# Patient Record
Sex: MALE | Race: WHITE | NOT HISPANIC OR LATINO | Employment: OTHER | URBAN - METROPOLITAN AREA
[De-identification: names, ages, dates, MRNs, and addresses within clinical notes are randomized per-mention and may not be internally consistent; named-entity substitution may affect disease eponyms.]

---

## 2017-05-15 ENCOUNTER — COMMUNICATION - HEALTHEAST (OUTPATIENT)
Dept: INTERNAL MEDICINE | Facility: CLINIC | Age: 62
End: 2017-05-15

## 2017-05-15 ENCOUNTER — OFFICE VISIT - HEALTHEAST (OUTPATIENT)
Dept: INTERNAL MEDICINE | Facility: CLINIC | Age: 62
End: 2017-05-15

## 2017-05-15 DIAGNOSIS — I10 BENIGN ESSENTIAL HTN: ICD-10-CM

## 2017-05-15 DIAGNOSIS — M10.9 GOUTY ARTHROPATHY: ICD-10-CM

## 2017-05-15 DIAGNOSIS — E11.9 TYPE 2 DIABETES MELLITUS (H): ICD-10-CM

## 2017-05-15 DIAGNOSIS — Z91.199 COMPLIANCE POOR: ICD-10-CM

## 2017-05-15 LAB — HBA1C MFR BLD: 10.7 % (ref 3.5–6)

## 2017-05-15 ASSESSMENT — MIFFLIN-ST. JEOR: SCORE: 1743.34

## 2017-05-17 ENCOUNTER — COMMUNICATION - HEALTHEAST (OUTPATIENT)
Dept: INTERNAL MEDICINE | Facility: CLINIC | Age: 62
End: 2017-05-17

## 2017-05-20 ENCOUNTER — COMMUNICATION - HEALTHEAST (OUTPATIENT)
Dept: INTERNAL MEDICINE | Facility: CLINIC | Age: 62
End: 2017-05-20

## 2017-05-20 DIAGNOSIS — M10.9 GOUT: ICD-10-CM

## 2017-05-31 ENCOUNTER — OFFICE VISIT - HEALTHEAST (OUTPATIENT)
Dept: INTERNAL MEDICINE | Facility: CLINIC | Age: 62
End: 2017-05-31

## 2017-05-31 DIAGNOSIS — E11.9 TYPE 2 DIABETES MELLITUS WITHOUT COMPLICATION, WITHOUT LONG-TERM CURRENT USE OF INSULIN (H): ICD-10-CM

## 2017-05-31 DIAGNOSIS — Z91.199 COMPLIANCE POOR: ICD-10-CM

## 2017-05-31 DIAGNOSIS — M10.9 GOUTY ARTHROPATHY: ICD-10-CM

## 2017-05-31 DIAGNOSIS — I10 BENIGN ESSENTIAL HTN: ICD-10-CM

## 2017-06-15 ENCOUNTER — COMMUNICATION - HEALTHEAST (OUTPATIENT)
Dept: INTERNAL MEDICINE | Facility: CLINIC | Age: 62
End: 2017-06-15

## 2017-07-24 ENCOUNTER — OFFICE VISIT - HEALTHEAST (OUTPATIENT)
Dept: INTERNAL MEDICINE | Facility: CLINIC | Age: 62
End: 2017-07-24

## 2017-07-24 ENCOUNTER — COMMUNICATION - HEALTHEAST (OUTPATIENT)
Dept: INTERNAL MEDICINE | Facility: CLINIC | Age: 62
End: 2017-07-24

## 2017-07-24 DIAGNOSIS — E11.9 TYPE 2 DIABETES MELLITUS WITHOUT COMPLICATION, WITHOUT LONG-TERM CURRENT USE OF INSULIN (H): ICD-10-CM

## 2017-07-24 DIAGNOSIS — M10.9 GOUTY ARTHROPATHY: ICD-10-CM

## 2017-07-24 DIAGNOSIS — Z91.199 COMPLIANCE POOR: ICD-10-CM

## 2017-07-24 DIAGNOSIS — I10 BENIGN ESSENTIAL HTN: ICD-10-CM

## 2017-07-24 DIAGNOSIS — K63.5 COLON POLYPS: ICD-10-CM

## 2017-07-24 LAB
HBA1C MFR BLD: 7.5 % (ref 3.5–6)
LDLC SERPL CALC-MCNC: 84 MG/DL

## 2017-09-04 ENCOUNTER — COMMUNICATION - HEALTHEAST (OUTPATIENT)
Dept: INTERNAL MEDICINE | Facility: CLINIC | Age: 62
End: 2017-09-04

## 2017-09-04 DIAGNOSIS — I10 BENIGN ESSENTIAL HTN: ICD-10-CM

## 2017-09-18 ENCOUNTER — COMMUNICATION - HEALTHEAST (OUTPATIENT)
Dept: INTERNAL MEDICINE | Facility: CLINIC | Age: 62
End: 2017-09-18

## 2017-09-18 DIAGNOSIS — E11.9 TYPE 2 DIABETES MELLITUS WITHOUT COMPLICATION, WITHOUT LONG-TERM CURRENT USE OF INSULIN (H): ICD-10-CM

## 2017-09-19 RX ORDER — ATORVASTATIN CALCIUM 10 MG/1
TABLET, FILM COATED ORAL
Qty: 90 TABLET | Refills: 0 | Status: ON HOLD | OUTPATIENT
Start: 2017-09-19 | End: 2021-01-01

## 2017-10-10 ENCOUNTER — OFFICE VISIT - HEALTHEAST (OUTPATIENT)
Dept: INTERNAL MEDICINE | Facility: CLINIC | Age: 62
End: 2017-10-10

## 2017-10-10 DIAGNOSIS — I10 BENIGN ESSENTIAL HTN: ICD-10-CM

## 2017-10-10 DIAGNOSIS — L72.9 SKIN CYST: ICD-10-CM

## 2017-10-10 DIAGNOSIS — E78.5 HYPERLIPEMIA: ICD-10-CM

## 2017-10-10 DIAGNOSIS — Z23 NEED FOR INFLUENZA VACCINATION: ICD-10-CM

## 2017-10-10 DIAGNOSIS — E11.9 TYPE 2 DIABETES MELLITUS WITHOUT COMPLICATION, WITHOUT LONG-TERM CURRENT USE OF INSULIN (H): ICD-10-CM

## 2017-10-10 RX ORDER — CLONIDINE HYDROCHLORIDE 0.2 MG/1
TABLET ORAL
Qty: 90 TABLET | Refills: 3 | Status: SHIPPED
Start: 2017-10-10

## 2017-10-10 RX ORDER — SIMVASTATIN 20 MG
20 TABLET ORAL EVERY EVENING
Qty: 90 TABLET | Refills: 3 | Status: ON HOLD | OUTPATIENT
Start: 2017-10-10 | End: 2021-01-01

## 2017-10-10 ASSESSMENT — MIFFLIN-ST. JEOR: SCORE: 1772.37

## 2017-11-08 ENCOUNTER — COMMUNICATION - HEALTHEAST (OUTPATIENT)
Dept: INTERNAL MEDICINE | Facility: CLINIC | Age: 62
End: 2017-11-08

## 2017-11-08 DIAGNOSIS — M10.9 GOUT: ICD-10-CM

## 2018-05-04 ENCOUNTER — COMMUNICATION - HEALTHEAST (OUTPATIENT)
Dept: INTERNAL MEDICINE | Facility: CLINIC | Age: 63
End: 2018-05-04

## 2018-05-04 RX ORDER — INDOMETHACIN 50 MG/1
50 CAPSULE ORAL 2 TIMES DAILY PRN
Qty: 60 CAPSULE | Refills: 6 | Status: ON HOLD | OUTPATIENT
Start: 2018-05-04 | End: 2021-01-01

## 2018-06-14 ENCOUNTER — COMMUNICATION - HEALTHEAST (OUTPATIENT)
Dept: INTERNAL MEDICINE | Facility: CLINIC | Age: 63
End: 2018-06-14

## 2018-06-14 DIAGNOSIS — M10.9 GOUT: ICD-10-CM

## 2018-06-14 DIAGNOSIS — E11.9 TYPE 2 DIABETES MELLITUS (H): ICD-10-CM

## 2018-06-17 RX ORDER — GLIPIZIDE 10 MG/1
TABLET ORAL
Qty: 90 TABLET | Refills: 0 | Status: ON HOLD | OUTPATIENT
Start: 2018-06-17 | End: 2021-01-01

## 2018-07-25 ENCOUNTER — COMMUNICATION - HEALTHEAST (OUTPATIENT)
Dept: INTERNAL MEDICINE | Facility: CLINIC | Age: 63
End: 2018-07-25

## 2018-07-25 DIAGNOSIS — I10 BENIGN ESSENTIAL HTN: ICD-10-CM

## 2018-07-25 RX ORDER — AMLODIPINE BESYLATE 10 MG/1
TABLET ORAL
Qty: 90 TABLET | Refills: 0 | Status: SHIPPED | OUTPATIENT
Start: 2018-07-25

## 2018-07-27 ENCOUNTER — COMMUNICATION - HEALTHEAST (OUTPATIENT)
Dept: INTERNAL MEDICINE | Facility: CLINIC | Age: 63
End: 2018-07-27

## 2018-07-27 DIAGNOSIS — E11.9 TYPE 2 DIABETES MELLITUS (H): ICD-10-CM

## 2018-08-06 ENCOUNTER — COMMUNICATION - HEALTHEAST (OUTPATIENT)
Dept: INTERNAL MEDICINE | Facility: CLINIC | Age: 63
End: 2018-08-06

## 2018-08-06 DIAGNOSIS — E11.9 TYPE 2 DIABETES MELLITUS (H): ICD-10-CM

## 2018-09-07 ENCOUNTER — COMMUNICATION - HEALTHEAST (OUTPATIENT)
Dept: INTERNAL MEDICINE | Facility: CLINIC | Age: 63
End: 2018-09-07

## 2018-09-10 RX ORDER — LISINOPRIL AND HYDROCHLOROTHIAZIDE 20; 25 MG/1; MG/1
TABLET ORAL
Qty: 90 TABLET | Refills: 3 | Status: ON HOLD | OUTPATIENT
Start: 2018-09-10 | End: 2021-01-01

## 2018-09-13 ENCOUNTER — COMMUNICATION - HEALTHEAST (OUTPATIENT)
Dept: INTERNAL MEDICINE | Facility: CLINIC | Age: 63
End: 2018-09-13

## 2018-10-01 ENCOUNTER — COMMUNICATION - HEALTHEAST (OUTPATIENT)
Dept: INTERNAL MEDICINE | Facility: CLINIC | Age: 63
End: 2018-10-01

## 2018-10-01 DIAGNOSIS — I10 BENIGN ESSENTIAL HTN: ICD-10-CM

## 2018-10-02 RX ORDER — CLONIDINE HYDROCHLORIDE 0.2 MG/1
0.2 TABLET ORAL DAILY
Qty: 90 TABLET | Refills: 0 | Status: ON HOLD | OUTPATIENT
Start: 2018-10-02 | End: 2021-01-01

## 2019-01-24 ENCOUNTER — OFFICE VISIT - HEALTHEAST (OUTPATIENT)
Dept: INTERNAL MEDICINE | Facility: CLINIC | Age: 64
End: 2019-01-24

## 2019-01-24 DIAGNOSIS — E11.9 TYPE 2 DIABETES MELLITUS WITHOUT COMPLICATION, WITHOUT LONG-TERM CURRENT USE OF INSULIN (H): ICD-10-CM

## 2019-01-24 DIAGNOSIS — Z98.890 H/O CORONARY ANGIOGRAM: ICD-10-CM

## 2019-01-24 DIAGNOSIS — R94.31 NONSPECIFIC ABNORMAL ELECTROCARDIOGRAM (ECG) (EKG): ICD-10-CM

## 2019-01-24 DIAGNOSIS — G62.9 NEUROPATHY: ICD-10-CM

## 2019-01-24 DIAGNOSIS — M10.9 GOUTY ARTHROPATHY: ICD-10-CM

## 2019-01-24 DIAGNOSIS — I10 BENIGN ESSENTIAL HTN: ICD-10-CM

## 2019-01-24 LAB
ALBUMIN SERPL-MCNC: 3.5 G/DL (ref 3.5–5)
ALP SERPL-CCNC: 90 U/L (ref 45–120)
ALT SERPL W P-5'-P-CCNC: 15 U/L (ref 0–45)
ANION GAP SERPL CALCULATED.3IONS-SCNC: 12 MMOL/L (ref 5–18)
AST SERPL W P-5'-P-CCNC: 11 U/L (ref 0–40)
BILIRUB SERPL-MCNC: 0.5 MG/DL (ref 0–1)
BUN SERPL-MCNC: 11 MG/DL (ref 8–22)
CALCIUM SERPL-MCNC: 9.1 MG/DL (ref 8.5–10.5)
CHLORIDE BLD-SCNC: 100 MMOL/L (ref 98–107)
CO2 SERPL-SCNC: 26 MMOL/L (ref 22–31)
CREAT SERPL-MCNC: 1.01 MG/DL (ref 0.7–1.3)
CREAT UR-MCNC: 91.8 MG/DL
ERYTHROCYTE [DISTWIDTH] IN BLOOD BY AUTOMATED COUNT: 11.3 % (ref 11–14.5)
GFR SERPL CREATININE-BSD FRML MDRD: >60 ML/MIN/1.73M2
GLUCOSE BLD-MCNC: 381 MG/DL (ref 70–125)
HBA1C MFR BLD: 9.1 % (ref 3.5–6)
HCT VFR BLD AUTO: 40.8 % (ref 40–54)
HGB BLD-MCNC: 14 G/DL (ref 14–18)
LDLC SERPL CALC-MCNC: 88 MG/DL
MCH RBC QN AUTO: 29.2 PG (ref 27–34)
MCHC RBC AUTO-ENTMCNC: 34.3 G/DL (ref 32–36)
MCV RBC AUTO: 85 FL (ref 80–100)
MICROALBUMIN UR-MCNC: 13.12 MG/DL (ref 0–1.99)
MICROALBUMIN/CREAT UR: 142.9 MG/G
PLATELET # BLD AUTO: 159 THOU/UL (ref 140–440)
PMV BLD AUTO: 8.7 FL (ref 7–10)
POTASSIUM BLD-SCNC: 4.1 MMOL/L (ref 3.5–5)
PROT SERPL-MCNC: 6.2 G/DL (ref 6–8)
RBC # BLD AUTO: 4.8 MILL/UL (ref 4.4–6.2)
SODIUM SERPL-SCNC: 138 MMOL/L (ref 136–145)
WBC: 6.5 THOU/UL (ref 4–11)

## 2019-01-24 RX ORDER — GABAPENTIN 300 MG/1
300 CAPSULE ORAL
Qty: 30 CAPSULE | Refills: 0 | Status: ON HOLD | OUTPATIENT
Start: 2019-01-24 | End: 2021-01-01

## 2019-01-25 ENCOUNTER — COMMUNICATION - HEALTHEAST (OUTPATIENT)
Dept: INTERNAL MEDICINE | Facility: CLINIC | Age: 64
End: 2019-01-25

## 2019-01-29 ENCOUNTER — COMMUNICATION - HEALTHEAST (OUTPATIENT)
Dept: PHARMACY | Facility: CLINIC | Age: 64
End: 2019-01-29

## 2019-01-31 ENCOUNTER — COMMUNICATION - HEALTHEAST (OUTPATIENT)
Dept: PHARMACY | Facility: CLINIC | Age: 64
End: 2019-01-31

## 2019-05-03 ENCOUNTER — COMMUNICATION - HEALTHEAST (OUTPATIENT)
Dept: INTERNAL MEDICINE | Facility: CLINIC | Age: 64
End: 2019-05-03

## 2019-06-09 ENCOUNTER — COMMUNICATION - HEALTHEAST (OUTPATIENT)
Dept: INTERNAL MEDICINE | Facility: CLINIC | Age: 64
End: 2019-06-09

## 2019-06-09 DIAGNOSIS — M10.9 GOUT: ICD-10-CM

## 2019-06-09 DIAGNOSIS — I10 BENIGN ESSENTIAL HTN: ICD-10-CM

## 2019-06-09 DIAGNOSIS — E11.9 TYPE 2 DIABETES MELLITUS (H): ICD-10-CM

## 2019-06-10 RX ORDER — METOPROLOL SUCCINATE 100 MG/1
TABLET, EXTENDED RELEASE ORAL
Qty: 90 TABLET | Refills: 3 | Status: SHIPPED | OUTPATIENT
Start: 2019-06-10

## 2019-06-10 RX ORDER — GLIPIZIDE 5 MG/1
TABLET ORAL
Qty: 90 TABLET | Refills: 3 | Status: ON HOLD | OUTPATIENT
Start: 2019-06-10 | End: 2021-01-01

## 2019-06-10 RX ORDER — ALLOPURINOL 300 MG/1
TABLET ORAL
Qty: 100 TABLET | Refills: 3 | Status: SHIPPED | OUTPATIENT
Start: 2019-06-10

## 2019-06-28 ENCOUNTER — COMMUNICATION - HEALTHEAST (OUTPATIENT)
Dept: INTERNAL MEDICINE | Facility: CLINIC | Age: 64
End: 2019-06-28

## 2020-01-02 ENCOUNTER — COMMUNICATION - HEALTHEAST (OUTPATIENT)
Dept: INTERNAL MEDICINE | Facility: CLINIC | Age: 65
End: 2020-01-02

## 2020-01-02 DIAGNOSIS — I10 BENIGN ESSENTIAL HTN: ICD-10-CM

## 2020-01-06 RX ORDER — CLONIDINE HYDROCHLORIDE 0.2 MG/1
TABLET ORAL
Qty: 30 TABLET | Refills: 0 | Status: ON HOLD | OUTPATIENT
Start: 2020-01-06 | End: 2021-01-01

## 2020-09-28 ENCOUNTER — COMMUNICATION - HEALTHEAST (OUTPATIENT)
Dept: INTERNAL MEDICINE | Facility: CLINIC | Age: 65
End: 2020-09-28

## 2020-09-28 DIAGNOSIS — I10 BENIGN ESSENTIAL HTN: ICD-10-CM

## 2021-01-01 ENCOUNTER — APPOINTMENT (OUTPATIENT)
Dept: GENERAL RADIOLOGY | Facility: CLINIC | Age: 66
DRG: 207 | End: 2021-01-01
Attending: INTERNAL MEDICINE
Payer: COMMERCIAL

## 2021-01-01 ENCOUNTER — RECORDS - HEALTHEAST (OUTPATIENT)
Dept: ADMINISTRATIVE | Facility: CLINIC | Age: 66
End: 2021-01-01

## 2021-01-01 ENCOUNTER — APPOINTMENT (OUTPATIENT)
Dept: CARDIOLOGY | Facility: CLINIC | Age: 66
DRG: 207 | End: 2021-01-01
Attending: PHYSICIAN ASSISTANT
Payer: COMMERCIAL

## 2021-01-01 ENCOUNTER — APPOINTMENT (OUTPATIENT)
Dept: CT IMAGING | Facility: CLINIC | Age: 66
DRG: 207 | End: 2021-01-01
Attending: EMERGENCY MEDICINE
Payer: COMMERCIAL

## 2021-01-01 ENCOUNTER — APPOINTMENT (OUTPATIENT)
Dept: CT IMAGING | Facility: CLINIC | Age: 66
DRG: 207 | End: 2021-01-01
Attending: ANESTHESIOLOGY
Payer: COMMERCIAL

## 2021-01-01 ENCOUNTER — HOSPITAL ENCOUNTER (INPATIENT)
Facility: CLINIC | Age: 66
LOS: 19 days | DRG: 207 | End: 2021-12-04
Attending: EMERGENCY MEDICINE | Admitting: HOSPITALIST
Payer: COMMERCIAL

## 2021-01-01 ENCOUNTER — ANESTHESIA EVENT (OUTPATIENT)
Dept: INTENSIVE CARE | Facility: CLINIC | Age: 66
DRG: 207 | End: 2021-01-01
Payer: COMMERCIAL

## 2021-01-01 ENCOUNTER — APPOINTMENT (OUTPATIENT)
Dept: CT IMAGING | Facility: CLINIC | Age: 66
DRG: 871 | End: 2021-01-01
Attending: PHYSICIAN ASSISTANT
Payer: COMMERCIAL

## 2021-01-01 ENCOUNTER — RESULTS ONLY (OUTPATIENT)
Facility: CLINIC | Age: 66
End: 2021-01-01
Payer: COMMERCIAL

## 2021-01-01 ENCOUNTER — ANESTHESIA (OUTPATIENT)
Dept: INTENSIVE CARE | Facility: CLINIC | Age: 66
DRG: 207 | End: 2021-01-01
Payer: COMMERCIAL

## 2021-01-01 ENCOUNTER — APPOINTMENT (OUTPATIENT)
Dept: GENERAL RADIOLOGY | Facility: CLINIC | Age: 66
DRG: 207 | End: 2021-01-01
Attending: EMERGENCY MEDICINE
Payer: COMMERCIAL

## 2021-01-01 ENCOUNTER — APPOINTMENT (OUTPATIENT)
Dept: CT IMAGING | Facility: CLINIC | Age: 66
DRG: 207 | End: 2021-01-01
Attending: HOSPITALIST
Payer: COMMERCIAL

## 2021-01-01 ENCOUNTER — APPOINTMENT (OUTPATIENT)
Dept: ULTRASOUND IMAGING | Facility: CLINIC | Age: 66
DRG: 207 | End: 2021-01-01
Attending: INTERNAL MEDICINE
Payer: COMMERCIAL

## 2021-01-01 ENCOUNTER — APPOINTMENT (OUTPATIENT)
Dept: PHYSICAL THERAPY | Facility: CLINIC | Age: 66
DRG: 207 | End: 2021-01-01
Attending: SURGERY
Payer: COMMERCIAL

## 2021-01-01 ENCOUNTER — HOSPITAL ENCOUNTER (INPATIENT)
Facility: CLINIC | Age: 66
LOS: 2 days | Discharge: HOME | DRG: 871 | End: 2021-07-02
Attending: PHYSICIAN ASSISTANT | Admitting: INTERNAL MEDICINE
Payer: COMMERCIAL

## 2021-01-01 ENCOUNTER — APPOINTMENT (OUTPATIENT)
Dept: GENERAL RADIOLOGY | Facility: CLINIC | Age: 66
DRG: 871 | End: 2021-01-01
Attending: PHYSICIAN ASSISTANT
Payer: COMMERCIAL

## 2021-01-01 ENCOUNTER — APPOINTMENT (OUTPATIENT)
Dept: SPEECH THERAPY | Facility: CLINIC | Age: 66
DRG: 207 | End: 2021-01-01
Attending: INTERNAL MEDICINE
Payer: COMMERCIAL

## 2021-01-01 VITALS
OXYGEN SATURATION: 98 % | DIASTOLIC BLOOD PRESSURE: 70 MMHG | HEART RATE: 65 BPM | RESPIRATION RATE: 16 BRPM | HEIGHT: 72 IN | TEMPERATURE: 98.3 F | WEIGHT: 186.3 LBS | SYSTOLIC BLOOD PRESSURE: 138 MMHG | BODY MASS INDEX: 25.23 KG/M2

## 2021-01-01 VITALS — BODY MASS INDEX: 27.8 KG/M2 | WEIGHT: 205 LBS

## 2021-01-01 VITALS — WEIGHT: 204 LBS | HEIGHT: 72 IN | BODY MASS INDEX: 27.63 KG/M2

## 2021-01-01 VITALS
DIASTOLIC BLOOD PRESSURE: 54 MMHG | SYSTOLIC BLOOD PRESSURE: 151 MMHG | TEMPERATURE: 98.3 F | RESPIRATION RATE: 25 BRPM | WEIGHT: 199.3 LBS | BODY MASS INDEX: 27.03 KG/M2

## 2021-01-01 VITALS — BODY MASS INDEX: 27.94 KG/M2 | WEIGHT: 206 LBS

## 2021-01-01 VITALS — BODY MASS INDEX: 28.5 KG/M2 | WEIGHT: 210.4 LBS | HEIGHT: 72 IN

## 2021-01-01 VITALS — BODY MASS INDEX: 27.11 KG/M2 | WEIGHT: 199.9 LBS

## 2021-01-01 DIAGNOSIS — R11.10 VOMITING AND DIARRHEA: ICD-10-CM

## 2021-01-01 DIAGNOSIS — Z00.6 RESEARCH STUDY PATIENT: Primary | ICD-10-CM

## 2021-01-01 DIAGNOSIS — R53.81 PHYSICAL DECONDITIONING: ICD-10-CM

## 2021-01-01 DIAGNOSIS — E87.20 LACTIC ACIDOSIS: ICD-10-CM

## 2021-01-01 DIAGNOSIS — R19.7 VOMITING AND DIARRHEA: ICD-10-CM

## 2021-01-01 DIAGNOSIS — J18.9 COMMUNITY ACQUIRED BILATERAL LOWER LOBE PNEUMONIA: ICD-10-CM

## 2021-01-01 DIAGNOSIS — E78.5 HYPERLIPIDEMIA LDL GOAL <100: Primary | ICD-10-CM

## 2021-01-01 DIAGNOSIS — U07.1 INFECTION DUE TO 2019 NOVEL CORONAVIRUS: ICD-10-CM

## 2021-01-01 DIAGNOSIS — M62.81 GENERALIZED MUSCLE WEAKNESS: ICD-10-CM

## 2021-01-01 DIAGNOSIS — N17.9 AKI (ACUTE KIDNEY INJURY) (H): ICD-10-CM

## 2021-01-01 LAB
ABO/RH(D): NORMAL
ABO/RH(D): NORMAL
ALBUMIN SERPL-MCNC: 1.1 G/DL (ref 3.4–5)
ALBUMIN SERPL-MCNC: 1.2 G/DL (ref 3.4–5)
ALBUMIN SERPL-MCNC: 1.2 G/DL (ref 3.4–5)
ALBUMIN SERPL-MCNC: 1.3 G/DL (ref 3.4–5)
ALBUMIN SERPL-MCNC: 1.4 G/DL (ref 3.4–5)
ALBUMIN SERPL-MCNC: 1.9 G/DL (ref 3.4–5)
ALBUMIN SERPL-MCNC: 2.7 G/DL (ref 3.4–5)
ALBUMIN SERPL-MCNC: 2.7 G/DL (ref 3.4–5)
ALBUMIN SERPL-MCNC: 2.8 G/DL (ref 3.4–5)
ALBUMIN SERPL-MCNC: 3.5 G/DL (ref 3.4–5)
ALBUMIN UR-MCNC: 100 MG/DL
ALBUMIN UR-MCNC: 50 MG/DL
ALBUMIN UR-MCNC: NEGATIVE MG/DL
ALP SERPL-CCNC: 102 U/L (ref 40–150)
ALP SERPL-CCNC: 73 U/L (ref 40–150)
ALP SERPL-CCNC: 75 U/L (ref 40–150)
ALP SERPL-CCNC: 78 U/L (ref 40–150)
ALP SERPL-CCNC: 80 U/L (ref 40–150)
ALT SERPL W P-5'-P-CCNC: 13 U/L (ref 0–70)
ALT SERPL W P-5'-P-CCNC: 14 U/L (ref 0–70)
ALT SERPL W P-5'-P-CCNC: 15 U/L (ref 0–70)
ALT SERPL W P-5'-P-CCNC: 16 U/L (ref 0–70)
ALT SERPL W P-5'-P-CCNC: 16 U/L (ref 0–70)
AMORPH CRY #/AREA URNS HPF: ABNORMAL /HPF
ANION GAP SERPL CALCULATED.3IONS-SCNC: 10 MMOL/L (ref 3–14)
ANION GAP SERPL CALCULATED.3IONS-SCNC: 11 MMOL/L (ref 3–14)
ANION GAP SERPL CALCULATED.3IONS-SCNC: 12 MMOL/L (ref 3–14)
ANION GAP SERPL CALCULATED.3IONS-SCNC: 13 MMOL/L (ref 3–14)
ANION GAP SERPL CALCULATED.3IONS-SCNC: 13 MMOL/L (ref 3–14)
ANION GAP SERPL CALCULATED.3IONS-SCNC: 14 MMOL/L (ref 3–14)
ANION GAP SERPL CALCULATED.3IONS-SCNC: 15 MMOL/L (ref 3–14)
ANION GAP SERPL CALCULATED.3IONS-SCNC: 15 MMOL/L (ref 3–14)
ANION GAP SERPL CALCULATED.3IONS-SCNC: 16 MMOL/L (ref 3–14)
ANION GAP SERPL CALCULATED.3IONS-SCNC: 16 MMOL/L (ref 3–14)
ANION GAP SERPL CALCULATED.3IONS-SCNC: 17 MMOL/L (ref 3–14)
ANION GAP SERPL CALCULATED.3IONS-SCNC: 19 MMOL/L (ref 3–14)
ANION GAP SERPL CALCULATED.3IONS-SCNC: 19 MMOL/L (ref 3–14)
ANION GAP SERPL CALCULATED.3IONS-SCNC: 20 MMOL/L (ref 3–14)
ANION GAP SERPL CALCULATED.3IONS-SCNC: 23 MMOL/L (ref 3–14)
ANION GAP SERPL CALCULATED.3IONS-SCNC: 23 MMOL/L (ref 3–14)
ANION GAP SERPL CALCULATED.3IONS-SCNC: 24 MMOL/L (ref 3–14)
ANION GAP SERPL CALCULATED.3IONS-SCNC: 25 MMOL/L (ref 3–14)
ANION GAP SERPL CALCULATED.3IONS-SCNC: 25 MMOL/L (ref 3–14)
ANION GAP SERPL CALCULATED.3IONS-SCNC: 26 MMOL/L (ref 3–14)
ANION GAP SERPL CALCULATED.3IONS-SCNC: 4 MMOL/L (ref 3–14)
ANION GAP SERPL CALCULATED.3IONS-SCNC: 6 MMOL/L (ref 3–14)
ANION GAP SERPL CALCULATED.3IONS-SCNC: 7 MMOL/L (ref 3–14)
ANTIBODY SCREEN: NEGATIVE
APPEARANCE UR: ABNORMAL
APPEARANCE UR: CLEAR
APPEARANCE UR: CLEAR
AST SERPL W P-5'-P-CCNC: 11 U/L (ref 0–45)
AST SERPL W P-5'-P-CCNC: 21 U/L (ref 0–45)
AST SERPL W P-5'-P-CCNC: 34 U/L (ref 0–45)
AST SERPL W P-5'-P-CCNC: 40 U/L (ref 0–45)
AST SERPL W P-5'-P-CCNC: 45 U/L (ref 0–45)
ATRIAL RATE - MUSE: 104 BPM
ATRIAL RATE - MUSE: 115 BPM
ATRIAL RATE - MUSE: 159 BPM
ATRIAL RATE - MUSE: 81 BPM
ATRIAL RATE - MUSE: 84 BPM
ATRIAL RATE - MUSE: 97 BPM
BACTERIA BLD CULT: NO GROWTH
BACTERIA SPEC CULT: NO GROWTH
BACTERIA SPEC CULT: NO GROWTH
BACTERIA SPT CULT: ABNORMAL
BACTERIA SPT CULT: ABNORMAL
BACTERIA UR CULT: NO GROWTH
BASE EXCESS BLDA CALC-SCNC: -0.2 MMOL/L (ref -9–1.8)
BASE EXCESS BLDA CALC-SCNC: -0.8 MMOL/L (ref -9–1.8)
BASE EXCESS BLDA CALC-SCNC: -0.8 MMOL/L (ref -9–1.8)
BASE EXCESS BLDA CALC-SCNC: -1.3 MMOL/L (ref -9–1.8)
BASE EXCESS BLDA CALC-SCNC: -1.7 MMOL/L (ref -9–1.8)
BASE EXCESS BLDA CALC-SCNC: -11.7 MMOL/L (ref -9–1.8)
BASE EXCESS BLDA CALC-SCNC: -16.2 MMOL/L (ref -9–1.8)
BASE EXCESS BLDA CALC-SCNC: -17.1 MMOL/L (ref -9–1.8)
BASE EXCESS BLDA CALC-SCNC: -2.2 MMOL/L (ref -9–1.8)
BASE EXCESS BLDA CALC-SCNC: -2.6 MMOL/L (ref -9–1.8)
BASE EXCESS BLDA CALC-SCNC: -2.9 MMOL/L (ref -9–1.8)
BASE EXCESS BLDA CALC-SCNC: -20 MMOL/L (ref -9–1.8)
BASE EXCESS BLDA CALC-SCNC: -21.4 MMOL/L (ref -9–1.8)
BASE EXCESS BLDA CALC-SCNC: -3.3 MMOL/L (ref -9–1.8)
BASE EXCESS BLDA CALC-SCNC: -3.9 MMOL/L (ref -9–1.8)
BASE EXCESS BLDA CALC-SCNC: -4.2 MMOL/L (ref -9–1.8)
BASE EXCESS BLDA CALC-SCNC: -4.3 MMOL/L (ref -9–1.8)
BASE EXCESS BLDA CALC-SCNC: -5.2 MMOL/L (ref -9–1.8)
BASE EXCESS BLDA CALC-SCNC: -5.2 MMOL/L (ref -9–1.8)
BASE EXCESS BLDA CALC-SCNC: -6.7 MMOL/L (ref -9–1.8)
BASE EXCESS BLDA CALC-SCNC: -6.9 MMOL/L (ref -9–1.8)
BASE EXCESS BLDA CALC-SCNC: -7.6 MMOL/L (ref -9–1.8)
BASE EXCESS BLDA CALC-SCNC: -8.1 MMOL/L (ref -9–1.8)
BASE EXCESS BLDA CALC-SCNC: -8.5 MMOL/L (ref -9–1.8)
BASE EXCESS BLDA CALC-SCNC: -9 MMOL/L (ref -9–1.8)
BASE EXCESS BLDA CALC-SCNC: -9.9 MMOL/L (ref -9–1.8)
BASE EXCESS BLDA CALC-SCNC: 1.7 MMOL/L (ref -9–1.8)
BASE EXCESS BLDA CALC-SCNC: 2.6 MMOL/L (ref -9–1.8)
BASE EXCESS BLDA CALC-SCNC: 5 MMOL/L (ref -9–1.8)
BASE EXCESS BLDA CALC-SCNC: 6.8 MMOL/L (ref -9–1.8)
BASOPHILS # BLD AUTO: 0 10E3/UL (ref 0–0.2)
BASOPHILS # BLD AUTO: 0.1 10E9/L (ref 0–0.2)
BASOPHILS # BLD MANUAL: 0 10E3/UL (ref 0–0.2)
BASOPHILS NFR BLD AUTO: 0 %
BASOPHILS NFR BLD AUTO: 0.4 %
BASOPHILS NFR BLD MANUAL: 0 %
BILIRUB DIRECT SERPL-MCNC: 0.2 MG/DL (ref 0–0.2)
BILIRUB SERPL-MCNC: 0.5 MG/DL (ref 0.2–1.3)
BILIRUB SERPL-MCNC: 0.5 MG/DL (ref 0.2–1.3)
BILIRUB SERPL-MCNC: 0.6 MG/DL (ref 0.2–1.3)
BILIRUB SERPL-MCNC: 0.6 MG/DL (ref 0.2–1.3)
BILIRUB SERPL-MCNC: 0.7 MG/DL (ref 0.2–1.3)
BILIRUB UR QL STRIP: NEGATIVE
BLD PROD TYP BPU: NORMAL
BLOOD COMPONENT TYPE: NORMAL
BUN SERPL-MCNC: 10 MG/DL (ref 7–30)
BUN SERPL-MCNC: 115 MG/DL (ref 7–30)
BUN SERPL-MCNC: 125 MG/DL (ref 7–30)
BUN SERPL-MCNC: 136 MG/DL (ref 7–30)
BUN SERPL-MCNC: 18 MG/DL (ref 7–30)
BUN SERPL-MCNC: 22 MG/DL (ref 7–30)
BUN SERPL-MCNC: 27 MG/DL (ref 7–30)
BUN SERPL-MCNC: 36 MG/DL (ref 7–30)
BUN SERPL-MCNC: 39 MG/DL (ref 7–30)
BUN SERPL-MCNC: 42 MG/DL (ref 7–30)
BUN SERPL-MCNC: 51 MG/DL (ref 7–30)
BUN SERPL-MCNC: 54 MG/DL (ref 7–30)
BUN SERPL-MCNC: 57 MG/DL (ref 7–30)
BUN SERPL-MCNC: 64 MG/DL (ref 7–30)
BUN SERPL-MCNC: 69 MG/DL (ref 7–30)
BUN SERPL-MCNC: 70 MG/DL (ref 7–30)
BUN SERPL-MCNC: 73 MG/DL (ref 7–30)
BUN SERPL-MCNC: 74 MG/DL (ref 7–30)
BUN SERPL-MCNC: 75 MG/DL (ref 7–30)
BUN SERPL-MCNC: 79 MG/DL (ref 7–30)
BUN SERPL-MCNC: 82 MG/DL (ref 7–30)
BUN SERPL-MCNC: 82 MG/DL (ref 7–30)
BUN SERPL-MCNC: 84 MG/DL (ref 7–30)
BUN SERPL-MCNC: 85 MG/DL (ref 7–30)
BUN SERPL-MCNC: 85 MG/DL (ref 7–30)
BUN SERPL-MCNC: 86 MG/DL (ref 7–30)
BUN SERPL-MCNC: 91 MG/DL (ref 7–30)
BUN SERPL-MCNC: 99 MG/DL (ref 7–30)
BURR CELLS BLD QL SMEAR: SLIGHT
C COLI+JEJUNI+LARI FUSA STL QL NAA+PROBE: NOT DETECTED
CALCIUM SERPL-MCNC: 6.3 MG/DL (ref 8.5–10.1)
CALCIUM SERPL-MCNC: 6.5 MG/DL (ref 8.5–10.1)
CALCIUM SERPL-MCNC: 6.6 MG/DL (ref 8.5–10.1)
CALCIUM SERPL-MCNC: 6.6 MG/DL (ref 8.5–10.1)
CALCIUM SERPL-MCNC: 6.7 MG/DL (ref 8.5–10.1)
CALCIUM SERPL-MCNC: 6.9 MG/DL (ref 8.5–10.1)
CALCIUM SERPL-MCNC: 7 MG/DL (ref 8.5–10.1)
CALCIUM SERPL-MCNC: 7 MG/DL (ref 8.5–10.1)
CALCIUM SERPL-MCNC: 7.1 MG/DL (ref 8.5–10.1)
CALCIUM SERPL-MCNC: 7.2 MG/DL (ref 8.5–10.1)
CALCIUM SERPL-MCNC: 7.2 MG/DL (ref 8.5–10.1)
CALCIUM SERPL-MCNC: 7.5 MG/DL (ref 8.5–10.1)
CALCIUM SERPL-MCNC: 7.5 MG/DL (ref 8.5–10.1)
CALCIUM SERPL-MCNC: 7.6 MG/DL (ref 8.5–10.1)
CALCIUM SERPL-MCNC: 7.8 MG/DL (ref 8.5–10.1)
CALCIUM SERPL-MCNC: 7.9 MG/DL (ref 8.5–10.1)
CALCIUM SERPL-MCNC: 7.9 MG/DL (ref 8.5–10.1)
CALCIUM SERPL-MCNC: 8 MG/DL (ref 8.5–10.1)
CALCIUM SERPL-MCNC: 8 MG/DL (ref 8.5–10.1)
CALCIUM SERPL-MCNC: 8.1 MG/DL (ref 8.5–10.1)
CALCIUM SERPL-MCNC: 8.1 MG/DL (ref 8.5–10.1)
CALCIUM SERPL-MCNC: 8.3 MG/DL (ref 8.5–10.1)
CALCIUM SERPL-MCNC: 8.8 MG/DL (ref 8.5–10.1)
CHLORIDE BLD-SCNC: 100 MMOL/L (ref 94–109)
CHLORIDE BLD-SCNC: 102 MMOL/L (ref 94–109)
CHLORIDE BLD-SCNC: 103 MMOL/L (ref 94–109)
CHLORIDE BLD-SCNC: 104 MMOL/L (ref 94–109)
CHLORIDE BLD-SCNC: 105 MMOL/L (ref 94–109)
CHLORIDE BLD-SCNC: 106 MMOL/L (ref 94–109)
CHLORIDE BLD-SCNC: 108 MMOL/L (ref 94–109)
CHLORIDE BLD-SCNC: 108 MMOL/L (ref 94–109)
CHLORIDE BLD-SCNC: 109 MMOL/L (ref 94–109)
CHLORIDE BLD-SCNC: 99 MMOL/L (ref 94–109)
CHLORIDE SERPL-SCNC: 103 MMOL/L (ref 94–109)
CHLORIDE SERPL-SCNC: 109 MMOL/L (ref 94–109)
CHLORIDE SERPL-SCNC: 111 MMOL/L (ref 94–109)
CK SERPL-CCNC: 105 U/L (ref 30–300)
CK SERPL-CCNC: 185 U/L (ref 30–300)
CK SERPL-CCNC: 218 U/L (ref 30–300)
CO2 SERPL-SCNC: 10 MMOL/L (ref 20–32)
CO2 SERPL-SCNC: 10 MMOL/L (ref 20–32)
CO2 SERPL-SCNC: 11 MMOL/L (ref 20–32)
CO2 SERPL-SCNC: 14 MMOL/L (ref 20–32)
CO2 SERPL-SCNC: 14 MMOL/L (ref 20–32)
CO2 SERPL-SCNC: 16 MMOL/L (ref 20–32)
CO2 SERPL-SCNC: 17 MMOL/L (ref 20–32)
CO2 SERPL-SCNC: 18 MMOL/L (ref 20–32)
CO2 SERPL-SCNC: 19 MMOL/L (ref 20–32)
CO2 SERPL-SCNC: 19 MMOL/L (ref 20–32)
CO2 SERPL-SCNC: 20 MMOL/L (ref 20–32)
CO2 SERPL-SCNC: 21 MMOL/L (ref 20–32)
CO2 SERPL-SCNC: 22 MMOL/L (ref 20–32)
CO2 SERPL-SCNC: 23 MMOL/L (ref 20–32)
CO2 SERPL-SCNC: 24 MMOL/L (ref 20–32)
CO2 SERPL-SCNC: 25 MMOL/L (ref 20–32)
CO2 SERPL-SCNC: 25 MMOL/L (ref 20–32)
CO2 SERPL-SCNC: 26 MMOL/L (ref 20–32)
CO2 SERPL-SCNC: 26 MMOL/L (ref 20–32)
CO2 SERPL-SCNC: 27 MMOL/L (ref 20–32)
CODING SYSTEM: NORMAL
COLOR UR AUTO: ABNORMAL
COLOR UR AUTO: YELLOW
COLOR UR AUTO: YELLOW
CREAT SERPL-MCNC: 0.82 MG/DL (ref 0.66–1.25)
CREAT SERPL-MCNC: 1.08 MG/DL (ref 0.66–1.25)
CREAT SERPL-MCNC: 1.22 MG/DL (ref 0.66–1.25)
CREAT SERPL-MCNC: 1.27 MG/DL (ref 0.66–1.25)
CREAT SERPL-MCNC: 1.29 MG/DL (ref 0.66–1.25)
CREAT SERPL-MCNC: 1.34 MG/DL (ref 0.66–1.25)
CREAT SERPL-MCNC: 1.37 MG/DL (ref 0.66–1.25)
CREAT SERPL-MCNC: 1.57 MG/DL (ref 0.66–1.25)
CREAT SERPL-MCNC: 1.83 MG/DL (ref 0.66–1.25)
CREAT SERPL-MCNC: 1.9 MG/DL (ref 0.66–1.25)
CREAT SERPL-MCNC: 1.96 MG/DL (ref 0.66–1.25)
CREAT SERPL-MCNC: 2.31 MG/DL (ref 0.66–1.25)
CREAT SERPL-MCNC: 2.41 MG/DL (ref 0.66–1.25)
CREAT SERPL-MCNC: 2.42 MG/DL (ref 0.66–1.25)
CREAT SERPL-MCNC: 2.44 MG/DL (ref 0.66–1.25)
CREAT SERPL-MCNC: 2.45 MG/DL (ref 0.66–1.25)
CREAT SERPL-MCNC: 2.56 MG/DL (ref 0.66–1.25)
CREAT SERPL-MCNC: 2.61 MG/DL (ref 0.66–1.25)
CREAT SERPL-MCNC: 2.65 MG/DL (ref 0.66–1.25)
CREAT SERPL-MCNC: 2.84 MG/DL (ref 0.66–1.25)
CREAT SERPL-MCNC: 2.89 MG/DL (ref 0.66–1.25)
CREAT SERPL-MCNC: 2.89 MG/DL (ref 0.66–1.25)
CREAT SERPL-MCNC: 2.96 MG/DL (ref 0.66–1.25)
CREAT SERPL-MCNC: 3.02 MG/DL (ref 0.66–1.25)
CREAT SERPL-MCNC: 3.05 MG/DL (ref 0.66–1.25)
CREAT SERPL-MCNC: 3.13 MG/DL (ref 0.66–1.25)
CREAT SERPL-MCNC: 3.8 MG/DL (ref 0.66–1.25)
CREAT SERPL-MCNC: 3.83 MG/DL (ref 0.66–1.25)
CREAT SERPL-MCNC: 4.71 MG/DL (ref 0.66–1.25)
CREAT SERPL-MCNC: 4.8 MG/DL (ref 0.66–1.25)
CREAT SERPL-MCNC: 4.89 MG/DL (ref 0.66–1.25)
CROSSMATCH: NORMAL
CRP SERPL-MCNC: 110 MG/L (ref 0–8)
CRP SERPL-MCNC: 111 MG/L (ref 0–8)
CRP SERPL-MCNC: 127 MG/L (ref 0–8)
CRP SERPL-MCNC: 156 MG/L (ref 0–8)
CRP SERPL-MCNC: 208 MG/L (ref 0–8)
CRP SERPL-MCNC: 22.9 MG/L (ref 0–8)
CRP SERPL-MCNC: 256 MG/L (ref 0–8)
CRP SERPL-MCNC: 34.4 MG/L (ref 0–8)
CRP SERPL-MCNC: 56.5 MG/L (ref 0–8)
CRP SERPL-MCNC: 76.3 MG/L (ref 0–8)
D DIMER PPP FEU-MCNC: 0.83 UG/ML FEU (ref 0–0.5)
D DIMER PPP FEU-MCNC: 0.98 UG/ML FEU (ref 0–0.5)
D DIMER PPP FEU-MCNC: 1.42 UG/ML FEU (ref 0–0.5)
D DIMER PPP FEU-MCNC: 10.48 UG/ML FEU (ref 0–0.5)
D DIMER PPP FEU-MCNC: 12.1 UG/ML FEU (ref 0–0.5)
D DIMER PPP FEU-MCNC: 2.19 UG/ML FEU (ref 0–0.5)
D DIMER PPP FEU-MCNC: 2.36 UG/ML FEU (ref 0–0.5)
D DIMER PPP FEU-MCNC: 3.67 UG/ML FEU (ref 0–0.5)
D DIMER PPP FEU-MCNC: 4.63 UG/ML FEU (ref 0–0.5)
D DIMER PPP FEU-MCNC: 9.6 UG/ML FEU (ref 0–0.5)
DACRYOCYTES BLD QL SMEAR: SLIGHT
DIASTOLIC BLOOD PRESSURE - MUSE: NORMAL MMHG
DIFFERENTIAL METHOD BLD: ABNORMAL
EC STX1 GENE STL QL NAA+PROBE: NOT DETECTED
EC STX2 GENE STL QL NAA+PROBE: NOT DETECTED
ENTERIC PATHOGEN COMMENT: NORMAL
EOSINOPHIL # BLD AUTO: 0 10E3/UL (ref 0–0.7)
EOSINOPHIL # BLD AUTO: 0.1 10E9/L (ref 0–0.7)
EOSINOPHIL # BLD MANUAL: 0 10E3/UL (ref 0–0.7)
EOSINOPHIL NFR BLD AUTO: 0 %
EOSINOPHIL NFR BLD AUTO: 0.2 %
EOSINOPHIL NFR BLD MANUAL: 0 %
ERYTHROCYTE [DISTWIDTH] IN BLOOD BY AUTOMATED COUNT: 12.8 % (ref 10–15)
ERYTHROCYTE [DISTWIDTH] IN BLOOD BY AUTOMATED COUNT: 12.9 % (ref 10–15)
ERYTHROCYTE [DISTWIDTH] IN BLOOD BY AUTOMATED COUNT: 13.1 % (ref 10–15)
ERYTHROCYTE [DISTWIDTH] IN BLOOD BY AUTOMATED COUNT: 13.2 % (ref 10–15)
ERYTHROCYTE [DISTWIDTH] IN BLOOD BY AUTOMATED COUNT: 13.5 % (ref 10–15)
ERYTHROCYTE [DISTWIDTH] IN BLOOD BY AUTOMATED COUNT: 13.6 % (ref 10–15)
ERYTHROCYTE [DISTWIDTH] IN BLOOD BY AUTOMATED COUNT: 13.8 % (ref 10–15)
ERYTHROCYTE [DISTWIDTH] IN BLOOD BY AUTOMATED COUNT: 14.1 % (ref 10–15)
ERYTHROCYTE [DISTWIDTH] IN BLOOD BY AUTOMATED COUNT: 14.2 % (ref 10–15)
ERYTHROCYTE [DISTWIDTH] IN BLOOD BY AUTOMATED COUNT: 14.4 % (ref 10–15)
ERYTHROCYTE [DISTWIDTH] IN BLOOD BY AUTOMATED COUNT: 14.5 % (ref 10–15)
ERYTHROCYTE [DISTWIDTH] IN BLOOD BY AUTOMATED COUNT: 14.5 % (ref 10–15)
ERYTHROCYTE [DISTWIDTH] IN BLOOD BY AUTOMATED COUNT: 14.6 % (ref 10–15)
ERYTHROCYTE [DISTWIDTH] IN BLOOD BY AUTOMATED COUNT: 14.9 % (ref 10–15)
ERYTHROCYTE [DISTWIDTH] IN BLOOD BY AUTOMATED COUNT: 14.9 % (ref 10–15)
ERYTHROCYTE [DISTWIDTH] IN BLOOD BY AUTOMATED COUNT: 15 % (ref 10–15)
ERYTHROCYTE [DISTWIDTH] IN BLOOD BY AUTOMATED COUNT: 15.2 % (ref 10–15)
ERYTHROCYTE [DISTWIDTH] IN BLOOD BY AUTOMATED COUNT: 15.2 % (ref 10–15)
ERYTHROCYTE [DISTWIDTH] IN BLOOD BY AUTOMATED COUNT: 15.4 % (ref 10–15)
ERYTHROCYTE [SEDIMENTATION RATE] IN BLOOD BY WESTERGREN METHOD: 34 MM/HR (ref 0–20)
FERRITIN SERPL-MCNC: 791 NG/ML (ref 26–388)
FIBRINOGEN PPP-MCNC: 466 MG/DL (ref 170–490)
FIBRINOGEN PPP-MCNC: 471 MG/DL (ref 170–490)
FIBRINOGEN PPP-MCNC: 572 MG/DL (ref 170–490)
FIBRINOGEN PPP-MCNC: 624 MG/DL (ref 170–490)
FIBRINOGEN PPP-MCNC: 641 MG/DL (ref 170–490)
FIBRINOGEN PPP-MCNC: 668 MG/DL (ref 170–490)
FIBRINOGEN PPP-MCNC: 718 MG/DL (ref 170–490)
FIBRINOGEN PPP-MCNC: 723 MG/DL (ref 170–490)
FOLATE SERPL-MCNC: 20.3 NG/ML
GFR SERPL CREATININE-BSD FRML MDRD: 11 ML/MIN/1.73M2
GFR SERPL CREATININE-BSD FRML MDRD: 12 ML/MIN/1.73M2
GFR SERPL CREATININE-BSD FRML MDRD: 12 ML/MIN/1.73M2
GFR SERPL CREATININE-BSD FRML MDRD: 15 ML/MIN/1.73M2
GFR SERPL CREATININE-BSD FRML MDRD: 16 ML/MIN/1.73M2
GFR SERPL CREATININE-BSD FRML MDRD: 20 ML/MIN/1.73M2
GFR SERPL CREATININE-BSD FRML MDRD: 20 ML/MIN/1.73M2
GFR SERPL CREATININE-BSD FRML MDRD: 21 ML/MIN/1.73M2
GFR SERPL CREATININE-BSD FRML MDRD: 21 ML/MIN/1.73M2
GFR SERPL CREATININE-BSD FRML MDRD: 22 ML/MIN/1.73M2
GFR SERPL CREATININE-BSD FRML MDRD: 24 ML/MIN/1.73M2
GFR SERPL CREATININE-BSD FRML MDRD: 24 ML/MIN/1.73M2
GFR SERPL CREATININE-BSD FRML MDRD: 25 ML/MIN/1.73M2
GFR SERPL CREATININE-BSD FRML MDRD: 26 ML/MIN/1.73M2
GFR SERPL CREATININE-BSD FRML MDRD: 27 ML/MIN/1.73M2
GFR SERPL CREATININE-BSD FRML MDRD: 28 ML/MIN/1.73M2
GFR SERPL CREATININE-BSD FRML MDRD: 35 ML/MIN/1.73M2
GFR SERPL CREATININE-BSD FRML MDRD: 36 ML/MIN/1.73M2
GFR SERPL CREATININE-BSD FRML MDRD: 38 ML/MIN/1.73M2
GFR SERPL CREATININE-BSD FRML MDRD: 45 ML/MIN/1.73M2
GFR SERPL CREATININE-BSD FRML MDRD: 53 ML/MIN/1.73M2
GFR SERPL CREATININE-BSD FRML MDRD: 55 ML/MIN/{1.73_M2}
GFR SERPL CREATININE-BSD FRML MDRD: 57 ML/MIN/{1.73_M2}
GFR SERPL CREATININE-BSD FRML MDRD: 58 ML/MIN/1.73M2
GFR SERPL CREATININE-BSD FRML MDRD: 61 ML/MIN/1.73M2
GFR SERPL CREATININE-BSD FRML MDRD: 71 ML/MIN/{1.73_M2}
GFR SERPL CREATININE-BSD FRML MDRD: >90 ML/MIN/{1.73_M2}
GLUCOSE BLD-MCNC: 105 MG/DL (ref 70–99)
GLUCOSE BLD-MCNC: 113 MG/DL (ref 70–99)
GLUCOSE BLD-MCNC: 132 MG/DL (ref 70–99)
GLUCOSE BLD-MCNC: 139 MG/DL (ref 70–99)
GLUCOSE BLD-MCNC: 141 MG/DL (ref 70–99)
GLUCOSE BLD-MCNC: 143 MG/DL (ref 70–99)
GLUCOSE BLD-MCNC: 144 MG/DL (ref 70–99)
GLUCOSE BLD-MCNC: 149 MG/DL (ref 70–99)
GLUCOSE BLD-MCNC: 160 MG/DL (ref 70–99)
GLUCOSE BLD-MCNC: 164 MG/DL (ref 70–99)
GLUCOSE BLD-MCNC: 168 MG/DL (ref 70–99)
GLUCOSE BLD-MCNC: 182 MG/DL (ref 70–99)
GLUCOSE BLD-MCNC: 186 MG/DL (ref 70–99)
GLUCOSE BLD-MCNC: 187 MG/DL (ref 70–99)
GLUCOSE BLD-MCNC: 191 MG/DL (ref 70–99)
GLUCOSE BLD-MCNC: 198 MG/DL (ref 70–99)
GLUCOSE BLD-MCNC: 206 MG/DL (ref 70–99)
GLUCOSE BLD-MCNC: 231 MG/DL (ref 70–99)
GLUCOSE BLD-MCNC: 237 MG/DL (ref 70–99)
GLUCOSE BLD-MCNC: 244 MG/DL (ref 70–99)
GLUCOSE BLD-MCNC: 289 MG/DL (ref 70–99)
GLUCOSE BLD-MCNC: 303 MG/DL (ref 70–99)
GLUCOSE BLD-MCNC: 324 MG/DL (ref 70–99)
GLUCOSE BLD-MCNC: 379 MG/DL (ref 70–99)
GLUCOSE BLD-MCNC: 386 MG/DL (ref 70–99)
GLUCOSE BLD-MCNC: 66 MG/DL (ref 70–99)
GLUCOSE BLD-MCNC: 98 MG/DL (ref 70–99)
GLUCOSE BLDC GLUCOMTR-MCNC: 100 MG/DL (ref 70–99)
GLUCOSE BLDC GLUCOMTR-MCNC: 101 MG/DL (ref 70–99)
GLUCOSE BLDC GLUCOMTR-MCNC: 102 MG/DL (ref 70–99)
GLUCOSE BLDC GLUCOMTR-MCNC: 105 MG/DL (ref 70–99)
GLUCOSE BLDC GLUCOMTR-MCNC: 105 MG/DL (ref 70–99)
GLUCOSE BLDC GLUCOMTR-MCNC: 106 MG/DL (ref 70–99)
GLUCOSE BLDC GLUCOMTR-MCNC: 106 MG/DL (ref 70–99)
GLUCOSE BLDC GLUCOMTR-MCNC: 107 MG/DL (ref 70–99)
GLUCOSE BLDC GLUCOMTR-MCNC: 107 MG/DL (ref 70–99)
GLUCOSE BLDC GLUCOMTR-MCNC: 110 MG/DL (ref 70–99)
GLUCOSE BLDC GLUCOMTR-MCNC: 111 MG/DL (ref 70–99)
GLUCOSE BLDC GLUCOMTR-MCNC: 111 MG/DL (ref 70–99)
GLUCOSE BLDC GLUCOMTR-MCNC: 112 MG/DL (ref 70–99)
GLUCOSE BLDC GLUCOMTR-MCNC: 113 MG/DL (ref 70–99)
GLUCOSE BLDC GLUCOMTR-MCNC: 113 MG/DL (ref 70–99)
GLUCOSE BLDC GLUCOMTR-MCNC: 114 MG/DL (ref 70–99)
GLUCOSE BLDC GLUCOMTR-MCNC: 114 MG/DL (ref 70–99)
GLUCOSE BLDC GLUCOMTR-MCNC: 115 MG/DL (ref 70–99)
GLUCOSE BLDC GLUCOMTR-MCNC: 118 MG/DL (ref 70–99)
GLUCOSE BLDC GLUCOMTR-MCNC: 118 MG/DL (ref 70–99)
GLUCOSE BLDC GLUCOMTR-MCNC: 119 MG/DL (ref 70–99)
GLUCOSE BLDC GLUCOMTR-MCNC: 119 MG/DL (ref 70–99)
GLUCOSE BLDC GLUCOMTR-MCNC: 120 MG/DL (ref 70–99)
GLUCOSE BLDC GLUCOMTR-MCNC: 121 MG/DL (ref 70–99)
GLUCOSE BLDC GLUCOMTR-MCNC: 121 MG/DL (ref 70–99)
GLUCOSE BLDC GLUCOMTR-MCNC: 122 MG/DL (ref 70–99)
GLUCOSE BLDC GLUCOMTR-MCNC: 122 MG/DL (ref 70–99)
GLUCOSE BLDC GLUCOMTR-MCNC: 123 MG/DL (ref 70–99)
GLUCOSE BLDC GLUCOMTR-MCNC: 124 MG/DL (ref 70–99)
GLUCOSE BLDC GLUCOMTR-MCNC: 125 MG/DL (ref 70–99)
GLUCOSE BLDC GLUCOMTR-MCNC: 126 MG/DL (ref 70–99)
GLUCOSE BLDC GLUCOMTR-MCNC: 126 MG/DL (ref 70–99)
GLUCOSE BLDC GLUCOMTR-MCNC: 127 MG/DL (ref 70–99)
GLUCOSE BLDC GLUCOMTR-MCNC: 128 MG/DL (ref 70–99)
GLUCOSE BLDC GLUCOMTR-MCNC: 128 MG/DL (ref 70–99)
GLUCOSE BLDC GLUCOMTR-MCNC: 129 MG/DL (ref 70–99)
GLUCOSE BLDC GLUCOMTR-MCNC: 130 MG/DL (ref 70–99)
GLUCOSE BLDC GLUCOMTR-MCNC: 132 MG/DL (ref 70–99)
GLUCOSE BLDC GLUCOMTR-MCNC: 132 MG/DL (ref 70–99)
GLUCOSE BLDC GLUCOMTR-MCNC: 133 MG/DL (ref 70–99)
GLUCOSE BLDC GLUCOMTR-MCNC: 134 MG/DL (ref 70–99)
GLUCOSE BLDC GLUCOMTR-MCNC: 136 MG/DL (ref 70–99)
GLUCOSE BLDC GLUCOMTR-MCNC: 137 MG/DL (ref 70–99)
GLUCOSE BLDC GLUCOMTR-MCNC: 138 MG/DL (ref 70–99)
GLUCOSE BLDC GLUCOMTR-MCNC: 139 MG/DL (ref 70–99)
GLUCOSE BLDC GLUCOMTR-MCNC: 139 MG/DL (ref 70–99)
GLUCOSE BLDC GLUCOMTR-MCNC: 140 MG/DL (ref 70–99)
GLUCOSE BLDC GLUCOMTR-MCNC: 141 MG/DL (ref 70–99)
GLUCOSE BLDC GLUCOMTR-MCNC: 142 MG/DL (ref 70–99)
GLUCOSE BLDC GLUCOMTR-MCNC: 143 MG/DL (ref 70–99)
GLUCOSE BLDC GLUCOMTR-MCNC: 144 MG/DL (ref 70–99)
GLUCOSE BLDC GLUCOMTR-MCNC: 146 MG/DL (ref 70–99)
GLUCOSE BLDC GLUCOMTR-MCNC: 147 MG/DL (ref 70–99)
GLUCOSE BLDC GLUCOMTR-MCNC: 148 MG/DL (ref 70–99)
GLUCOSE BLDC GLUCOMTR-MCNC: 150 MG/DL (ref 70–99)
GLUCOSE BLDC GLUCOMTR-MCNC: 151 MG/DL (ref 70–99)
GLUCOSE BLDC GLUCOMTR-MCNC: 152 MG/DL (ref 70–99)
GLUCOSE BLDC GLUCOMTR-MCNC: 152 MG/DL (ref 70–99)
GLUCOSE BLDC GLUCOMTR-MCNC: 153 MG/DL (ref 70–99)
GLUCOSE BLDC GLUCOMTR-MCNC: 154 MG/DL (ref 70–99)
GLUCOSE BLDC GLUCOMTR-MCNC: 155 MG/DL (ref 70–99)
GLUCOSE BLDC GLUCOMTR-MCNC: 156 MG/DL (ref 70–99)
GLUCOSE BLDC GLUCOMTR-MCNC: 159 MG/DL (ref 70–99)
GLUCOSE BLDC GLUCOMTR-MCNC: 160 MG/DL (ref 70–99)
GLUCOSE BLDC GLUCOMTR-MCNC: 160 MG/DL (ref 70–99)
GLUCOSE BLDC GLUCOMTR-MCNC: 162 MG/DL (ref 70–99)
GLUCOSE BLDC GLUCOMTR-MCNC: 163 MG/DL (ref 70–99)
GLUCOSE BLDC GLUCOMTR-MCNC: 166 MG/DL (ref 70–99)
GLUCOSE BLDC GLUCOMTR-MCNC: 169 MG/DL (ref 70–99)
GLUCOSE BLDC GLUCOMTR-MCNC: 169 MG/DL (ref 70–99)
GLUCOSE BLDC GLUCOMTR-MCNC: 170 MG/DL (ref 70–99)
GLUCOSE BLDC GLUCOMTR-MCNC: 170 MG/DL (ref 70–99)
GLUCOSE BLDC GLUCOMTR-MCNC: 172 MG/DL (ref 70–99)
GLUCOSE BLDC GLUCOMTR-MCNC: 172 MG/DL (ref 70–99)
GLUCOSE BLDC GLUCOMTR-MCNC: 173 MG/DL (ref 70–99)
GLUCOSE BLDC GLUCOMTR-MCNC: 173 MG/DL (ref 70–99)
GLUCOSE BLDC GLUCOMTR-MCNC: 174 MG/DL (ref 70–99)
GLUCOSE BLDC GLUCOMTR-MCNC: 174 MG/DL (ref 70–99)
GLUCOSE BLDC GLUCOMTR-MCNC: 175 MG/DL (ref 70–99)
GLUCOSE BLDC GLUCOMTR-MCNC: 177 MG/DL (ref 70–99)
GLUCOSE BLDC GLUCOMTR-MCNC: 178 MG/DL (ref 70–99)
GLUCOSE BLDC GLUCOMTR-MCNC: 179 MG/DL (ref 70–99)
GLUCOSE BLDC GLUCOMTR-MCNC: 179 MG/DL (ref 70–99)
GLUCOSE BLDC GLUCOMTR-MCNC: 180 MG/DL (ref 70–99)
GLUCOSE BLDC GLUCOMTR-MCNC: 182 MG/DL (ref 70–99)
GLUCOSE BLDC GLUCOMTR-MCNC: 184 MG/DL (ref 70–99)
GLUCOSE BLDC GLUCOMTR-MCNC: 184 MG/DL (ref 70–99)
GLUCOSE BLDC GLUCOMTR-MCNC: 186 MG/DL (ref 70–99)
GLUCOSE BLDC GLUCOMTR-MCNC: 187 MG/DL (ref 70–99)
GLUCOSE BLDC GLUCOMTR-MCNC: 190 MG/DL (ref 70–99)
GLUCOSE BLDC GLUCOMTR-MCNC: 191 MG/DL (ref 70–99)
GLUCOSE BLDC GLUCOMTR-MCNC: 192 MG/DL (ref 70–99)
GLUCOSE BLDC GLUCOMTR-MCNC: 195 MG/DL (ref 70–99)
GLUCOSE BLDC GLUCOMTR-MCNC: 195 MG/DL (ref 70–99)
GLUCOSE BLDC GLUCOMTR-MCNC: 196 MG/DL (ref 70–99)
GLUCOSE BLDC GLUCOMTR-MCNC: 198 MG/DL (ref 70–99)
GLUCOSE BLDC GLUCOMTR-MCNC: 203 MG/DL (ref 70–99)
GLUCOSE BLDC GLUCOMTR-MCNC: 206 MG/DL (ref 70–99)
GLUCOSE BLDC GLUCOMTR-MCNC: 212 MG/DL (ref 70–99)
GLUCOSE BLDC GLUCOMTR-MCNC: 212 MG/DL (ref 70–99)
GLUCOSE BLDC GLUCOMTR-MCNC: 214 MG/DL (ref 70–99)
GLUCOSE BLDC GLUCOMTR-MCNC: 216 MG/DL (ref 70–99)
GLUCOSE BLDC GLUCOMTR-MCNC: 223 MG/DL (ref 70–99)
GLUCOSE BLDC GLUCOMTR-MCNC: 226 MG/DL (ref 70–99)
GLUCOSE BLDC GLUCOMTR-MCNC: 227 MG/DL (ref 70–99)
GLUCOSE BLDC GLUCOMTR-MCNC: 233 MG/DL (ref 70–99)
GLUCOSE BLDC GLUCOMTR-MCNC: 235 MG/DL (ref 70–99)
GLUCOSE BLDC GLUCOMTR-MCNC: 239 MG/DL (ref 70–99)
GLUCOSE BLDC GLUCOMTR-MCNC: 240 MG/DL (ref 70–99)
GLUCOSE BLDC GLUCOMTR-MCNC: 246 MG/DL (ref 70–99)
GLUCOSE BLDC GLUCOMTR-MCNC: 257 MG/DL (ref 70–99)
GLUCOSE BLDC GLUCOMTR-MCNC: 258 MG/DL (ref 70–99)
GLUCOSE BLDC GLUCOMTR-MCNC: 275 MG/DL (ref 70–99)
GLUCOSE BLDC GLUCOMTR-MCNC: 284 MG/DL (ref 70–99)
GLUCOSE BLDC GLUCOMTR-MCNC: 286 MG/DL (ref 70–99)
GLUCOSE BLDC GLUCOMTR-MCNC: 287 MG/DL (ref 70–99)
GLUCOSE BLDC GLUCOMTR-MCNC: 291 MG/DL (ref 70–99)
GLUCOSE BLDC GLUCOMTR-MCNC: 296 MG/DL (ref 70–99)
GLUCOSE BLDC GLUCOMTR-MCNC: 319 MG/DL (ref 70–99)
GLUCOSE BLDC GLUCOMTR-MCNC: 336 MG/DL (ref 70–99)
GLUCOSE BLDC GLUCOMTR-MCNC: 348 MG/DL (ref 70–99)
GLUCOSE BLDC GLUCOMTR-MCNC: 349 MG/DL (ref 70–99)
GLUCOSE BLDC GLUCOMTR-MCNC: 365 MG/DL (ref 70–99)
GLUCOSE BLDC GLUCOMTR-MCNC: 57 MG/DL (ref 70–99)
GLUCOSE BLDC GLUCOMTR-MCNC: 58 MG/DL (ref 70–99)
GLUCOSE BLDC GLUCOMTR-MCNC: 64 MG/DL (ref 70–99)
GLUCOSE BLDC GLUCOMTR-MCNC: 78 MG/DL (ref 70–99)
GLUCOSE BLDC GLUCOMTR-MCNC: 81 MG/DL (ref 70–99)
GLUCOSE BLDC GLUCOMTR-MCNC: 83 MG/DL (ref 70–99)
GLUCOSE BLDC GLUCOMTR-MCNC: 85 MG/DL (ref 70–99)
GLUCOSE BLDC GLUCOMTR-MCNC: 85 MG/DL (ref 70–99)
GLUCOSE BLDC GLUCOMTR-MCNC: 89 MG/DL (ref 70–99)
GLUCOSE BLDC GLUCOMTR-MCNC: 90 MG/DL (ref 70–99)
GLUCOSE BLDC GLUCOMTR-MCNC: 92 MG/DL (ref 70–99)
GLUCOSE BLDC GLUCOMTR-MCNC: 95 MG/DL (ref 70–99)
GLUCOSE BLDC GLUCOMTR-MCNC: 96 MG/DL (ref 70–99)
GLUCOSE BLDC GLUCOMTR-MCNC: 98 MG/DL (ref 70–99)
GLUCOSE SERPL-MCNC: 128 MG/DL (ref 70–99)
GLUCOSE SERPL-MCNC: 173 MG/DL (ref 70–99)
GLUCOSE SERPL-MCNC: 277 MG/DL (ref 70–99)
GLUCOSE UR STRIP-MCNC: 100 MG/DL
GLUCOSE UR STRIP-MCNC: 30 MG/DL
GLUCOSE UR STRIP-MCNC: 70 MG/DL
GRAM STAIN RESULT: ABNORMAL
GRAM STAIN RESULT: ABNORMAL
GRANULAR CAST: 7 /LPF
HBA1C MFR BLD: 8.1 % (ref 0–5.6)
HBA1C MFR BLD: 8.4 % (ref 0–5.6)
HBV SURFACE AB SERPL IA-ACNC: 0.01 M[IU]/ML
HBV SURFACE AG SERPL QL IA: NONREACTIVE
HCO3 BLD-SCNC: 11 MMOL/L (ref 21–28)
HCO3 BLD-SCNC: 14 MMOL/L (ref 21–28)
HCO3 BLD-SCNC: 14 MMOL/L (ref 21–28)
HCO3 BLD-SCNC: 16 MMOL/L (ref 21–28)
HCO3 BLD-SCNC: 16 MMOL/L (ref 21–28)
HCO3 BLD-SCNC: 18 MMOL/L (ref 21–28)
HCO3 BLD-SCNC: 18 MMOL/L (ref 21–28)
HCO3 BLD-SCNC: 19 MMOL/L (ref 21–28)
HCO3 BLD-SCNC: 20 MMOL/L (ref 21–28)
HCO3 BLD-SCNC: 21 MMOL/L (ref 21–28)
HCO3 BLD-SCNC: 22 MMOL/L (ref 21–28)
HCO3 BLD-SCNC: 22 MMOL/L (ref 21–28)
HCO3 BLD-SCNC: 23 MMOL/L (ref 21–28)
HCO3 BLD-SCNC: 24 MMOL/L (ref 21–28)
HCO3 BLD-SCNC: 24 MMOL/L (ref 21–28)
HCO3 BLD-SCNC: 25 MMOL/L (ref 21–28)
HCO3 BLD-SCNC: 25 MMOL/L (ref 21–28)
HCO3 BLD-SCNC: 26 MMOL/L (ref 21–28)
HCO3 BLD-SCNC: 28 MMOL/L (ref 21–28)
HCO3 BLD-SCNC: 31 MMOL/L (ref 21–28)
HCO3 BLD-SCNC: 8 MMOL/L (ref 21–28)
HCT VFR BLD AUTO: 20.8 % (ref 40–53)
HCT VFR BLD AUTO: 23.5 % (ref 40–53)
HCT VFR BLD AUTO: 24.1 % (ref 40–53)
HCT VFR BLD AUTO: 24.1 % (ref 40–53)
HCT VFR BLD AUTO: 24.6 % (ref 40–53)
HCT VFR BLD AUTO: 24.8 % (ref 40–53)
HCT VFR BLD AUTO: 25.8 % (ref 40–53)
HCT VFR BLD AUTO: 26.1 % (ref 40–53)
HCT VFR BLD AUTO: 28.6 % (ref 40–53)
HCT VFR BLD AUTO: 29 % (ref 40–53)
HCT VFR BLD AUTO: 29.6 % (ref 40–53)
HCT VFR BLD AUTO: 30.4 % (ref 40–53)
HCT VFR BLD AUTO: 31.3 % (ref 40–53)
HCT VFR BLD AUTO: 31.4 % (ref 40–53)
HCT VFR BLD AUTO: 31.6 % (ref 40–53)
HCT VFR BLD AUTO: 31.8 % (ref 40–53)
HCT VFR BLD AUTO: 33.1 % (ref 40–53)
HCT VFR BLD AUTO: 33.2 % (ref 40–53)
HCT VFR BLD AUTO: 34 % (ref 40–53)
HCT VFR BLD AUTO: 36.4 % (ref 40–53)
HCT VFR BLD AUTO: 36.6 % (ref 40–53)
HCT VFR BLD AUTO: 38.3 % (ref 40–53)
HCT VFR BLD AUTO: 38.3 % (ref 40–53)
HCT VFR BLD AUTO: 40.1 % (ref 40–53)
HCT VFR BLD AUTO: 41.4 % (ref 40–53)
HCT VFR BLD AUTO: 41.5 % (ref 40–53)
HGB BLD-MCNC: 10 G/DL (ref 13.3–17.7)
HGB BLD-MCNC: 10.1 G/DL (ref 13.3–17.7)
HGB BLD-MCNC: 10.4 G/DL (ref 13.3–17.7)
HGB BLD-MCNC: 10.6 G/DL (ref 13.3–17.7)
HGB BLD-MCNC: 10.8 G/DL (ref 13.3–17.7)
HGB BLD-MCNC: 10.8 G/DL (ref 13.3–17.7)
HGB BLD-MCNC: 11.2 G/DL (ref 13.3–17.7)
HGB BLD-MCNC: 11.5 G/DL (ref 13.3–17.7)
HGB BLD-MCNC: 11.6 G/DL (ref 13.3–17.7)
HGB BLD-MCNC: 11.8 G/DL (ref 13.3–17.7)
HGB BLD-MCNC: 11.9 G/DL (ref 13.3–17.7)
HGB BLD-MCNC: 12.3 G/DL (ref 13.3–17.7)
HGB BLD-MCNC: 12.6 G/DL (ref 13.3–17.7)
HGB BLD-MCNC: 12.6 G/DL (ref 13.3–17.7)
HGB BLD-MCNC: 12.8 G/DL (ref 13.3–17.7)
HGB BLD-MCNC: 13.6 G/DL (ref 13.3–17.7)
HGB BLD-MCNC: 14 G/DL (ref 13.3–17.7)
HGB BLD-MCNC: 6.6 G/DL (ref 13.3–17.7)
HGB BLD-MCNC: 7.3 G/DL (ref 13.3–17.7)
HGB BLD-MCNC: 7.5 G/DL (ref 13.3–17.7)
HGB BLD-MCNC: 7.5 G/DL (ref 13.3–17.7)
HGB BLD-MCNC: 7.7 G/DL (ref 13.3–17.7)
HGB BLD-MCNC: 7.9 G/DL (ref 13.3–17.7)
HGB BLD-MCNC: 8.1 G/DL (ref 13.3–17.7)
HGB BLD-MCNC: 9.4 G/DL (ref 13.3–17.7)
HGB BLD-MCNC: 9.9 G/DL (ref 13.3–17.7)
HGB UR QL STRIP: ABNORMAL
HOLD SPECIMEN: NORMAL
HOLD SPECIMEN: NORMAL
IMM GRANULOCYTES # BLD: 0 10E3/UL
IMM GRANULOCYTES # BLD: 0 10E3/UL
IMM GRANULOCYTES # BLD: 0.1 10E3/UL
IMM GRANULOCYTES # BLD: 0.2 10E3/UL
IMM GRANULOCYTES # BLD: 0.2 10E9/L (ref 0–0.4)
IMM GRANULOCYTES # BLD: 0.4 10E3/UL
IMM GRANULOCYTES NFR BLD: 0 %
IMM GRANULOCYTES NFR BLD: 0 %
IMM GRANULOCYTES NFR BLD: 0.6 %
IMM GRANULOCYTES NFR BLD: 1 %
IMM GRANULOCYTES NFR BLD: 2 %
IMM GRANULOCYTES NFR BLD: 3 %
INR PPP: 0.85 (ref 0.85–1.15)
INR PPP: 0.87 (ref 0.85–1.15)
INR PPP: 0.9 (ref 0.85–1.15)
INR PPP: 0.97 (ref 0.85–1.15)
INR PPP: 0.99 (ref 0.85–1.15)
INR PPP: 1.01 (ref 0.85–1.15)
INR PPP: 1.05 (ref 0.85–1.15)
INR PPP: 1.1 (ref 0.85–1.15)
INTERPRETATION ECG - MUSE: NORMAL
IRON SATN MFR SERPL: 10 % (ref 15–46)
IRON SERPL-MCNC: 19 UG/DL (ref 35–180)
ISSUE DATE AND TIME: NORMAL
KETONES BLD-SCNC: 0.2 MMOL/L (ref 0–0.6)
KETONES BLD-SCNC: 2.1 MMOL/L (ref 0–0.6)
KETONES BLD-SCNC: 4.3 MMOL/L (ref 0–0.6)
KETONES UR STRIP-MCNC: 10 MG/DL
KETONES UR STRIP-MCNC: NEGATIVE MG/DL
KETONES UR STRIP-MCNC: NEGATIVE MG/DL
L PNEUMO1 AG UR QL IA: NEGATIVE
LABORATORY COMMENT REPORT: NORMAL
LACTATE BLD-SCNC: 0.7 MMOL/L (ref 0.7–2)
LACTATE BLD-SCNC: 1.2 MMOL/L (ref 0.7–2)
LACTATE BLD-SCNC: 1.8 MMOL/L (ref 0.7–2)
LACTATE BLD-SCNC: 2.1 MMOL/L (ref 0.7–2)
LACTATE BLD-SCNC: 3.4 MMOL/L (ref 0.7–2)
LACTATE BLD-SCNC: 4.1 MMOL/L (ref 0.7–2)
LACTATE SERPL-SCNC: 1.4 MMOL/L (ref 0.7–2)
LACTATE SERPL-SCNC: 1.4 MMOL/L (ref 0.7–2)
LACTATE SERPL-SCNC: 1.5 MMOL/L (ref 0.7–2)
LACTATE SERPL-SCNC: 1.9 MMOL/L (ref 0.7–2)
LACTATE SERPL-SCNC: 2.2 MMOL/L (ref 0.7–2)
LACTATE SERPL-SCNC: 3 MMOL/L (ref 0.7–2)
LACTOFERRIN STL QL IA: POSITIVE
LDH SERPL L TO P-CCNC: 1056 U/L (ref 85–227)
LDH SERPL L TO P-CCNC: 1201 U/L (ref 85–227)
LDH SERPL L TO P-CCNC: 461 U/L (ref 85–227)
LDH SERPL L TO P-CCNC: 627 U/L (ref 85–227)
LDH SERPL L TO P-CCNC: 640 U/L (ref 85–227)
LDH SERPL L TO P-CCNC: 663 U/L (ref 85–227)
LDH SERPL L TO P-CCNC: 744 U/L (ref 85–227)
LDH SERPL L TO P-CCNC: 935 U/L (ref 85–227)
LEUKOCYTE ESTERASE UR QL STRIP: NEGATIVE
LIPASE SERPL-CCNC: 45 U/L (ref 73–393)
LVEF ECHO: NORMAL
LYMPHOCYTES # BLD AUTO: 0.4 10E3/UL (ref 0.8–5.3)
LYMPHOCYTES # BLD AUTO: 0.4 10E3/UL (ref 0.8–5.3)
LYMPHOCYTES # BLD AUTO: 0.5 10E3/UL (ref 0.8–5.3)
LYMPHOCYTES # BLD AUTO: 0.5 10E3/UL (ref 0.8–5.3)
LYMPHOCYTES # BLD AUTO: 0.6 10E3/UL (ref 0.8–5.3)
LYMPHOCYTES # BLD AUTO: 0.7 10E3/UL (ref 0.8–5.3)
LYMPHOCYTES # BLD AUTO: 0.8 10E3/UL (ref 0.8–5.3)
LYMPHOCYTES # BLD AUTO: 0.8 10E9/L (ref 0.8–5.3)
LYMPHOCYTES # BLD AUTO: 1.2 10E3/UL (ref 0.8–5.3)
LYMPHOCYTES # BLD MANUAL: 0.5 10E3/UL (ref 0.8–5.3)
LYMPHOCYTES # BLD MANUAL: 1 10E3/UL (ref 0.8–5.3)
LYMPHOCYTES # BLD MANUAL: 1.2 10E3/UL (ref 0.8–5.3)
LYMPHOCYTES # BLD MANUAL: 2.1 10E3/UL (ref 0.8–5.3)
LYMPHOCYTES NFR BLD AUTO: 11 %
LYMPHOCYTES NFR BLD AUTO: 3 %
LYMPHOCYTES NFR BLD AUTO: 3 %
LYMPHOCYTES NFR BLD AUTO: 3.2 %
LYMPHOCYTES NFR BLD AUTO: 4 %
LYMPHOCYTES NFR BLD AUTO: 5 %
LYMPHOCYTES NFR BLD AUTO: 7 %
LYMPHOCYTES NFR BLD MANUAL: 11 %
LYMPHOCYTES NFR BLD MANUAL: 3 %
LYMPHOCYTES NFR BLD MANUAL: 6 %
LYMPHOCYTES NFR BLD MANUAL: 8 %
Lab: NORMAL
Lab: NORMAL
MAGNESIUM SERPL-MCNC: 1.8 MG/DL (ref 1.6–2.3)
MAGNESIUM SERPL-MCNC: 1.9 MG/DL (ref 1.6–2.3)
MAGNESIUM SERPL-MCNC: 2 MG/DL (ref 1.6–2.3)
MAGNESIUM SERPL-MCNC: 2.1 MG/DL (ref 1.6–2.3)
MAGNESIUM SERPL-MCNC: 2.2 MG/DL (ref 1.6–2.3)
MAGNESIUM SERPL-MCNC: 2.3 MG/DL (ref 1.6–2.3)
MAGNESIUM SERPL-MCNC: 2.3 MG/DL (ref 1.6–2.3)
MAGNESIUM SERPL-MCNC: 2.7 MG/DL (ref 1.6–2.3)
MAGNESIUM SERPL-MCNC: 2.8 MG/DL (ref 1.6–2.3)
MCH RBC QN AUTO: 28.3 PG (ref 26.5–33)
MCH RBC QN AUTO: 28.5 PG (ref 26.5–33)
MCH RBC QN AUTO: 28.6 PG (ref 26.5–33)
MCH RBC QN AUTO: 28.7 PG (ref 26.5–33)
MCH RBC QN AUTO: 28.7 PG (ref 26.5–33)
MCH RBC QN AUTO: 28.8 PG (ref 26.5–33)
MCH RBC QN AUTO: 29 PG (ref 26.5–33)
MCH RBC QN AUTO: 29.1 PG (ref 26.5–33)
MCH RBC QN AUTO: 29.2 PG (ref 26.5–33)
MCH RBC QN AUTO: 29.3 PG (ref 26.5–33)
MCH RBC QN AUTO: 29.4 PG (ref 26.5–33)
MCH RBC QN AUTO: 29.4 PG (ref 26.5–33)
MCH RBC QN AUTO: 29.5 PG (ref 26.5–33)
MCH RBC QN AUTO: 29.7 PG (ref 26.5–33)
MCH RBC QN AUTO: 29.7 PG (ref 26.5–33)
MCH RBC QN AUTO: 29.9 PG (ref 26.5–33)
MCH RBC QN AUTO: 30.2 PG (ref 26.5–33)
MCHC RBC AUTO-ENTMCNC: 30.6 G/DL (ref 31.5–36.5)
MCHC RBC AUTO-ENTMCNC: 30.7 G/DL (ref 31.5–36.5)
MCHC RBC AUTO-ENTMCNC: 31 G/DL (ref 31.5–36.5)
MCHC RBC AUTO-ENTMCNC: 31 G/DL (ref 31.5–36.5)
MCHC RBC AUTO-ENTMCNC: 31.1 G/DL (ref 31.5–36.5)
MCHC RBC AUTO-ENTMCNC: 31.7 G/DL (ref 31.5–36.5)
MCHC RBC AUTO-ENTMCNC: 32.1 G/DL (ref 31.5–36.5)
MCHC RBC AUTO-ENTMCNC: 32.4 G/DL (ref 31.5–36.5)
MCHC RBC AUTO-ENTMCNC: 32.5 G/DL (ref 31.5–36.5)
MCHC RBC AUTO-ENTMCNC: 32.6 G/DL (ref 31.5–36.5)
MCHC RBC AUTO-ENTMCNC: 32.6 G/DL (ref 31.5–36.5)
MCHC RBC AUTO-ENTMCNC: 32.9 G/DL (ref 31.5–36.5)
MCHC RBC AUTO-ENTMCNC: 33.4 G/DL (ref 31.5–36.5)
MCHC RBC AUTO-ENTMCNC: 33.7 G/DL (ref 31.5–36.5)
MCHC RBC AUTO-ENTMCNC: 33.7 G/DL (ref 31.5–36.5)
MCHC RBC AUTO-ENTMCNC: 33.8 G/DL (ref 31.5–36.5)
MCHC RBC AUTO-ENTMCNC: 33.8 G/DL (ref 31.5–36.5)
MCHC RBC AUTO-ENTMCNC: 33.9 G/DL (ref 31.5–36.5)
MCHC RBC AUTO-ENTMCNC: 34 G/DL (ref 31.5–36.5)
MCHC RBC AUTO-ENTMCNC: 34.1 G/DL (ref 31.5–36.5)
MCHC RBC AUTO-ENTMCNC: 34.6 G/DL (ref 31.5–36.5)
MCHC RBC AUTO-ENTMCNC: 35.3 G/DL (ref 31.5–36.5)
MCV RBC AUTO: 85 FL (ref 78–100)
MCV RBC AUTO: 86 FL (ref 78–100)
MCV RBC AUTO: 86 FL (ref 78–100)
MCV RBC AUTO: 87 FL (ref 78–100)
MCV RBC AUTO: 88 FL (ref 78–100)
MCV RBC AUTO: 88 FL (ref 78–100)
MCV RBC AUTO: 89 FL (ref 78–100)
MCV RBC AUTO: 89 FL (ref 78–100)
MCV RBC AUTO: 90 FL (ref 78–100)
MCV RBC AUTO: 90 FL (ref 78–100)
MCV RBC AUTO: 91 FL (ref 78–100)
MCV RBC AUTO: 92 FL (ref 78–100)
MCV RBC AUTO: 93 FL (ref 78–100)
MCV RBC AUTO: 93 FL (ref 78–100)
MCV RBC AUTO: 94 FL (ref 78–100)
METAMYELOCYTES # BLD MANUAL: 0.2 10E3/UL
METAMYELOCYTES # BLD MANUAL: 0.5 10E3/UL
METAMYELOCYTES NFR BLD MANUAL: 1 %
METAMYELOCYTES NFR BLD MANUAL: 3 %
MONOCYTES # BLD AUTO: 0.3 10E3/UL (ref 0–1.3)
MONOCYTES # BLD AUTO: 0.3 10E3/UL (ref 0–1.3)
MONOCYTES # BLD AUTO: 0.4 10E3/UL (ref 0–1.3)
MONOCYTES # BLD AUTO: 0.4 10E3/UL (ref 0–1.3)
MONOCYTES # BLD AUTO: 0.5 10E3/UL (ref 0–1.3)
MONOCYTES # BLD AUTO: 0.6 10E3/UL (ref 0–1.3)
MONOCYTES # BLD AUTO: 0.6 10E3/UL (ref 0–1.3)
MONOCYTES # BLD AUTO: 0.7 10E3/UL (ref 0–1.3)
MONOCYTES # BLD AUTO: 1.4 10E9/L (ref 0–1.3)
MONOCYTES # BLD MANUAL: 0.2 10E3/UL (ref 0–1.3)
MONOCYTES # BLD MANUAL: 0.4 10E3/UL (ref 0–1.3)
MONOCYTES # BLD MANUAL: 0.4 10E3/UL (ref 0–1.3)
MONOCYTES # BLD MANUAL: 0.7 10E3/UL (ref 0–1.3)
MONOCYTES NFR BLD AUTO: 10 %
MONOCYTES NFR BLD AUTO: 2 %
MONOCYTES NFR BLD AUTO: 3 %
MONOCYTES NFR BLD AUTO: 4 %
MONOCYTES NFR BLD AUTO: 5.3 %
MONOCYTES NFR BLD MANUAL: 1 %
MONOCYTES NFR BLD MANUAL: 2 %
MONOCYTES NFR BLD MANUAL: 3 %
MONOCYTES NFR BLD MANUAL: 4 %
MRSA DNA SPEC QL NAA+PROBE: NEGATIVE
MUCOUS THREADS #/AREA URNS LPF: PRESENT /LPF
MUCOUS THREADS #/AREA URNS LPF: PRESENT /LPF
MYELOCYTES # BLD MANUAL: 0.1 10E3/UL
MYELOCYTES NFR BLD MANUAL: 1 %
NEUTROPHILS # BLD AUTO: 12.2 10E3/UL (ref 1.6–8.3)
NEUTROPHILS # BLD AUTO: 12.6 10E3/UL (ref 1.6–8.3)
NEUTROPHILS # BLD AUTO: 13.8 10E3/UL (ref 1.6–8.3)
NEUTROPHILS # BLD AUTO: 14.1 10E3/UL (ref 1.6–8.3)
NEUTROPHILS # BLD AUTO: 14.2 10E3/UL (ref 1.6–8.3)
NEUTROPHILS # BLD AUTO: 23.2 10E9/L (ref 1.6–8.3)
NEUTROPHILS # BLD AUTO: 5.8 10E3/UL (ref 1.6–8.3)
NEUTROPHILS # BLD AUTO: 7 10E3/UL (ref 1.6–8.3)
NEUTROPHILS # BLD AUTO: 8.6 10E3/UL (ref 1.6–8.3)
NEUTROPHILS # BLD MANUAL: 13 10E3/UL (ref 1.6–8.3)
NEUTROPHILS # BLD MANUAL: 14.7 10E3/UL (ref 1.6–8.3)
NEUTROPHILS # BLD MANUAL: 16.6 10E3/UL (ref 1.6–8.3)
NEUTROPHILS # BLD MANUAL: 16.7 10E3/UL (ref 1.6–8.3)
NEUTROPHILS NFR BLD AUTO: 79 %
NEUTROPHILS NFR BLD AUTO: 89 %
NEUTROPHILS NFR BLD AUTO: 90.3 %
NEUTROPHILS NFR BLD AUTO: 92 %
NEUTROPHILS NFR BLD MANUAL: 86 %
NEUTROPHILS NFR BLD MANUAL: 88 %
NEUTROPHILS NFR BLD MANUAL: 90 %
NEUTROPHILS NFR BLD MANUAL: 93 %
NITRATE UR QL: NEGATIVE
NOROV GI+II ORF1-ORF2 JNC STL QL NAA+PR: NOT DETECTED
NRBC # BLD AUTO: 0 10*3/UL
NRBC # BLD AUTO: 0 10E3/UL
NRBC BLD AUTO-RTO: 0 /100
NT-PROBNP SERPL-MCNC: 580 PG/ML (ref 0–900)
NT-PROBNP SERPL-MCNC: 999 PG/ML (ref 0–900)
O2/TOTAL GAS SETTING VFR VENT: 100 %
O2/TOTAL GAS SETTING VFR VENT: 35 %
O2/TOTAL GAS SETTING VFR VENT: 40 %
O2/TOTAL GAS SETTING VFR VENT: 45 %
O2/TOTAL GAS SETTING VFR VENT: 45 %
O2/TOTAL GAS SETTING VFR VENT: 50 %
O2/TOTAL GAS SETTING VFR VENT: 55 %
O2/TOTAL GAS SETTING VFR VENT: 60 %
O2/TOTAL GAS SETTING VFR VENT: 70 %
O2/TOTAL GAS SETTING VFR VENT: 75 %
O2/TOTAL GAS SETTING VFR VENT: 80 %
O2/TOTAL GAS SETTING VFR VENT: 85 %
OXYHGB MFR BLD: 93 % (ref 92–100)
OXYHGB MFR BLD: 94 % (ref 92–100)
P AXIS - MUSE: 49 DEGREES
P AXIS - MUSE: 54 DEGREES
P AXIS - MUSE: 57 DEGREES
P AXIS - MUSE: 60 DEGREES
P AXIS - MUSE: NORMAL DEGREES
P AXIS - MUSE: NORMAL DEGREES
PATH REPORT.COMMENTS IMP SPEC: NORMAL
PATH REPORT.COMMENTS IMP SPEC: NORMAL
PATH REPORT.FINAL DX SPEC: NORMAL
PATH REPORT.MICROSCOPIC SPEC OTHER STN: NORMAL
PATH REPORT.MICROSCOPIC SPEC OTHER STN: NORMAL
PATH REPORT.RELEVANT HX SPEC: NORMAL
PCO2 BLD: 26 MM HG (ref 35–45)
PCO2 BLD: 26 MM HG (ref 35–45)
PCO2 BLD: 29 MM HG (ref 35–45)
PCO2 BLD: 30 MM HG (ref 35–45)
PCO2 BLD: 31 MM HG (ref 35–45)
PCO2 BLD: 31 MM HG (ref 35–45)
PCO2 BLD: 32 MM HG (ref 35–45)
PCO2 BLD: 32 MM HG (ref 35–45)
PCO2 BLD: 33 MM HG (ref 35–45)
PCO2 BLD: 37 MM HG (ref 35–45)
PCO2 BLD: 38 MM HG (ref 35–45)
PCO2 BLD: 39 MM HG (ref 35–45)
PCO2 BLD: 40 MM HG (ref 35–45)
PCO2 BLD: 41 MM HG (ref 35–45)
PCO2 BLD: 41 MM HG (ref 35–45)
PCO2 BLD: 43 MM HG (ref 35–45)
PCO2 BLD: 44 MM HG (ref 35–45)
PCO2 BLD: 44 MM HG (ref 35–45)
PCO2 BLD: 53 MM HG (ref 35–45)
PH BLD: 6.93 [PH] (ref 7.35–7.45)
PH BLD: 7.11 [PH] (ref 7.35–7.45)
PH BLD: 7.14 [PH] (ref 7.35–7.45)
PH BLD: 7.17 [PH] (ref 7.35–7.45)
PH BLD: 7.25 [PH] (ref 7.35–7.45)
PH BLD: 7.28 [PH] (ref 7.35–7.45)
PH BLD: 7.31 [PH] (ref 7.35–7.45)
PH BLD: 7.34 [PH] (ref 7.35–7.45)
PH BLD: 7.35 [PH] (ref 7.35–7.45)
PH BLD: 7.36 [PH] (ref 7.35–7.45)
PH BLD: 7.37 [PH] (ref 7.35–7.45)
PH BLD: 7.38 [PH] (ref 7.35–7.45)
PH BLD: 7.39 [PH] (ref 7.35–7.45)
PH BLD: 7.4 [PH] (ref 7.35–7.45)
PH BLD: 7.4 [PH] (ref 7.35–7.45)
PH BLD: 7.41 [PH] (ref 7.35–7.45)
PH BLD: 7.43 [PH] (ref 7.35–7.45)
PH BLD: 7.46 [PH] (ref 7.35–7.45)
PH BLD: 7.47 [PH] (ref 7.35–7.45)
PH BLD: 7.53 [PH] (ref 7.35–7.45)
PH BLD: 7.54 [PH] (ref 7.35–7.45)
PH UR STRIP: 5 PH (ref 5–7)
PH UR STRIP: 5 [PH] (ref 5–7)
PH UR STRIP: 5.5 [PH] (ref 5–7)
PHOSPHATE SERPL-MCNC: 4.4 MG/DL (ref 2.5–4.5)
PHOSPHATE SERPL-MCNC: 4.4 MG/DL (ref 2.5–4.5)
PHOSPHATE SERPL-MCNC: 4.5 MG/DL (ref 2.5–4.5)
PHOSPHATE SERPL-MCNC: 4.6 MG/DL (ref 2.5–4.5)
PHOSPHATE SERPL-MCNC: 5 MG/DL (ref 2.5–4.5)
PHOSPHATE SERPL-MCNC: 5.3 MG/DL (ref 2.5–4.5)
PHOSPHATE SERPL-MCNC: 5.7 MG/DL (ref 2.5–4.5)
PHOSPHATE SERPL-MCNC: 5.8 MG/DL (ref 2.5–4.5)
PHOSPHATE SERPL-MCNC: 6 MG/DL (ref 2.5–4.5)
PHOSPHATE SERPL-MCNC: 6.5 MG/DL (ref 2.5–4.5)
PHOSPHATE SERPL-MCNC: 6.5 MG/DL (ref 2.5–4.5)
PHOSPHATE SERPL-MCNC: 6.6 MG/DL (ref 2.5–4.5)
PHOSPHATE SERPL-MCNC: 6.8 MG/DL (ref 2.5–4.5)
PHOSPHATE SERPL-MCNC: 7.1 MG/DL (ref 2.5–4.5)
PHOSPHATE SERPL-MCNC: 7.5 MG/DL (ref 2.5–4.5)
PHOSPHATE SERPL-MCNC: 7.7 MG/DL (ref 2.5–4.5)
PHOSPHATE SERPL-MCNC: 8.1 MG/DL (ref 2.5–4.5)
PHOSPHATE SERPL-MCNC: 8.8 MG/DL (ref 2.5–4.5)
PLAT MORPH BLD: ABNORMAL
PLAT MORPH BLD: NORMAL
PLATELET # BLD AUTO: 115 10E3/UL (ref 150–450)
PLATELET # BLD AUTO: 118 10E3/UL (ref 150–450)
PLATELET # BLD AUTO: 122 10E3/UL (ref 150–450)
PLATELET # BLD AUTO: 122 10E3/UL (ref 150–450)
PLATELET # BLD AUTO: 125 10E3/UL (ref 150–450)
PLATELET # BLD AUTO: 127 10E3/UL (ref 150–450)
PLATELET # BLD AUTO: 128 10E3/UL (ref 150–450)
PLATELET # BLD AUTO: 128 10E3/UL (ref 150–450)
PLATELET # BLD AUTO: 129 10E3/UL (ref 150–450)
PLATELET # BLD AUTO: 135 10E3/UL (ref 150–450)
PLATELET # BLD AUTO: 136 10E3/UL (ref 150–450)
PLATELET # BLD AUTO: 138 10E9/L (ref 150–450)
PLATELET # BLD AUTO: 141 10E3/UL (ref 150–450)
PLATELET # BLD AUTO: 144 10E3/UL (ref 150–450)
PLATELET # BLD AUTO: 144 10E9/L (ref 150–450)
PLATELET # BLD AUTO: 149 10E3/UL (ref 150–450)
PLATELET # BLD AUTO: 153 10E3/UL (ref 150–450)
PLATELET # BLD AUTO: 162 10E3/UL (ref 150–450)
PLATELET # BLD AUTO: 177 10E3/UL (ref 150–450)
PLATELET # BLD AUTO: 214 10E9/L (ref 150–450)
PLATELET # BLD AUTO: 266 10E3/UL (ref 150–450)
PLATELET # BLD AUTO: 79 10E3/UL (ref 150–450)
PLATELET # BLD AUTO: 87 10E3/UL (ref 150–450)
PO2 BLD: 103 MM HG (ref 80–105)
PO2 BLD: 104 MM HG (ref 80–105)
PO2 BLD: 107 MM HG (ref 80–105)
PO2 BLD: 116 MM HG (ref 80–105)
PO2 BLD: 127 MM HG (ref 80–105)
PO2 BLD: 218 MM HG (ref 80–105)
PO2 BLD: 346 MM HG (ref 80–105)
PO2 BLD: 56 MM HG (ref 80–105)
PO2 BLD: 62 MM HG (ref 80–105)
PO2 BLD: 65 MM HG (ref 80–105)
PO2 BLD: 68 MM HG (ref 80–105)
PO2 BLD: 69 MM HG (ref 80–105)
PO2 BLD: 70 MM HG (ref 80–105)
PO2 BLD: 70 MM HG (ref 80–105)
PO2 BLD: 71 MM HG (ref 80–105)
PO2 BLD: 72 MM HG (ref 80–105)
PO2 BLD: 73 MM HG (ref 80–105)
PO2 BLD: 74 MM HG (ref 80–105)
PO2 BLD: 75 MM HG (ref 80–105)
PO2 BLD: 75 MM HG (ref 80–105)
PO2 BLD: 76 MM HG (ref 80–105)
PO2 BLD: 82 MM HG (ref 80–105)
PO2 BLD: 88 MM HG (ref 80–105)
PO2 BLD: 89 MM HG (ref 80–105)
PO2 BLD: 90 MM HG (ref 80–105)
PO2 BLD: 91 MM HG (ref 80–105)
PO2 BLD: 99 MM HG (ref 80–105)
PO2 BLD: 99 MM HG (ref 80–105)
POTASSIUM BLD-SCNC: 2.9 MMOL/L (ref 3.4–5.3)
POTASSIUM BLD-SCNC: 3 MMOL/L (ref 3.4–5.3)
POTASSIUM BLD-SCNC: 3 MMOL/L (ref 3.4–5.3)
POTASSIUM BLD-SCNC: 3.1 MMOL/L (ref 3.4–5.3)
POTASSIUM BLD-SCNC: 3.2 MMOL/L (ref 3.4–5.3)
POTASSIUM BLD-SCNC: 3.3 MMOL/L (ref 3.4–5.3)
POTASSIUM BLD-SCNC: 3.3 MMOL/L (ref 3.4–5.3)
POTASSIUM BLD-SCNC: 3.4 MMOL/L (ref 3.4–5.3)
POTASSIUM BLD-SCNC: 3.4 MMOL/L (ref 3.4–5.3)
POTASSIUM BLD-SCNC: 3.5 MMOL/L (ref 3.4–5.3)
POTASSIUM BLD-SCNC: 3.6 MMOL/L (ref 3.4–5.3)
POTASSIUM BLD-SCNC: 3.6 MMOL/L (ref 3.4–5.3)
POTASSIUM BLD-SCNC: 3.7 MMOL/L (ref 3.4–5.3)
POTASSIUM BLD-SCNC: 3.8 MMOL/L (ref 3.4–5.3)
POTASSIUM BLD-SCNC: 3.9 MMOL/L (ref 3.4–5.3)
POTASSIUM BLD-SCNC: 4 MMOL/L (ref 3.4–5.3)
POTASSIUM BLD-SCNC: 4 MMOL/L (ref 3.4–5.3)
POTASSIUM BLD-SCNC: 4.2 MMOL/L (ref 3.4–5.3)
POTASSIUM BLD-SCNC: 4.5 MMOL/L (ref 3.4–5.3)
POTASSIUM BLD-SCNC: 4.7 MMOL/L (ref 3.4–5.3)
POTASSIUM SERPL-SCNC: 3.4 MMOL/L (ref 3.4–5.3)
POTASSIUM SERPL-SCNC: 3.8 MMOL/L (ref 3.4–5.3)
POTASSIUM SERPL-SCNC: 3.9 MMOL/L (ref 3.4–5.3)
PR INTERVAL - MUSE: 134 MS
PR INTERVAL - MUSE: 136 MS
PR INTERVAL - MUSE: 136 MS
PR INTERVAL - MUSE: 140 MS
PR INTERVAL - MUSE: NORMAL MS
PR INTERVAL - MUSE: NORMAL MS
PROCALCITONIN SERPL-MCNC: 0.28 NG/ML
PROCALCITONIN SERPL-MCNC: 12.44 NG/ML
PROCALCITONIN SERPL-MCNC: 2.66 NG/ML
PROCALCITONIN SERPL-MCNC: 47.73 NG/ML
PROCALCITONIN SERPL-MCNC: 8.56 NG/ML
PROT SERPL-MCNC: 5.8 G/DL (ref 6.8–8.8)
PROT SERPL-MCNC: 6.5 G/DL (ref 6.8–8.8)
PROT SERPL-MCNC: 6.5 G/DL (ref 6.8–8.8)
PROT SERPL-MCNC: 6.8 G/DL (ref 6.8–8.8)
PROT SERPL-MCNC: 6.9 G/DL (ref 6.8–8.8)
QRS DURATION - MUSE: 100 MS
QRS DURATION - MUSE: 90 MS
QRS DURATION - MUSE: 92 MS
QRS DURATION - MUSE: 96 MS
QRS DURATION - MUSE: 96 MS
QRS DURATION - MUSE: 98 MS
QT - MUSE: 358 MS
QT - MUSE: 364 MS
QT - MUSE: 364 MS
QT - MUSE: 388 MS
QT - MUSE: 440 MS
QT - MUSE: 446 MS
QTC - MUSE: 478 MS
QTC - MUSE: 492 MS
QTC - MUSE: 495 MS
QTC - MUSE: 519 MS
QTC - MUSE: 537 MS
QTC - MUSE: 551 MS
R AXIS - MUSE: 64 DEGREES
R AXIS - MUSE: 64 DEGREES
R AXIS - MUSE: 80 DEGREES
R AXIS - MUSE: 82 DEGREES
R AXIS - MUSE: 90 DEGREES
R AXIS - MUSE: 97 DEGREES
RBC # BLD AUTO: 2.29 10E6/UL (ref 4.4–5.9)
RBC # BLD AUTO: 2.52 10E6/UL (ref 4.4–5.9)
RBC # BLD AUTO: 2.61 10E6/UL (ref 4.4–5.9)
RBC # BLD AUTO: 2.61 10E6/UL (ref 4.4–5.9)
RBC # BLD AUTO: 2.67 10E6/UL (ref 4.4–5.9)
RBC # BLD AUTO: 2.71 10E6/UL (ref 4.4–5.9)
RBC # BLD AUTO: 2.76 10E6/UL (ref 4.4–5.9)
RBC # BLD AUTO: 2.83 10E6/UL (ref 4.4–5.9)
RBC # BLD AUTO: 3.21 10E6/UL (ref 4.4–5.9)
RBC # BLD AUTO: 3.34 10E6/UL (ref 4.4–5.9)
RBC # BLD AUTO: 3.36 10E6/UL (ref 4.4–5.9)
RBC # BLD AUTO: 3.39 10E6/UL (ref 4.4–5.9)
RBC # BLD AUTO: 3.54 10E6/UL (ref 4.4–5.9)
RBC # BLD AUTO: 3.6 10E6/UL (ref 4.4–5.9)
RBC # BLD AUTO: 3.64 10E12/L (ref 4.4–5.9)
RBC # BLD AUTO: 3.64 10E6/UL (ref 4.4–5.9)
RBC # BLD AUTO: 3.74 10E6/UL (ref 4.4–5.9)
RBC # BLD AUTO: 3.77 10E12/L (ref 4.4–5.9)
RBC # BLD AUTO: 3.91 10E6/UL (ref 4.4–5.9)
RBC # BLD AUTO: 4.08 10E6/UL (ref 4.4–5.9)
RBC # BLD AUTO: 4.27 10E6/UL (ref 4.4–5.9)
RBC # BLD AUTO: 4.35 10E6/UL (ref 4.4–5.9)
RBC # BLD AUTO: 4.37 10E6/UL (ref 4.4–5.9)
RBC # BLD AUTO: 4.39 10E6/UL (ref 4.4–5.9)
RBC # BLD AUTO: 4.77 10E6/UL (ref 4.4–5.9)
RBC # BLD AUTO: 4.79 10E12/L (ref 4.4–5.9)
RBC #/AREA URNS AUTO: 4 /HPF (ref 0–2)
RBC MORPH BLD: ABNORMAL
RBC MORPH BLD: NORMAL
RBC URINE: 69 /HPF
RBC URINE: 8 /HPF
RETICS # AUTO: 0.02 10E6/UL (ref 0.03–0.1)
RETICS # AUTO: 0.02 10E6/UL (ref 0.03–0.1)
RETICS # AUTO: 0.03 10E6/UL (ref 0.03–0.1)
RETICS # AUTO: 0.04 10E6/UL (ref 0.03–0.1)
RETICS # AUTO: 0.05 10E6/UL (ref 0.03–0.1)
RETICS # AUTO: 0.06 10E6/UL (ref 0.03–0.1)
RETICS # AUTO: 0.07 10E6/UL (ref 0.03–0.1)
RETICS # AUTO: 0.07 10E6/UL (ref 0.03–0.1)
RETICS/RBC NFR AUTO: 0.7 % (ref 0.5–2)
RETICS/RBC NFR AUTO: 0.7 % (ref 0.5–2)
RETICS/RBC NFR AUTO: 0.8 % (ref 0.5–2)
RETICS/RBC NFR AUTO: 0.8 % (ref 0.5–2)
RETICS/RBC NFR AUTO: 0.9 % (ref 0.5–2)
RETICS/RBC NFR AUTO: 1 % (ref 0.5–2)
RETICS/RBC NFR AUTO: 1.2 % (ref 0.5–2)
RETICS/RBC NFR AUTO: 1.4 % (ref 0.5–2)
RETICS/RBC NFR AUTO: 1.8 % (ref 0.5–2)
RETICS/RBC NFR AUTO: 1.8 % (ref 0.5–2)
RVA NSP5 STL QL NAA+PROBE: NOT DETECTED
S PNEUM AG SPEC QL: NEGATIVE
SA TARGET DNA: NEGATIVE
SALMONELLA SP RPOD STL QL NAA+PROBE: NOT DETECTED
SARS-COV-2 RNA RESP QL NAA+PROBE: NEGATIVE
SHIGELLA SP+EIEC IPAH STL QL NAA+PROBE: NOT DETECTED
SMUDGE CELLS BLD QL SMEAR: PRESENT
SODIUM SERPL-SCNC: 133 MMOL/L (ref 133–144)
SODIUM SERPL-SCNC: 134 MMOL/L (ref 133–144)
SODIUM SERPL-SCNC: 135 MMOL/L (ref 133–144)
SODIUM SERPL-SCNC: 135 MMOL/L (ref 133–144)
SODIUM SERPL-SCNC: 136 MMOL/L (ref 133–144)
SODIUM SERPL-SCNC: 137 MMOL/L (ref 133–144)
SODIUM SERPL-SCNC: 137 MMOL/L (ref 133–144)
SODIUM SERPL-SCNC: 138 MMOL/L (ref 133–144)
SODIUM SERPL-SCNC: 139 MMOL/L (ref 133–144)
SODIUM SERPL-SCNC: 139 MMOL/L (ref 133–144)
SODIUM SERPL-SCNC: 140 MMOL/L (ref 133–144)
SODIUM SERPL-SCNC: 141 MMOL/L (ref 133–144)
SODIUM SERPL-SCNC: 145 MMOL/L (ref 133–144)
SODIUM SERPL-SCNC: 145 MMOL/L (ref 133–144)
SODIUM SERPL-SCNC: 146 MMOL/L (ref 133–144)
SODIUM SERPL-SCNC: 146 MMOL/L (ref 133–144)
SODIUM SERPL-SCNC: 148 MMOL/L (ref 133–144)
SODIUM SERPL-SCNC: 149 MMOL/L (ref 133–144)
SODIUM SERPL-SCNC: 149 MMOL/L (ref 133–144)
SODIUM SERPL-SCNC: 150 MMOL/L (ref 133–144)
SOURCE: ABNORMAL
SP GR UR STRIP: 1.01 (ref 1–1.03)
SP GR UR STRIP: 1.02 (ref 1–1.03)
SP GR UR STRIP: 1.03 (ref 1–1.03)
SPECIMEN EXPIRATION DATE: NORMAL
SPECIMEN EXPIRATION DATE: NORMAL
SPECIMEN SOURCE: NORMAL
SQUAMOUS #/AREA URNS AUTO: <1 /HPF (ref 0–1)
SQUAMOUS EPITHELIAL: 1 /HPF
SQUAMOUS EPITHELIAL: <1 /HPF
SYSTOLIC BLOOD PRESSURE - MUSE: NORMAL MMHG
T AXIS - MUSE: -52 DEGREES
T AXIS - MUSE: -62 DEGREES
T AXIS - MUSE: -69 DEGREES
T AXIS - MUSE: -76 DEGREES
T AXIS - MUSE: 34 DEGREES
T AXIS - MUSE: 5 DEGREES
TIBC SERPL-MCNC: 190 UG/DL (ref 240–430)
TOXIC GRANULES BLD QL SMEAR: PRESENT
TRIGL SERPL-MCNC: 288 MG/DL
TRIGL SERPL-MCNC: 424 MG/DL
TRIGL SERPL-MCNC: 446 MG/DL
TRIGL SERPL-MCNC: 479 MG/DL
TROPONIN I SERPL HS-MCNC: 16 NG/L
TROPONIN I SERPL-MCNC: 0.02 UG/L (ref 0–0.04)
TROPONIN I SERPL-MCNC: 0.03 UG/L (ref 0–0.04)
TROPONIN I SERPL-MCNC: 0.03 UG/L (ref 0–0.04)
TROPONIN I SERPL-MCNC: 0.04 UG/L (ref 0–0.04)
TROPONIN I SERPL-MCNC: <0.015 UG/L (ref 0–0.04)
TSH SERPL DL<=0.005 MIU/L-ACNC: 0.46 MU/L (ref 0.4–4)
UNIT ABO/RH: NORMAL
UNIT NUMBER: NORMAL
UNIT STATUS: NORMAL
UNIT TYPE ISBT: 5100
UROBILINOGEN UR STRIP-MCNC: NORMAL MG/DL
UROBILINOGEN UR STRIP-MCNC: NORMAL MG/DL
UROBILINOGEN UR STRIP-MCNC: NORMAL MG/DL (ref 0–2)
V CHOL+PARA RFBL+TRKH+TNAA STL QL NAA+PR: NOT DETECTED
VANCOMYCIN SERPL-MCNC: 24.6 MG/L
VANCOMYCIN SERPL-MCNC: 26.8 MG/L
VENTRICULAR RATE- MUSE: 104 BPM
VENTRICULAR RATE- MUSE: 115 BPM
VENTRICULAR RATE- MUSE: 131 BPM
VENTRICULAR RATE- MUSE: 84 BPM
VENTRICULAR RATE- MUSE: 92 BPM
VENTRICULAR RATE- MUSE: 97 BPM
VIT B12 SERPL-MCNC: 935 PG/ML (ref 193–986)
WBC # BLD AUTO: 10.8 10E3/UL (ref 4–11)
WBC # BLD AUTO: 11.2 10E3/UL (ref 4–11)
WBC # BLD AUTO: 12.5 10E3/UL (ref 4–11)
WBC # BLD AUTO: 13.5 10E3/UL (ref 4–11)
WBC # BLD AUTO: 13.7 10E3/UL (ref 4–11)
WBC # BLD AUTO: 13.8 10E3/UL (ref 4–11)
WBC # BLD AUTO: 14.2 10E3/UL (ref 4–11)
WBC # BLD AUTO: 14.8 10E3/UL (ref 4–11)
WBC # BLD AUTO: 15 10E3/UL (ref 4–11)
WBC # BLD AUTO: 15.4 10E3/UL (ref 4–11)
WBC # BLD AUTO: 15.5 10E3/UL (ref 4–11)
WBC # BLD AUTO: 15.9 10E3/UL (ref 4–11)
WBC # BLD AUTO: 16.3 10E3/UL (ref 4–11)
WBC # BLD AUTO: 17.8 10E3/UL (ref 4–11)
WBC # BLD AUTO: 19.4 10E3/UL (ref 4–11)
WBC # BLD AUTO: 23.1 10E3/UL (ref 4–11)
WBC # BLD AUTO: 25.7 10E9/L (ref 4–11)
WBC # BLD AUTO: 5.1 10E3/UL (ref 4–11)
WBC # BLD AUTO: 5.7 10E9/L (ref 4–11)
WBC # BLD AUTO: 6.6 10E3/UL (ref 4–11)
WBC # BLD AUTO: 7.4 10E3/UL (ref 4–11)
WBC # BLD AUTO: 7.8 10E3/UL (ref 4–11)
WBC # BLD AUTO: 7.9 10E3/UL (ref 4–11)
WBC # BLD AUTO: 8.6 10E9/L (ref 4–11)
WBC # BLD AUTO: 9.6 10E3/UL (ref 4–11)
WBC # BLD AUTO: 9.6 10E3/UL (ref 4–11)
WBC #/AREA URNS AUTO: 2 /HPF (ref 0–5)
WBC URINE: 2 /HPF
WBC URINE: 4 /HPF
Y ENTERO RECN STL QL NAA+PROBE: NOT DETECTED

## 2021-01-01 PROCEDURE — 85025 COMPLETE CBC W/AUTO DIFF WBC: CPT | Performed by: EMERGENCY MEDICINE

## 2021-01-01 PROCEDURE — 250N000013 HC RX MED GY IP 250 OP 250 PS 637: Performed by: HOSPITALIST

## 2021-01-01 PROCEDURE — 258N000003 HC RX IP 258 OP 636: Performed by: NURSE PRACTITIONER

## 2021-01-01 PROCEDURE — 85045 AUTOMATED RETICULOCYTE COUNT: CPT | Performed by: INTERNAL MEDICINE

## 2021-01-01 PROCEDURE — 82803 BLOOD GASES ANY COMBINATION: CPT | Performed by: INTERNAL MEDICINE

## 2021-01-01 PROCEDURE — 250N000013 HC RX MED GY IP 250 OP 250 PS 637: Performed by: SURGERY

## 2021-01-01 PROCEDURE — 250N000011 HC RX IP 250 OP 636: Performed by: ANESTHESIOLOGY

## 2021-01-01 PROCEDURE — 250N000013 HC RX MED GY IP 250 OP 250 PS 637: Performed by: INTERNAL MEDICINE

## 2021-01-01 PROCEDURE — 80202 ASSAY OF VANCOMYCIN: CPT | Performed by: INTERNAL MEDICINE

## 2021-01-01 PROCEDURE — 82728 ASSAY OF FERRITIN: CPT | Performed by: HOSPITALIST

## 2021-01-01 PROCEDURE — 250N000013 HC RX MED GY IP 250 OP 250 PS 637: Performed by: PHYSICIAN ASSISTANT

## 2021-01-01 PROCEDURE — 99232 SBSQ HOSP IP/OBS MODERATE 35: CPT | Performed by: NURSE PRACTITIONER

## 2021-01-01 PROCEDURE — G0378 HOSPITAL OBSERVATION PER HR: HCPCS

## 2021-01-01 PROCEDURE — 82746 ASSAY OF FOLIC ACID SERUM: CPT | Performed by: HOSPITALIST

## 2021-01-01 PROCEDURE — 258N000003 HC RX IP 258 OP 636: Performed by: INTERNAL MEDICINE

## 2021-01-01 PROCEDURE — 634N000001 HC RX 634: Performed by: INTERNAL MEDICINE

## 2021-01-01 PROCEDURE — 82803 BLOOD GASES ANY COMBINATION: CPT | Performed by: SURGERY

## 2021-01-01 PROCEDURE — 99291 CRITICAL CARE FIRST HOUR: CPT | Performed by: SURGERY

## 2021-01-01 PROCEDURE — 70486 CT MAXILLOFACIAL W/O DYE: CPT

## 2021-01-01 PROCEDURE — 250N000011 HC RX IP 250 OP 636: Performed by: SURGERY

## 2021-01-01 PROCEDURE — 99223 1ST HOSP IP/OBS HIGH 75: CPT | Mod: AI | Performed by: HOSPITALIST

## 2021-01-01 PROCEDURE — 99223 1ST HOSP IP/OBS HIGH 75: CPT | Performed by: NURSE PRACTITIONER

## 2021-01-01 PROCEDURE — 99001 SPECIMEN HANDLING PT-LAB: CPT | Performed by: INTERNAL MEDICINE

## 2021-01-01 PROCEDURE — 85025 COMPLETE CBC W/AUTO DIFF WBC: CPT | Performed by: HOSPITALIST

## 2021-01-01 PROCEDURE — 96376 TX/PRO/DX INJ SAME DRUG ADON: CPT

## 2021-01-01 PROCEDURE — 84484 ASSAY OF TROPONIN QUANT: CPT | Performed by: INTERNAL MEDICINE

## 2021-01-01 PROCEDURE — 250N000012 HC RX MED GY IP 250 OP 636 PS 637: Performed by: INTERNAL MEDICINE

## 2021-01-01 PROCEDURE — 99233 SBSQ HOSP IP/OBS HIGH 50: CPT | Performed by: INTERNAL MEDICINE

## 2021-01-01 PROCEDURE — 83735 ASSAY OF MAGNESIUM: CPT | Performed by: ANESTHESIOLOGY

## 2021-01-01 PROCEDURE — 250N000009 HC RX 250: Performed by: ANESTHESIOLOGY

## 2021-01-01 PROCEDURE — 96372 THER/PROPH/DIAG INJ SC/IM: CPT

## 2021-01-01 PROCEDURE — 84145 PROCALCITONIN (PCT): CPT | Performed by: PHYSICIAN ASSISTANT

## 2021-01-01 PROCEDURE — 80048 BASIC METABOLIC PNL TOTAL CA: CPT | Performed by: INTERNAL MEDICINE

## 2021-01-01 PROCEDURE — 36415 COLL VENOUS BLD VENIPUNCTURE: CPT | Performed by: INTERNAL MEDICINE

## 2021-01-01 PROCEDURE — 70450 CT HEAD/BRAIN W/O DYE: CPT

## 2021-01-01 PROCEDURE — 87040 BLOOD CULTURE FOR BACTERIA: CPT | Performed by: ANESTHESIOLOGY

## 2021-01-01 PROCEDURE — 250N000013 HC RX MED GY IP 250 OP 250 PS 637: Performed by: ANESTHESIOLOGY

## 2021-01-01 PROCEDURE — C9113 INJ PANTOPRAZOLE SODIUM, VIA: HCPCS | Performed by: ANESTHESIOLOGY

## 2021-01-01 PROCEDURE — 250N000011 HC RX IP 250 OP 636: Performed by: INTERNAL MEDICINE

## 2021-01-01 PROCEDURE — 84155 ASSAY OF PROTEIN SERUM: CPT | Performed by: INTERNAL MEDICINE

## 2021-01-01 PROCEDURE — 250N000011 HC RX IP 250 OP 636: Performed by: HOSPITALIST

## 2021-01-01 PROCEDURE — 83605 ASSAY OF LACTIC ACID: CPT | Performed by: PHYSICIAN ASSISTANT

## 2021-01-01 PROCEDURE — 36592 COLLECT BLOOD FROM PICC: CPT | Performed by: INTERNAL MEDICINE

## 2021-01-01 PROCEDURE — 250N000011 HC RX IP 250 OP 636: Performed by: PHYSICIAN ASSISTANT

## 2021-01-01 PROCEDURE — 258N000003 HC RX IP 258 OP 636: Performed by: PHYSICIAN ASSISTANT

## 2021-01-01 PROCEDURE — 80202 ASSAY OF VANCOMYCIN: CPT | Performed by: HOSPITALIST

## 2021-01-01 PROCEDURE — C9113 INJ PANTOPRAZOLE SODIUM, VIA: HCPCS | Performed by: INTERNAL MEDICINE

## 2021-01-01 PROCEDURE — 81001 URINALYSIS AUTO W/SCOPE: CPT | Performed by: PHYSICIAN ASSISTANT

## 2021-01-01 PROCEDURE — 36415 COLL VENOUS BLD VENIPUNCTURE: CPT | Performed by: HOSPITALIST

## 2021-01-01 PROCEDURE — 82010 KETONE BODYS QUAN: CPT | Performed by: INTERNAL MEDICINE

## 2021-01-01 PROCEDURE — 86901 BLOOD TYPING SEROLOGIC RH(D): CPT | Performed by: INTERNAL MEDICINE

## 2021-01-01 PROCEDURE — 999N000157 HC STATISTIC RCP TIME EA 10 MIN

## 2021-01-01 PROCEDURE — 250N000009 HC RX 250: Performed by: PHYSICIAN ASSISTANT

## 2021-01-01 PROCEDURE — 3E043XZ INTRODUCTION OF VASOPRESSOR INTO CENTRAL VEIN, PERCUTANEOUS APPROACH: ICD-10-PCS | Performed by: ANESTHESIOLOGY

## 2021-01-01 PROCEDURE — 93005 ELECTROCARDIOGRAM TRACING: CPT

## 2021-01-01 PROCEDURE — 90937 HEMODIALYSIS REPEATED EVAL: CPT

## 2021-01-01 PROCEDURE — 99207 PR CDG-CODE CATEGORY CHANGED: CPT | Performed by: HOSPITALIST

## 2021-01-01 PROCEDURE — 258N000003 HC RX IP 258 OP 636: Performed by: ANESTHESIOLOGY

## 2021-01-01 PROCEDURE — 999N001017 HC STATISTIC GLUCOSE BY METER IP

## 2021-01-01 PROCEDURE — 93306 TTE W/DOPPLER COMPLETE: CPT | Mod: 26 | Performed by: INTERNAL MEDICINE

## 2021-01-01 PROCEDURE — 85610 PROTHROMBIN TIME: CPT | Performed by: STUDENT IN AN ORGANIZED HEALTH CARE EDUCATION/TRAINING PROGRAM

## 2021-01-01 PROCEDURE — 87635 SARS-COV-2 COVID-19 AMP PRB: CPT | Performed by: PHYSICIAN ASSISTANT

## 2021-01-01 PROCEDURE — 84145 PROCALCITONIN (PCT): CPT | Performed by: INTERNAL MEDICINE

## 2021-01-01 PROCEDURE — 71045 X-RAY EXAM CHEST 1 VIEW: CPT

## 2021-01-01 PROCEDURE — 999N000127 HC STATISTIC PERIPHERAL IV START W US GUIDANCE

## 2021-01-01 PROCEDURE — 85027 COMPLETE CBC AUTOMATED: CPT | Performed by: PHYSICIAN ASSISTANT

## 2021-01-01 PROCEDURE — 250N000009 HC RX 250: Performed by: NURSE PRACTITIONER

## 2021-01-01 PROCEDURE — 94003 VENT MGMT INPAT SUBQ DAY: CPT

## 2021-01-01 PROCEDURE — 370N000003 HC ANESTHESIA WARD SERVICE

## 2021-01-01 PROCEDURE — 36415 COLL VENOUS BLD VENIPUNCTURE: CPT | Performed by: PHYSICIAN ASSISTANT

## 2021-01-01 PROCEDURE — 83735 ASSAY OF MAGNESIUM: CPT | Performed by: HOSPITALIST

## 2021-01-01 PROCEDURE — 999N000105 HC STATISTIC NO DOCUMENTATION TO SUPPORT CHARGE

## 2021-01-01 PROCEDURE — 36592 COLLECT BLOOD FROM PICC: CPT | Performed by: HOSPITALIST

## 2021-01-01 PROCEDURE — 84478 ASSAY OF TRIGLYCERIDES: CPT

## 2021-01-01 PROCEDURE — 250N000009 HC RX 250: Performed by: INTERNAL MEDICINE

## 2021-01-01 PROCEDURE — 84132 ASSAY OF SERUM POTASSIUM: CPT | Performed by: PHYSICIAN ASSISTANT

## 2021-01-01 PROCEDURE — 87899 AGENT NOS ASSAY W/OPTIC: CPT | Performed by: INTERNAL MEDICINE

## 2021-01-01 PROCEDURE — 85027 COMPLETE CBC AUTOMATED: CPT | Performed by: SURGERY

## 2021-01-01 PROCEDURE — 83735 ASSAY OF MAGNESIUM: CPT | Performed by: SURGERY

## 2021-01-01 PROCEDURE — 87205 SMEAR GRAM STAIN: CPT | Performed by: INTERNAL MEDICINE

## 2021-01-01 PROCEDURE — 90935 HEMODIALYSIS ONE EVALUATION: CPT | Performed by: INTERNAL MEDICINE

## 2021-01-01 PROCEDURE — 84100 ASSAY OF PHOSPHORUS: CPT | Performed by: SURGERY

## 2021-01-01 PROCEDURE — 83735 ASSAY OF MAGNESIUM: CPT | Performed by: INTERNAL MEDICINE

## 2021-01-01 PROCEDURE — 82040 ASSAY OF SERUM ALBUMIN: CPT | Performed by: INTERNAL MEDICINE

## 2021-01-01 PROCEDURE — G0463 HOSPITAL OUTPT CLINIC VISIT: HCPCS

## 2021-01-01 PROCEDURE — 99223 1ST HOSP IP/OBS HIGH 75: CPT | Mod: AI | Performed by: INTERNAL MEDICINE

## 2021-01-01 PROCEDURE — 99239 HOSP IP/OBS DSCHRG MGMT >30: CPT | Performed by: INTERNAL MEDICINE

## 2021-01-01 PROCEDURE — 71250 CT THORAX DX C-: CPT

## 2021-01-01 PROCEDURE — 85379 FIBRIN DEGRADATION QUANT: CPT | Performed by: INTERNAL MEDICINE

## 2021-01-01 PROCEDURE — 85379 FIBRIN DEGRADATION QUANT: CPT | Performed by: STUDENT IN AN ORGANIZED HEALTH CARE EDUCATION/TRAINING PROGRAM

## 2021-01-01 PROCEDURE — 250N000011 HC RX IP 250 OP 636: Performed by: NURSE ANESTHETIST, CERTIFIED REGISTERED

## 2021-01-01 PROCEDURE — 99232 SBSQ HOSP IP/OBS MODERATE 35: CPT | Performed by: INTERNAL MEDICINE

## 2021-01-01 PROCEDURE — 5A09357 ASSISTANCE WITH RESPIRATORY VENTILATION, LESS THAN 24 CONSECUTIVE HOURS, CONTINUOUS POSITIVE AIRWAY PRESSURE: ICD-10-PCS | Performed by: INTERNAL MEDICINE

## 2021-01-01 PROCEDURE — 86140 C-REACTIVE PROTEIN: CPT | Performed by: STUDENT IN AN ORGANIZED HEALTH CARE EDUCATION/TRAINING PROGRAM

## 2021-01-01 PROCEDURE — 5A1955Z RESPIRATORY VENTILATION, GREATER THAN 96 CONSECUTIVE HOURS: ICD-10-PCS | Performed by: INTERNAL MEDICINE

## 2021-01-01 PROCEDURE — 85610 PROTHROMBIN TIME: CPT | Performed by: INTERNAL MEDICINE

## 2021-01-01 PROCEDURE — 36415 COLL VENOUS BLD VENIPUNCTURE: CPT | Performed by: EMERGENCY MEDICINE

## 2021-01-01 PROCEDURE — 80048 BASIC METABOLIC PNL TOTAL CA: CPT | Performed by: HOSPITALIST

## 2021-01-01 PROCEDURE — 85027 COMPLETE CBC AUTOMATED: CPT | Performed by: INTERNAL MEDICINE

## 2021-01-01 PROCEDURE — 83615 LACTATE (LD) (LDH) ENZYME: CPT | Performed by: INTERNAL MEDICINE

## 2021-01-01 PROCEDURE — 94002 VENT MGMT INPAT INIT DAY: CPT

## 2021-01-01 PROCEDURE — XW033E5 INTRODUCTION OF REMDESIVIR ANTI-INFECTIVE INTO PERIPHERAL VEIN, PERCUTANEOUS APPROACH, NEW TECHNOLOGY GROUP 5: ICD-10-PCS | Performed by: PHYSICIAN ASSISTANT

## 2021-01-01 PROCEDURE — 258N000003 HC RX IP 258 OP 636: Performed by: HOSPITALIST

## 2021-01-01 PROCEDURE — 86140 C-REACTIVE PROTEIN: CPT | Performed by: INTERNAL MEDICINE

## 2021-01-01 PROCEDURE — 82803 BLOOD GASES ANY COMBINATION: CPT | Performed by: ANESTHESIOLOGY

## 2021-01-01 PROCEDURE — 200N000001 HC R&B ICU

## 2021-01-01 PROCEDURE — 36592 COLLECT BLOOD FROM PICC: CPT | Performed by: SURGERY

## 2021-01-01 PROCEDURE — 84484 ASSAY OF TROPONIN QUANT: CPT | Performed by: HOSPITALIST

## 2021-01-01 PROCEDURE — 85384 FIBRINOGEN ACTIVITY: CPT | Performed by: STUDENT IN AN ORGANIZED HEALTH CARE EDUCATION/TRAINING PROGRAM

## 2021-01-01 PROCEDURE — 84484 ASSAY OF TROPONIN QUANT: CPT | Performed by: PHYSICIAN ASSISTANT

## 2021-01-01 PROCEDURE — 99233 SBSQ HOSP IP/OBS HIGH 50: CPT | Performed by: PHYSICIAN ASSISTANT

## 2021-01-01 PROCEDURE — 99291 CRITICAL CARE FIRST HOUR: CPT | Mod: 25 | Performed by: INTERNAL MEDICINE

## 2021-01-01 PROCEDURE — 87506 IADNA-DNA/RNA PROBE TQ 6-11: CPT | Performed by: INTERNAL MEDICINE

## 2021-01-01 PROCEDURE — 999N000065 XR ABDOMEN PORT 1 VIEWS

## 2021-01-01 PROCEDURE — 83605 ASSAY OF LACTIC ACID: CPT | Performed by: INTERNAL MEDICINE

## 2021-01-01 PROCEDURE — 86923 COMPATIBILITY TEST ELECTRIC: CPT | Performed by: SURGERY

## 2021-01-01 PROCEDURE — 86140 C-REACTIVE PROTEIN: CPT | Performed by: PHYSICIAN ASSISTANT

## 2021-01-01 PROCEDURE — 82374 ASSAY BLOOD CARBON DIOXIDE: CPT | Performed by: INTERNAL MEDICINE

## 2021-01-01 PROCEDURE — 96367 TX/PROPH/DG ADDL SEQ IV INF: CPT

## 2021-01-01 PROCEDURE — 94660 CPAP INITIATION&MGMT: CPT

## 2021-01-01 PROCEDURE — 258N000003 HC RX IP 258 OP 636: Performed by: EMERGENCY MEDICINE

## 2021-01-01 PROCEDURE — 82374 ASSAY BLOOD CARBON DIOXIDE: CPT | Performed by: PHYSICIAN ASSISTANT

## 2021-01-01 PROCEDURE — 96366 THER/PROPH/DIAG IV INF ADDON: CPT

## 2021-01-01 PROCEDURE — 85652 RBC SED RATE AUTOMATED: CPT | Performed by: PHYSICIAN ASSISTANT

## 2021-01-01 PROCEDURE — 83036 HEMOGLOBIN GLYCOSYLATED A1C: CPT | Performed by: INTERNAL MEDICINE

## 2021-01-01 PROCEDURE — 85384 FIBRINOGEN ACTIVITY: CPT | Performed by: INTERNAL MEDICINE

## 2021-01-01 PROCEDURE — 82310 ASSAY OF CALCIUM: CPT | Performed by: SURGERY

## 2021-01-01 PROCEDURE — 999N000208 ECHOCARDIOGRAM COMPLETE

## 2021-01-01 PROCEDURE — 80053 COMPREHEN METABOLIC PANEL: CPT | Performed by: PHYSICIAN ASSISTANT

## 2021-01-01 PROCEDURE — 96361 HYDRATE IV INFUSION ADD-ON: CPT

## 2021-01-01 PROCEDURE — 258N000001 HC RX 258: Performed by: HOSPITALIST

## 2021-01-01 PROCEDURE — 258N000003 HC RX IP 258 OP 636: Performed by: SURGERY

## 2021-01-01 PROCEDURE — C9803 HOPD COVID-19 SPEC COLLECT: HCPCS

## 2021-01-01 PROCEDURE — 36556 INSERT NON-TUNNEL CV CATH: CPT | Performed by: INTERNAL MEDICINE

## 2021-01-01 PROCEDURE — 86706 HEP B SURFACE ANTIBODY: CPT | Performed by: INTERNAL MEDICINE

## 2021-01-01 PROCEDURE — 80069 RENAL FUNCTION PANEL: CPT | Performed by: INTERNAL MEDICINE

## 2021-01-01 PROCEDURE — 83880 ASSAY OF NATRIURETIC PEPTIDE: CPT | Performed by: INTERNAL MEDICINE

## 2021-01-01 PROCEDURE — 999N000040 HC STATISTIC CONSULT NO CHARGE VASC ACCESS

## 2021-01-01 PROCEDURE — 84145 PROCALCITONIN (PCT): CPT | Performed by: HOSPITALIST

## 2021-01-01 PROCEDURE — 80048 BASIC METABOLIC PNL TOTAL CA: CPT | Performed by: SURGERY

## 2021-01-01 PROCEDURE — 250N000009 HC RX 250: Performed by: HOSPITALIST

## 2021-01-01 PROCEDURE — 85025 COMPLETE CBC W/AUTO DIFF WBC: CPT | Performed by: SURGERY

## 2021-01-01 PROCEDURE — 83615 LACTATE (LD) (LDH) ENZYME: CPT | Performed by: STUDENT IN AN ORGANIZED HEALTH CARE EDUCATION/TRAINING PROGRAM

## 2021-01-01 PROCEDURE — 999N000111 HC STATISTIC OT IP EVAL DEFER

## 2021-01-01 PROCEDURE — 81001 URINALYSIS AUTO W/SCOPE: CPT | Performed by: EMERGENCY MEDICINE

## 2021-01-01 PROCEDURE — 85379 FIBRIN DEGRADATION QUANT: CPT | Performed by: PHYSICIAN ASSISTANT

## 2021-01-01 PROCEDURE — 83550 IRON BINDING TEST: CPT | Performed by: HOSPITALIST

## 2021-01-01 PROCEDURE — 99233 SBSQ HOSP IP/OBS HIGH 50: CPT | Performed by: HOSPITALIST

## 2021-01-01 PROCEDURE — 82040 ASSAY OF SERUM ALBUMIN: CPT | Performed by: PHYSICIAN ASSISTANT

## 2021-01-01 PROCEDURE — 84145 PROCALCITONIN (PCT): CPT | Performed by: SURGERY

## 2021-01-01 PROCEDURE — 82565 ASSAY OF CREATININE: CPT | Performed by: INTERNAL MEDICINE

## 2021-01-01 PROCEDURE — 83690 ASSAY OF LIPASE: CPT | Performed by: PHYSICIAN ASSISTANT

## 2021-01-01 PROCEDURE — 85018 HEMOGLOBIN: CPT | Performed by: INTERNAL MEDICINE

## 2021-01-01 PROCEDURE — 83605 ASSAY OF LACTIC ACID: CPT | Performed by: HOSPITALIST

## 2021-01-01 PROCEDURE — 96372 THER/PROPH/DIAG INJ SC/IM: CPT | Performed by: HOSPITALIST

## 2021-01-01 PROCEDURE — 99207 PR NO BILLABLE SERVICE THIS VISIT: CPT | Performed by: INTERNAL MEDICINE

## 2021-01-01 PROCEDURE — P9016 RBC LEUKOCYTES REDUCED: HCPCS | Performed by: SURGERY

## 2021-01-01 PROCEDURE — 87040 BLOOD CULTURE FOR BACTERIA: CPT | Performed by: PHYSICIAN ASSISTANT

## 2021-01-01 PROCEDURE — 999N000147 HC STATISTIC PT IP EVAL DEFER: Performed by: PHYSICAL THERAPIST

## 2021-01-01 PROCEDURE — 87086 URINE CULTURE/COLONY COUNT: CPT | Performed by: PHYSICIAN ASSISTANT

## 2021-01-01 PROCEDURE — 84484 ASSAY OF TROPONIN QUANT: CPT | Performed by: STUDENT IN AN ORGANIZED HEALTH CARE EDUCATION/TRAINING PROGRAM

## 2021-01-01 PROCEDURE — 85060 BLOOD SMEAR INTERPRETATION: CPT | Performed by: PATHOLOGY

## 2021-01-01 PROCEDURE — 81001 URINALYSIS AUTO W/SCOPE: CPT | Performed by: INTERNAL MEDICINE

## 2021-01-01 PROCEDURE — 120N000001 HC R&B MED SURG/OB

## 2021-01-01 PROCEDURE — 85027 COMPLETE CBC AUTOMATED: CPT | Performed by: HOSPITALIST

## 2021-01-01 PROCEDURE — 99285 EMERGENCY DEPT VISIT HI MDM: CPT | Mod: 25

## 2021-01-01 PROCEDURE — 83630 LACTOFERRIN FECAL (QUAL): CPT | Performed by: INTERNAL MEDICINE

## 2021-01-01 PROCEDURE — 82805 BLOOD GASES W/O2 SATURATION: CPT | Performed by: INTERNAL MEDICINE

## 2021-01-01 PROCEDURE — 83735 ASSAY OF MAGNESIUM: CPT | Performed by: PHYSICIAN ASSISTANT

## 2021-01-01 PROCEDURE — 84478 ASSAY OF TRIGLYCERIDES: CPT | Performed by: HOSPITALIST

## 2021-01-01 PROCEDURE — 84450 TRANSFERASE (AST) (SGOT): CPT | Performed by: INTERNAL MEDICINE

## 2021-01-01 PROCEDURE — 80053 COMPREHEN METABOLIC PANEL: CPT | Performed by: EMERGENCY MEDICINE

## 2021-01-01 PROCEDURE — 96365 THER/PROPH/DIAG IV INF INIT: CPT | Mod: 59

## 2021-01-01 PROCEDURE — 02HV33Z INSERTION OF INFUSION DEVICE INTO SUPERIOR VENA CAVA, PERCUTANEOUS APPROACH: ICD-10-PCS | Performed by: INTERNAL MEDICINE

## 2021-01-01 PROCEDURE — 71275 CT ANGIOGRAPHY CHEST: CPT

## 2021-01-01 PROCEDURE — 83036 HEMOGLOBIN GLYCOSYLATED A1C: CPT | Performed by: HOSPITALIST

## 2021-01-01 PROCEDURE — 82550 ASSAY OF CK (CPK): CPT | Performed by: HOSPITALIST

## 2021-01-01 PROCEDURE — 87641 MR-STAPH DNA AMP PROBE: CPT | Performed by: SURGERY

## 2021-01-01 PROCEDURE — 83880 ASSAY OF NATRIURETIC PEPTIDE: CPT | Performed by: PHYSICIAN ASSISTANT

## 2021-01-01 PROCEDURE — 250N000009 HC RX 250: Performed by: SURGERY

## 2021-01-01 PROCEDURE — 97162 PT EVAL MOD COMPLEX 30 MIN: CPT | Mod: GP | Performed by: PHYSICAL THERAPIST

## 2021-01-01 PROCEDURE — 82962 GLUCOSE BLOOD TEST: CPT

## 2021-01-01 PROCEDURE — 76705 ECHO EXAM OF ABDOMEN: CPT | Mod: TC

## 2021-01-01 PROCEDURE — 99291 CRITICAL CARE FIRST HOUR: CPT | Performed by: INTERNAL MEDICINE

## 2021-01-01 PROCEDURE — 87340 HEPATITIS B SURFACE AG IA: CPT | Performed by: INTERNAL MEDICINE

## 2021-01-01 PROCEDURE — 272N000026 HC KIT POWER PICC TRIPLE LUMEN

## 2021-01-01 PROCEDURE — 96375 TX/PRO/DX INJ NEW DRUG ADDON: CPT

## 2021-01-01 PROCEDURE — 84100 ASSAY OF PHOSPHORUS: CPT | Performed by: ANESTHESIOLOGY

## 2021-01-01 PROCEDURE — 84443 ASSAY THYROID STIM HORMONE: CPT | Performed by: PHYSICIAN ASSISTANT

## 2021-01-01 PROCEDURE — 82607 VITAMIN B-12: CPT | Performed by: HOSPITALIST

## 2021-01-01 PROCEDURE — 92610 EVALUATE SWALLOWING FUNCTION: CPT | Mod: GN | Performed by: SPEECH-LANGUAGE PATHOLOGIST

## 2021-01-01 PROCEDURE — 120N000004 HC R&B MS OVERFLOW

## 2021-01-01 PROCEDURE — 96360 HYDRATION IV INFUSION INIT: CPT

## 2021-01-01 PROCEDURE — 97530 THERAPEUTIC ACTIVITIES: CPT | Mod: GP | Performed by: PHYSICAL THERAPIST

## 2021-01-01 PROCEDURE — 82040 ASSAY OF SERUM ALBUMIN: CPT | Performed by: ANESTHESIOLOGY

## 2021-01-01 PROCEDURE — 82550 ASSAY OF CK (CPK): CPT

## 2021-01-01 PROCEDURE — 85014 HEMATOCRIT: CPT | Performed by: STUDENT IN AN ORGANIZED HEALTH CARE EDUCATION/TRAINING PROGRAM

## 2021-01-01 PROCEDURE — 85025 COMPLETE CBC W/AUTO DIFF WBC: CPT | Performed by: INTERNAL MEDICINE

## 2021-01-01 PROCEDURE — 84132 ASSAY OF SERUM POTASSIUM: CPT | Performed by: INTERNAL MEDICINE

## 2021-01-01 PROCEDURE — 250N000011 HC RX IP 250 OP 636: Performed by: NURSE PRACTITIONER

## 2021-01-01 PROCEDURE — 85045 AUTOMATED RETICULOCYTE COUNT: CPT | Performed by: HOSPITALIST

## 2021-01-01 PROCEDURE — 85045 AUTOMATED RETICULOCYTE COUNT: CPT | Performed by: STUDENT IN AN ORGANIZED HEALTH CARE EDUCATION/TRAINING PROGRAM

## 2021-01-01 PROCEDURE — 85018 HEMOGLOBIN: CPT | Mod: 91 | Performed by: INTERNAL MEDICINE

## 2021-01-01 PROCEDURE — 272N000079 HC NUTRITION PRODUCT RENAL BASIC LITER

## 2021-01-01 PROCEDURE — 255N000002 HC RX 255 OP 636: Performed by: HOSPITALIST

## 2021-01-01 PROCEDURE — 85025 COMPLETE CBC W/AUTO DIFF WBC: CPT | Performed by: PHYSICIAN ASSISTANT

## 2021-01-01 PROCEDURE — 92526 ORAL FUNCTION THERAPY: CPT | Mod: GN | Performed by: SPEECH-LANGUAGE PATHOLOGIST

## 2021-01-01 PROCEDURE — 93970 EXTREMITY STUDY: CPT

## 2021-01-01 PROCEDURE — 36569 INSJ PICC 5 YR+ W/O IMAGING: CPT

## 2021-01-01 PROCEDURE — 71046 X-RAY EXAM CHEST 2 VIEWS: CPT

## 2021-01-01 PROCEDURE — 85018 HEMOGLOBIN: CPT | Performed by: OBSTETRICS & GYNECOLOGY

## 2021-01-01 PROCEDURE — 99223 1ST HOSP IP/OBS HIGH 75: CPT | Performed by: INTERNAL MEDICINE

## 2021-01-01 PROCEDURE — 74177 CT ABD & PELVIS W/CONTRAST: CPT

## 2021-01-01 PROCEDURE — 85041 AUTOMATED RBC COUNT: CPT | Performed by: INTERNAL MEDICINE

## 2021-01-01 PROCEDURE — 84478 ASSAY OF TRIGLYCERIDES: CPT | Performed by: SURGERY

## 2021-01-01 PROCEDURE — 999N000065 XR CHEST PORT 1 VIEW

## 2021-01-01 RX ORDER — NALOXONE HYDROCHLORIDE 0.4 MG/ML
0.2 INJECTION, SOLUTION INTRAMUSCULAR; INTRAVENOUS; SUBCUTANEOUS
Status: DISCONTINUED | OUTPATIENT
Start: 2021-01-01 | End: 2021-12-05 | Stop reason: HOSPADM

## 2021-01-01 RX ORDER — MIDAZOLAM HCL IN 0.9 % NACL/PF 1 MG/ML
1-8 PLASTIC BAG, INJECTION (ML) INTRAVENOUS CONTINUOUS
Status: DISCONTINUED | OUTPATIENT
Start: 2021-01-01 | End: 2021-01-01

## 2021-01-01 RX ORDER — PROPOFOL 10 MG/ML
10-75 INJECTION, EMULSION INTRAVENOUS CONTINUOUS
Status: DISCONTINUED | OUTPATIENT
Start: 2021-01-01 | End: 2021-01-01

## 2021-01-01 RX ORDER — IOPAMIDOL 755 MG/ML
500 INJECTION, SOLUTION INTRAVASCULAR ONCE
Status: COMPLETED | OUTPATIENT
Start: 2021-01-01 | End: 2021-01-01

## 2021-01-01 RX ORDER — DEXTROSE MONOHYDRATE 25 G/50ML
25-50 INJECTION, SOLUTION INTRAVENOUS
Status: DISCONTINUED | OUTPATIENT
Start: 2021-01-01 | End: 2021-01-01

## 2021-01-01 RX ORDER — ACETAMINOPHEN 325 MG/10.15ML
650 LIQUID ORAL EVERY 6 HOURS PRN
Status: DISCONTINUED | OUTPATIENT
Start: 2021-01-01 | End: 2021-12-05 | Stop reason: HOSPADM

## 2021-01-01 RX ORDER — POTASSIUM CHLORIDE 1.5 G/1.58G
40 POWDER, FOR SOLUTION ORAL ONCE
Status: COMPLETED | OUTPATIENT
Start: 2021-01-01 | End: 2021-01-01

## 2021-01-01 RX ORDER — AZITHROMYCIN 500 MG/5ML
500 INJECTION, POWDER, LYOPHILIZED, FOR SOLUTION INTRAVENOUS ONCE
Status: COMPLETED | OUTPATIENT
Start: 2021-01-01 | End: 2021-01-01

## 2021-01-01 RX ORDER — GABAPENTIN 100 MG/1
100-200 CAPSULE ORAL
COMMUNITY

## 2021-01-01 RX ORDER — NICOTINE POLACRILEX 4 MG
15-30 LOZENGE BUCCAL
Status: DISCONTINUED | OUTPATIENT
Start: 2021-01-01 | End: 2021-01-01

## 2021-01-01 RX ORDER — AMLODIPINE BESYLATE 10 MG/1
10 TABLET ORAL DAILY
Status: DISCONTINUED | OUTPATIENT
Start: 2021-01-01 | End: 2021-01-01 | Stop reason: HOSPADM

## 2021-01-01 RX ORDER — LISINOPRIL 40 MG/1
40 TABLET ORAL DAILY
COMMUNITY

## 2021-01-01 RX ORDER — VANCOMYCIN HYDROCHLORIDE 1 G/200ML
1000 INJECTION, SOLUTION INTRAVENOUS EVERY 24 HOURS
Status: DISCONTINUED | OUTPATIENT
Start: 2021-01-01 | End: 2021-01-01 | Stop reason: DRUGHIGH

## 2021-01-01 RX ORDER — CLONIDINE HYDROCHLORIDE 0.1 MG/1
0.2 TABLET ORAL DAILY
Status: DISCONTINUED | OUTPATIENT
Start: 2021-01-01 | End: 2021-01-01 | Stop reason: HOSPADM

## 2021-01-01 RX ORDER — PROPOFOL 10 MG/ML
INJECTION, EMULSION INTRAVENOUS PRN
Status: DISCONTINUED | OUTPATIENT
Start: 2021-01-01 | End: 2021-01-01

## 2021-01-01 RX ORDER — CLONIDINE HYDROCHLORIDE 0.1 MG/1
0.2 TABLET ORAL 2 TIMES DAILY
Status: DISCONTINUED | OUTPATIENT
Start: 2021-01-01 | End: 2021-01-01

## 2021-01-01 RX ORDER — POTASSIUM CHLORIDE 20MEQ/15ML
10 LIQUID (ML) ORAL ONCE
Status: COMPLETED | OUTPATIENT
Start: 2021-01-01 | End: 2021-01-01

## 2021-01-01 RX ORDER — PROPOFOL 10 MG/ML
5-20 INJECTION, EMULSION INTRAVENOUS CONTINUOUS
Status: DISCONTINUED | OUTPATIENT
Start: 2021-01-01 | End: 2021-01-01

## 2021-01-01 RX ORDER — NALOXONE HYDROCHLORIDE 0.4 MG/ML
0.4 INJECTION, SOLUTION INTRAMUSCULAR; INTRAVENOUS; SUBCUTANEOUS
Status: DISCONTINUED | OUTPATIENT
Start: 2021-01-01 | End: 2021-12-05 | Stop reason: HOSPADM

## 2021-01-01 RX ORDER — METOPROLOL TARTRATE 1 MG/ML
10 INJECTION, SOLUTION INTRAVENOUS EVERY 8 HOURS
Status: DISCONTINUED | OUTPATIENT
Start: 2021-01-01 | End: 2021-01-01

## 2021-01-01 RX ORDER — FUROSEMIDE 10 MG/ML
60 INJECTION INTRAMUSCULAR; INTRAVENOUS ONCE
Status: COMPLETED | OUTPATIENT
Start: 2021-01-01 | End: 2021-01-01

## 2021-01-01 RX ORDER — GUAIFENESIN 600 MG/1
15 TABLET, EXTENDED RELEASE ORAL DAILY
Status: DISCONTINUED | OUTPATIENT
Start: 2021-01-01 | End: 2021-01-01

## 2021-01-01 RX ORDER — AMINO AC/PROTEIN HYDR/WHEY PRO 10G-100/30
1 LIQUID (ML) ORAL 3 TIMES DAILY
Status: DISCONTINUED | OUTPATIENT
Start: 2021-01-01 | End: 2021-01-01

## 2021-01-01 RX ORDER — PANTOPRAZOLE SODIUM 40 MG/1
40 TABLET, DELAYED RELEASE ORAL
Status: DISCONTINUED | OUTPATIENT
Start: 2021-01-01 | End: 2021-01-01 | Stop reason: HOSPADM

## 2021-01-01 RX ORDER — CEFTRIAXONE 2 G/1
2 INJECTION, POWDER, FOR SOLUTION INTRAMUSCULAR; INTRAVENOUS EVERY 24 HOURS
Status: DISCONTINUED | OUTPATIENT
Start: 2021-01-01 | End: 2021-01-01 | Stop reason: HOSPADM

## 2021-01-01 RX ORDER — LIDOCAINE 40 MG/G
CREAM TOPICAL
Status: ACTIVE | OUTPATIENT
Start: 2021-01-01 | End: 2021-01-01

## 2021-01-01 RX ORDER — DEXTROSE MONOHYDRATE 100 MG/ML
INJECTION, SOLUTION INTRAVENOUS CONTINUOUS PRN
Status: DISCONTINUED | OUTPATIENT
Start: 2021-01-01 | End: 2021-01-01

## 2021-01-01 RX ORDER — DEXTROSE MONOHYDRATE, SODIUM CHLORIDE, SODIUM LACTATE, POTASSIUM CHLORIDE, CALCIUM CHLORIDE 5; 600; 310; 179; 20 G/100ML; MG/100ML; MG/100ML; MG/100ML; MG/100ML
INJECTION, SOLUTION INTRAVENOUS CONTINUOUS
Status: DISCONTINUED | OUTPATIENT
Start: 2021-01-01 | End: 2021-01-01

## 2021-01-01 RX ORDER — CLONIDINE HYDROCHLORIDE 0.1 MG/1
0.2 TABLET ORAL DAILY
Status: DISCONTINUED | OUTPATIENT
Start: 2021-01-01 | End: 2021-01-01

## 2021-01-01 RX ORDER — HYDROCHLOROTHIAZIDE 25 MG/1
25 TABLET ORAL DAILY
COMMUNITY

## 2021-01-01 RX ORDER — HEPARIN SODIUM 1000 [USP'U]/ML
500 INJECTION, SOLUTION INTRAVENOUS; SUBCUTANEOUS CONTINUOUS
Status: DISCONTINUED | OUTPATIENT
Start: 2021-01-01 | End: 2021-01-01

## 2021-01-01 RX ORDER — AMINO AC/PROTEIN HYDR/WHEY PRO 10G-100/30
2 LIQUID (ML) ORAL 3 TIMES DAILY
Status: DISCONTINUED | OUTPATIENT
Start: 2021-01-01 | End: 2021-01-01

## 2021-01-01 RX ORDER — ATORVASTATIN CALCIUM 10 MG/1
10 TABLET, FILM COATED ORAL EVERY EVENING
Qty: 30 TABLET | Refills: 0 | Status: SHIPPED | OUTPATIENT
Start: 2021-01-01

## 2021-01-01 RX ORDER — GABAPENTIN 100 MG/1
100-200 CAPSULE ORAL
Status: DISCONTINUED | OUTPATIENT
Start: 2021-01-01 | End: 2021-01-01 | Stop reason: CLARIF

## 2021-01-01 RX ORDER — GLIPIZIDE 10 MG/1
10 TABLET, FILM COATED, EXTENDED RELEASE ORAL
COMMUNITY

## 2021-01-01 RX ORDER — NICOTINE POLACRILEX 4 MG
15-30 LOZENGE BUCCAL
Status: DISCONTINUED | OUTPATIENT
Start: 2021-01-01 | End: 2021-12-05 | Stop reason: HOSPADM

## 2021-01-01 RX ORDER — ONDANSETRON 2 MG/ML
4 INJECTION INTRAMUSCULAR; INTRAVENOUS EVERY 6 HOURS PRN
Status: DISCONTINUED | OUTPATIENT
Start: 2021-01-01 | End: 2021-01-01

## 2021-01-01 RX ORDER — PROCHLORPERAZINE 25 MG
12.5 SUPPOSITORY, RECTAL RECTAL EVERY 12 HOURS PRN
Status: DISCONTINUED | OUTPATIENT
Start: 2021-01-01 | End: 2021-01-01

## 2021-01-01 RX ORDER — CEFTRIAXONE 2 G/1
2 INJECTION, POWDER, FOR SOLUTION INTRAMUSCULAR; INTRAVENOUS ONCE
Status: COMPLETED | OUTPATIENT
Start: 2021-01-01 | End: 2021-01-01

## 2021-01-01 RX ORDER — AMLODIPINE BESYLATE 5 MG/1
5 TABLET ORAL DAILY
Status: DISCONTINUED | OUTPATIENT
Start: 2021-01-01 | End: 2021-01-01

## 2021-01-01 RX ORDER — PROCHLORPERAZINE MALEATE 5 MG
5 TABLET ORAL EVERY 6 HOURS PRN
Status: DISCONTINUED | OUTPATIENT
Start: 2021-01-01 | End: 2021-01-01

## 2021-01-01 RX ORDER — MEROPENEM 500 MG/1
500 INJECTION, POWDER, FOR SOLUTION INTRAVENOUS EVERY 8 HOURS
Status: DISCONTINUED | OUTPATIENT
Start: 2021-01-01 | End: 2021-01-01

## 2021-01-01 RX ORDER — FENTANYL CITRATE 50 UG/ML
50 INJECTION, SOLUTION INTRAMUSCULAR; INTRAVENOUS ONCE
Status: DISCONTINUED | OUTPATIENT
Start: 2021-01-01 | End: 2021-12-05 | Stop reason: HOSPADM

## 2021-01-01 RX ORDER — KETOROLAC TROMETHAMINE 15 MG/ML
15 INJECTION, SOLUTION INTRAMUSCULAR; INTRAVENOUS EVERY 6 HOURS PRN
Status: DISCONTINUED | OUTPATIENT
Start: 2021-01-01 | End: 2021-01-01

## 2021-01-01 RX ORDER — BUMETANIDE 0.25 MG/ML
2 INJECTION INTRAMUSCULAR; INTRAVENOUS EVERY 12 HOURS
Status: DISCONTINUED | OUTPATIENT
Start: 2021-01-01 | End: 2021-01-01

## 2021-01-01 RX ORDER — CALCIUM GLUCONATE 20 MG/ML
2 INJECTION, SOLUTION INTRAVENOUS ONCE
Status: COMPLETED | OUTPATIENT
Start: 2021-01-01 | End: 2021-01-01

## 2021-01-01 RX ORDER — SODIUM CHLORIDE, SODIUM LACTATE, POTASSIUM CHLORIDE, CALCIUM CHLORIDE 600; 310; 30; 20 MG/100ML; MG/100ML; MG/100ML; MG/100ML
INJECTION, SOLUTION INTRAVENOUS CONTINUOUS
Status: DISCONTINUED | OUTPATIENT
Start: 2021-01-01 | End: 2021-01-01

## 2021-01-01 RX ORDER — SODIUM CHLORIDE 9 MG/ML
INJECTION, SOLUTION INTRAVENOUS CONTINUOUS
Status: DISCONTINUED | OUTPATIENT
Start: 2021-01-01 | End: 2021-01-01

## 2021-01-01 RX ORDER — LIDOCAINE 40 MG/G
CREAM TOPICAL
Status: DISCONTINUED | OUTPATIENT
Start: 2021-01-01 | End: 2021-01-01 | Stop reason: HOSPADM

## 2021-01-01 RX ORDER — FENTANYL CITRATE 50 UG/ML
50-100 INJECTION, SOLUTION INTRAMUSCULAR; INTRAVENOUS
Status: DISCONTINUED | OUTPATIENT
Start: 2021-01-01 | End: 2021-12-05 | Stop reason: HOSPADM

## 2021-01-01 RX ORDER — VANCOMYCIN HYDROCHLORIDE 500 MG/10ML
500 INJECTION, POWDER, LYOPHILIZED, FOR SOLUTION INTRAVENOUS EVERY 24 HOURS
Status: DISCONTINUED | OUTPATIENT
Start: 2021-01-01 | End: 2021-01-01

## 2021-01-01 RX ORDER — ONDANSETRON 2 MG/ML
4 INJECTION INTRAMUSCULAR; INTRAVENOUS ONCE
Status: COMPLETED | OUTPATIENT
Start: 2021-01-01 | End: 2021-01-01

## 2021-01-01 RX ORDER — MEROPENEM 500 MG/1
500 INJECTION, POWDER, FOR SOLUTION INTRAVENOUS EVERY 12 HOURS
Status: DISCONTINUED | OUTPATIENT
Start: 2021-01-01 | End: 2021-01-01

## 2021-01-01 RX ORDER — ALLOPURINOL 300 MG/1
300 TABLET ORAL DAILY
Status: DISCONTINUED | OUTPATIENT
Start: 2021-01-01 | End: 2021-01-01

## 2021-01-01 RX ORDER — GABAPENTIN 250 MG/5ML
100-200 SOLUTION ORAL
Status: DISCONTINUED | OUTPATIENT
Start: 2021-01-01 | End: 2021-01-01

## 2021-01-01 RX ORDER — BISACODYL 10 MG
10 SUPPOSITORY, RECTAL RECTAL DAILY PRN
Status: DISCONTINUED | OUTPATIENT
Start: 2021-01-01 | End: 2021-12-05 | Stop reason: HOSPADM

## 2021-01-01 RX ORDER — PROPOFOL 10 MG/ML
5-75 INJECTION, EMULSION INTRAVENOUS CONTINUOUS
Status: DISCONTINUED | OUTPATIENT
Start: 2021-01-01 | End: 2021-01-01

## 2021-01-01 RX ORDER — MAGNESIUM SULFATE HEPTAHYDRATE 40 MG/ML
2 INJECTION, SOLUTION INTRAVENOUS ONCE
Status: COMPLETED | OUTPATIENT
Start: 2021-01-01 | End: 2021-01-01

## 2021-01-01 RX ORDER — AMIODARONE HYDROCHLORIDE 200 MG/1
200 TABLET ORAL DAILY
Status: DISCONTINUED | OUTPATIENT
Start: 2021-01-01 | End: 2021-01-01

## 2021-01-01 RX ORDER — MIDAZOLAM HCL IN 0.9 % NACL/PF 1 MG/ML
2-10 PLASTIC BAG, INJECTION (ML) INTRAVENOUS CONTINUOUS
Status: DISCONTINUED | OUTPATIENT
Start: 2021-01-01 | End: 2021-12-05 | Stop reason: HOSPADM

## 2021-01-01 RX ORDER — ONDANSETRON 4 MG/1
4 TABLET, ORALLY DISINTEGRATING ORAL EVERY 6 HOURS PRN
Status: DISCONTINUED | OUTPATIENT
Start: 2021-01-01 | End: 2021-12-05 | Stop reason: HOSPADM

## 2021-01-01 RX ORDER — DEXTROSE MONOHYDRATE 25 G/50ML
25-50 INJECTION, SOLUTION INTRAVENOUS
Status: DISCONTINUED | OUTPATIENT
Start: 2021-01-01 | End: 2021-12-05 | Stop reason: HOSPADM

## 2021-01-01 RX ORDER — ACETAMINOPHEN 325 MG/10.15ML
975 LIQUID ORAL EVERY 8 HOURS
Status: DISCONTINUED | OUTPATIENT
Start: 2021-01-01 | End: 2021-01-01

## 2021-01-01 RX ORDER — METOPROLOL SUCCINATE 100 MG/1
100 TABLET, EXTENDED RELEASE ORAL DAILY
Status: DISCONTINUED | OUTPATIENT
Start: 2021-01-01 | End: 2021-01-01 | Stop reason: HOSPADM

## 2021-01-01 RX ORDER — LABETALOL HYDROCHLORIDE 5 MG/ML
20 INJECTION, SOLUTION INTRAVENOUS EVERY 4 HOURS PRN
Status: DISCONTINUED | OUTPATIENT
Start: 2021-01-01 | End: 2021-01-01

## 2021-01-01 RX ORDER — FUROSEMIDE 10 MG/ML
80 INJECTION INTRAMUSCULAR; INTRAVENOUS EVERY 12 HOURS
Status: DISCONTINUED | OUTPATIENT
Start: 2021-01-01 | End: 2021-01-01

## 2021-01-01 RX ORDER — AMIODARONE HYDROCHLORIDE 200 MG/1
400 TABLET ORAL 2 TIMES DAILY
Status: DISCONTINUED | OUTPATIENT
Start: 2021-01-01 | End: 2021-01-01

## 2021-01-01 RX ORDER — ACETAMINOPHEN 325 MG/1
650 TABLET ORAL EVERY 4 HOURS PRN
Status: DISCONTINUED | OUTPATIENT
Start: 2021-01-01 | End: 2021-01-01 | Stop reason: HOSPADM

## 2021-01-01 RX ORDER — AZITHROMYCIN 250 MG/1
250 TABLET, FILM COATED ORAL DAILY
Qty: 2 TABLET | Refills: 0 | Status: SHIPPED | OUTPATIENT
Start: 2021-01-01 | End: 2021-01-01

## 2021-01-01 RX ORDER — B COMPLEX C NO.10/FOLIC ACID 900MCG/5ML
5 LIQUID (ML) ORAL DAILY
Status: DISCONTINUED | OUTPATIENT
Start: 2021-01-01 | End: 2021-01-01

## 2021-01-01 RX ORDER — ACETAMINOPHEN 650 MG/1
650 SUPPOSITORY RECTAL EVERY 6 HOURS PRN
Status: DISCONTINUED | OUTPATIENT
Start: 2021-01-01 | End: 2021-01-01

## 2021-01-01 RX ORDER — GLYCOPYRROLATE 0.2 MG/ML
0.2 INJECTION, SOLUTION INTRAMUSCULAR; INTRAVENOUS ONCE
Status: COMPLETED | OUTPATIENT
Start: 2021-01-01 | End: 2021-01-01

## 2021-01-01 RX ORDER — DEXTROSE MONOHYDRATE 100 MG/ML
INJECTION, SOLUTION INTRAVENOUS CONTINUOUS PRN
Status: DISCONTINUED | OUTPATIENT
Start: 2021-01-01 | End: 2021-12-05 | Stop reason: HOSPADM

## 2021-01-01 RX ORDER — GLIPIZIDE 5 MG/1
10 TABLET, FILM COATED, EXTENDED RELEASE ORAL
Status: DISCONTINUED | OUTPATIENT
Start: 2021-01-01 | End: 2021-01-01

## 2021-01-01 RX ORDER — FUROSEMIDE 10 MG/ML
40 INJECTION INTRAMUSCULAR; INTRAVENOUS ONCE
Status: COMPLETED | OUTPATIENT
Start: 2021-01-01 | End: 2021-01-01

## 2021-01-01 RX ORDER — ACETAMINOPHEN 650 MG/1
650 SUPPOSITORY RECTAL EVERY 6 HOURS PRN
Status: DISCONTINUED | OUTPATIENT
Start: 2021-01-01 | End: 2021-12-05 | Stop reason: HOSPADM

## 2021-01-01 RX ORDER — ATORVASTATIN CALCIUM 10 MG/1
10 TABLET, FILM COATED ORAL EVERY EVENING
Status: DISCONTINUED | OUTPATIENT
Start: 2021-01-01 | End: 2021-01-01 | Stop reason: HOSPADM

## 2021-01-01 RX ORDER — POTASSIUM CHLORIDE 7.45 MG/ML
10 INJECTION INTRAVENOUS
Status: COMPLETED | OUTPATIENT
Start: 2021-01-01 | End: 2021-01-01

## 2021-01-01 RX ORDER — AZITHROMYCIN 250 MG/1
250 TABLET, FILM COATED ORAL DAILY
Status: DISCONTINUED | OUTPATIENT
Start: 2021-01-01 | End: 2021-01-01 | Stop reason: HOSPADM

## 2021-01-01 RX ORDER — POTASSIUM CHLORIDE 20MEQ/15ML
40 LIQUID (ML) ORAL ONCE
Status: COMPLETED | OUTPATIENT
Start: 2021-01-01 | End: 2021-01-01

## 2021-01-01 RX ORDER — DEXMEDETOMIDINE HYDROCHLORIDE 4 UG/ML
0.2-0.7 INJECTION, SOLUTION INTRAVENOUS CONTINUOUS
Status: DISCONTINUED | OUTPATIENT
Start: 2021-01-01 | End: 2021-01-01

## 2021-01-01 RX ORDER — ACETAMINOPHEN 325 MG/10.15ML
650 LIQUID ORAL EVERY 6 HOURS PRN
Status: DISCONTINUED | OUTPATIENT
Start: 2021-01-01 | End: 2021-01-01

## 2021-01-01 RX ORDER — NICOTINE POLACRILEX 4 MG
15-30 LOZENGE BUCCAL
Status: DISCONTINUED | OUTPATIENT
Start: 2021-01-01 | End: 2021-01-01 | Stop reason: HOSPADM

## 2021-01-01 RX ORDER — ACETAMINOPHEN 325 MG/1
650 TABLET ORAL EVERY 6 HOURS PRN
Status: DISCONTINUED | OUTPATIENT
Start: 2021-01-01 | End: 2021-01-01

## 2021-01-01 RX ORDER — AMLODIPINE BESYLATE 10 MG/1
10 TABLET ORAL DAILY
Status: DISCONTINUED | OUTPATIENT
Start: 2021-01-01 | End: 2021-01-01

## 2021-01-01 RX ORDER — LANOLIN ALCOHOL/MO/W.PET/CERES
3 CREAM (GRAM) TOPICAL
Status: DISCONTINUED | OUTPATIENT
Start: 2021-01-01 | End: 2021-01-01

## 2021-01-01 RX ORDER — ATORVASTATIN CALCIUM 10 MG/1
10 TABLET, FILM COATED ORAL EVERY EVENING
Status: DISCONTINUED | OUTPATIENT
Start: 2021-01-01 | End: 2021-01-01

## 2021-01-01 RX ORDER — AMINO AC/PROTEIN HYDR/WHEY PRO 10G-100/30
2 LIQUID (ML) ORAL 2 TIMES DAILY
Status: DISCONTINUED | OUTPATIENT
Start: 2021-01-01 | End: 2021-01-01

## 2021-01-01 RX ORDER — GABAPENTIN 300 MG/1
300 CAPSULE ORAL
Status: DISCONTINUED | OUTPATIENT
Start: 2021-01-01 | End: 2021-01-01 | Stop reason: HOSPADM

## 2021-01-01 RX ORDER — ONDANSETRON 2 MG/ML
4 INJECTION INTRAMUSCULAR; INTRAVENOUS EVERY 6 HOURS PRN
Status: DISCONTINUED | OUTPATIENT
Start: 2021-01-01 | End: 2021-12-05 | Stop reason: HOSPADM

## 2021-01-01 RX ORDER — METFORMIN HCL 500 MG
2000 TABLET, EXTENDED RELEASE 24 HR ORAL
Status: DISCONTINUED | OUTPATIENT
Start: 2021-01-01 | End: 2021-01-01

## 2021-01-01 RX ORDER — FUROSEMIDE 10 MG/ML
20 INJECTION INTRAMUSCULAR; INTRAVENOUS ONCE
Status: COMPLETED | OUTPATIENT
Start: 2021-01-01 | End: 2021-01-01

## 2021-01-01 RX ORDER — METOPROLOL SUCCINATE 100 MG/1
100 TABLET, EXTENDED RELEASE ORAL DAILY
Status: DISCONTINUED | OUTPATIENT
Start: 2021-01-01 | End: 2021-01-01

## 2021-01-01 RX ORDER — DEXAMETHASONE SODIUM PHOSPHATE 10 MG/ML
6 INJECTION, SOLUTION INTRAMUSCULAR; INTRAVENOUS DAILY
Status: COMPLETED | OUTPATIENT
Start: 2021-01-01 | End: 2021-01-01

## 2021-01-01 RX ORDER — CHLORHEXIDINE GLUCONATE ORAL RINSE 1.2 MG/ML
15 SOLUTION DENTAL 2 TIMES DAILY
Status: DISCONTINUED | OUTPATIENT
Start: 2021-01-01 | End: 2021-12-05 | Stop reason: HOSPADM

## 2021-01-01 RX ORDER — SODIUM CHLORIDE 9 MG/ML
INJECTION, SOLUTION INTRAVENOUS CONTINUOUS
Status: DISCONTINUED | OUTPATIENT
Start: 2021-01-01 | End: 2021-01-01 | Stop reason: HOSPADM

## 2021-01-01 RX ORDER — IBUPROFEN 400 MG/1
800 TABLET, FILM COATED ORAL EVERY 6 HOURS PRN
Status: DISCONTINUED | OUTPATIENT
Start: 2021-01-01 | End: 2021-01-01

## 2021-01-01 RX ORDER — LISINOPRIL 40 MG/1
40 TABLET ORAL DAILY
Status: DISCONTINUED | OUTPATIENT
Start: 2021-01-01 | End: 2021-01-01

## 2021-01-01 RX ORDER — ONDANSETRON 4 MG/1
4 TABLET, ORALLY DISINTEGRATING ORAL EVERY 6 HOURS PRN
Status: DISCONTINUED | OUTPATIENT
Start: 2021-01-01 | End: 2021-01-01

## 2021-01-01 RX ORDER — ACETAMINOPHEN 325 MG/1
975 TABLET ORAL EVERY 8 HOURS
Status: DISCONTINUED | OUTPATIENT
Start: 2021-01-01 | End: 2021-01-01 | Stop reason: ALTCHOICE

## 2021-01-01 RX ORDER — ONDANSETRON 2 MG/ML
4 INJECTION INTRAMUSCULAR; INTRAVENOUS EVERY 6 HOURS PRN
Status: DISCONTINUED | OUTPATIENT
Start: 2021-01-01 | End: 2021-01-01 | Stop reason: HOSPADM

## 2021-01-01 RX ORDER — POTASSIUM CHLORIDE 29.8 MG/ML
20 INJECTION INTRAVENOUS ONCE
Status: COMPLETED | OUTPATIENT
Start: 2021-01-01 | End: 2021-01-01

## 2021-01-01 RX ORDER — HYDROMORPHONE HYDROCHLORIDE 1 MG/ML
0.5 INJECTION, SOLUTION INTRAMUSCULAR; INTRAVENOUS; SUBCUTANEOUS ONCE
Status: COMPLETED | OUTPATIENT
Start: 2021-01-01 | End: 2021-01-01

## 2021-01-01 RX ORDER — NOREPINEPHRINE BITARTRATE 0.02 MG/ML
.01-.6 INJECTION, SOLUTION INTRAVENOUS CONTINUOUS
Status: DISCONTINUED | OUTPATIENT
Start: 2021-01-01 | End: 2021-01-01

## 2021-01-01 RX ORDER — METFORMIN HCL 500 MG
2000 TABLET, EXTENDED RELEASE 24 HR ORAL
COMMUNITY

## 2021-01-01 RX ORDER — ONDANSETRON 4 MG/1
4 TABLET, ORALLY DISINTEGRATING ORAL EVERY 6 HOURS PRN
Status: DISCONTINUED | OUTPATIENT
Start: 2021-01-01 | End: 2021-01-01 | Stop reason: HOSPADM

## 2021-01-01 RX ORDER — HEPARIN SODIUM 5000 [USP'U]/.5ML
5000 INJECTION, SOLUTION INTRAVENOUS; SUBCUTANEOUS EVERY 8 HOURS
Status: DISCONTINUED | OUTPATIENT
Start: 2021-01-01 | End: 2021-01-01

## 2021-01-01 RX ORDER — POTASSIUM CHLORIDE 1.5 G/1.58G
20 POWDER, FOR SOLUTION ORAL ONCE
Status: COMPLETED | OUTPATIENT
Start: 2021-01-01 | End: 2021-01-01

## 2021-01-01 RX ORDER — METOPROLOL TARTRATE 1 MG/ML
5 INJECTION, SOLUTION INTRAVENOUS EVERY 8 HOURS
Status: DISCONTINUED | OUTPATIENT
Start: 2021-01-01 | End: 2021-01-01

## 2021-01-01 RX ORDER — DEXTROSE MONOHYDRATE, SODIUM CHLORIDE, AND POTASSIUM CHLORIDE 50; 1.49; 4.5 G/1000ML; G/1000ML; G/1000ML
INJECTION, SOLUTION INTRAVENOUS CONTINUOUS
Status: ACTIVE | OUTPATIENT
Start: 2021-01-01 | End: 2021-01-01

## 2021-01-01 RX ORDER — DEXTROSE MONOHYDRATE 25 G/50ML
25-50 INJECTION, SOLUTION INTRAVENOUS
Status: DISCONTINUED | OUTPATIENT
Start: 2021-01-01 | End: 2021-01-01 | Stop reason: HOSPADM

## 2021-01-01 RX ADMIN — INSULIN ASPART 4 UNITS: 100 INJECTION, SOLUTION INTRAVENOUS; SUBCUTANEOUS at 12:56

## 2021-01-01 RX ADMIN — VASOPRESSIN 2.4 UNITS/HR: 20 INJECTION INTRAVENOUS at 18:37

## 2021-01-01 RX ADMIN — HEPARIN SODIUM 3000 UNITS: 1000 INJECTION INTRAVENOUS; SUBCUTANEOUS at 08:38

## 2021-01-01 RX ADMIN — PROPOFOL 25 MCG/KG/MIN: 10 INJECTION, EMULSION INTRAVENOUS at 21:33

## 2021-01-01 RX ADMIN — Medication 2 PACKET: at 21:36

## 2021-01-01 RX ADMIN — AMIODARONE HYDROCHLORIDE 150 MG: 1.5 INJECTION, SOLUTION INTRAVENOUS at 06:08

## 2021-01-01 RX ADMIN — HEPARIN SODIUM 5000 UNITS: 10000 INJECTION, SOLUTION INTRAVENOUS; SUBCUTANEOUS at 11:32

## 2021-01-01 RX ADMIN — ACETAMINOPHEN 975 MG: 325 TABLET, FILM COATED ORAL at 20:30

## 2021-01-01 RX ADMIN — PROPOFOL 40 MCG/KG/MIN: 10 INJECTION, EMULSION INTRAVENOUS at 01:09

## 2021-01-01 RX ADMIN — ACETAMINOPHEN 975 MG: 160 SOLUTION ORAL at 13:07

## 2021-01-01 RX ADMIN — INSULIN ASPART 4 UNITS: 100 INJECTION, SOLUTION INTRAVENOUS; SUBCUTANEOUS at 23:30

## 2021-01-01 RX ADMIN — HEPARIN SODIUM 5000 UNITS: 10000 INJECTION, SOLUTION INTRAVENOUS; SUBCUTANEOUS at 02:12

## 2021-01-01 RX ADMIN — ACETAMINOPHEN 975 MG: 160 SOLUTION ORAL at 21:10

## 2021-01-01 RX ADMIN — SODIUM CHLORIDE 1000 ML: 9 INJECTION, SOLUTION INTRAVENOUS at 14:46

## 2021-01-01 RX ADMIN — INSULIN ASPART 4 UNITS: 100 INJECTION, SOLUTION INTRAVENOUS; SUBCUTANEOUS at 00:35

## 2021-01-01 RX ADMIN — VASOPRESSIN 2.4 UNITS/HR: 20 INJECTION INTRAVENOUS at 03:36

## 2021-01-01 RX ADMIN — HEPARIN SODIUM 5000 UNITS: 10000 INJECTION, SOLUTION INTRAVENOUS; SUBCUTANEOUS at 18:00

## 2021-01-01 RX ADMIN — PROPOFOL 20 MCG/KG/MIN: 10 INJECTION, EMULSION INTRAVENOUS at 15:28

## 2021-01-01 RX ADMIN — PROPOFOL 30 MCG/KG/MIN: 10 INJECTION, EMULSION INTRAVENOUS at 15:18

## 2021-01-01 RX ADMIN — SODIUM CHLORIDE 63 ML: 9 INJECTION, SOLUTION INTRAVENOUS at 15:12

## 2021-01-01 RX ADMIN — PROPOFOL 50 MCG/KG/MIN: 10 INJECTION, EMULSION INTRAVENOUS at 14:01

## 2021-01-01 RX ADMIN — SODIUM BICARBONATE 25 ML/HR: 84 INJECTION, SOLUTION INTRAVENOUS at 22:32

## 2021-01-01 RX ADMIN — FUROSEMIDE 80 MG: 10 INJECTION, SOLUTION INTRAMUSCULAR; INTRAVENOUS at 22:19

## 2021-01-01 RX ADMIN — LABETALOL HYDROCHLORIDE 20 MG: 5 INJECTION, SOLUTION INTRAVENOUS at 19:03

## 2021-01-01 RX ADMIN — ACETAMINOPHEN 975 MG: 160 SOLUTION ORAL at 12:48

## 2021-01-01 RX ADMIN — Medication 100 MCG/HR: at 06:09

## 2021-01-01 RX ADMIN — TAZOBACTAM SODIUM AND PIPERACILLIN SODIUM 4.5 G: 500; 4 INJECTION, SOLUTION INTRAVENOUS at 19:50

## 2021-01-01 RX ADMIN — PROPOFOL 30 MCG/KG/MIN: 10 INJECTION, EMULSION INTRAVENOUS at 22:36

## 2021-01-01 RX ADMIN — SODIUM BICARBONATE 40 MG: 84 INJECTION, SOLUTION INTRAVENOUS at 08:28

## 2021-01-01 RX ADMIN — PROPOFOL 20 MCG/KG/MIN: 10 INJECTION, EMULSION INTRAVENOUS at 06:14

## 2021-01-01 RX ADMIN — INSULIN ASPART 4 UNITS: 100 INJECTION, SOLUTION INTRAVENOUS; SUBCUTANEOUS at 20:01

## 2021-01-01 RX ADMIN — FUROSEMIDE 80 MG: 10 INJECTION, SOLUTION INTRAMUSCULAR; INTRAVENOUS at 21:08

## 2021-01-01 RX ADMIN — Medication 5 ML: at 13:00

## 2021-01-01 RX ADMIN — INSULIN ASPART 4 UNITS: 100 INJECTION, SOLUTION INTRAVENOUS; SUBCUTANEOUS at 03:50

## 2021-01-01 RX ADMIN — Medication 5 ML: at 08:28

## 2021-01-01 RX ADMIN — PROPOFOL 25 MCG/KG/MIN: 10 INJECTION, EMULSION INTRAVENOUS at 15:57

## 2021-01-01 RX ADMIN — SODIUM CHLORIDE: 9 INJECTION, SOLUTION INTRAVENOUS at 10:33

## 2021-01-01 RX ADMIN — CHLORHEXIDINE GLUCONATE 0.12% ORAL RINSE 15 ML: 1.2 LIQUID ORAL at 20:35

## 2021-01-01 RX ADMIN — Medication 5 ML: at 08:21

## 2021-01-01 RX ADMIN — Medication 100 MCG/HR: at 14:17

## 2021-01-01 RX ADMIN — CLONIDINE HYDROCHLORIDE 0.2 MG: 0.1 TABLET ORAL at 09:33

## 2021-01-01 RX ADMIN — PROPOFOL 25 MCG/KG/MIN: 10 INJECTION, EMULSION INTRAVENOUS at 18:05

## 2021-01-01 RX ADMIN — PROPOFOL 40 MCG/KG/MIN: 10 INJECTION, EMULSION INTRAVENOUS at 13:44

## 2021-01-01 RX ADMIN — HEPARIN SODIUM 1600 UNITS: 1000 INJECTION INTRAVENOUS; SUBCUTANEOUS at 10:55

## 2021-01-01 RX ADMIN — Medication 100 MCG/HR: at 20:10

## 2021-01-01 RX ADMIN — DEXAMETHASONE SODIUM PHOSPHATE 6 MG: 10 INJECTION INTRAMUSCULAR; INTRAVENOUS at 09:33

## 2021-01-01 RX ADMIN — HEPARIN SODIUM 5000 UNITS: 10000 INJECTION, SOLUTION INTRAVENOUS; SUBCUTANEOUS at 02:18

## 2021-01-01 RX ADMIN — DEXAMETHASONE SODIUM PHOSPHATE 6 MG: 10 INJECTION INTRAMUSCULAR; INTRAVENOUS at 08:04

## 2021-01-01 RX ADMIN — Medication 0.03 MCG/KG/MIN: at 04:16

## 2021-01-01 RX ADMIN — SODIUM CHLORIDE: 9 INJECTION, SOLUTION INTRAVENOUS at 04:03

## 2021-01-01 RX ADMIN — INSULIN ASPART 4 UNITS: 100 INJECTION, SOLUTION INTRAVENOUS; SUBCUTANEOUS at 16:33

## 2021-01-01 RX ADMIN — HEPARIN SODIUM 5000 UNITS: 10000 INJECTION, SOLUTION INTRAVENOUS; SUBCUTANEOUS at 17:50

## 2021-01-01 RX ADMIN — ACETAMINOPHEN 650 MG: 650 SUPPOSITORY RECTAL at 01:56

## 2021-01-01 RX ADMIN — HEPARIN SODIUM 5000 UNITS: 10000 INJECTION, SOLUTION INTRAVENOUS; SUBCUTANEOUS at 02:13

## 2021-01-01 RX ADMIN — Medication 5 ML: at 09:08

## 2021-01-01 RX ADMIN — BUMETANIDE 2 MG: 0.25 INJECTION INTRAMUSCULAR; INTRAVENOUS at 12:00

## 2021-01-01 RX ADMIN — Medication 15 UNITS/HR: at 16:35

## 2021-01-01 RX ADMIN — INSULIN ASPART 4 UNITS: 100 INJECTION, SOLUTION INTRAVENOUS; SUBCUTANEOUS at 20:49

## 2021-01-01 RX ADMIN — INSULIN ASPART 4 UNITS: 100 INJECTION, SOLUTION INTRAVENOUS; SUBCUTANEOUS at 04:22

## 2021-01-01 RX ADMIN — HEPARIN SODIUM 5000 UNITS: 10000 INJECTION, SOLUTION INTRAVENOUS; SUBCUTANEOUS at 18:34

## 2021-01-01 RX ADMIN — CHLORHEXIDINE GLUCONATE 0.12% ORAL RINSE 15 ML: 1.2 LIQUID ORAL at 20:30

## 2021-01-01 RX ADMIN — AZITHROMYCIN MONOHYDRATE 500 MG: 500 INJECTION, POWDER, LYOPHILIZED, FOR SOLUTION INTRAVENOUS at 18:27

## 2021-01-01 RX ADMIN — HEPARIN SODIUM 5000 UNITS: 10000 INJECTION, SOLUTION INTRAVENOUS; SUBCUTANEOUS at 09:30

## 2021-01-01 RX ADMIN — SODIUM BICARBONATE 25 ML/HR: 84 INJECTION, SOLUTION INTRAVENOUS at 09:28

## 2021-01-01 RX ADMIN — SODIUM BICARBONATE 25 ML/HR: 84 INJECTION, SOLUTION INTRAVENOUS at 21:01

## 2021-01-01 RX ADMIN — SODIUM CHLORIDE 250 ML: 9 INJECTION, SOLUTION INTRAVENOUS at 10:54

## 2021-01-01 RX ADMIN — Medication 0.3 MCG/KG/MIN: at 09:18

## 2021-01-01 RX ADMIN — ACETAMINOPHEN 975 MG: 325 TABLET, FILM COATED ORAL at 21:03

## 2021-01-01 RX ADMIN — Medication 5 UNITS/HR: at 09:09

## 2021-01-01 RX ADMIN — INSULIN ASPART 4 UNITS: 100 INJECTION, SOLUTION INTRAVENOUS; SUBCUTANEOUS at 08:44

## 2021-01-01 RX ADMIN — SODIUM CHLORIDE 300 ML: 9 INJECTION, SOLUTION INTRAVENOUS at 12:42

## 2021-01-01 RX ADMIN — ALLOPURINOL 300 MG: 300 TABLET ORAL at 08:43

## 2021-01-01 RX ADMIN — HEPARIN SODIUM 5000 UNITS: 10000 INJECTION, SOLUTION INTRAVENOUS; SUBCUTANEOUS at 17:51

## 2021-01-01 RX ADMIN — LABETALOL HYDROCHLORIDE 20 MG: 5 INJECTION, SOLUTION INTRAVENOUS at 23:00

## 2021-01-01 RX ADMIN — VANCOMYCIN HYDROCHLORIDE 1000 MG: 1 INJECTION, SOLUTION INTRAVENOUS at 15:57

## 2021-01-01 RX ADMIN — SODIUM BICARBONATE 25 ML/HR: 84 INJECTION, SOLUTION INTRAVENOUS at 14:59

## 2021-01-01 RX ADMIN — AMLODIPINE BESYLATE 10 MG: 10 TABLET ORAL at 10:08

## 2021-01-01 RX ADMIN — BUMETANIDE 2 MG: 0.25 INJECTION INTRAMUSCULAR; INTRAVENOUS at 00:31

## 2021-01-01 RX ADMIN — DEXTROSE 15 G: 15 GEL ORAL at 21:06

## 2021-01-01 RX ADMIN — POTASSIUM CHLORIDE 40 MEQ: 1.5 SOLUTION ORAL at 19:48

## 2021-01-01 RX ADMIN — FENTANYL CITRATE 50 MCG: 50 INJECTION, SOLUTION INTRAMUSCULAR; INTRAVENOUS at 03:35

## 2021-01-01 RX ADMIN — DEXAMETHASONE SODIUM PHOSPHATE 6 MG: 10 INJECTION INTRAMUSCULAR; INTRAVENOUS at 08:48

## 2021-01-01 RX ADMIN — Medication 0.3 MCG/KG/MIN: at 06:39

## 2021-01-01 RX ADMIN — TAZOBACTAM SODIUM AND PIPERACILLIN SODIUM 4.5 G: 500; 4 INJECTION, SOLUTION INTRAVENOUS at 12:57

## 2021-01-01 RX ADMIN — SODIUM CHLORIDE, POTASSIUM CHLORIDE, SODIUM LACTATE AND CALCIUM CHLORIDE: 600; 310; 30; 20 INJECTION, SOLUTION INTRAVENOUS at 18:33

## 2021-01-01 RX ADMIN — PROPOFOL 25 MCG/KG/MIN: 10 INJECTION, EMULSION INTRAVENOUS at 04:18

## 2021-01-01 RX ADMIN — HEPARIN SODIUM 5000 UNITS: 10000 INJECTION, SOLUTION INTRAVENOUS; SUBCUTANEOUS at 01:56

## 2021-01-01 RX ADMIN — SODIUM CHLORIDE: 9 INJECTION, SOLUTION INTRAVENOUS at 20:30

## 2021-01-01 RX ADMIN — MEROPENEM 500 MG: 500 INJECTION, POWDER, FOR SOLUTION INTRAVENOUS at 13:43

## 2021-01-01 RX ADMIN — Medication 2 PACKET: at 21:03

## 2021-01-01 RX ADMIN — INSULIN ASPART 4 UNITS: 100 INJECTION, SOLUTION INTRAVENOUS; SUBCUTANEOUS at 08:13

## 2021-01-01 RX ADMIN — POTASSIUM CHLORIDE 10 MEQ: 7.46 INJECTION, SOLUTION INTRAVENOUS at 20:41

## 2021-01-01 RX ADMIN — SODIUM CHLORIDE, POTASSIUM CHLORIDE, SODIUM LACTATE AND CALCIUM CHLORIDE: 600; 310; 30; 20 INJECTION, SOLUTION INTRAVENOUS at 20:24

## 2021-01-01 RX ADMIN — SENNOSIDES A AND B 15 ML: 8.8 SYRUP ORAL at 08:48

## 2021-01-01 RX ADMIN — AMLODIPINE BESYLATE 10 MG: 10 TABLET ORAL at 08:35

## 2021-01-01 RX ADMIN — Medication 2 PACKET: at 10:34

## 2021-01-01 RX ADMIN — AMIODARONE HYDROCHLORIDE 1 MG/MIN: 50 INJECTION, SOLUTION INTRAVENOUS at 06:27

## 2021-01-01 RX ADMIN — LABETALOL HYDROCHLORIDE 20 MG: 5 INJECTION, SOLUTION INTRAVENOUS at 09:05

## 2021-01-01 RX ADMIN — PROPOFOL 25 MCG/KG/MIN: 10 INJECTION, EMULSION INTRAVENOUS at 04:24

## 2021-01-01 RX ADMIN — LISINOPRIL 40 MG: 40 TABLET ORAL at 10:08

## 2021-01-01 RX ADMIN — HEPARIN SODIUM 5000 UNITS: 10000 INJECTION, SOLUTION INTRAVENOUS; SUBCUTANEOUS at 17:17

## 2021-01-01 RX ADMIN — LABETALOL HYDROCHLORIDE 20 MG: 5 INJECTION, SOLUTION INTRAVENOUS at 08:07

## 2021-01-01 RX ADMIN — TAZOBACTAM SODIUM AND PIPERACILLIN SODIUM 4.5 G: 500; 4 INJECTION, SOLUTION INTRAVENOUS at 22:18

## 2021-01-01 RX ADMIN — FUROSEMIDE 80 MG: 10 INJECTION, SOLUTION INTRAMUSCULAR; INTRAVENOUS at 10:05

## 2021-01-01 RX ADMIN — PROPOFOL 50 MCG/KG/MIN: 10 INJECTION, EMULSION INTRAVENOUS at 11:36

## 2021-01-01 RX ADMIN — ACETAMINOPHEN 975 MG: 160 SOLUTION ORAL at 04:09

## 2021-01-01 RX ADMIN — PROPOFOL 30 MCG/KG/MIN: 10 INJECTION, EMULSION INTRAVENOUS at 09:49

## 2021-01-01 RX ADMIN — PROPOFOL 20 MCG/KG/MIN: 10 INJECTION, EMULSION INTRAVENOUS at 21:48

## 2021-01-01 RX ADMIN — INSULIN ASPART 4 UNITS: 100 INJECTION, SOLUTION INTRAVENOUS; SUBCUTANEOUS at 20:09

## 2021-01-01 RX ADMIN — PROPOFOL 50 MCG/KG/MIN: 10 INJECTION, EMULSION INTRAVENOUS at 16:17

## 2021-01-01 RX ADMIN — HEPARIN SODIUM 5000 UNITS: 10000 INJECTION, SOLUTION INTRAVENOUS; SUBCUTANEOUS at 02:03

## 2021-01-01 RX ADMIN — PROPOFOL 35 MCG/KG/MIN: 10 INJECTION, EMULSION INTRAVENOUS at 23:09

## 2021-01-01 RX ADMIN — INSULIN ASPART 4 UNITS: 100 INJECTION, SOLUTION INTRAVENOUS; SUBCUTANEOUS at 15:54

## 2021-01-01 RX ADMIN — CHLORHEXIDINE GLUCONATE 0.12% ORAL RINSE 15 ML: 1.2 LIQUID ORAL at 20:48

## 2021-01-01 RX ADMIN — AMIODARONE HYDROCHLORIDE 1 MG/MIN: 50 INJECTION, SOLUTION INTRAVENOUS at 18:12

## 2021-01-01 RX ADMIN — INSULIN ASPART 4 UNITS: 100 INJECTION, SOLUTION INTRAVENOUS; SUBCUTANEOUS at 17:04

## 2021-01-01 RX ADMIN — SODIUM CHLORIDE 250 ML: 9 INJECTION, SOLUTION INTRAVENOUS at 08:38

## 2021-01-01 RX ADMIN — INSULIN ASPART 4 UNITS: 100 INJECTION, SOLUTION INTRAVENOUS; SUBCUTANEOUS at 05:21

## 2021-01-01 RX ADMIN — PROPOFOL 50 MCG/KG/MIN: 10 INJECTION, EMULSION INTRAVENOUS at 04:26

## 2021-01-01 RX ADMIN — PANTOPRAZOLE SODIUM 40 MG: 40 INJECTION, POWDER, FOR SOLUTION INTRAVENOUS at 06:53

## 2021-01-01 RX ADMIN — PROPOFOL 50 MCG/KG/MIN: 10 INJECTION, EMULSION INTRAVENOUS at 05:28

## 2021-01-01 RX ADMIN — Medication 50 MCG/HR: at 03:22

## 2021-01-01 RX ADMIN — PROPOFOL 30 MCG/KG/MIN: 10 INJECTION, EMULSION INTRAVENOUS at 13:50

## 2021-01-01 RX ADMIN — PHENYLEPHRINE HYDROCHLORIDE 1 MCG/KG/MIN: 10 INJECTION INTRAVENOUS at 07:21

## 2021-01-01 RX ADMIN — HEPARIN SODIUM 500 UNITS/HR: 1000 INJECTION INTRAVENOUS; SUBCUTANEOUS at 08:50

## 2021-01-01 RX ADMIN — INSULIN ASPART 1 UNITS: 100 INJECTION, SOLUTION INTRAVENOUS; SUBCUTANEOUS at 13:52

## 2021-01-01 RX ADMIN — AMIODARONE HYDROCHLORIDE 0.5 MG/MIN: 50 INJECTION, SOLUTION INTRAVENOUS at 08:44

## 2021-01-01 RX ADMIN — Medication 0.03 MCG/KG/MIN: at 13:11

## 2021-01-01 RX ADMIN — VASOPRESSIN 2.4 UNITS/HR: 20 INJECTION INTRAVENOUS at 02:55

## 2021-01-01 RX ADMIN — Medication 2 PACKET: at 17:04

## 2021-01-01 RX ADMIN — SODIUM BICARBONATE 50 MEQ: 84 INJECTION, SOLUTION INTRAVENOUS at 04:20

## 2021-01-01 RX ADMIN — PHENYLEPHRINE HYDROCHLORIDE 3 MCG/KG/MIN: 10 INJECTION INTRAVENOUS at 17:29

## 2021-01-01 RX ADMIN — SODIUM BICARBONATE 40 MG: 84 INJECTION, SOLUTION INTRAVENOUS at 08:15

## 2021-01-01 RX ADMIN — CHLORHEXIDINE GLUCONATE 0.12% ORAL RINSE 15 ML: 1.2 LIQUID ORAL at 08:11

## 2021-01-01 RX ADMIN — PANTOPRAZOLE SODIUM 40 MG: 40 INJECTION, POWDER, FOR SOLUTION INTRAVENOUS at 08:52

## 2021-01-01 RX ADMIN — DEXAMETHASONE SODIUM PHOSPHATE 6 MG: 10 INJECTION INTRAMUSCULAR; INTRAVENOUS at 08:52

## 2021-01-01 RX ADMIN — HEPARIN SODIUM 5000 UNITS: 10000 INJECTION, SOLUTION INTRAVENOUS; SUBCUTANEOUS at 02:47

## 2021-01-01 RX ADMIN — SODIUM CHLORIDE: 9 INJECTION, SOLUTION INTRAVENOUS at 12:57

## 2021-01-01 RX ADMIN — INSULIN ASPART 4 UNITS: 100 INJECTION, SOLUTION INTRAVENOUS; SUBCUTANEOUS at 08:20

## 2021-01-01 RX ADMIN — CHLORHEXIDINE GLUCONATE 0.12% ORAL RINSE 15 ML: 1.2 LIQUID ORAL at 08:10

## 2021-01-01 RX ADMIN — CHLORHEXIDINE GLUCONATE 0.12% ORAL RINSE 15 ML: 1.2 LIQUID ORAL at 08:51

## 2021-01-01 RX ADMIN — POTASSIUM CHLORIDE 20 MEQ: 1.5 POWDER, FOR SOLUTION ORAL at 08:52

## 2021-01-01 RX ADMIN — ACETAMINOPHEN 975 MG: 325 TABLET, FILM COATED ORAL at 04:00

## 2021-01-01 RX ADMIN — HYDROMORPHONE HYDROCHLORIDE 0.5 MG: 1 INJECTION, SOLUTION INTRAMUSCULAR; INTRAVENOUS; SUBCUTANEOUS at 14:47

## 2021-01-01 RX ADMIN — METOPROLOL TARTRATE 10 MG: 5 INJECTION INTRAVENOUS at 11:40

## 2021-01-01 RX ADMIN — CHLORHEXIDINE GLUCONATE 0.12% ORAL RINSE 15 ML: 1.2 LIQUID ORAL at 09:30

## 2021-01-01 RX ADMIN — HEPARIN SODIUM 5000 UNITS: 10000 INJECTION, SOLUTION INTRAVENOUS; SUBCUTANEOUS at 09:41

## 2021-01-01 RX ADMIN — Medication 100 MCG/HR: at 05:05

## 2021-01-01 RX ADMIN — CHLORHEXIDINE GLUCONATE 0.12% ORAL RINSE 15 ML: 1.2 LIQUID ORAL at 20:01

## 2021-01-01 RX ADMIN — CHLORHEXIDINE GLUCONATE 0.12% ORAL RINSE 15 ML: 1.2 LIQUID ORAL at 20:47

## 2021-01-01 RX ADMIN — Medication 2 PACKET: at 08:16

## 2021-01-01 RX ADMIN — PROPOFOL 25 MCG/KG/MIN: 10 INJECTION, EMULSION INTRAVENOUS at 06:44

## 2021-01-01 RX ADMIN — DEXAMETHASONE SODIUM PHOSPHATE 6 MG: 10 INJECTION INTRAMUSCULAR; INTRAVENOUS at 08:17

## 2021-01-01 RX ADMIN — INSULIN ASPART 4 UNITS: 100 INJECTION, SOLUTION INTRAVENOUS; SUBCUTANEOUS at 05:25

## 2021-01-01 RX ADMIN — Medication 1 PACKET: at 21:49

## 2021-01-01 RX ADMIN — BUMETANIDE 2 MG: 0.25 INJECTION INTRAMUSCULAR; INTRAVENOUS at 00:58

## 2021-01-01 RX ADMIN — PANTOPRAZOLE SODIUM 40 MG: 40 INJECTION, POWDER, FOR SOLUTION INTRAVENOUS at 08:40

## 2021-01-01 RX ADMIN — PROPOFOL 25 MCG/KG/MIN: 10 INJECTION, EMULSION INTRAVENOUS at 10:03

## 2021-01-01 RX ADMIN — FUROSEMIDE 20 MG: 10 INJECTION, SOLUTION INTRAMUSCULAR; INTRAVENOUS at 14:14

## 2021-01-01 RX ADMIN — Medication 2 PACKET: at 21:14

## 2021-01-01 RX ADMIN — Medication 2 PACKET: at 20:31

## 2021-01-01 RX ADMIN — INSULIN ASPART 4 UNITS: 100 INJECTION, SOLUTION INTRAVENOUS; SUBCUTANEOUS at 15:42

## 2021-01-01 RX ADMIN — AMLODIPINE BESYLATE 10 MG: 10 TABLET ORAL at 09:33

## 2021-01-01 RX ADMIN — HEPARIN SODIUM 5000 UNITS: 10000 INJECTION, SOLUTION INTRAVENOUS; SUBCUTANEOUS at 17:12

## 2021-01-01 RX ADMIN — ACETAMINOPHEN 975 MG: 325 TABLET, FILM COATED ORAL at 12:18

## 2021-01-01 RX ADMIN — Medication 2 MG/HR: at 04:16

## 2021-01-01 RX ADMIN — AMIODARONE HYDROCHLORIDE 1 MG/MIN: 50 INJECTION, SOLUTION INTRAVENOUS at 09:21

## 2021-01-01 RX ADMIN — CHLORHEXIDINE GLUCONATE 0.12% ORAL RINSE 15 ML: 1.2 LIQUID ORAL at 08:17

## 2021-01-01 RX ADMIN — ENOXAPARIN SODIUM 40 MG: 40 INJECTION SUBCUTANEOUS at 19:50

## 2021-01-01 RX ADMIN — INSULIN ASPART 1 UNITS: 100 INJECTION, SOLUTION INTRAVENOUS; SUBCUTANEOUS at 16:06

## 2021-01-01 RX ADMIN — ATORVASTATIN CALCIUM 10 MG: 10 TABLET, FILM COATED ORAL at 23:56

## 2021-01-01 RX ADMIN — PROPOFOL 35 MCG/KG/MIN: 10 INJECTION, EMULSION INTRAVENOUS at 03:48

## 2021-01-01 RX ADMIN — CLONIDINE HYDROCHLORIDE 0.2 MG: 0.1 TABLET ORAL at 20:11

## 2021-01-01 RX ADMIN — ACETAMINOPHEN 975 MG: 160 SOLUTION ORAL at 20:30

## 2021-01-01 RX ADMIN — Medication 2 PACKET: at 21:10

## 2021-01-01 RX ADMIN — BUMETANIDE 2 MG: 0.25 INJECTION INTRAMUSCULAR; INTRAVENOUS at 01:06

## 2021-01-01 RX ADMIN — Medication 5 ML: at 10:18

## 2021-01-01 RX ADMIN — SODIUM CHLORIDE 250 ML: 9 INJECTION, SOLUTION INTRAVENOUS at 09:11

## 2021-01-01 RX ADMIN — EPOETIN ALFA-EPBX 10000 UNITS: 10000 INJECTION, SOLUTION INTRAVENOUS; SUBCUTANEOUS at 10:53

## 2021-01-01 RX ADMIN — SODIUM BICARBONATE 25 MEQ: 84 INJECTION, SOLUTION INTRAVENOUS at 12:23

## 2021-01-01 RX ADMIN — POTASSIUM CHLORIDE 10 MEQ: 7.46 INJECTION, SOLUTION INTRAVENOUS at 12:00

## 2021-01-01 RX ADMIN — ONDANSETRON 4 MG: 2 INJECTION INTRAMUSCULAR; INTRAVENOUS at 21:38

## 2021-01-01 RX ADMIN — CALCIUM GLUCONATE 2 G: 20 INJECTION, SOLUTION INTRAVENOUS at 09:00

## 2021-01-01 RX ADMIN — PROPOFOL 40 MCG/KG/MIN: 10 INJECTION, EMULSION INTRAVENOUS at 13:24

## 2021-01-01 RX ADMIN — BUMETANIDE 2 MG: 0.25 INJECTION INTRAMUSCULAR; INTRAVENOUS at 13:17

## 2021-01-01 RX ADMIN — Medication 5 ML: at 08:39

## 2021-01-01 RX ADMIN — AMLODIPINE BESYLATE 5 MG: 5 TABLET ORAL at 08:39

## 2021-01-01 RX ADMIN — SODIUM BICARBONATE 40 MG: 84 INJECTION, SOLUTION INTRAVENOUS at 08:35

## 2021-01-01 RX ADMIN — SODIUM CHLORIDE, POTASSIUM CHLORIDE, SODIUM LACTATE AND CALCIUM CHLORIDE: 600; 310; 30; 20 INJECTION, SOLUTION INTRAVENOUS at 06:27

## 2021-01-01 RX ADMIN — POTASSIUM CHLORIDE 10 MEQ: 7.46 INJECTION, SOLUTION INTRAVENOUS at 14:17

## 2021-01-01 RX ADMIN — INSULIN ASPART 4 UNITS: 100 INJECTION, SOLUTION INTRAVENOUS; SUBCUTANEOUS at 12:52

## 2021-01-01 RX ADMIN — ACETAMINOPHEN 975 MG: 160 SOLUTION ORAL at 12:55

## 2021-01-01 RX ADMIN — PROPOFOL 20 MCG/KG/MIN: 10 INJECTION, EMULSION INTRAVENOUS at 22:19

## 2021-01-01 RX ADMIN — CHLORHEXIDINE GLUCONATE 0.12% ORAL RINSE 15 ML: 1.2 LIQUID ORAL at 11:15

## 2021-01-01 RX ADMIN — PROPOFOL 20 MCG/KG/MIN: 10 INJECTION, EMULSION INTRAVENOUS at 13:41

## 2021-01-01 RX ADMIN — BUMETANIDE 2 MG: 0.25 INJECTION INTRAMUSCULAR; INTRAVENOUS at 13:01

## 2021-01-01 RX ADMIN — PANTOPRAZOLE SODIUM 40 MG: 40 INJECTION, POWDER, FOR SOLUTION INTRAVENOUS at 06:15

## 2021-01-01 RX ADMIN — DEXAMETHASONE SODIUM PHOSPHATE 6 MG: 10 INJECTION INTRAMUSCULAR; INTRAVENOUS at 08:35

## 2021-01-01 RX ADMIN — BUMETANIDE 2 MG: 0.25 INJECTION INTRAMUSCULAR; INTRAVENOUS at 00:42

## 2021-01-01 RX ADMIN — ACETAMINOPHEN 975 MG: 160 SOLUTION ORAL at 03:51

## 2021-01-01 RX ADMIN — INSULIN GLARGINE 16 UNITS: 100 INJECTION, SOLUTION SUBCUTANEOUS at 08:49

## 2021-01-01 RX ADMIN — REMDESIVIR 100 MG: 100 INJECTION, POWDER, LYOPHILIZED, FOR SOLUTION INTRAVENOUS at 18:43

## 2021-01-01 RX ADMIN — INSULIN ASPART 4 UNITS: 100 INJECTION, SOLUTION INTRAVENOUS; SUBCUTANEOUS at 00:57

## 2021-01-01 RX ADMIN — MEROPENEM 500 MG: 500 INJECTION, POWDER, FOR SOLUTION INTRAVENOUS at 21:46

## 2021-01-01 RX ADMIN — ACETAMINOPHEN 975 MG: 160 SOLUTION ORAL at 11:59

## 2021-01-01 RX ADMIN — Medication 100 MCG/HR: at 07:44

## 2021-01-01 RX ADMIN — Medication 5 ML: at 11:32

## 2021-01-01 RX ADMIN — PROPOFOL 40 MCG/KG/MIN: 10 INJECTION, EMULSION INTRAVENOUS at 18:33

## 2021-01-01 RX ADMIN — HEPARIN SODIUM 5000 UNITS: 10000 INJECTION, SOLUTION INTRAVENOUS; SUBCUTANEOUS at 17:25

## 2021-01-01 RX ADMIN — INSULIN ASPART 4 UNITS: 100 INJECTION, SOLUTION INTRAVENOUS; SUBCUTANEOUS at 01:04

## 2021-01-01 RX ADMIN — PROPOFOL 5 MCG/KG/MIN: 10 INJECTION, EMULSION INTRAVENOUS at 11:28

## 2021-01-01 RX ADMIN — AMIODARONE HYDROCHLORIDE 150 MG: 1.5 INJECTION, SOLUTION INTRAVENOUS at 02:04

## 2021-01-01 RX ADMIN — PHENYLEPHRINE HYDROCHLORIDE 0.5 MCG/KG/MIN: 10 INJECTION INTRAVENOUS at 13:37

## 2021-01-01 RX ADMIN — HEPARIN SODIUM 5000 UNITS: 10000 INJECTION, SOLUTION INTRAVENOUS; SUBCUTANEOUS at 09:50

## 2021-01-01 RX ADMIN — INSULIN ASPART 4 UNITS: 100 INJECTION, SOLUTION INTRAVENOUS; SUBCUTANEOUS at 00:41

## 2021-01-01 RX ADMIN — INSULIN ASPART 4 UNITS: 100 INJECTION, SOLUTION INTRAVENOUS; SUBCUTANEOUS at 12:36

## 2021-01-01 RX ADMIN — FENTANYL CITRATE 100 MCG: 50 INJECTION, SOLUTION INTRAMUSCULAR; INTRAVENOUS at 08:07

## 2021-01-01 RX ADMIN — HEPARIN SODIUM 5000 UNITS: 10000 INJECTION, SOLUTION INTRAVENOUS; SUBCUTANEOUS at 02:21

## 2021-01-01 RX ADMIN — ACETAMINOPHEN 975 MG: 160 SOLUTION ORAL at 04:27

## 2021-01-01 RX ADMIN — METOPROLOL TARTRATE 10 MG: 5 INJECTION INTRAVENOUS at 12:57

## 2021-01-01 RX ADMIN — PROPOFOL 25 MCG/KG/MIN: 10 INJECTION, EMULSION INTRAVENOUS at 15:47

## 2021-01-01 RX ADMIN — POTASSIUM CHLORIDE 40 MEQ: 1.5 POWDER, FOR SOLUTION ORAL at 12:51

## 2021-01-01 RX ADMIN — PANTOPRAZOLE SODIUM 40 MG: 40 INJECTION, POWDER, FOR SOLUTION INTRAVENOUS at 08:07

## 2021-01-01 RX ADMIN — AMIODARONE HYDROCHLORIDE 1 MG/MIN: 50 INJECTION, SOLUTION INTRAVENOUS at 02:49

## 2021-01-01 RX ADMIN — ACETAMINOPHEN 975 MG: 325 TABLET, FILM COATED ORAL at 11:29

## 2021-01-01 RX ADMIN — HEPARIN SODIUM 5000 UNITS: 10000 INJECTION, SOLUTION INTRAVENOUS; SUBCUTANEOUS at 09:49

## 2021-01-01 RX ADMIN — INSULIN ASPART 4 UNITS: 100 INJECTION, SOLUTION INTRAVENOUS; SUBCUTANEOUS at 12:25

## 2021-01-01 RX ADMIN — Medication 2 PACKET: at 22:34

## 2021-01-01 RX ADMIN — SODIUM CHLORIDE 1000 ML: 9 INJECTION, SOLUTION INTRAVENOUS at 02:03

## 2021-01-01 RX ADMIN — ACETAMINOPHEN 975 MG: 160 SOLUTION ORAL at 03:36

## 2021-01-01 RX ADMIN — PROPOFOL 25 MCG/KG/MIN: 10 INJECTION, EMULSION INTRAVENOUS at 14:37

## 2021-01-01 RX ADMIN — INSULIN ASPART 4 UNITS: 100 INJECTION, SOLUTION INTRAVENOUS; SUBCUTANEOUS at 08:17

## 2021-01-01 RX ADMIN — INSULIN ASPART 4 UNITS: 100 INJECTION, SOLUTION INTRAVENOUS; SUBCUTANEOUS at 04:57

## 2021-01-01 RX ADMIN — TAZOBACTAM SODIUM AND PIPERACILLIN SODIUM 4.5 G: 500; 4 INJECTION, SOLUTION INTRAVENOUS at 06:39

## 2021-01-01 RX ADMIN — HEPARIN SODIUM 5000 UNITS: 10000 INJECTION, SOLUTION INTRAVENOUS; SUBCUTANEOUS at 11:14

## 2021-01-01 RX ADMIN — POTASSIUM CHLORIDE 10 MEQ: 7.46 INJECTION, SOLUTION INTRAVENOUS at 18:46

## 2021-01-01 RX ADMIN — SODIUM CHLORIDE 1000 ML: 9 INJECTION, SOLUTION INTRAVENOUS at 20:37

## 2021-01-01 RX ADMIN — METOPROLOL TARTRATE 10 MG: 5 INJECTION INTRAVENOUS at 18:42

## 2021-01-01 RX ADMIN — FENTANYL CITRATE 50 MCG: 50 INJECTION, SOLUTION INTRAMUSCULAR; INTRAVENOUS at 03:25

## 2021-01-01 RX ADMIN — HEPARIN SODIUM 5000 UNITS: 10000 INJECTION, SOLUTION INTRAVENOUS; SUBCUTANEOUS at 01:18

## 2021-01-01 RX ADMIN — Medication 5 ML: at 08:57

## 2021-01-01 RX ADMIN — VANCOMYCIN HYDROCHLORIDE 1500 MG: 5 INJECTION, POWDER, LYOPHILIZED, FOR SOLUTION INTRAVENOUS at 16:01

## 2021-01-01 RX ADMIN — PROPOFOL 35 MCG/KG/MIN: 10 INJECTION, EMULSION INTRAVENOUS at 19:29

## 2021-01-01 RX ADMIN — SODIUM CHLORIDE: 9 INJECTION, SOLUTION INTRAVENOUS at 01:14

## 2021-01-01 RX ADMIN — Medication 2 PACKET: at 09:28

## 2021-01-01 RX ADMIN — CLONIDINE HYDROCHLORIDE 0.2 MG: 0.1 TABLET ORAL at 10:08

## 2021-01-01 RX ADMIN — INSULIN ASPART 4 UNITS: 100 INJECTION, SOLUTION INTRAVENOUS; SUBCUTANEOUS at 12:03

## 2021-01-01 RX ADMIN — DEXAMETHASONE SODIUM PHOSPHATE 6 MG: 10 INJECTION INTRAMUSCULAR; INTRAVENOUS at 08:08

## 2021-01-01 RX ADMIN — ALLOPURINOL 300 MG: 300 TABLET ORAL at 09:33

## 2021-01-01 RX ADMIN — INSULIN ASPART 4 UNITS: 100 INJECTION, SOLUTION INTRAVENOUS; SUBCUTANEOUS at 00:34

## 2021-01-01 RX ADMIN — Medication 2 PACKET: at 09:39

## 2021-01-01 RX ADMIN — SODIUM CHLORIDE 250 ML: 9 INJECTION, SOLUTION INTRAVENOUS at 08:37

## 2021-01-01 RX ADMIN — PROPOFOL 40 MCG/KG/MIN: 10 INJECTION, EMULSION INTRAVENOUS at 03:24

## 2021-01-01 RX ADMIN — HEPARIN SODIUM 5000 UNITS: 10000 INJECTION, SOLUTION INTRAVENOUS; SUBCUTANEOUS at 10:45

## 2021-01-01 RX ADMIN — SODIUM CHLORIDE 1000 ML: 9 INJECTION, SOLUTION INTRAVENOUS at 23:04

## 2021-01-01 RX ADMIN — PROPOFOL 35 MCG/KG/MIN: 10 INJECTION, EMULSION INTRAVENOUS at 06:53

## 2021-01-01 RX ADMIN — PANTOPRAZOLE SODIUM 40 MG: 40 INJECTION, POWDER, FOR SOLUTION INTRAVENOUS at 08:34

## 2021-01-01 RX ADMIN — Medication 2 PACKET: at 08:12

## 2021-01-01 RX ADMIN — SODIUM CHLORIDE 300 ML: 9 INJECTION, SOLUTION INTRAVENOUS at 08:37

## 2021-01-01 RX ADMIN — CHLORHEXIDINE GLUCONATE 0.12% ORAL RINSE 15 ML: 1.2 LIQUID ORAL at 08:29

## 2021-01-01 RX ADMIN — SODIUM CHLORIDE 300 ML: 9 INJECTION, SOLUTION INTRAVENOUS at 14:24

## 2021-01-01 RX ADMIN — PHENYLEPHRINE HYDROCHLORIDE 1 MCG/KG/MIN: 10 INJECTION INTRAVENOUS at 15:35

## 2021-01-01 RX ADMIN — INSULIN ASPART 4 UNITS: 100 INJECTION, SOLUTION INTRAVENOUS; SUBCUTANEOUS at 13:31

## 2021-01-01 RX ADMIN — ALLOPURINOL 300 MG: 300 TABLET ORAL at 08:08

## 2021-01-01 RX ADMIN — LABETALOL HYDROCHLORIDE 20 MG: 5 INJECTION, SOLUTION INTRAVENOUS at 05:01

## 2021-01-01 RX ADMIN — ACETAMINOPHEN 975 MG: 160 SOLUTION ORAL at 20:16

## 2021-01-01 RX ADMIN — HEPARIN SODIUM 5000 UNITS: 10000 INJECTION, SOLUTION INTRAVENOUS; SUBCUTANEOUS at 09:10

## 2021-01-01 RX ADMIN — PROPOFOL 30 MCG/KG/MIN: 10 INJECTION, EMULSION INTRAVENOUS at 05:04

## 2021-01-01 RX ADMIN — Medication 18 UNITS/HR: at 12:16

## 2021-01-01 RX ADMIN — SODIUM CHLORIDE 50 ML: 9 INJECTION, SOLUTION INTRAVENOUS at 18:44

## 2021-01-01 RX ADMIN — PROPOFOL 50 MCG/KG/MIN: 10 INJECTION, EMULSION INTRAVENOUS at 22:56

## 2021-01-01 RX ADMIN — ENOXAPARIN SODIUM 40 MG: 40 INJECTION SUBCUTANEOUS at 19:41

## 2021-01-01 RX ADMIN — FENTANYL CITRATE 100 MCG: 50 INJECTION, SOLUTION INTRAMUSCULAR; INTRAVENOUS at 09:15

## 2021-01-01 RX ADMIN — TAZOBACTAM SODIUM AND PIPERACILLIN SODIUM 4.5 G: 500; 4 INJECTION, SOLUTION INTRAVENOUS at 00:47

## 2021-01-01 RX ADMIN — INSULIN ASPART 1 UNITS: 100 INJECTION, SOLUTION INTRAVENOUS; SUBCUTANEOUS at 13:10

## 2021-01-01 RX ADMIN — ACETAMINOPHEN 975 MG: 160 SOLUTION ORAL at 12:51

## 2021-01-01 RX ADMIN — HEPARIN SODIUM 5000 UNITS: 10000 INJECTION, SOLUTION INTRAVENOUS; SUBCUTANEOUS at 01:37

## 2021-01-01 RX ADMIN — Medication 2 PACKET: at 09:32

## 2021-01-01 RX ADMIN — PROPOFOL 25 MCG/KG/MIN: 10 INJECTION, EMULSION INTRAVENOUS at 03:33

## 2021-01-01 RX ADMIN — DEXAMETHASONE SODIUM PHOSPHATE 6 MG: 10 INJECTION INTRAMUSCULAR; INTRAVENOUS at 11:32

## 2021-01-01 RX ADMIN — Medication 140 MG: at 09:33

## 2021-01-01 RX ADMIN — ACETAMINOPHEN 975 MG: 325 TABLET, FILM COATED ORAL at 11:48

## 2021-01-01 RX ADMIN — Medication 1 PACKET: at 10:05

## 2021-01-01 RX ADMIN — PROPOFOL 35 MCG/KG/MIN: 10 INJECTION, EMULSION INTRAVENOUS at 08:28

## 2021-01-01 RX ADMIN — CHLORHEXIDINE GLUCONATE 0.12% ORAL RINSE 15 ML: 1.2 LIQUID ORAL at 09:33

## 2021-01-01 RX ADMIN — SODIUM BICARBONATE 40 MG: 84 INJECTION, SOLUTION INTRAVENOUS at 09:06

## 2021-01-01 RX ADMIN — Medication 2 PACKET: at 22:21

## 2021-01-01 RX ADMIN — INSULIN GLARGINE 16 UNITS: 100 INJECTION, SOLUTION SUBCUTANEOUS at 09:25

## 2021-01-01 RX ADMIN — Medication: at 14:25

## 2021-01-01 RX ADMIN — PROPOFOL 30 MCG/KG/MIN: 10 INJECTION, EMULSION INTRAVENOUS at 08:57

## 2021-01-01 RX ADMIN — HEPARIN SODIUM 5000 UNITS: 10000 INJECTION, SOLUTION INTRAVENOUS; SUBCUTANEOUS at 02:37

## 2021-01-01 RX ADMIN — POTASSIUM CHLORIDE, DEXTROSE MONOHYDRATE AND SODIUM CHLORIDE: 150; 5; 450 INJECTION, SOLUTION INTRAVENOUS at 11:41

## 2021-01-01 RX ADMIN — PROPOFOL 40 MCG/KG/MIN: 10 INJECTION, EMULSION INTRAVENOUS at 10:08

## 2021-01-01 RX ADMIN — FENTANYL CITRATE 100 MCG: 50 INJECTION, SOLUTION INTRAMUSCULAR; INTRAVENOUS at 02:43

## 2021-01-01 RX ADMIN — SODIUM CHLORIDE 300 ML: 9 INJECTION, SOLUTION INTRAVENOUS at 08:36

## 2021-01-01 RX ADMIN — SODIUM BICARBONATE: 84 INJECTION, SOLUTION INTRAVENOUS at 09:19

## 2021-01-01 RX ADMIN — TAZOBACTAM SODIUM AND PIPERACILLIN SODIUM 4.5 G: 500; 4 INJECTION, SOLUTION INTRAVENOUS at 16:40

## 2021-01-01 RX ADMIN — SODIUM CHLORIDE: 9 INJECTION, SOLUTION INTRAVENOUS at 12:31

## 2021-01-01 RX ADMIN — SODIUM BICARBONATE 40 MG: 84 INJECTION, SOLUTION INTRAVENOUS at 08:56

## 2021-01-01 RX ADMIN — LABETALOL HYDROCHLORIDE 20 MG: 5 INJECTION, SOLUTION INTRAVENOUS at 04:35

## 2021-01-01 RX ADMIN — HEPARIN SODIUM 5000 UNITS: 10000 INJECTION, SOLUTION INTRAVENOUS; SUBCUTANEOUS at 01:11

## 2021-01-01 RX ADMIN — AZITHROMYCIN 250 MG: 250 TABLET, FILM COATED ORAL at 09:08

## 2021-01-01 RX ADMIN — VASOPRESSIN 2.4 UNITS/HR: 20 INJECTION INTRAVENOUS at 04:45

## 2021-01-01 RX ADMIN — Medication 25 MCG/HR: at 12:23

## 2021-01-01 RX ADMIN — VASOPRESSIN 2.4 UNITS/HR: 20 INJECTION INTRAVENOUS at 19:18

## 2021-01-01 RX ADMIN — HUMAN ALBUMIN MICROSPHERES AND PERFLUTREN 3 ML: 10; .22 INJECTION, SOLUTION INTRAVENOUS at 14:45

## 2021-01-01 RX ADMIN — PROPOFOL 50 MCG/KG/MIN: 10 INJECTION, EMULSION INTRAVENOUS at 19:31

## 2021-01-01 RX ADMIN — MAGNESIUM SULFATE HEPTAHYDRATE 2 G: 2 INJECTION, SOLUTION INTRAVENOUS at 02:12

## 2021-01-01 RX ADMIN — Medication 75 MCG/HR: at 12:55

## 2021-01-01 RX ADMIN — PROPOFOL 40 MCG/KG/MIN: 10 INJECTION, EMULSION INTRAVENOUS at 17:30

## 2021-01-01 RX ADMIN — SODIUM BICARBONATE 25 ML/HR: 84 INJECTION, SOLUTION INTRAVENOUS at 10:20

## 2021-01-01 RX ADMIN — ACETAMINOPHEN 975 MG: 160 SOLUTION ORAL at 12:33

## 2021-01-01 RX ADMIN — MEROPENEM 500 MG: 500 INJECTION, POWDER, FOR SOLUTION INTRAVENOUS at 13:11

## 2021-01-01 RX ADMIN — MEROPENEM 500 MG: 500 INJECTION, POWDER, FOR SOLUTION INTRAVENOUS at 20:30

## 2021-01-01 RX ADMIN — Medication 1 PACKET: at 16:46

## 2021-01-01 RX ADMIN — PROPOFOL 20 MCG/KG/MIN: 10 INJECTION, EMULSION INTRAVENOUS at 02:14

## 2021-01-01 RX ADMIN — Medication 2 PACKET: at 10:07

## 2021-01-01 RX ADMIN — METFORMIN ER 500 MG 2000 MG: 500 TABLET ORAL at 10:07

## 2021-01-01 RX ADMIN — HEPARIN SODIUM 5000 UNITS: 10000 INJECTION, SOLUTION INTRAVENOUS; SUBCUTANEOUS at 02:17

## 2021-01-01 RX ADMIN — ACETAMINOPHEN 975 MG: 160 SOLUTION ORAL at 12:20

## 2021-01-01 RX ADMIN — VASOPRESSIN 2.4 UNITS/HR: 20 INJECTION INTRAVENOUS at 19:00

## 2021-01-01 RX ADMIN — ENOXAPARIN SODIUM 40 MG: 40 INJECTION SUBCUTANEOUS at 20:13

## 2021-01-01 RX ADMIN — LABETALOL HYDROCHLORIDE 20 MG: 5 INJECTION, SOLUTION INTRAVENOUS at 16:34

## 2021-01-01 RX ADMIN — CHLORHEXIDINE GLUCONATE 0.12% ORAL RINSE 15 ML: 1.2 LIQUID ORAL at 09:10

## 2021-01-01 RX ADMIN — AMIODARONE HYDROCHLORIDE 400 MG: 200 TABLET ORAL at 21:46

## 2021-01-01 RX ADMIN — ACETAMINOPHEN 975 MG: 160 SOLUTION ORAL at 12:01

## 2021-01-01 RX ADMIN — FUROSEMIDE 80 MG: 10 INJECTION, SOLUTION INTRAMUSCULAR; INTRAVENOUS at 09:08

## 2021-01-01 RX ADMIN — PROPOFOL 20 MCG/KG/MIN: 10 INJECTION, EMULSION INTRAVENOUS at 08:58

## 2021-01-01 RX ADMIN — Medication 5 ML: at 08:35

## 2021-01-01 RX ADMIN — INSULIN ASPART 4 UNITS: 100 INJECTION, SOLUTION INTRAVENOUS; SUBCUTANEOUS at 12:21

## 2021-01-01 RX ADMIN — INSULIN ASPART 4 UNITS: 100 INJECTION, SOLUTION INTRAVENOUS; SUBCUTANEOUS at 00:47

## 2021-01-01 RX ADMIN — ACETAMINOPHEN 975 MG: 160 SOLUTION ORAL at 04:28

## 2021-01-01 RX ADMIN — HEPARIN SODIUM 5000 UNITS: 10000 INJECTION, SOLUTION INTRAVENOUS; SUBCUTANEOUS at 17:30

## 2021-01-01 RX ADMIN — ENOXAPARIN SODIUM 40 MG: 40 INJECTION SUBCUTANEOUS at 21:07

## 2021-01-01 RX ADMIN — AMIODARONE HYDROCHLORIDE 0.5 MG/MIN: 50 INJECTION, SOLUTION INTRAVENOUS at 00:25

## 2021-01-01 RX ADMIN — AMIODARONE HYDROCHLORIDE 400 MG: 200 TABLET ORAL at 21:21

## 2021-01-01 RX ADMIN — HEPARIN SODIUM 5000 UNITS: 10000 INJECTION, SOLUTION INTRAVENOUS; SUBCUTANEOUS at 01:20

## 2021-01-01 RX ADMIN — HEPARIN SODIUM 5000 UNITS: 10000 INJECTION, SOLUTION INTRAVENOUS; SUBCUTANEOUS at 17:20

## 2021-01-01 RX ADMIN — AMLODIPINE BESYLATE 10 MG: 10 TABLET ORAL at 09:08

## 2021-01-01 RX ADMIN — ACETAMINOPHEN 975 MG: 325 TABLET, FILM COATED ORAL at 04:13

## 2021-01-01 RX ADMIN — SODIUM CHLORIDE, POTASSIUM CHLORIDE, SODIUM LACTATE AND CALCIUM CHLORIDE 1000 ML: 600; 310; 30; 20 INJECTION, SOLUTION INTRAVENOUS at 12:58

## 2021-01-01 RX ADMIN — HEPARIN SODIUM 5000 UNITS: 10000 INJECTION, SOLUTION INTRAVENOUS; SUBCUTANEOUS at 18:36

## 2021-01-01 RX ADMIN — TAZOBACTAM SODIUM AND PIPERACILLIN SODIUM 4.5 G: 500; 4 INJECTION, SOLUTION INTRAVENOUS at 03:32

## 2021-01-01 RX ADMIN — DEXTROSE AND SODIUM CHLORIDE: 5; 900 INJECTION, SOLUTION INTRAVENOUS at 21:37

## 2021-01-01 RX ADMIN — METOPROLOL TARTRATE 10 MG: 5 INJECTION INTRAVENOUS at 18:44

## 2021-01-01 RX ADMIN — PROPOFOL 40 MCG/KG/MIN: 10 INJECTION, EMULSION INTRAVENOUS at 09:00

## 2021-01-01 RX ADMIN — INSULIN ASPART 4 UNITS: 100 INJECTION, SOLUTION INTRAVENOUS; SUBCUTANEOUS at 08:31

## 2021-01-01 RX ADMIN — ACETAMINOPHEN 975 MG: 160 SOLUTION ORAL at 13:18

## 2021-01-01 RX ADMIN — INSULIN ASPART 4 UNITS: 100 INJECTION, SOLUTION INTRAVENOUS; SUBCUTANEOUS at 20:34

## 2021-01-01 RX ADMIN — PROPOFOL 35 MCG/KG/MIN: 10 INJECTION, EMULSION INTRAVENOUS at 14:17

## 2021-01-01 RX ADMIN — POTASSIUM CHLORIDE 20 MEQ: 29.8 INJECTION, SOLUTION INTRAVENOUS at 06:45

## 2021-01-01 RX ADMIN — AMIODARONE HYDROCHLORIDE 0.5 MG/MIN: 50 INJECTION, SOLUTION INTRAVENOUS at 00:36

## 2021-01-01 RX ADMIN — METOPROLOL SUCCINATE 100 MG: 100 TABLET, EXTENDED RELEASE ORAL at 08:35

## 2021-01-01 RX ADMIN — PROPOFOL 40 MCG/KG/MIN: 10 INJECTION, EMULSION INTRAVENOUS at 05:29

## 2021-01-01 RX ADMIN — Medication 50 MCG/HR: at 22:17

## 2021-01-01 RX ADMIN — PROPOFOL 30 MCG/KG/MIN: 10 INJECTION, EMULSION INTRAVENOUS at 23:42

## 2021-01-01 RX ADMIN — Medication 2 PACKET: at 16:38

## 2021-01-01 RX ADMIN — SODIUM CHLORIDE, POTASSIUM CHLORIDE, SODIUM LACTATE AND CALCIUM CHLORIDE 1000 ML: 600; 310; 30; 20 INJECTION, SOLUTION INTRAVENOUS at 09:57

## 2021-01-01 RX ADMIN — PROPOFOL 25 MCG/KG/MIN: 10 INJECTION, EMULSION INTRAVENOUS at 22:15

## 2021-01-01 RX ADMIN — ACETAMINOPHEN 975 MG: 325 TABLET, FILM COATED ORAL at 20:47

## 2021-01-01 RX ADMIN — ACETAMINOPHEN 975 MG: 160 SOLUTION ORAL at 20:48

## 2021-01-01 RX ADMIN — INSULIN ASPART 4 UNITS: 100 INJECTION, SOLUTION INTRAVENOUS; SUBCUTANEOUS at 15:52

## 2021-01-01 RX ADMIN — SODIUM BICARBONATE 25 ML/HR: 84 INJECTION, SOLUTION INTRAVENOUS at 01:51

## 2021-01-01 RX ADMIN — PHENYLEPHRINE HYDROCHLORIDE 2 MCG/KG/MIN: 10 INJECTION INTRAVENOUS at 20:48

## 2021-01-01 RX ADMIN — HEPARIN SODIUM 5000 UNITS: 10000 INJECTION, SOLUTION INTRAVENOUS; SUBCUTANEOUS at 11:02

## 2021-01-01 RX ADMIN — INSULIN ASPART 4 UNITS: 100 INJECTION, SOLUTION INTRAVENOUS; SUBCUTANEOUS at 04:19

## 2021-01-01 RX ADMIN — AMIODARONE HYDROCHLORIDE 0.5 MG/MIN: 50 INJECTION, SOLUTION INTRAVENOUS at 09:17

## 2021-01-01 RX ADMIN — PROPOFOL 40 MCG/KG/MIN: 10 INJECTION, EMULSION INTRAVENOUS at 17:06

## 2021-01-01 RX ADMIN — PROPOFOL 25 MCG/KG/MIN: 10 INJECTION, EMULSION INTRAVENOUS at 11:01

## 2021-01-01 RX ADMIN — PROPOFOL 30 MCG/KG/MIN: 10 INJECTION, EMULSION INTRAVENOUS at 03:11

## 2021-01-01 RX ADMIN — Medication 75 MCG/HR: at 10:15

## 2021-01-01 RX ADMIN — INSULIN ASPART 4 UNITS: 100 INJECTION, SOLUTION INTRAVENOUS; SUBCUTANEOUS at 15:53

## 2021-01-01 RX ADMIN — PROPOFOL 40 MCG/KG/MIN: 10 INJECTION, EMULSION INTRAVENOUS at 21:15

## 2021-01-01 RX ADMIN — EPOETIN ALFA-EPBX 5000 UNITS: 3000 INJECTION, SOLUTION INTRAVENOUS; SUBCUTANEOUS at 09:08

## 2021-01-01 RX ADMIN — HEPARIN SODIUM 5000 UNITS: 10000 INJECTION, SOLUTION INTRAVENOUS; SUBCUTANEOUS at 13:01

## 2021-01-01 RX ADMIN — ACETAMINOPHEN 975 MG: 325 TABLET, FILM COATED ORAL at 04:53

## 2021-01-01 RX ADMIN — AMLODIPINE BESYLATE 5 MG: 5 TABLET ORAL at 13:01

## 2021-01-01 RX ADMIN — EPOETIN ALFA-EPBX 3000 UNITS: 3000 INJECTION, SOLUTION INTRAVENOUS; SUBCUTANEOUS at 10:34

## 2021-01-01 RX ADMIN — PROPOFOL 100 MG: 10 INJECTION, EMULSION INTRAVENOUS at 03:45

## 2021-01-01 RX ADMIN — PROPOFOL 30 MCG/KG/MIN: 10 INJECTION, EMULSION INTRAVENOUS at 18:27

## 2021-01-01 RX ADMIN — SODIUM CHLORIDE 250 ML: 9 INJECTION, SOLUTION INTRAVENOUS at 14:25

## 2021-01-01 RX ADMIN — PROPOFOL 40 MCG/KG/MIN: 10 INJECTION, EMULSION INTRAVENOUS at 21:59

## 2021-01-01 RX ADMIN — ALLOPURINOL 300 MG: 300 TABLET ORAL at 08:35

## 2021-01-01 RX ADMIN — FENTANYL CITRATE 100 MCG: 50 INJECTION, SOLUTION INTRAMUSCULAR; INTRAVENOUS at 14:36

## 2021-01-01 RX ADMIN — PROPOFOL 35 MCG/KG/MIN: 10 INJECTION, EMULSION INTRAVENOUS at 04:10

## 2021-01-01 RX ADMIN — ACETAMINOPHEN 975 MG: 325 TABLET, FILM COATED ORAL at 12:53

## 2021-01-01 RX ADMIN — Medication 2 PACKET: at 17:47

## 2021-01-01 RX ADMIN — PROPOFOL 55 MCG/KG/MIN: 10 INJECTION, EMULSION INTRAVENOUS at 01:07

## 2021-01-01 RX ADMIN — FUROSEMIDE 80 MG: 10 INJECTION, SOLUTION INTRAMUSCULAR; INTRAVENOUS at 11:14

## 2021-01-01 RX ADMIN — SODIUM CHLORIDE: 9 INJECTION, SOLUTION INTRAVENOUS at 21:42

## 2021-01-01 RX ADMIN — SODIUM CHLORIDE 83 ML: 9 INJECTION, SOLUTION INTRAVENOUS at 11:19

## 2021-01-01 RX ADMIN — INSULIN ASPART 4 UNITS: 100 INJECTION, SOLUTION INTRAVENOUS; SUBCUTANEOUS at 16:24

## 2021-01-01 RX ADMIN — HEPARIN SODIUM 5000 UNITS: 10000 INJECTION, SOLUTION INTRAVENOUS; SUBCUTANEOUS at 02:31

## 2021-01-01 RX ADMIN — INSULIN ASPART 1 UNITS: 100 INJECTION, SOLUTION INTRAVENOUS; SUBCUTANEOUS at 12:25

## 2021-01-01 RX ADMIN — AMIODARONE HYDROCHLORIDE 0.5 MG/MIN: 50 INJECTION, SOLUTION INTRAVENOUS at 12:04

## 2021-01-01 RX ADMIN — PROPOFOL 40 MCG/KG/MIN: 10 INJECTION, EMULSION INTRAVENOUS at 09:38

## 2021-01-01 RX ADMIN — MEROPENEM 500 MG: 500 INJECTION, POWDER, FOR SOLUTION INTRAVENOUS at 14:21

## 2021-01-01 RX ADMIN — CEFTRIAXONE 2 G: 2 INJECTION, POWDER, FOR SOLUTION INTRAMUSCULAR; INTRAVENOUS at 16:45

## 2021-01-01 RX ADMIN — INSULIN ASPART 2 UNITS: 100 INJECTION, SOLUTION INTRAVENOUS; SUBCUTANEOUS at 21:14

## 2021-01-01 RX ADMIN — AMIODARONE HYDROCHLORIDE 1 MG/MIN: 50 INJECTION, SOLUTION INTRAVENOUS at 17:56

## 2021-01-01 RX ADMIN — HEPARIN SODIUM 5000 UNITS: 10000 INJECTION, SOLUTION INTRAVENOUS; SUBCUTANEOUS at 09:02

## 2021-01-01 RX ADMIN — CHLORHEXIDINE GLUCONATE 0.12% ORAL RINSE 15 ML: 1.2 LIQUID ORAL at 21:13

## 2021-01-01 RX ADMIN — HEPARIN SODIUM 500 UNITS/HR: 1000 INJECTION INTRAVENOUS; SUBCUTANEOUS at 08:37

## 2021-01-01 RX ADMIN — FENTANYL CITRATE 100 MCG: 50 INJECTION, SOLUTION INTRAMUSCULAR; INTRAVENOUS at 20:37

## 2021-01-01 RX ADMIN — PROPOFOL 20 MCG/KG/MIN: 10 INJECTION, EMULSION INTRAVENOUS at 17:03

## 2021-01-01 RX ADMIN — Medication 16 UNITS/HR: at 14:24

## 2021-01-01 RX ADMIN — Medication 2 PACKET: at 08:48

## 2021-01-01 RX ADMIN — ACETAMINOPHEN 650 MG: 325 TABLET, FILM COATED ORAL at 18:30

## 2021-01-01 RX ADMIN — PROPOFOL 50 MCG/KG/MIN: 10 INJECTION, EMULSION INTRAVENOUS at 15:18

## 2021-01-01 RX ADMIN — REMDESIVIR 200 MG: 100 INJECTION, POWDER, LYOPHILIZED, FOR SOLUTION INTRAVENOUS at 09:35

## 2021-01-01 RX ADMIN — PROPOFOL 60 MCG/KG/MIN: 10 INJECTION, EMULSION INTRAVENOUS at 09:16

## 2021-01-01 RX ADMIN — METOPROLOL SUCCINATE 100 MG: 100 TABLET, EXTENDED RELEASE ORAL at 09:08

## 2021-01-01 RX ADMIN — FUROSEMIDE 80 MG: 10 INJECTION, SOLUTION INTRAMUSCULAR; INTRAVENOUS at 21:50

## 2021-01-01 RX ADMIN — PROPOFOL 30 MCG/KG/MIN: 10 INJECTION, EMULSION INTRAVENOUS at 20:11

## 2021-01-01 RX ADMIN — SODIUM BICARBONATE 25 ML/HR: 84 INJECTION, SOLUTION INTRAVENOUS at 05:55

## 2021-01-01 RX ADMIN — ACETAMINOPHEN 975 MG: 160 SOLUTION ORAL at 20:10

## 2021-01-01 RX ADMIN — DEXAMETHASONE SODIUM PHOSPHATE 6 MG: 10 INJECTION INTRAMUSCULAR; INTRAVENOUS at 07:50

## 2021-01-01 RX ADMIN — AZITHROMYCIN 250 MG: 250 TABLET, FILM COATED ORAL at 08:35

## 2021-01-01 RX ADMIN — ENOXAPARIN SODIUM 40 MG: 40 INJECTION SUBCUTANEOUS at 20:31

## 2021-01-01 RX ADMIN — CLONIDINE HYDROCHLORIDE 0.2 MG: 0.1 TABLET ORAL at 09:08

## 2021-01-01 RX ADMIN — DEXTROSE MONOHYDRATE 25 ML: 25 INJECTION, SOLUTION INTRAVENOUS at 21:21

## 2021-01-01 RX ADMIN — INSULIN ASPART 3 UNITS: 100 INJECTION, SOLUTION INTRAVENOUS; SUBCUTANEOUS at 05:18

## 2021-01-01 RX ADMIN — Medication 2 PACKET: at 22:24

## 2021-01-01 RX ADMIN — HEPARIN SODIUM 5000 UNITS: 10000 INJECTION, SOLUTION INTRAVENOUS; SUBCUTANEOUS at 17:55

## 2021-01-01 RX ADMIN — ACETAMINOPHEN 975 MG: 160 SOLUTION ORAL at 20:32

## 2021-01-01 RX ADMIN — Medication 5 ML: at 09:29

## 2021-01-01 RX ADMIN — Medication 2 PACKET: at 15:52

## 2021-01-01 RX ADMIN — SODIUM CHLORIDE 300 ML: 9 INJECTION, SOLUTION INTRAVENOUS at 09:11

## 2021-01-01 RX ADMIN — ACETAMINOPHEN 975 MG: 160 SOLUTION ORAL at 05:22

## 2021-01-01 RX ADMIN — Medication 100 MG: at 03:45

## 2021-01-01 RX ADMIN — SODIUM CHLORIDE 300 ML: 9 INJECTION, SOLUTION INTRAVENOUS at 10:33

## 2021-01-01 RX ADMIN — Medication 1 UNITS/HR: at 00:51

## 2021-01-01 RX ADMIN — ACETAMINOPHEN 975 MG: 160 SOLUTION ORAL at 20:02

## 2021-01-01 RX ADMIN — CALCIUM CHLORIDE 1 G: 100 INJECTION, SOLUTION INTRAVENOUS at 02:55

## 2021-01-01 RX ADMIN — Medication 2 PACKET: at 08:57

## 2021-01-01 RX ADMIN — ENOXAPARIN SODIUM 40 MG: 40 INJECTION SUBCUTANEOUS at 21:13

## 2021-01-01 RX ADMIN — SODIUM CHLORIDE: 9 INJECTION, SOLUTION INTRAVENOUS at 20:14

## 2021-01-01 RX ADMIN — SODIUM BICARBONATE 40 MG: 84 INJECTION, SOLUTION INTRAVENOUS at 06:20

## 2021-01-01 RX ADMIN — SODIUM BICARBONATE 40 MG: 84 INJECTION, SOLUTION INTRAVENOUS at 09:39

## 2021-01-01 RX ADMIN — BUMETANIDE 2 MG: 0.25 INJECTION INTRAMUSCULAR; INTRAVENOUS at 12:20

## 2021-01-01 RX ADMIN — HEPARIN SODIUM 5000 UNITS: 10000 INJECTION, SOLUTION INTRAVENOUS; SUBCUTANEOUS at 19:01

## 2021-01-01 RX ADMIN — SODIUM CHLORIDE, POTASSIUM CHLORIDE, SODIUM LACTATE AND CALCIUM CHLORIDE 1000 ML: 600; 310; 30; 20 INJECTION, SOLUTION INTRAVENOUS at 13:19

## 2021-01-01 RX ADMIN — CHLORHEXIDINE GLUCONATE 0.12% ORAL RINSE 15 ML: 1.2 LIQUID ORAL at 20:14

## 2021-01-01 RX ADMIN — DEXAMETHASONE SODIUM PHOSPHATE 6 MG: 10 INJECTION INTRAMUSCULAR; INTRAVENOUS at 08:07

## 2021-01-01 RX ADMIN — INSULIN ASPART 4 UNITS: 100 INJECTION, SOLUTION INTRAVENOUS; SUBCUTANEOUS at 16:07

## 2021-01-01 RX ADMIN — SENNOSIDES A AND B 15 ML: 8.8 SYRUP ORAL at 08:08

## 2021-01-01 RX ADMIN — LABETALOL HYDROCHLORIDE 20 MG: 5 INJECTION, SOLUTION INTRAVENOUS at 10:52

## 2021-01-01 RX ADMIN — CHLORHEXIDINE GLUCONATE 0.12% ORAL RINSE 15 ML: 1.2 LIQUID ORAL at 08:33

## 2021-01-01 RX ADMIN — SODIUM BICARBONATE 25 ML/HR: 84 INJECTION, SOLUTION INTRAVENOUS at 13:08

## 2021-01-01 RX ADMIN — CHLORHEXIDINE GLUCONATE 0.12% ORAL RINSE 15 ML: 1.2 LIQUID ORAL at 08:49

## 2021-01-01 RX ADMIN — Medication 2 PACKET: at 21:15

## 2021-01-01 RX ADMIN — ACETAMINOPHEN 975 MG: 325 TABLET, FILM COATED ORAL at 11:15

## 2021-01-01 RX ADMIN — DEXMEDETOMIDINE 0.2 MCG/KG/HR: 100 INJECTION, SOLUTION, CONCENTRATE INTRAVENOUS at 11:12

## 2021-01-01 RX ADMIN — Medication 2 PACKET: at 16:05

## 2021-01-01 RX ADMIN — PANTOPRAZOLE SODIUM 40 MG: 40 INJECTION, POWDER, FOR SOLUTION INTRAVENOUS at 08:35

## 2021-01-01 RX ADMIN — PROPOFOL 40 MCG/KG/MIN: 10 INJECTION, EMULSION INTRAVENOUS at 23:43

## 2021-01-01 RX ADMIN — PANTOPRAZOLE SODIUM 40 MG: 40 INJECTION, POWDER, FOR SOLUTION INTRAVENOUS at 08:48

## 2021-01-01 RX ADMIN — HEPARIN SODIUM 5000 UNITS: 10000 INJECTION, SOLUTION INTRAVENOUS; SUBCUTANEOUS at 02:52

## 2021-01-01 RX ADMIN — PROPOFOL 50 MCG/KG/MIN: 10 INJECTION, EMULSION INTRAVENOUS at 23:10

## 2021-01-01 RX ADMIN — POTASSIUM CHLORIDE 10 MEQ: 1.5 SOLUTION ORAL at 16:32

## 2021-01-01 RX ADMIN — ACETAMINOPHEN 975 MG: 325 TABLET, FILM COATED ORAL at 20:13

## 2021-01-01 RX ADMIN — PHENYLEPHRINE HYDROCHLORIDE 6 MCG/KG/MIN: 10 INJECTION INTRAVENOUS at 19:44

## 2021-01-01 RX ADMIN — IOPAMIDOL 63 ML: 755 INJECTION, SOLUTION INTRAVENOUS at 11:19

## 2021-01-01 RX ADMIN — SODIUM CHLORIDE 250 ML: 9 INJECTION, SOLUTION INTRAVENOUS at 10:33

## 2021-01-01 RX ADMIN — Medication 2 PACKET: at 09:07

## 2021-01-01 RX ADMIN — Medication 100 MCG/HR: at 16:04

## 2021-01-01 RX ADMIN — HEPARIN SODIUM 5000 UNITS: 10000 INJECTION, SOLUTION INTRAVENOUS; SUBCUTANEOUS at 17:05

## 2021-01-01 RX ADMIN — Medication 5 ML: at 08:16

## 2021-01-01 RX ADMIN — FENTANYL CITRATE 50 MCG: 50 INJECTION, SOLUTION INTRAMUSCULAR; INTRAVENOUS at 10:44

## 2021-01-01 RX ADMIN — Medication 2 MG/HR: at 19:46

## 2021-01-01 RX ADMIN — SODIUM CHLORIDE 50 ML: 9 INJECTION, SOLUTION INTRAVENOUS at 09:36

## 2021-01-01 RX ADMIN — Medication 100 MCG/HR: at 07:16

## 2021-01-01 RX ADMIN — HEPARIN SODIUM 5000 UNITS: 10000 INJECTION, SOLUTION INTRAVENOUS; SUBCUTANEOUS at 17:36

## 2021-01-01 RX ADMIN — ATORVASTATIN CALCIUM 10 MG: 10 TABLET, FILM COATED ORAL at 20:31

## 2021-01-01 RX ADMIN — Medication 2 PACKET: at 15:55

## 2021-01-01 RX ADMIN — Medication 2 UNITS/HR: at 14:39

## 2021-01-01 RX ADMIN — Medication 1 PACKET: at 08:52

## 2021-01-01 RX ADMIN — TAZOBACTAM SODIUM AND PIPERACILLIN SODIUM 4.5 G: 500; 4 INJECTION, SOLUTION INTRAVENOUS at 01:46

## 2021-01-01 RX ADMIN — Medication 50 MCG/HR: at 04:34

## 2021-01-01 RX ADMIN — AMLODIPINE BESYLATE 5 MG: 5 TABLET ORAL at 09:58

## 2021-01-01 RX ADMIN — PROPOFOL 5 MCG/KG/MIN: 10 INJECTION, EMULSION INTRAVENOUS at 17:38

## 2021-01-01 RX ADMIN — MEROPENEM 500 MG: 500 INJECTION, POWDER, FOR SOLUTION INTRAVENOUS at 21:16

## 2021-01-01 RX ADMIN — VASOPRESSIN 2.4 UNITS/HR: 20 INJECTION INTRAVENOUS at 10:34

## 2021-01-01 RX ADMIN — PROPOFOL 25 MCG/KG/MIN: 10 INJECTION, EMULSION INTRAVENOUS at 18:52

## 2021-01-01 RX ADMIN — METOPROLOL TARTRATE 10 MG: 5 INJECTION INTRAVENOUS at 03:05

## 2021-01-01 RX ADMIN — POTASSIUM CHLORIDE 10 MEQ: 7.46 INJECTION, SOLUTION INTRAVENOUS at 17:23

## 2021-01-01 RX ADMIN — ALLOPURINOL 300 MG: 300 TABLET ORAL at 08:48

## 2021-01-01 RX ADMIN — ACETAMINOPHEN 650 MG: 325 TABLET, FILM COATED ORAL at 04:33

## 2021-01-01 RX ADMIN — PROPOFOL 40 MCG/KG/MIN: 10 INJECTION, EMULSION INTRAVENOUS at 08:59

## 2021-01-01 RX ADMIN — LABETALOL HYDROCHLORIDE 20 MG: 5 INJECTION, SOLUTION INTRAVENOUS at 17:05

## 2021-01-01 RX ADMIN — ACETAMINOPHEN 975 MG: 160 SOLUTION ORAL at 19:52

## 2021-01-01 RX ADMIN — PHENYLEPHRINE HYDROCHLORIDE 0.5 MCG/KG/MIN: 10 INJECTION INTRAVENOUS at 01:54

## 2021-01-01 RX ADMIN — SENNOSIDES A AND B 15 ML: 8.8 SYRUP ORAL at 08:17

## 2021-01-01 RX ADMIN — AMIODARONE HYDROCHLORIDE 400 MG: 200 TABLET ORAL at 09:00

## 2021-01-01 RX ADMIN — LABETALOL HYDROCHLORIDE 20 MG: 5 INJECTION, SOLUTION INTRAVENOUS at 20:10

## 2021-01-01 RX ADMIN — INSULIN ASPART 3 UNITS: 100 INJECTION, SOLUTION INTRAVENOUS; SUBCUTANEOUS at 08:46

## 2021-01-01 RX ADMIN — FENTANYL CITRATE 100 MCG: 50 INJECTION, SOLUTION INTRAMUSCULAR; INTRAVENOUS at 01:59

## 2021-01-01 RX ADMIN — CHLORHEXIDINE GLUCONATE 0.12% ORAL RINSE 15 ML: 1.2 LIQUID ORAL at 08:39

## 2021-01-01 RX ADMIN — AMLODIPINE BESYLATE 5 MG: 5 TABLET ORAL at 11:59

## 2021-01-01 RX ADMIN — SODIUM CHLORIDE: 9 INJECTION, SOLUTION INTRAVENOUS at 02:56

## 2021-01-01 RX ADMIN — INSULIN ASPART 4 UNITS: 100 INJECTION, SOLUTION INTRAVENOUS; SUBCUTANEOUS at 16:14

## 2021-01-01 RX ADMIN — ACETAMINOPHEN 975 MG: 160 SOLUTION ORAL at 04:58

## 2021-01-01 RX ADMIN — PHENYLEPHRINE HYDROCHLORIDE 1 MCG/KG/MIN: 10 INJECTION INTRAVENOUS at 16:53

## 2021-01-01 RX ADMIN — HEPARIN SODIUM 500 UNITS: 1000 INJECTION INTRAVENOUS; SUBCUTANEOUS at 08:49

## 2021-01-01 RX ADMIN — AMLODIPINE BESYLATE 10 MG: 10 TABLET ORAL at 20:12

## 2021-01-01 RX ADMIN — Medication 2 PACKET: at 15:41

## 2021-01-01 RX ADMIN — PROPOFOL 40 MCG/KG/MIN: 10 INJECTION, EMULSION INTRAVENOUS at 11:33

## 2021-01-01 RX ADMIN — ATORVASTATIN CALCIUM 10 MG: 10 TABLET, FILM COATED ORAL at 22:17

## 2021-01-01 RX ADMIN — FENTANYL CITRATE 100 MCG: 50 INJECTION, SOLUTION INTRAMUSCULAR; INTRAVENOUS at 04:10

## 2021-01-01 RX ADMIN — Medication 2 PACKET: at 15:50

## 2021-01-01 RX ADMIN — HEPARIN SODIUM 5000 UNITS: 10000 INJECTION, SOLUTION INTRAVENOUS; SUBCUTANEOUS at 17:48

## 2021-01-01 RX ADMIN — HEPARIN SODIUM 5000 UNITS: 10000 INJECTION, SOLUTION INTRAVENOUS; SUBCUTANEOUS at 10:21

## 2021-01-01 RX ADMIN — SODIUM BICARBONATE 50 MEQ: 84 INJECTION, SOLUTION INTRAVENOUS at 04:22

## 2021-01-01 RX ADMIN — Medication 1.5 UNITS/HR: at 04:29

## 2021-01-01 RX ADMIN — CHLORHEXIDINE GLUCONATE 0.12% ORAL RINSE 15 ML: 1.2 LIQUID ORAL at 21:10

## 2021-01-01 RX ADMIN — HEPARIN SODIUM 5000 UNITS: 10000 INJECTION, SOLUTION INTRAVENOUS; SUBCUTANEOUS at 10:07

## 2021-01-01 RX ADMIN — INSULIN ASPART 2 UNITS: 100 INJECTION, SOLUTION INTRAVENOUS; SUBCUTANEOUS at 00:16

## 2021-01-01 RX ADMIN — IOPAMIDOL 93 ML: 755 INJECTION, SOLUTION INTRAVENOUS at 15:12

## 2021-01-01 RX ADMIN — POTASSIUM CHLORIDE 40 MEQ: 1.5 POWDER, FOR SOLUTION ORAL at 13:47

## 2021-01-01 RX ADMIN — PROPOFOL 50 MCG/KG/MIN: 10 INJECTION, EMULSION INTRAVENOUS at 06:02

## 2021-01-01 RX ADMIN — LABETALOL HYDROCHLORIDE 20 MG: 5 INJECTION, SOLUTION INTRAVENOUS at 15:52

## 2021-01-01 RX ADMIN — AMLODIPINE BESYLATE 5 MG: 5 TABLET ORAL at 08:35

## 2021-01-01 RX ADMIN — ACETAMINOPHEN 650 MG: 325 TABLET, FILM COATED ORAL at 10:08

## 2021-01-01 RX ADMIN — GLIPIZIDE 10 MG: 5 TABLET, EXTENDED RELEASE ORAL at 10:07

## 2021-01-01 RX ADMIN — HEPARIN SODIUM 5000 UNITS: 10000 INJECTION, SOLUTION INTRAVENOUS; SUBCUTANEOUS at 10:35

## 2021-01-01 RX ADMIN — PANTOPRAZOLE SODIUM 40 MG: 40 INJECTION, POWDER, FOR SOLUTION INTRAVENOUS at 09:32

## 2021-01-01 RX ADMIN — METOPROLOL SUCCINATE 100 MG: 100 TABLET, EXTENDED RELEASE ORAL at 20:12

## 2021-01-01 RX ADMIN — INSULIN ASPART 4 UNITS: 100 INJECTION, SOLUTION INTRAVENOUS; SUBCUTANEOUS at 20:15

## 2021-01-01 RX ADMIN — PROPOFOL 25 MCG/KG/MIN: 10 INJECTION, EMULSION INTRAVENOUS at 09:05

## 2021-01-01 RX ADMIN — ACETAMINOPHEN 975 MG: 325 TABLET, FILM COATED ORAL at 04:37

## 2021-01-01 RX ADMIN — PROPOFOL 10 MCG/KG/MIN: 10 INJECTION, EMULSION INTRAVENOUS at 00:52

## 2021-01-01 RX ADMIN — CHLORHEXIDINE GLUCONATE 0.12% ORAL RINSE 15 ML: 1.2 LIQUID ORAL at 08:35

## 2021-01-01 RX ADMIN — Medication 0.1 MCG/KG/MIN: at 04:44

## 2021-01-01 RX ADMIN — INSULIN ASPART 4 UNITS: 100 INJECTION, SOLUTION INTRAVENOUS; SUBCUTANEOUS at 00:26

## 2021-01-01 RX ADMIN — PROPOFOL 50 MCG/KG/MIN: 10 INJECTION, EMULSION INTRAVENOUS at 02:14

## 2021-01-01 RX ADMIN — FENTANYL CITRATE 100 MCG: 50 INJECTION, SOLUTION INTRAMUSCULAR; INTRAVENOUS at 20:58

## 2021-01-01 RX ADMIN — HEPARIN SODIUM 5000 UNITS: 10000 INJECTION, SOLUTION INTRAVENOUS; SUBCUTANEOUS at 09:33

## 2021-01-01 RX ADMIN — PROPOFOL 50 MCG/KG/MIN: 10 INJECTION, EMULSION INTRAVENOUS at 02:36

## 2021-01-01 RX ADMIN — Medication 2 PACKET: at 17:18

## 2021-01-01 RX ADMIN — HEPARIN SODIUM 5000 UNITS: 10000 INJECTION, SOLUTION INTRAVENOUS; SUBCUTANEOUS at 01:32

## 2021-01-01 RX ADMIN — ACETAMINOPHEN 975 MG: 325 TABLET, FILM COATED ORAL at 04:39

## 2021-01-01 RX ADMIN — CLONIDINE HYDROCHLORIDE 0.2 MG: 0.1 TABLET ORAL at 08:35

## 2021-01-01 RX ADMIN — AMIODARONE HYDROCHLORIDE 150 MG: 1.5 INJECTION, SOLUTION INTRAVENOUS at 17:59

## 2021-01-01 RX ADMIN — PROPOFOL 30 MCG/KG/MIN: 10 INJECTION, EMULSION INTRAVENOUS at 20:39

## 2021-01-01 RX ADMIN — TAZOBACTAM SODIUM AND PIPERACILLIN SODIUM 2.25 G: 250; 2 INJECTION, SOLUTION INTRAVENOUS at 12:38

## 2021-01-01 RX ADMIN — EPOETIN ALFA-EPBX 5000 UNITS: 3000 INJECTION, SOLUTION INTRAVENOUS; SUBCUTANEOUS at 08:39

## 2021-01-01 RX ADMIN — MEROPENEM 500 MG: 500 INJECTION, POWDER, FOR SOLUTION INTRAVENOUS at 05:21

## 2021-01-01 RX ADMIN — CHLORHEXIDINE GLUCONATE 0.12% ORAL RINSE 15 ML: 1.2 LIQUID ORAL at 20:58

## 2021-01-01 RX ADMIN — Medication 2 PACKET: at 16:24

## 2021-01-01 RX ADMIN — HEPARIN SODIUM 5000 UNITS: 10000 INJECTION, SOLUTION INTRAVENOUS; SUBCUTANEOUS at 18:09

## 2021-01-01 RX ADMIN — Medication 2 PACKET: at 13:01

## 2021-01-01 RX ADMIN — SODIUM CHLORIDE, POTASSIUM CHLORIDE, SODIUM LACTATE AND CALCIUM CHLORIDE: 600; 310; 30; 20 INJECTION, SOLUTION INTRAVENOUS at 11:05

## 2021-01-01 RX ADMIN — HEPARIN SODIUM 500 UNITS: 1000 INJECTION INTRAVENOUS; SUBCUTANEOUS at 08:37

## 2021-01-01 RX ADMIN — VASOPRESSIN 2.4 UNITS/HR: 20 INJECTION INTRAVENOUS at 11:31

## 2021-01-01 RX ADMIN — LABETALOL HYDROCHLORIDE 20 MG: 5 INJECTION, SOLUTION INTRAVENOUS at 17:56

## 2021-01-01 RX ADMIN — PROPOFOL 30 MCG/KG/MIN: 10 INJECTION, EMULSION INTRAVENOUS at 16:25

## 2021-01-01 RX ADMIN — PROPOFOL 50 MCG/KG/MIN: 10 INJECTION, EMULSION INTRAVENOUS at 06:50

## 2021-01-01 RX ADMIN — INSULIN ASPART 4 UNITS: 100 INJECTION, SOLUTION INTRAVENOUS; SUBCUTANEOUS at 11:53

## 2021-01-01 RX ADMIN — MEROPENEM 500 MG: 500 INJECTION, POWDER, FOR SOLUTION INTRAVENOUS at 04:35

## 2021-01-01 RX ADMIN — Medication 12 UNITS/HR: at 18:38

## 2021-01-01 RX ADMIN — PROPOFOL 35 MCG/KG/MIN: 10 INJECTION, EMULSION INTRAVENOUS at 04:23

## 2021-01-01 RX ADMIN — PROPOFOL 50 MCG/KG/MIN: 10 INJECTION, EMULSION INTRAVENOUS at 19:51

## 2021-01-01 RX ADMIN — INSULIN ASPART 4 UNITS: 100 INJECTION, SOLUTION INTRAVENOUS; SUBCUTANEOUS at 04:31

## 2021-01-01 RX ADMIN — Medication 75 MCG/HR: at 20:32

## 2021-01-01 RX ADMIN — Medication 140 MG: at 22:18

## 2021-01-01 RX ADMIN — ONDANSETRON 4 MG: 2 INJECTION INTRAMUSCULAR; INTRAVENOUS at 14:46

## 2021-01-01 RX ADMIN — SODIUM BICARBONATE 25 ML/HR: 84 INJECTION, SOLUTION INTRAVENOUS at 06:14

## 2021-01-01 RX ADMIN — SODIUM CHLORIDE 300 ML: 9 INJECTION, SOLUTION INTRAVENOUS at 08:49

## 2021-01-01 RX ADMIN — SODIUM BICARBONATE 40 MG: 84 INJECTION, SOLUTION INTRAVENOUS at 13:01

## 2021-01-01 RX ADMIN — ATORVASTATIN CALCIUM 10 MG: 10 TABLET, FILM COATED ORAL at 22:05

## 2021-01-01 RX ADMIN — CEFTRIAXONE 2 G: 2 INJECTION, POWDER, FOR SOLUTION INTRAMUSCULAR; INTRAVENOUS at 16:23

## 2021-01-01 RX ADMIN — MEROPENEM 500 MG: 500 INJECTION, POWDER, FOR SOLUTION INTRAVENOUS at 05:14

## 2021-01-01 RX ADMIN — GLYCOPYRROLATE 0.2 MG: 0.2 INJECTION, SOLUTION INTRAMUSCULAR; INTRAVENOUS at 19:17

## 2021-01-01 RX ADMIN — Medication 75 MCG/HR: at 01:56

## 2021-01-01 RX ADMIN — TAZOBACTAM SODIUM AND PIPERACILLIN SODIUM 2.25 G: 250; 2 INJECTION, SOLUTION INTRAVENOUS at 17:30

## 2021-01-01 RX ADMIN — ACETAMINOPHEN 975 MG: 325 TABLET, FILM COATED ORAL at 20:01

## 2021-01-01 RX ADMIN — Medication 75 MCG/HR: at 05:45

## 2021-01-01 RX ADMIN — ACETAMINOPHEN 975 MG: 160 SOLUTION ORAL at 04:22

## 2021-01-01 RX ADMIN — VANCOMYCIN HYDROCHLORIDE 500 MG: 500 INJECTION, POWDER, LYOPHILIZED, FOR SOLUTION INTRAVENOUS at 19:58

## 2021-01-01 RX ADMIN — Medication 5 ML: at 09:28

## 2021-01-01 RX ADMIN — Medication 2 PACKET: at 20:02

## 2021-01-01 RX ADMIN — PROPOFOL 35 MCG/KG/MIN: 10 INJECTION, EMULSION INTRAVENOUS at 00:58

## 2021-01-01 RX ADMIN — PANTOPRAZOLE SODIUM 40 MG: 40 INJECTION, POWDER, FOR SOLUTION INTRAVENOUS at 10:32

## 2021-01-01 RX ADMIN — FENTANYL CITRATE 75 MCG: 50 INJECTION, SOLUTION INTRAMUSCULAR; INTRAVENOUS at 01:07

## 2021-01-01 RX ADMIN — INSULIN ASPART 4 UNITS: 100 INJECTION, SOLUTION INTRAVENOUS; SUBCUTANEOUS at 08:36

## 2021-01-01 RX ADMIN — HEPARIN SODIUM 5000 UNITS: 10000 INJECTION, SOLUTION INTRAVENOUS; SUBCUTANEOUS at 09:27

## 2021-01-01 RX ADMIN — PROPOFOL 40 MCG/KG/MIN: 10 INJECTION, EMULSION INTRAVENOUS at 07:17

## 2021-01-01 RX ADMIN — INSULIN ASPART 4 UNITS: 100 INJECTION, SOLUTION INTRAVENOUS; SUBCUTANEOUS at 04:08

## 2021-01-01 RX ADMIN — HEPARIN SODIUM 5000 UNITS: 10000 INJECTION, SOLUTION INTRAVENOUS; SUBCUTANEOUS at 17:47

## 2021-01-01 RX ADMIN — TAZOBACTAM SODIUM AND PIPERACILLIN SODIUM 4.5 G: 500; 4 INJECTION, SOLUTION INTRAVENOUS at 06:58

## 2021-01-01 RX ADMIN — Medication 0.36 MCG/KG/MIN: at 11:49

## 2021-01-01 RX ADMIN — FUROSEMIDE 60 MG: 10 INJECTION, SOLUTION INTRAMUSCULAR; INTRAVENOUS at 11:05

## 2021-01-01 RX ADMIN — VASOPRESSIN 2.4 UNITS/HR: 20 INJECTION INTRAVENOUS at 11:33

## 2021-01-01 RX ADMIN — Medication 2 PACKET: at 22:06

## 2021-01-01 RX ADMIN — INSULIN ASPART 4 UNITS: 100 INJECTION, SOLUTION INTRAVENOUS; SUBCUTANEOUS at 19:35

## 2021-01-01 RX ADMIN — INSULIN ASPART 4 UNITS: 100 INJECTION, SOLUTION INTRAVENOUS; SUBCUTANEOUS at 08:16

## 2021-01-01 RX ADMIN — SODIUM BICARBONATE 40 MG: 84 INJECTION, SOLUTION INTRAVENOUS at 09:27

## 2021-01-01 RX ADMIN — LABETALOL HYDROCHLORIDE 20 MG: 5 INJECTION, SOLUTION INTRAVENOUS at 18:09

## 2021-01-01 RX ADMIN — POTASSIUM CHLORIDE 10 MEQ: 7.46 INJECTION, SOLUTION INTRAVENOUS at 11:43

## 2021-01-01 RX ADMIN — FENTANYL CITRATE 100 MCG: 50 INJECTION, SOLUTION INTRAMUSCULAR; INTRAVENOUS at 22:50

## 2021-01-01 RX ADMIN — FUROSEMIDE 40 MG: 10 INJECTION, SOLUTION INTRAMUSCULAR; INTRAVENOUS at 00:06

## 2021-01-01 RX ADMIN — LABETALOL HYDROCHLORIDE 20 MG: 5 INJECTION, SOLUTION INTRAVENOUS at 03:29

## 2021-01-01 RX ADMIN — INSULIN ASPART 1 UNITS: 100 INJECTION, SOLUTION INTRAVENOUS; SUBCUTANEOUS at 16:27

## 2021-01-01 RX ADMIN — INSULIN ASPART 4 UNITS: 100 INJECTION, SOLUTION INTRAVENOUS; SUBCUTANEOUS at 20:23

## 2021-01-01 RX ADMIN — METOPROLOL SUCCINATE 100 MG: 100 TABLET, EXTENDED RELEASE ORAL at 10:08

## 2021-01-01 RX ADMIN — CHLORHEXIDINE GLUCONATE 0.12% ORAL RINSE 15 ML: 1.2 LIQUID ORAL at 20:00

## 2021-01-01 RX ADMIN — INSULIN ASPART 4 UNITS: 100 INJECTION, SOLUTION INTRAVENOUS; SUBCUTANEOUS at 07:59

## 2021-01-01 RX ADMIN — PHENYLEPHRINE HYDROCHLORIDE 0.5 MCG/KG/MIN: 10 INJECTION INTRAVENOUS at 05:13

## 2021-01-01 ASSESSMENT — ACTIVITIES OF DAILY LIVING (ADL)
ADLS_ACUITY_SCORE: 19
ADLS_ACUITY_SCORE: 23
ADLS_ACUITY_SCORE: 17
ADLS_ACUITY_SCORE: 23
ADLS_ACUITY_SCORE: 23
ADLS_ACUITY_SCORE: 19
ADLS_ACUITY_SCORE: 17
ADLS_ACUITY_SCORE: 17
ADLS_ACUITY_SCORE: 21
ADLS_ACUITY_SCORE: 23
ADLS_ACUITY_SCORE: 25
ADLS_ACUITY_SCORE: 23
ADLS_ACUITY_SCORE: 23
ADLS_ACUITY_SCORE: 21
ADLS_ACUITY_SCORE: 25
ADLS_ACUITY_SCORE: 23
ADLS_ACUITY_SCORE: 15
ADLS_ACUITY_SCORE: 23
ADLS_ACUITY_SCORE: 19
ADLS_ACUITY_SCORE: 23
ADLS_ACUITY_SCORE: 19
ADLS_ACUITY_SCORE: 19
ADLS_ACUITY_SCORE: 23
ADLS_ACUITY_SCORE: 21
ADLS_ACUITY_SCORE: 23
ADLS_ACUITY_SCORE: 17
ADLS_ACUITY_SCORE: 23
ADLS_ACUITY_SCORE: 23
ADLS_ACUITY_SCORE: 19
ADLS_ACUITY_SCORE: 15
ADLS_ACUITY_SCORE: 23
ADLS_ACUITY_SCORE: 17
ADLS_ACUITY_SCORE: 23
ADLS_ACUITY_SCORE: 15
ADLS_ACUITY_SCORE: 13
ADLS_ACUITY_SCORE: 23
ADLS_ACUITY_SCORE: 15
DIFFICULTY_COMMUNICATING: NO
ADLS_ACUITY_SCORE: 23
ADLS_ACUITY_SCORE: 15
DIFFICULTY_EATING/SWALLOWING: NO
ADLS_ACUITY_SCORE: 25
ADLS_ACUITY_SCORE: 17
ADLS_ACUITY_SCORE: 25
ADLS_ACUITY_SCORE: 23
ADLS_ACUITY_SCORE: 25
ADLS_ACUITY_SCORE: 23
ADLS_ACUITY_SCORE: 23
ADLS_ACUITY_SCORE: 15
ADLS_ACUITY_SCORE: 17
ADLS_ACUITY_SCORE: 21
ADLS_ACUITY_SCORE: 23
ADLS_ACUITY_SCORE: 25
ADLS_ACUITY_SCORE: 25
ADLS_ACUITY_SCORE: 17
ADLS_ACUITY_SCORE: 19
EQUIPMENT_CURRENTLY_USED_AT_HOME: CANE, STRAIGHT
ADLS_ACUITY_SCORE: 19
ADLS_ACUITY_SCORE: 21
ADLS_ACUITY_SCORE: 23
ADLS_ACUITY_SCORE: 23
ADLS_ACUITY_SCORE: 25
ADLS_ACUITY_SCORE: 21
ADLS_ACUITY_SCORE: 25
ADLS_ACUITY_SCORE: 19
ADLS_ACUITY_SCORE: 25
ADLS_ACUITY_SCORE: 23
ADLS_ACUITY_SCORE: 23
ADLS_ACUITY_SCORE: 13
ADLS_ACUITY_SCORE: 23
ADLS_ACUITY_SCORE: 25
ADLS_ACUITY_SCORE: 23
ADLS_ACUITY_SCORE: 25
ADLS_ACUITY_SCORE: 23
ADLS_ACUITY_SCORE: 25
ADLS_ACUITY_SCORE: 23
ADLS_ACUITY_SCORE: 23
ADLS_ACUITY_SCORE: 21
ADLS_ACUITY_SCORE: 23
ADLS_ACUITY_SCORE: 21
ADLS_ACUITY_SCORE: 23
ADLS_ACUITY_SCORE: 25
ADLS_ACUITY_SCORE: 23
ADLS_ACUITY_SCORE: 19
ADLS_ACUITY_SCORE: 21
ADLS_ACUITY_SCORE: 23
ADLS_ACUITY_SCORE: 17
ADLS_ACUITY_SCORE: 23
ADLS_ACUITY_SCORE: 23
ADLS_ACUITY_SCORE: 25
ADLS_ACUITY_SCORE: 25
ADLS_ACUITY_SCORE: 23
ADLS_ACUITY_SCORE: 25
ADLS_ACUITY_SCORE: 19
ADLS_ACUITY_SCORE: 25
ADLS_ACUITY_SCORE: 17
ADLS_ACUITY_SCORE: 23
ADLS_ACUITY_SCORE: 25
ADLS_ACUITY_SCORE: 23
ADLS_ACUITY_SCORE: 25
ADLS_ACUITY_SCORE: 23
ADLS_ACUITY_SCORE: 23
ADLS_ACUITY_SCORE: 17
ADLS_ACUITY_SCORE: 23
ADLS_ACUITY_SCORE: 21
ADLS_ACUITY_SCORE: 23
ADLS_ACUITY_SCORE: 17
ADLS_ACUITY_SCORE: 23
ADLS_ACUITY_SCORE: 23
ADLS_ACUITY_SCORE: 25
ADLS_ACUITY_SCORE: 25
ADLS_ACUITY_SCORE: 23
ADLS_ACUITY_SCORE: 21
ADLS_ACUITY_SCORE: 23
ADLS_ACUITY_SCORE: 23
ADLS_ACUITY_SCORE: 19
ADLS_ACUITY_SCORE: 23
ADLS_ACUITY_SCORE: 23
ADLS_ACUITY_SCORE: 25
ADLS_ACUITY_SCORE: 25
ADLS_ACUITY_SCORE: 23
ADLS_ACUITY_SCORE: 23
ADLS_ACUITY_SCORE: 17
ADLS_ACUITY_SCORE: 23
ADLS_ACUITY_SCORE: 25
ADLS_ACUITY_SCORE: 17
ADLS_ACUITY_SCORE: 23
ADLS_ACUITY_SCORE: 25
ADLS_ACUITY_SCORE: 17
ADLS_ACUITY_SCORE: 25
ADLS_ACUITY_SCORE: 23
ADLS_ACUITY_SCORE: 25
ADLS_ACUITY_SCORE: 23
ADLS_ACUITY_SCORE: 25
ADLS_ACUITY_SCORE: 23
ADLS_ACUITY_SCORE: 17
ADLS_ACUITY_SCORE: 19
ADLS_ACUITY_SCORE: 25
ADLS_ACUITY_SCORE: 19
ADLS_ACUITY_SCORE: 23
HEARING_DIFFICULTY_OR_DEAF: NO
ADLS_ACUITY_SCORE: 23
ADLS_ACUITY_SCORE: 21
ADLS_ACUITY_SCORE: 21
ADLS_ACUITY_SCORE: 17
ADLS_ACUITY_SCORE: 23
ADLS_ACUITY_SCORE: 19
ADLS_ACUITY_SCORE: 23
ADLS_ACUITY_SCORE: 23
ADLS_ACUITY_SCORE: 25
ADLS_ACUITY_SCORE: 17
ADLS_ACUITY_SCORE: 23
ADLS_ACUITY_SCORE: 25
ADLS_ACUITY_SCORE: 25
ADLS_ACUITY_SCORE: 17
ADLS_ACUITY_SCORE: 23
ADLS_ACUITY_SCORE: 25
ADLS_ACUITY_SCORE: 23
DOING_ERRANDS_INDEPENDENTLY_DIFFICULTY: YES
ADLS_ACUITY_SCORE: 18
ADLS_ACUITY_SCORE: 23
ADLS_ACUITY_SCORE: 23
ADLS_ACUITY_SCORE: 25
ADLS_ACUITY_SCORE: 17
ADLS_ACUITY_SCORE: 23
ADLS_ACUITY_SCORE: 25
ADLS_ACUITY_SCORE: 23
ADLS_ACUITY_SCORE: 18
ADLS_ACUITY_SCORE: 23
ADLS_ACUITY_SCORE: 23
ADLS_ACUITY_SCORE: 21
ADLS_ACUITY_SCORE: 18
ADLS_ACUITY_SCORE: 23
ADLS_ACUITY_SCORE: 13
ADLS_ACUITY_SCORE: 25
ADLS_ACUITY_SCORE: 23
ADLS_ACUITY_SCORE: 25
ADLS_ACUITY_SCORE: 23
ADLS_ACUITY_SCORE: 25
ADLS_ACUITY_SCORE: 21
ADLS_ACUITY_SCORE: 23
ADLS_ACUITY_SCORE: 25
ADLS_ACUITY_SCORE: 19
ADLS_ACUITY_SCORE: 23
ADLS_ACUITY_SCORE: 25
ADLS_ACUITY_SCORE: 25
ADLS_ACUITY_SCORE: 23
ADLS_ACUITY_SCORE: 19
ADLS_ACUITY_SCORE: 23
ADLS_ACUITY_SCORE: 23
ADLS_ACUITY_SCORE: 25
ADLS_ACUITY_SCORE: 23
ADLS_ACUITY_SCORE: 25
ADLS_ACUITY_SCORE: 19
ADLS_ACUITY_SCORE: 17
ADLS_ACUITY_SCORE: 23
ADLS_ACUITY_SCORE: 23
ADLS_ACUITY_SCORE: 17
ADLS_ACUITY_SCORE: 17
ADLS_ACUITY_SCORE: 19
CONCENTRATING,_REMEMBERING_OR_MAKING_DECISIONS_DIFFICULTY: NO
ADLS_ACUITY_SCORE: 23
DRESSING/BATHING_DIFFICULTY: NO
ADLS_ACUITY_SCORE: 23
ADLS_ACUITY_SCORE: 25
ADLS_ACUITY_SCORE: 23
ADLS_ACUITY_SCORE: 25
ADLS_ACUITY_SCORE: 17
ADLS_ACUITY_SCORE: 23
ADLS_ACUITY_SCORE: 25
ADLS_ACUITY_SCORE: 23
ADLS_ACUITY_SCORE: 17
ADLS_ACUITY_SCORE: 23
ADLS_ACUITY_SCORE: 21
ADLS_ACUITY_SCORE: 25
ADLS_ACUITY_SCORE: 23
ADLS_ACUITY_SCORE: 25
ADLS_ACUITY_SCORE: 25
ADLS_ACUITY_SCORE: 17
ADLS_ACUITY_SCORE: 23
ADLS_ACUITY_SCORE: 25
ADLS_ACUITY_SCORE: 23
ADLS_ACUITY_SCORE: 15
ADLS_ACUITY_SCORE: 23
ADLS_ACUITY_SCORE: 23
ADLS_ACUITY_SCORE: 19
ADLS_ACUITY_SCORE: 25
ADLS_ACUITY_SCORE: 13
ADLS_ACUITY_SCORE: 25
ADLS_ACUITY_SCORE: 23
TOILETING_ISSUES: NO
ADLS_ACUITY_SCORE: 23
ADLS_ACUITY_SCORE: 15
ADLS_ACUITY_SCORE: 23
ADLS_ACUITY_SCORE: 15
ADLS_ACUITY_SCORE: 23
ADLS_ACUITY_SCORE: 17
ADLS_ACUITY_SCORE: 23
FALL_HISTORY_WITHIN_LAST_SIX_MONTHS: NO
ADLS_ACUITY_SCORE: 23
ADLS_ACUITY_SCORE: 25
ADLS_ACUITY_SCORE: 17
ADLS_ACUITY_SCORE: 23
ADLS_ACUITY_SCORE: 23
ADLS_ACUITY_SCORE: 25
ADLS_ACUITY_SCORE: 23
ADLS_ACUITY_SCORE: 23
ADLS_ACUITY_SCORE: 19
ADLS_ACUITY_SCORE: 23
ADLS_ACUITY_SCORE: 25
ADLS_ACUITY_SCORE: 25
ADLS_ACUITY_SCORE: 23
ADLS_ACUITY_SCORE: 23
ADLS_ACUITY_SCORE: 25
ADLS_ACUITY_SCORE: 19
ADLS_ACUITY_SCORE: 15
ADLS_ACUITY_SCORE: 23
ADLS_ACUITY_SCORE: 23
ADLS_ACUITY_SCORE: 15
ADLS_ACUITY_SCORE: 23
ADLS_ACUITY_SCORE: 25
ADLS_ACUITY_SCORE: 19
ADLS_ACUITY_SCORE: 23
ADLS_ACUITY_SCORE: 25
ADLS_ACUITY_SCORE: 23
ADLS_ACUITY_SCORE: 25
ADLS_ACUITY_SCORE: 21
ADLS_ACUITY_SCORE: 23
ADLS_ACUITY_SCORE: 25
ADLS_ACUITY_SCORE: 25
ADLS_ACUITY_SCORE: 23
ADLS_ACUITY_SCORE: 21
ADLS_ACUITY_SCORE: 23
ADLS_ACUITY_SCORE: 23
ADLS_ACUITY_SCORE: 25
ADLS_ACUITY_SCORE: 19
ADLS_ACUITY_SCORE: 23
ADLS_ACUITY_SCORE: 13
ADLS_ACUITY_SCORE: 23
ADLS_ACUITY_SCORE: 21
ADLS_ACUITY_SCORE: 19
ADLS_ACUITY_SCORE: 25
ADLS_ACUITY_SCORE: 23
ADLS_ACUITY_SCORE: 17
ADLS_ACUITY_SCORE: 25
ADLS_ACUITY_SCORE: 17
ADLS_ACUITY_SCORE: 23
ADLS_ACUITY_SCORE: 15
ADLS_ACUITY_SCORE: 23
ADLS_ACUITY_SCORE: 23
ADLS_ACUITY_SCORE: 17
ADLS_ACUITY_SCORE: 23
ADLS_ACUITY_SCORE: 25
ADLS_ACUITY_SCORE: 23
ADLS_ACUITY_SCORE: 17
ADLS_ACUITY_SCORE: 25
ADLS_ACUITY_SCORE: 23
ADLS_ACUITY_SCORE: 19
ADLS_ACUITY_SCORE: 23
ADLS_ACUITY_SCORE: 25
WEAR_GLASSES_OR_BLIND: YES
ADLS_ACUITY_SCORE: 23
ADLS_ACUITY_SCORE: 25
ADLS_ACUITY_SCORE: 23
ADLS_ACUITY_SCORE: 19
ADLS_ACUITY_SCORE: 23
ADLS_ACUITY_SCORE: 25
ADLS_ACUITY_SCORE: 17
ADLS_ACUITY_SCORE: 25
ADLS_ACUITY_SCORE: 23
ADLS_ACUITY_SCORE: 19
ADLS_ACUITY_SCORE: 23
ADLS_ACUITY_SCORE: 18
ADLS_ACUITY_SCORE: 23
ADLS_ACUITY_SCORE: 23
ADLS_ACUITY_SCORE: 19
ADLS_ACUITY_SCORE: 23
ADLS_ACUITY_SCORE: 25
ADLS_ACUITY_SCORE: 23
ADLS_ACUITY_SCORE: 25
ADLS_ACUITY_SCORE: 19
ADLS_ACUITY_SCORE: 25
ADLS_ACUITY_SCORE: 17
ADLS_ACUITY_SCORE: 25
ADLS_ACUITY_SCORE: 23
ADLS_ACUITY_SCORE: 25
ADLS_ACUITY_SCORE: 23
ADLS_ACUITY_SCORE: 23
ADLS_ACUITY_SCORE: 25
ADLS_ACUITY_SCORE: 23
ADLS_ACUITY_SCORE: 15
ADLS_ACUITY_SCORE: 19
ADLS_ACUITY_SCORE: 21
ADLS_ACUITY_SCORE: 23
ADLS_ACUITY_SCORE: 23
WALKING_OR_CLIMBING_STAIRS_DIFFICULTY: YES
ADLS_ACUITY_SCORE: 25
ADLS_ACUITY_SCORE: 23
ADLS_ACUITY_SCORE: 25
ADLS_ACUITY_SCORE: 17
ADLS_ACUITY_SCORE: 23
ADLS_ACUITY_SCORE: 13
ADLS_ACUITY_SCORE: 25
ADLS_ACUITY_SCORE: 23
ADLS_ACUITY_SCORE: 25
ADLS_ACUITY_SCORE: 23
ADLS_ACUITY_SCORE: 23
ADLS_ACUITY_SCORE: 19
ADLS_ACUITY_SCORE: 23
ADLS_ACUITY_SCORE: 15
ADLS_ACUITY_SCORE: 25
ADLS_ACUITY_SCORE: 23
ADLS_ACUITY_SCORE: 19
ADLS_ACUITY_SCORE: 21
ADLS_ACUITY_SCORE: 23
ADLS_ACUITY_SCORE: 23
ADLS_ACUITY_SCORE: 25
ADLS_ACUITY_SCORE: 25
ADLS_ACUITY_SCORE: 23
ADLS_ACUITY_SCORE: 25
ADLS_ACUITY_SCORE: 17
ADLS_ACUITY_SCORE: 17
ADLS_ACUITY_SCORE: 19
ADLS_ACUITY_SCORE: 25
ADLS_ACUITY_SCORE: 23
ADLS_ACUITY_SCORE: 21
ADLS_ACUITY_SCORE: 25
ADLS_ACUITY_SCORE: 17
ADLS_ACUITY_SCORE: 23
ADLS_ACUITY_SCORE: 23
ADLS_ACUITY_SCORE: 21
ADLS_ACUITY_SCORE: 23
ADLS_ACUITY_SCORE: 25
ADLS_ACUITY_SCORE: 23
ADLS_ACUITY_SCORE: 17
ADLS_ACUITY_SCORE: 23

## 2021-01-01 ASSESSMENT — MIFFLIN-ST. JEOR: SCORE: 1663.05

## 2021-01-01 ASSESSMENT — ENCOUNTER SYMPTOMS
DECREASED CONCENTRATION: 1
SHORTNESS OF BREATH: 0
CONSTIPATION: 0
VOMITING: 0
ACTIVITY CHANGE: 1
APPETITE CHANGE: 1
FEVER: 0
CONFUSION: 1
VOMITING: 1
DIARRHEA: 1
SHORTNESS OF BREATH: 0
WOUND: 1
BLOOD IN STOOL: 0
BLOOD IN STOOL: 0
NAUSEA: 1
WEAKNESS: 1
ABDOMINAL PAIN: 1
DIARRHEA: 0

## 2021-05-29 NOTE — TELEPHONE ENCOUNTER
RN cannot approve Refill Request    RN can NOT refill this medication No established PCP. Last office visit: 1/24/2019 Tomy Fischer MD Last Physical: Visit date not found Last MTM visit: Visit date not found Last visit same specialty: 1/24/2019 Tomy Fischer MD.  Next visit within 3 mo: Visit date not found  Next physical within 3 mo: Visit date not found      Florecita Bowles, [facility] Triage/Med Refill 6/9/2019    Requested Prescriptions   Pending Prescriptions Disp Refills     metoprolol succinate (TOPROL-XL) 100 MG 24 hr tablet [Pharmacy Med Name: METOPROLOL ER SUCCINATE 100MG TABS] 90 tablet 0     Sig: TAKE 1 TABLET(100 MG) BY MOUTH DAILY       Beta-Blockers Refill Protocol Passed - 6/9/2019 10:25 AM        Passed - PCP or prescribing provider visit in past 12 months or next 3 months     Last office visit with prescriber/PCP: 1/24/2019 Tomy Fischer MD OR same dept: 1/24/2019 Tomy Fischer MD OR same specialty: 1/24/2019 Tomy Fischer MD  Last physical: Visit date not found Last MTM visit: Visit date not found   Next visit within 3 mo: Visit date not found  Next physical within 3 mo: Visit date not found  Prescriber OR PCP: Tomy Fischer MD  Last diagnosis associated with med order: 1. Benign essential HTN  - metoprolol succinate (TOPROL-XL) 100 MG 24 hr tablet [Pharmacy Med Name: METOPROLOL ER SUCCINATE 100MG TABS]; TAKE 1 TABLET(100 MG) BY MOUTH DAILY  Dispense: 90 tablet; Refill: 0    2. Gout  - allopurinol (ZYLOPRIM) 300 MG tablet [Pharmacy Med Name: ALLOPURINOL 300MG TABLETS]; TAKE ONE TABLET BY MOUTH DAILY.  Dispense: 100 tablet; Refill: 0    3. Type 2 diabetes mellitus (H)  - glipiZIDE (GLUCOTROL) 5 MG tablet [Pharmacy Med Name: GLIPIZIDE 5MG TABLETS]; TAKE 1 TABLET(5 MG) BY MOUTH DAILY AFTER BREAKFAST  Dispense: 90 tablet; Refill: 0    If protocol passes may refill for 12 months if within 3 months of last provider visit (or a total of 15 months).              Passed - Blood pressure filed in past 12 months     BP Readings from Last 1 Encounters:   01/24/19 162/90             allopurinol (ZYLOPRIM) 300 MG tablet [Pharmacy Med Name: ALLOPURINOL 300MG TABLETS] 100 tablet 0     Sig: TAKE ONE TABLET BY MOUTH DAILY.       Allopurinol/Febuxostat Refill Protocol  Passed - 6/9/2019 10:25 AM        Passed - LFT or AST or ALT in last 12 months     Albumin   Date Value Ref Range Status   01/24/2019 3.5 3.5 - 5.0 g/dL Final     Bilirubin, Total   Date Value Ref Range Status   01/24/2019 0.5 0.0 - 1.0 mg/dL Final     Bilirubin, Direct   Date Value Ref Range Status   05/08/2017 0.2 <=0.5 mg/dL Final     Alkaline Phosphatase   Date Value Ref Range Status   01/24/2019 90 45 - 120 U/L Final     AST   Date Value Ref Range Status   01/24/2019 11 0 - 40 U/L Final     ALT   Date Value Ref Range Status   01/24/2019 15 0 - 45 U/L Final     Protein, Total   Date Value Ref Range Status   01/24/2019 6.2 6.0 - 8.0 g/dL Final                Passed - Visit with PCP or prescribing provider visit in past 12 months     Last office visit with prescriber/PCP: 1/24/2019 Tomy Fischer MD OR same dept: 1/24/2019 Tomy Fischer MD OR same specialty: 1/24/2019 Tomy Fischer MD  Last physical: Visit date not found Last MTM visit: Visit date not found   Next visit within 3 mo: Visit date not found  Next physical within 3 mo: Visit date not found  Prescriber OR PCP: Tomy Fischer MD  Last diagnosis associated with med order: 1. Benign essential HTN  - metoprolol succinate (TOPROL-XL) 100 MG 24 hr tablet [Pharmacy Med Name: METOPROLOL ER SUCCINATE 100MG TABS]; TAKE 1 TABLET(100 MG) BY MOUTH DAILY  Dispense: 90 tablet; Refill: 0    2. Gout  - allopurinol (ZYLOPRIM) 300 MG tablet [Pharmacy Med Name: ALLOPURINOL 300MG TABLETS]; TAKE ONE TABLET BY MOUTH DAILY.  Dispense: 100 tablet; Refill: 0    3. Type 2 diabetes mellitus (H)  - glipiZIDE (GLUCOTROL) 5 MG tablet [Pharmacy Med  Name: GLIPIZIDE 5MG TABLETS]; TAKE 1 TABLET(5 MG) BY MOUTH DAILY AFTER BREAKFAST  Dispense: 90 tablet; Refill: 0    If protocol passes may refill for 12 months if within 3 months of last provider visit (or a total of 15 months).             glipiZIDE (GLUCOTROL) 5 MG tablet [Pharmacy Med Name: GLIPIZIDE 5MG TABLETS] 90 tablet 0     Sig: TAKE 1 TABLET(5 MG) BY MOUTH DAILY AFTER BREAKFAST       Oral Hypoglycemics Refill Protocol Passed - 6/9/2019 10:25 AM        Passed - Visit with PCP or prescribing provider visit in last 6 months       Last office visit with prescriber/PCP: 1/24/2019 OR same dept: 1/24/2019 Tomy Fischer MD OR same specialty: 1/24/2019 Tomy Fischer MD Last physical: Visit date not found Last MTM visit: Visit date not found         Next appt within 3 mo: Visit date not found  Next physical within 3 mo: Visit date not found  Prescriber OR PCP: Tomy Fischer MD  Last diagnosis associated with med order: 1. Benign essential HTN  - metoprolol succinate (TOPROL-XL) 100 MG 24 hr tablet [Pharmacy Med Name: METOPROLOL ER SUCCINATE 100MG TABS]; TAKE 1 TABLET(100 MG) BY MOUTH DAILY  Dispense: 90 tablet; Refill: 0    2. Gout  - allopurinol (ZYLOPRIM) 300 MG tablet [Pharmacy Med Name: ALLOPURINOL 300MG TABLETS]; TAKE ONE TABLET BY MOUTH DAILY.  Dispense: 100 tablet; Refill: 0    3. Type 2 diabetes mellitus (H)  - glipiZIDE (GLUCOTROL) 5 MG tablet [Pharmacy Med Name: GLIPIZIDE 5MG TABLETS]; TAKE 1 TABLET(5 MG) BY MOUTH DAILY AFTER BREAKFAST  Dispense: 90 tablet; Refill: 0     If protocol passes may refill for 12 months if within 3 months of last provider visit (or a total of 15 months).           Passed - A1C in last 6 months     Hemoglobin A1c   Date Value Ref Range Status   01/24/2019 9.1 (H) 3.5 - 6.0 % Final               Passed - Microalbumin in last year      Microalbumin, Random Urine   Date Value Ref Range Status   01/24/2019 13.12 (H) 0.00 - 1.99 mg/dL Final                  Passed -  Blood pressure in last year     BP Readings from Last 1 Encounters:   01/24/19 162/90             Passed - Serum creatinine in last year     Creatinine   Date Value Ref Range Status   01/24/2019 1.01 0.70 - 1.30 mg/dL Final

## 2021-06-04 NOTE — TELEPHONE ENCOUNTER
Former patient of [facility] & has not established care with another provider.  Please assign refill request to covering provider per Clinic standard process.      Refill Approved    Rx renewed per Medication Renewal Policy. Medication was last renewed on 10/2/18.    Ligia Flores, [facility] Triage/Med Refill 1/4/2020     Requested Prescriptions   Pending Prescriptions Disp Refills     cloNIDine HCl (CATAPRES) 0.2 MG tablet [Pharmacy Med Name: CLONIDINE 0.2MG TABLETS] 30 tablet 8     Sig: TAKE 1 TABLET BY MOUTH ONCE DAILY       Clonidine/Hydralazine Refill Protocol Passed - 1/2/2020 11:30 AM        Passed - PCP or prescribing provider visit in past 12 months       Last office visit with prescriber/PCP: 1/24/2019 Tomy Fischer MD OR same dept: 1/24/2019 Tomy Fischer MD OR same specialty: 1/24/2019 oTmy Fischer MD  Last physical: Visit date not found Last MTM visit: Visit date not found   Next visit within 3 mo: Visit date not found  Next physical within 3 mo: Visit date not found  Prescriber OR PCP: Tomy Fischer MD  Last diagnosis associated with med order: 1. Benign essential HTN  - cloNIDine HCl (CATAPRES) 0.2 MG tablet [Pharmacy Med Name: CLONIDINE 0.2MG TABLETS]; TAKE 1 TABLET BY MOUTH ONCE DAILY  Dispense: 30 tablet; Refill: 8    If protocol passes may refill for 12 months if within 3 months of last provider visit (or a total of 15 months).             Passed - Blood pressure filed in past 12 months     BP Readings from Last 1 Encounters:   01/24/19 162/90

## 2021-06-10 NOTE — PROGRESS NOTES
Rosanna Romero  1955      Assessment and Plan:  1 recent emergency room visit for dizziness markedly elevated blood pressure he had stopped all meds; see labs below most significant abnormality is expected as the markedly elevated blood sugar  2 diabetes recently stopped medications will check A1c  3 gout no recent attacks  4 overall compliance has been poor but he is now getting reestablished she had been seen for 3 years; possibly some insurance problems although this is not entirely clear  5 he is currently off work he will see me again in 2 weeks we will see if he wants to get back at that time.  Now pleased with his current job situation but has not been for quite some time.  6 social stress he is raising 2 teenagers by himself the mother has abandoned them  years ago      Chief Complaint: Follow-up ER visit    Visit diagnoses:    1. Benign essential HTN     2. Type 2 diabetes mellitus  glipiZIDE (GLUCOTROL) 10 MG tablet    glipiZIDE (GLUCOTROL) 5 MG tablet    metFORMIN (GLUCOPHAGE) 1000 MG tablet    Glycosylated Hemoglobin A1c   3. Gout     4. Compliance poor       Patient Active Problem List   Diagnosis     Abnormal Electrocardiogram     Type 2 Diabetes Mellitus     Gout     Cardiomyopathy     Chronic Reflux Esophagitis     Sleep Apnea     Benign Adenomatous Polyp Of The Large Intestine     Nephrolithiasis With Hyperuricuria     Fatty Liver - Alcoholic     Benign essential HTN     Compliance poor     Past Medical History:   Diagnosis Date     GERD (gastroesophageal reflux disease)      Gout      Hypertension      Hypertensive cardiomyopathy      Kidney stone      Sleep apnea      Past Surgical History:   Procedure Laterality Date     IN ABDOMEN SURGERY PROC UNLISTED      Description: Hernia Repair;  Proc Date: 03/09/2009;  Comments: ventral with bovine graft and re-do because of incarceration May 2009     IN ABDOMEN SURGERY PROC UNLISTED      Description: Hernia Repair;  Recorded: 02/03/2010;  Comments: redo  ventral hernia failed bovine graft with incarceration     MT KNEE SCOPE,DIAGNOSTIC      Description: Arthroscopy Knee Right;  Recorded: 02/03/2009;     MT REPAIR UMBILICAL KATJA,<6Y/O,REDUC      Description: Umbilical Hernia Repair;  Recorded: 02/03/2009;     Social History     Social History     Marital status:      Spouse name: N/A     Number of children: N/A     Years of education: N/A     Occupational History     Not on file.     Social History Main Topics     Smoking status: Never Smoker     Smokeless tobacco: Not on file     Alcohol use Yes      Comment: occas     Drug use: Not on file     Sexual activity: Not on file     Other Topics Concern     Not on file     Social History Narrative     Family History   Problem Relation Age of Onset     Heart disease Father      Diabetes Sister      Cancer Sister      colon     Heart disease Sister      Diabetes Brother      Cancer Brother      stomach? abd, testicle     Cancer Sister      ovarian     Heart disease Sister          Meds:  Current Outpatient Prescriptions   Medication Sig Dispense Refill     allopurinol (ZYLOPRIM) 300 MG tablet Take 1 tablet (300 mg total) by mouth daily. 100 tablet 0     amLODIPine (NORVASC) 10 MG tablet Take 10 mg by mouth daily.       amLODIPine (NORVASC) 10 MG tablet Take 1 tablet (10 mg total) by mouth daily. 30 tablet 0     cloNIDine HCl (CATAPRES) 0.2 MG tablet TAKE 1 TABLET BY MOUTH TWICE DAILY 90 tablet 3     glipiZIDE (GLUCOTROL) 10 MG tablet Take 1 tablet (10 mg total) by mouth daily with supper. 90 tablet 3     glipiZIDE (GLUCOTROL) 5 MG tablet Take 1 tablet (5 mg total) by mouth Daily after breakfast. 90 tablet 3     lisinopril-hydrochlorothiazide (PRINZIDE,ZESTORETIC) 20-25 mg per tablet Take 1 tablet by mouth daily.       lisinopril-hydrochlorothiazide (PRINZIDE,ZESTORETIC) 20-25 mg per tablet Take 1 tablet by mouth daily. 30 tablet 0     metFORMIN (GLUCOPHAGE) 1000 MG tablet TAKE 1 TABLET BY MOUTH TWICE DAILY 180  tablet 3     metoprolol (TOPROL-XL) 100 MG 24 hr tablet Take 100 mg by mouth daily.       metoprolol succinate (TOPROL-XL) 100 MG 24 hr tablet Take 1 tablet (100 mg total) by mouth daily. 30 tablet 0     No current facility-administered medications for this visit.        No Known Allergies    ROS: complete review of symptoms otherwise negative except as noted below    S: At some point in the past year he stopped all his medications he has not been seen here at the clinic for almost 3 years.  He is working full time although is not happy at his job.  He is raising the 2 adolescent children by himself.  Extensive workup in the emergency room when his blood pressure with systolic 190  reports reviewed       O:   Vitals:    05/15/17 1305   BP: 128/80   Pulse: 63   Resp: 14   SpO2: 96%   Weight: 204 lb (92.5 kg)   Height: 6' (1.829 m)       Physical Exam:  General-he actually looks fairly healthy he has his work outfit on a now is a full beard  VS- see above  HEENT- neg   Neck- no adenopathy/thyromegaly/bruits  Chest- clear   Cor- reg no murmurs/gallops/ectopics  Abdomen- soft non tender, no masses; no organomegaly  Extremities: no edema, good distal pulses  Neuro- Cr. NN-  intact, alert,     Recent Results (from the past 240 hour(s))   ECG 12 lead nursing unit performed   Result Value Ref Range    SYSTOLIC BLOOD PRESSURE  mmHg    DIASTOLIC BLOOD PRESSURE  mmHg    VENTRICULAR RATE 109 BPM    ATRIAL RATE 109 BPM    P-R INTERVAL 138 ms    QRS DURATION 92 ms    Q-T INTERVAL 356 ms    QTC CALCULATION (BEZET) 479 ms    P Axis 45 degrees    R AXIS 74 degrees    T AXIS -6 degrees    MUSE DIAGNOSIS       Sinus tachycardia with Premature atrial complexes  Nonspecific T wave abnormality  Minimal criteria for Prolonged QT  Abnormal ECG  When compared with ECG of 04-JAN-2008 06:26,  Premature atrial complexes are now Present  Vent. rate has increased BY  41 BPM  T wave inversion no longer evident in Lateral leads  QT has  lengthened  Confirmed by DAISY REEVES MD LOC:[facility] ([id number]) on 5/8/2017 5:23:54 PM     Basic Metabolic Panel   Result Value Ref Range    Sodium 133 (L) 136 - 145 mmol/L    Potassium 4.1 3.5 - 5.0 mmol/L    Chloride 98 98 - 107 mmol/L    CO2 19 (L) 22 - 31 mmol/L    Anion Gap, Calculation 16 5 - 18 mmol/L    Glucose 372 (H) 70 - 125 mg/dL    Calcium 9.1 8.5 - 10.5 mg/dL    BUN 13 8 - 22 mg/dL    Creatinine 1.03 0.70 - 1.30 mg/dL    GFR MDRD Af Amer >60 >60 mL/min/1.73m2    GFR MDRD Non Af Amer >60 >60 mL/min/1.73m2   HM2(CBC w/o Differential)   Result Value Ref Range    WBC 6.2 4.0 - 11.0 thou/uL    RBC 4.71 4.40 - 6.20 mill/uL    Hemoglobin 14.1 14.0 - 18.0 g/dL    Hematocrit 40.6 40.0 - 54.0 %    MCV 86 80 - 100 fL    MCH 29.9 27.0 - 34.0 pg    MCHC 34.7 32.0 - 36.0 g/dL    RDW 12.9 11.0 - 14.5 %    Platelets 148 140 - 440 thou/uL    MPV 11.6 8.5 - 12.5 fL   Troponin I   Result Value Ref Range    Troponin I 0.01 0.00 - 0.29 ng/mL   Thyroid Stimulating Hormone (TSH)   Result Value Ref Range    TSH 0.73 0.30 - 5.00 uIU/mL   BNP(B-type Natriuretic Peptide)   Result Value Ref Range     (H) 0 - 55 pg/mL   D-dimer, Quantitative   Result Value Ref Range    D-Dimer, Quant 0.38 <=0.50 FEU ug/mL   Hepatic Profile   Result Value Ref Range    Bilirubin, Total 0.8 0.0 - 1.0 mg/dL    Bilirubin, Direct 0.2 <=0.5 mg/dL    Protein, Total 6.9 6.0 - 8.0 g/dL    Albumin 3.5 3.5 - 5.0 g/dL    Alkaline Phosphatase 98 45 - 120 U/L    AST 24 0 - 40 U/L    ALT 28 0 - 45 U/L   Urinalysis-UC if Indicated   Result Value Ref Range    Color, UA Yellow Colorless, Yellow, Straw, Light Yellow    Clarity, UA Clear Clear    Glucose, UA >1000 mg/dL (!!) Negative    Bilirubin, UA Negative Negative    Ketones, UA 60 mg/dL (!!) Negative    Specific Gravity, UA 1.016 1.001 - 1.030    Blood, UA Small (!) Negative    pH, UA 5.0 4.5 - 8.0    Protein, UA 30 mg/dL (!) Negative mg/dL    Urobilinogen, UA <2.0 E.U./dL <2.0 E.U./dL, 2.0 E.U./dL    Nitrite,  UA Negative Negative    Leukocytes, UA Negative Negative    Bacteria, UA None Seen None Seen hpf    RBC, UA 0-2 None Seen, 0-2 hpf    WBC, UA 0-5 None Seen, 0-5 hpf    Squam Epithel, UA 0-5 None Seen, 0-5 lpf    Mucus, UA Few (!) None Seen lpf   Glycosylated Hemoglobin A1c   Result Value Ref Range    Hemoglobin A1c 10.7 (H) 3.5 - 6.0 %           Tomy Fischer MD

## 2021-06-10 NOTE — PROGRESS NOTES
Rosanna Romero  1955      Assessment and Plan:  1. He's back to taking his meds and doing well  2. BP ok  3. Check A1c next time.   4. RTW without restriction  5. F/u 3 months check labs then. Not interested in diabetes referral yet but does take the metformin      Chief Complaint: f/u, hospital visit/accelerated BP    Visit diagnoses:    1. Type 2 diabetes mellitus without complication, without long-term current use of insulin     2. Benign essential HTN     3. Compliance poor     4. Gout       Patient Active Problem List   Diagnosis     Abnormal Electrocardiogram     Type 2 diabetes mellitus without complication, without long-term current use of insulin     Gout     Cardiomyopathy     Chronic Reflux Esophagitis     Sleep Apnea     Benign Adenomatous Polyp Of The Large Intestine     Nephrolithiasis With Hyperuricuria     Fatty Liver - Alcoholic     Benign essential HTN     Compliance poor     Past Medical History:   Diagnosis Date     GERD (gastroesophageal reflux disease)      Gout      Hypertension      Hypertensive cardiomyopathy      Kidney stone      Sleep apnea      Past Surgical History:   Procedure Laterality Date     [place] ABDOMEN SURGERY PROC UNLISTED      Description: Hernia Repair;  Proc Date: 03/09/2009;  Comments: ventral with bovine graft and re-do because of incarceration May 2009     NC ABDOMEN SURGERY PROC UNLISTED      Description: Hernia Repair;  Recorded: 02/03/2010;  Comments: redo ventral hernia failed bovine graft with incarceration     NC KNEE SCOPE,DIAGNOSTIC      Description: Arthroscopy Knee Right;  Recorded: 02/03/2009;     NC REPAIR UMBILICAL KATJA,<4Y/O,REDUC      Description: Umbilical Hernia Repair;  Recorded: 02/03/2009;     Social History     Social History     Marital status:      Spouse name: N/A     Number of children: N/A     Years of education: N/A     Occupational History     Not on file.     Social History Main Topics     Smoking status: Never Smoker     Smokeless  tobacco: Not on file     Alcohol use Yes      Comment: occas     Drug use: Not on file     Sexual activity: Not on file     Other Topics Concern     Not on file     Social History Narrative     Family History   Problem Relation Age of Onset     Heart disease Father      Diabetes Sister      Cancer Sister      colon     Heart disease Sister      Diabetes Brother      Cancer Brother      stomach? abd, testicle     Cancer Sister      ovarian     Heart disease Sister        Meds:  Current Outpatient Prescriptions   Medication Sig Dispense Refill     allopurinol (ZYLOPRIM) 300 MG tablet TAKE ONE TABLET BY MOUTH DAILY. 100 tablet 0     amLODIPine (NORVASC) 10 MG tablet Take 1 tablet (10 mg total) by mouth daily. 30 tablet 0     cloNIDine HCl (CATAPRES) 0.2 MG tablet TAKE 1 TABLET BY MOUTH TWICE DAILY 90 tablet 3     glipiZIDE (GLUCOTROL) 10 MG tablet Take 1 tablet (10 mg total) by mouth daily with supper. 90 tablet 3     glipiZIDE (GLUCOTROL) 5 MG tablet Take 1 tablet (5 mg total) by mouth Daily after breakfast. 90 tablet 3     lisinopril-hydrochlorothiazide (PRINZIDE,ZESTORETIC) 20-25 mg per tablet Take 1 tablet by mouth daily. 30 tablet 0     metFORMIN (GLUCOPHAGE) 1000 MG tablet TAKE 1 TABLET BY MOUTH TWICE DAILY 180 tablet 3     metoprolol succinate (TOPROL-XL) 100 MG 24 hr tablet Take 1 tablet (100 mg total) by mouth daily. 30 tablet 0     No current facility-administered medications for this visit.        No Known Allergies    ROS: complete review of symptoms otherwise negative except as noted below    S:  Here for follow up. Tolerates meds. Doing well. No issues or concerns- wants to go back to work. Gout not problematic currently       O:   Vitals:    05/31/17 0745 05/31/17 0830   BP: 144/82 128/84   Patient Site: Left Arm Left Arm   Patient Position: Sitting Sitting   Cuff Size: Adult Regular    Pulse: 78    Weight: 205 lb (93 kg)        Physical Exam:  General- BP excellent for him  VS- see above  HEENT- neg    Neck- no adenopathy/thyromegaly/bruits  Chest- clear   Cor- reg no murmurs/gallops/ectopics  Abdomen- soft non tender, no masses; no organomegaly; multiple surgical scars from ventral hernia repairs    No results found for this or any previous visit (from the past 240 hour(s)).          Tomy Fischer MD

## 2021-06-12 NOTE — PROGRESS NOTES
Rosanna Romero  1955      Assessment and Plan:  1 hyper tension controlled; tolerates meds  2 diabetes he is back taking his meds will check A1c today  3 recurrent gout this is been stable whenever he gets a twinge of pain he takes Indocin and that works nicely  4 social history updated he is going to be going on Social Security next week at the age of 62 is very pleased about this; compliance issues hopefully will improve now that he has regular insurance and income  5 history of colon polyps due for colonoscopy  6 return to clinic 6 months        Chief Complaint: Follow-up multiple    Visit diagnoses:    1. Type 2 diabetes mellitus without complication, without long-term current use of insulin  Glycosylated Hemoglobin A1c    Basic Metabolic Panel    LDL Cholesterol, Direct   2. Benign essential HTN  Basic Metabolic Panel    LDL Cholesterol, Direct   3. Colon polyps  Ambulatory referral for Colonoscopy   4. Compliance poor     5. Gout         Meds:  Current Outpatient Prescriptions   Medication Sig Dispense Refill     indomethacin (INDOCIN) 50 MG capsule Take 50 mg by mouth 2 (two) times a day as needed.       allopurinol (ZYLOPRIM) 300 MG tablet TAKE ONE TABLET BY MOUTH DAILY. 100 tablet 0     amLODIPine (NORVASC) 10 MG tablet Take 1 tablet (10 mg total) by mouth daily. 30 tablet 0     cloNIDine HCl (CATAPRES) 0.2 MG tablet TAKE 1 TABLET BY MOUTH TWICE DAILY 90 tablet 3     glipiZIDE (GLUCOTROL) 10 MG tablet Take 1 tablet (10 mg total) by mouth daily with supper. 90 tablet 3     glipiZIDE (GLUCOTROL) 5 MG tablet Take 1 tablet (5 mg total) by mouth Daily after breakfast. 90 tablet 3     lisinopril-hydrochlorothiazide (PRINZIDE,ZESTORETIC) 20-25 mg per tablet TAKE 1 TABLET BY MOUTH DAILY 90 tablet 3     metFORMIN (GLUCOPHAGE) 1000 MG tablet TAKE 1 TABLET BY MOUTH TWICE DAILY 180 tablet 3     metoprolol succinate (TOPROL-XL) 100 MG 24 hr tablet Take 1 tablet (100 mg total) by mouth daily. 90 tablet 3     No current  facility-administered medications for this visit.        No Known Allergies    ROS: complete review of symptoms otherwise negative except as noted below    S: He is doing remarkably well his kids are in high school and doing well he gets help raising his kids with his sister and brother-in-law.  He is going to retire next week and be on Social Security and is very happy about that.  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  He is not having any symptoms of concern.  He is back on his medications and needs a refill.  Gout has been under control       O:   Vitals:    07/24/17 0816 07/24/17 0818   BP: (!) 144/98 (136/88 standing left arm   Patient Site: Left Arm Left Arm   Patient Position: Sitting Sitting   Cuff Size: Adult Regular Adult Regular   Pulse: 80    Weight: 206 lb (93.4 kg)        Physical Exam:  General-looks healthy and in a very good mood discussing his upcoming longterm  VS- see above  HEENT- neg   Neck- no adenopathy/thyromegaly/bruits  Chest- clear   Cor- reg no murmurs/gallops/ectopics  Abdomen- soft non tender, no masses; no organomegaly  Extremities: no edema, good distal pulses  Neuro- Cr. NN-  intact, alert,         Tomy Fischer MD      Recent Results (from the past 240 hour(s))   Glycosylated Hemoglobin A1c   Result Value Ref Range    Hemoglobin A1c 7.5 (H) 3.5 - 6.0 %

## 2021-06-13 NOTE — PROGRESS NOTES
Rosanna Romero  1955      Assessment and Plan:  1. Subcutaneous cyst/dermal- not significant; right axilla; reassured ; will monitor if it gets larger or becomes symptomatic will recommend excision.   2 atorvastatin not covered too expensive- switch to simvastatin  3. Blood pressure excellent- improved post USP  4. DM2 improved with re-start of meds  5. meds reviewed/clarified/RTC 4 months- reviewed with mature 16 y/o son Raza who accompanied pt today  6. Needs to exercise/watch weight and get into a productive routine now that he is retired      Chief Complaint: skin lump    Visit diagnoses:    1. Skin cyst      right axilla area- not worrisome   2. Type 2 diabetes mellitus without complication, without long-term current use of insulin     3. Benign essential HTN  cloNIDine HCl (CATAPRES) 0.2 MG tablet   4. Hyperlipemia  simvastatin (ZOCOR) 20 MG tablet   5. Need for influenza vaccination  Influenza, Seasonal,Quad Inj, 36+ MOS     Patient Active Problem List   Diagnosis     Abnormal Electrocardiogram     Type 2 diabetes mellitus without complication, without long-term current use of insulin     Gout     Cardiomyopathy     Chronic Reflux Esophagitis     Sleep Apnea     Benign Adenomatous Polyp Of The Large Intestine     Nephrolithiasis With Hyperuricuria     Fatty Liver - Alcoholic     Benign essential HTN     Compliance poor     Past Medical History:   Diagnosis Date     GERD (gastroesophageal reflux disease)      Gout      Hypertension      Hypertensive cardiomyopathy      Kidney stone      Sleep apnea      Past Surgical History:   Procedure Laterality Date     [place] ABDOMEN SURGERY PROC UNLISTED      Description: Hernia Repair;  Proc Date: 03/09/2009;  Comments: ventral with bovine graft and re-do because of incarceration May 2009     [place] ABDOMEN SURGERY PROC UNLISTED      Description: Hernia Repair;  Recorded: 02/03/2010;  Comments: redo ventral hernia failed bovine graft with incarceration     MO KNEE  SCOPE,DIAGNOSTIC      Description: Arthroscopy Knee Right;  Recorded: 02/03/2009;     [place] REPAIR UMBILICAL KATJA,<6Y/O,REDUC      Description: Umbilical Hernia Repair;  Recorded: 02/03/2009;     Social History     Social History     Marital status:      Spouse name: N/A     Number of children: 2     Years of education: N/A     Occupational History     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 Rare ETOH;     Family History   Problem Relation Age of Onset     Heart disease Father      Diabetes Sister      Cancer Sister      colon     Heart disease Sister      Diabetes Brother      Cancer Brother      stomach? abd, testicle     Cancer Sister      ovarian     Heart disease Sister        Meds:  Current Outpatient Prescriptions   Medication Sig Dispense Refill     allopurinol (ZYLOPRIM) 300 MG tablet TAKE ONE TABLET BY MOUTH DAILY. 100 tablet 0     amLODIPine (NORVASC) 10 MG tablet Take 1 tablet (10 mg total) by mouth daily. 30 tablet 0     atorvastatin (LIPITOR) 10 MG tablet TAKE 1 TABLET BY MOUTH EVERY DAY 90 tablet 0     cloNIDine HCl (CATAPRES) 0.2 MG tablet TAKE 1 TABLET BY MOUTH ONCE DAILY 90 tablet 3     glipiZIDE (GLUCOTROL) 10 MG tablet Take 1 tablet (10 mg total) by mouth daily with supper. 90 tablet 3     glipiZIDE (GLUCOTROL) 5 MG tablet Take 1 tablet (5 mg total) by mouth Daily after breakfast. 90 tablet 3     indomethacin (INDOCIN) 50 MG capsule Take 50 mg by mouth 2 (two) times a day as needed.       lisinopril-hydrochlorothiazide (PRINZIDE,ZESTORETIC) 20-25 mg per tablet TAKE 1  TABLET BY MOUTH DAILY 90 tablet 3     metFORMIN (GLUCOPHAGE) 1000 MG tablet TAKE 1 TABLET BY MOUTH TWICE DAILY 180 tablet 3     metoprolol succinate (TOPROL-XL) 100 MG 24 hr tablet Take 1 tablet (100 mg total) by mouth daily. 90 tablet 3     simvastatin (ZOCOR) 20 MG tablet Take 1 tablet (20 mg total) by mouth every evening. 90 tablet 3     No current facility-administered medications for this visit.        No Known Allergies    ROS: complete review of symptoms otherwise negative except as noted below    S:  Here with son Raza age 17 (HS Sr @[place]). Small benign intradermal cyst not significant. Reviewed other issues with son. Rosanna needs to stay busy now that he is retired. Tolerates meds which were reviewed in detail        O:   Vitals:    10/10/17 1522   BP: 128/88   Patient Site: Left Arm   Patient Position: Sitting   Cuff Size: Adult Regular   Pulse: 84   Temp: 97.2  F (36.2  C)   TempSrc: Tympanic   Weight: 210 lb 6.4 oz (95.4 kg)   Height: 6' (1.829 m)       Physical Exam:  General-  VS- see above  HEENT- neg   Neck- no adenopathy/thyromegaly/bruits  Chest- clear   Cor- reg no murmurs/gallops/ectopics  Abdomen- soft non tender, no masses; no organomegaly  Extremities: no edema, good distal pulses  Neuro- Cr. NN-  intact, alert,   Skin- no suspicious lesions for malignancy; small cyst ~ 2 cm left axilla. Not lymph node not suspicious- intrdadermal     Lab Results   Component Value Date    HGBA1C 7.5 (H) 07/24/2017     Lab Results   Component Value Date    CHOL 138 07/03/2014     Lab Results   Component Value Date    HDL 30 (L) 07/03/2014     Lab Results   Component Value Date    LDLCALC 39 07/03/2014     Lab Results   Component Value Date    TRIG 345 (H) 07/03/2014     No components found for: CHOLHDL      Tomy Fischer MD

## 2021-06-15 PROBLEM — Z91.199 COMPLIANCE POOR: Chronic | Status: ACTIVE | Noted: 2017-05-16

## 2021-06-15 PROBLEM — I10 BENIGN ESSENTIAL HTN: Chronic | Status: ACTIVE | Noted: 2017-05-16

## 2021-06-16 PROBLEM — Z98.890 H/O CORONARY ANGIOGRAM: Chronic | Status: ACTIVE | Noted: 2019-01-25

## 2021-06-18 NOTE — LETTER
Letter by Tomy Fischer MD at      Author: Tomy Fischer MD Service: -- Author Type: --    Filed:  Encounter Date: 1/25/2019 Status: (Other)       Rosanna Romero  [address]  [place] [address]             January 25, 2019         Dear Mr. Romero,    Below are the results from your recent visit:    Resulted Orders   Glycosylated Hemoglobin A1c   Result Value Ref Range    Hemoglobin A1c 9.1 (H) 3.5 - 6.0 %   Microalbumin, Random Urine   Result Value Ref Range    Microalbumin, Random Urine 13.12 (H) 0.00 - 1.99 mg/dL    Creatinine, Urine 91.8 mg/dL    Microalbumin/Creatinine Ratio Random Urine 142.9 (H) <=19.9 mg/g    Narrative    Microalbumin, Random Urine  <2.0 mg/dL . . . . . . . . Normal  3.0-30.0 mg/dL . . . . . . Microalbuminuria  >30.0 mg/dL . . . . . .  . Clinical Proteinuria    Microalbumin/Creatinine Ratio, Random Urine  <20 mg/g . . . . .. . . . Normal   mg/g . . . . . . . Microalbuminuria  >300 mg/g . . . . . . . . Clinical Proteinuria     HM2(CBC w/o Differential)   Result Value Ref Range    WBC 6.5 4.0 - 11.0 thou/uL    RBC 4.80 4.40 - 6.20 mill/uL    Hemoglobin 14.0 14.0 - 18.0 g/dL    Hematocrit 40.8 40.0 - 54.0 %    MCV 85 80 - 100 fL    MCH 29.2 27.0 - 34.0 pg    MCHC 34.3 32.0 - 36.0 g/dL    RDW 11.3 11.0 - 14.5 %    Platelets 159 140 - 440 thou/uL    MPV 8.7 7.0 - 10.0 fL   Comprehensive Metabolic Panel   Result Value Ref Range    Sodium 138 136 - 145 mmol/L    Potassium 4.1 3.5 - 5.0 mmol/L    Chloride 100 98 - 107 mmol/L    CO2 26 22 - 31 mmol/L    Anion Gap, Calculation 12 5 - 18 mmol/L    Glucose 381 (H) 70 - 125 mg/dL    BUN 11 8 - 22 mg/dL    Creatinine 1.01 0.70 - 1.30 mg/dL    GFR MDRD Af Amer >60 >60 mL/min/1.73m2    GFR MDRD Non Af Amer >60 >60 mL/min/1.73m2    Bilirubin, Total 0.5 0.0 - 1.0 mg/dL    Calcium 9.1 8.5 - 10.5 mg/dL    Protein, Total 6.2 6.0 - 8.0 g/dL    Albumin 3.5 3.5 - 5.0 g/dL    Alkaline Phosphatase 90 45 - 120 U/L    AST 11 0 - 40 U/L    ALT 15  0 - 45 U/L    Narrative    Fasting Glucose reference range is 70-99 mg/dL per  American Diabetes Association (ADA) guidelines.   LDL Cholesterol, Direct   Result Value Ref Range    Direct LDL 88 <=129 mg/dl       Rosanna, great to see you and Raza yesterday. Wonderful to see you enjoying your intermediate and family. However, your diabetes needs some serious work. I suggest you check in with Sylvia Parsons our Pharm D who works with our diabetic patients. We'll call you on this recommendation. Eventually, you'll need to get settled at a [facility] clinic. Same meds for now/ jps    Please call with questions or contact us using [employer].    Sincerely,        Electronically signed by Tomy Fischer MD

## 2021-06-19 NOTE — LETTER
Letter by Tomy Fischer MD at      Author: Tomy Fischer MD Service: -- Author Type: --    Filed:  Encounter Date: 6/28/2019 Status: (Other)         Rosanna Romero  [address]  [place] [address]      June 28, 2019      Dear Rosanna,    As a valued [facility] patient, your healthcare needs are our priority.  Your health care team has determined that you are due for an appointment regarding your Diabetes.    With the skilled nursing of Dr Fischer it does not look like you have re established care with a new PCP. Please see enclosed letter for available physicians.      We look forward to partnering with you to achieve optimal health and wellbeing.    Sincerely,    Clinical Staff at [facility]

## 2021-06-23 NOTE — TELEPHONE ENCOUNTER
Attempted to reach patient to set up follow-up appointment with MTM for medication management, however his phone does not allow for voicemail.  Dr. Fischer also placed a referral, and the coordinators will help reach out additionally.

## 2021-06-23 NOTE — TELEPHONE ENCOUNTER
1/31/19 - MTM referral from provider    Outcome:  Spoke with Rosanna regarding referral. He wasn't sure why other than Dr. Fischer retiring and told him to find new provider. Mentioned he will only work with males.  Very concerned about costs of meds and healthcare, insurance limiting his health, etc. Encouraged him that MTM visit could be directed at cost and solutions, however he mentioned newer insurance that was not covering visit. Patient declined referral without coverage.    Gerardo Lopez, MTM intern

## 2021-06-23 NOTE — PROGRESS NOTES
Rosanna Romero  1955      Assessment and Plan:  1. DM- a1C > 9 will see MTM  2. HTN ok for him; borderline control   3. Gout- no recent episodes; remote ETOH not currently active problem per patient/son  4. Compliance issues- son here with patient (Raza) to make sure dad follows thru  5. Routine labs  6. Primary care follow up at [facility] or [facility]       Chief Complaint: f/u diabetes; htn , multiple     Visit diagnoses:    1. Type 2 diabetes mellitus without complication, without long-term current use of insulin (H)  Glycosylated Hemoglobin A1c    Microalbumin, Random Urine    HM2(CBC w/o Differential)    Comprehensive Metabolic Panel    LDL Cholesterol, Direct    Amb Referral to Medication Management   2. Neuropathy (H)  gabapentin (NEURONTIN) 300 MG capsule   3. Benign essential HTN     4. Gout     5. Abnormal Electrocardiogram     6. H/O coronary angiogram         Meds:  Current Outpatient Medications   Medication Sig Dispense Refill     allopurinol (ZYLOPRIM) 300 MG tablet TAKE ONE TABLET BY MOUTH DAILY. 100 tablet 0     amLODIPine (NORVASC) 10 MG tablet TAKE 1 TABLET(10 MG) BY MOUTH DAILY 90 tablet 0     atorvastatin (LIPITOR) 10 MG tablet TAKE 1 TABLET BY MOUTH EVERY DAY 90 tablet 0     cloNIDine HCl (CATAPRES) 0.2 MG tablet TAKE 1 TABLET BY MOUTH ONCE DAILY 90 tablet 3     cloNIDine HCl (CATAPRES) 0.2 MG tablet Take 1 tablet (0.2 mg total) by mouth daily. 90 tablet 0     glipiZIDE (GLUCOTROL) 10 MG tablet TAKE 1 TABLET(10 MG) BY MOUTH DAILY WITH SUPPER 90 tablet 0     glipiZIDE (GLUCOTROL) 5 MG tablet TAKE 1 TABLET(5 MG) BY MOUTH DAILY AFTER BREAKFAST 90 tablet 0     indomethacin (INDOCIN) 50 MG capsule Take 1 capsule (50 mg total) by mouth 2 (two) times a day as needed. 60 capsule 6     lisinopril-hydrochlorothiazide (PRINZIDE,ZESTORETIC) 20-25 mg per tablet TAKE 1 TABLET BY MOUTH DAILY 90 tablet 3     metFORMIN (GLUCOPHAGE) 1000 MG tablet TAKE 1 TABLET BY MOUTH TWICE DAILY 180 tablet 0      metoprolol succinate (TOPROL-XL) 100 MG 24 hr tablet TAKE 1 TABLET(100 MG) BY MOUTH DAILY 90 tablet 0     simvastatin (ZOCOR) 20 MG tablet Take 1 tablet (20 mg total) by mouth every evening. 90 tablet 3     gabapentin (NEURONTIN) 300 MG capsule Take 1 capsule (300 mg total) by mouth at bedtime as needed. 30 capsule 0     No current facility-administered medications for this visit.      Patient Active Problem List   Diagnosis     Abnormal Electrocardiogram     Type 2 diabetes mellitus without complication, without long-term current use of insulin (H)     Gout     Cardiomyopathy     Chronic Reflux Esophagitis     Sleep Apnea     Benign Adenomatous Polyp Of The Large Intestine     Nephrolithiasis With Hyperuricuria     Fatty Liver - Alcoholic     Benign essential HTN     Compliance poor     H/O coronary angiogram     Past Medical History:   Diagnosis Date     GERD (gastroesophageal reflux disease)      Gout      Hypertension      Hypertensive cardiomyopathy (H)      Kidney stone      Sleep apnea      Past Surgical History:   Procedure Laterality Date     EYE SURGERY  2016    cataract     [place] ABDOMEN SURGERY PROC UNLISTED      Description: Hernia Repair;  Proc Date: 03/09/2009;  Comments: ventral with bovine graft and re-do because of incarceration May 2009     [place] ABDOMEN SURGERY PROC UNLISTED      Description: Hernia Repair;  Recorded: 02/03/2010;  Comments: redo ventral hernia failed bovine graft with incarceration     W[place] KNEE SCOPE,DIAGNOSTIC      Description: Arthroscopy Knee Right;  Recorded: 02/03/2009;     [place] REPAIR UMBILICAL KATJA,<6Y/O,REDUC      Description: Umbilical Hernia Repair;  Recorded: 02/03/2009;     Social History     Socioeconomic History     Marital status:      Spouse name: Not on file     Number of children: 2     Years of education: Not on file     Highest education level: Not on file   Social Needs     Financial resource strain: Not on file     Food insecurity - worry: Not on file      Food insecurity - inability: Not on file     Transportation needs - medical: Not on file     Transportation needs - non-medical: Not on file   Occupational History     Occupation: retired/Aug 2017     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 Rare ETOH;     Family History   Problem Relation Age of Onset     Heart disease Father      Diabetes Sister      Cancer Sister         colon     Heart disease Sister      Diabetes Brother      Cancer Brother         stomach? abd, testicle     Cancer Sister         ovarian     Heart disease Sister        No Known Allergies    ROS: complete review of symptoms otherwise negative except as noted below    S:  He feels better now than in quite some time/ retired past 2 yrs. Son Raza here (19 y/o). Also has daughter age 15. Single parent/. Takes meds apparently although compliance always an issue       O:   Vitals:    01/24/19 1104 01/24/19 1108   BP: (!) 152/98 162/90; 148/90 left arm sitting   Patient Site: Left Arm Right Leg   Patient Position: Sitting Sitting   Cuff Size: Adult Regular Adult Regular   Pulse: 94    SpO2: 99%    Weight: 199 lb 14.4 oz (90.7 kg)        Physical Exam:  General-  VS- see above  HEENT- neg   Neck- no adenopathy/thyromegaly/bruits  Chest- clear   Cor- reg no murmurs/gallops/ectopics  Extremities: no edema, good distal pulses  Neuro- Cr. NN-  intact, alert,           Tomy Fischer MD  Recent Results (from the past 240 hour(s))   Glycosylated Hemoglobin A1c   Result Value Ref Range    Hemoglobin A1c 9.1 (H) 3.5 - 6.0 %   Microalbumin, Random Urine   Result Value Ref Range     Microalbumin, Random Urine 13.12 (H) 0.00 - 1.99 mg/dL    Creatinine, Urine 91.8 mg/dL    Microalbumin/Creatinine Ratio Random Urine 142.9 (H) <=19.9 mg/g   HM2(CBC w/o Differential)   Result Value Ref Range    WBC 6.5 4.0 - 11.0 thou/uL    RBC 4.80 4.40 - 6.20 mill/uL    Hemoglobin 14.0 14.0 - 18.0 g/dL    Hematocrit 40.8 40.0 - 54.0 %    MCV 85 80 - 100 fL    MCH 29.2 27.0 - 34.0 pg    MCHC 34.3 32.0 - 36.0 g/dL    RDW 11.3 11.0 - 14.5 %    Platelets 159 140 - 440 thou/uL    MPV 8.7 7.0 - 10.0 fL   Comprehensive Metabolic Panel   Result Value Ref Range    Sodium 138 136 - 145 mmol/L    Potassium 4.1 3.5 - 5.0 mmol/L    Chloride 100 98 - 107 mmol/L    CO2 26 22 - 31 mmol/L    Anion Gap, Calculation 12 5 - 18 mmol/L    Glucose 381 (H) 70 - 125 mg/dL    BUN 11 8 - 22 mg/dL    Creatinine 1.01 0.70 - 1.30 mg/dL    GFR MDRD Af Amer >60 >60 mL/min/1.73m2    GFR MDRD Non Af Amer >60 >60 mL/min/1.73m2    Bilirubin, Total 0.5 0.0 - 1.0 mg/dL    Calcium 9.1 8.5 - 10.5 mg/dL    Protein, Total 6.2 6.0 - 8.0 g/dL    Albumin 3.5 3.5 - 5.0 g/dL    Alkaline Phosphatase 90 45 - 120 U/L    AST 11 0 - 40 U/L    ALT 15 0 - 45 U/L   LDL Cholesterol, Direct   Result Value Ref Range    Direct LDL 88 <=129 mg/dl

## 2021-06-30 PROBLEM — N17.9 AKI (ACUTE KIDNEY INJURY) (H): Status: ACTIVE | Noted: 2021-01-01

## 2021-06-30 PROBLEM — J18.9 COMMUNITY ACQUIRED BILATERAL LOWER LOBE PNEUMONIA: Status: ACTIVE | Noted: 2021-01-01

## 2021-06-30 PROBLEM — R11.10 VOMITING AND DIARRHEA: Status: ACTIVE | Noted: 2021-01-01

## 2021-06-30 PROBLEM — R19.7 VOMITING AND DIARRHEA: Status: ACTIVE | Noted: 2021-01-01

## 2021-06-30 PROBLEM — E87.20 LACTIC ACIDOSIS: Status: ACTIVE | Noted: 2021-01-01

## 2021-06-30 NOTE — ED NOTES
"[facility]  ED Nurse Handoff Report    Rosanna Romero is a 66 year old male   ED Chief complaint: Abdominal Pain  . ED Diagnosis:   Final diagnoses:   AZALIA (acute kidney injury) (H)   Vomiting and diarrhea   Lactic acidosis     Allergies: No Known Allergies    Code Status: Not on file   Activity level - Baseline/Home:  Independent. Activity Level - Current:   Stand by Assist. Lift room needed: No. Bariatric: No   Needed: No   Isolation: No. Infection: Not Applicable.     Vital Signs:   Vitals:    06/30/21 1530 06/30/21 1545 06/30/21 1600 06/30/21 1615   BP: (!) 149/85 (!) 159/85 (!) 153/89 (!) 147/100   Pulse: 95 95 95    Resp: 16      Temp:       TempSrc:       SpO2: 98% 96%     Weight:           Cardiac Rhythm:  ,      Pain level:    Patient confused: No. Patient Falls Risk: Yes.   Elimination Status: Has voided   Patient Report - Initial Complaint: Abdominal Pain. Focused Assessment: HPI per provider note: \"Rosanna Romero is a 66 year old male with history of hypertension, GERD, and type two diabetes who presents with abdominal pain. This morning the patient woke and thought he was hungry so he ate some food. After this he began to have abdominal pain, diarrhea, and vomiting starting approximately five hours ago. The abdominal pain covers the whole abdomen, not localized to one spot. He also feels lightheaded and weak. He had the same symptoms a couple weeks ago but it is worse today. No blood in stool. Denies chest pain or shortness of breath. No fever. Has had multiple abdominal surgeries.\" PT A&O in ED.    Tests Performed: labs, urine, CT. Abnormal Results: .  Labs Ordered and Resulted from Time of ED Arrival Up to the Time of Departure from the ED   CBC WITH PLATELETS DIFFERENTIAL - Abnormal; Notable for the following components:       Result Value    WBC 25.7 (*)     Absolute Neutrophil 23.2 (*)     Absolute Monocytes 1.4 (*)     All other components within normal limits   COMPREHENSIVE " METABOLIC PANEL - Abnormal; Notable for the following components:    Glucose 277 (*)     Creatinine 1.34 (*)     GFR Estimate 55 (*)     All other components within normal limits   LIPASE - Abnormal; Notable for the following components:    Lipase 45 (*)     All other components within normal limits   LACTIC ACID WHOLE BLOOD - Abnormal; Notable for the following components:    Lactic Acid 3.4 (*)     All other components within normal limits   ROUTINE UA WITH MICROSCOPIC - Abnormal; Notable for the following components:    Glucose Urine 30 (*)     Blood Urine Small (*)     RBC Urine 4 (*)     Mucous Urine Present (*)     All other components within normal limits   TROPONIN I   SARS-COV-2 (COVID-19) VIRUS RT-PCR   NT PROBNP INPATIENT   PROCALCITONIN   LACTIC ACID WHOLE BLOOD   BLOOD CULTURE   BLOOD CULTURE   .  Chest XR,  PA & LAT   Preliminary Result   IMPRESSION: PA and lateral views of the chest. Lungs are clear. Subtle   opacities at the lung bases described on CT is not well appreciated.   Pneumonia remains in the differential based on prior CT findings.   Heart is normal in size. No effusions are evident. No pneumothorax.      Abd/pelvis CT,  IV  contrast only TRAUMA / AAA   Final Result   IMPRESSION:    1.  Mild patchy groundglass opacities at both lung bases, greater on   the left, are nonspecific, but could be infectious in etiology.   2.  Mild circumferential thickening of the distal esophagus may be   inflammatory. Esophageal neoplasm is considered less likely from this   appearance, but is not excluded.   3.  Mild diffuse bladder wall thickening is nonspecific, but could be   related to prostatic enlargement and/or lack of distention.   4.  Mild splenomegaly.      CLOTILDE RODRIGUEZ MD             SYSTEM ID:  [id number]      .   Treatments provided: IV fluids, see ED meds below   Family Comments: family member at bedside   OBS brochure/video discussed/provided to patient:  Yes  ED Medications:   Medications  "  cefTRIAXone (ROCEPHIN) 2 g vial to attach to  ml bag for ADULTS or NS 50 ml bag for PEDS (has no administration in time range)   azithromycin 500 mg (ZITHROMAX) in 0.9% NaCl 250 mL intermittent infusion 500 mg (has no administration in time range)   0.9% sodium chloride BOLUS (0 mLs Intravenous Stopped 6/30/21 1547)   ondansetron (ZOFRAN) injection 4 mg (4 mg Intravenous Given 6/30/21 1446)   HYDROmorphone (PF) (DILAUDID) injection 0.5 mg (0.5 mg Intravenous Given 6/30/21 1447)   iopamidol (ISOVUE-370) solution 500 mL (93 mLs Intravenous Given 6/30/21 1512)   sodium chloride 0.9 % bag 500mL for CT scan flush use (63 mLs As instructed Given 6/30/21 1512)     Drips infusing:  No  For the majority of the shift, the patient's behavior Green. Interventions performed were n/a.    Sepsis treatment initiated: No     Patient tested for COVID 19 prior to admission: YES    ED Nurse Name/Phone Number: Celestina \"Afia\" QAMAR Singh,   4:30 PM  RECEIVING UNIT ED HANDOFF REVIEW    Above ED Nurse Handoff Report was reviewed: Yes  Reviewed by: Yanet Gramajo RN on June 30, 2021 at 7:30 PM       "

## 2021-06-30 NOTE — H&P
[facility]    Hospitalist History and Physical    Name: Rosanna Romero    MRN: [id number]  YOB: 1955    Age: 66 year old  Date of Admission:  6/30/2021  Date of Service (when I saw the patient): 06/30/21    Assessment & Plan   Rosanna Romero is a 66 year old male with past medical history significant for diabetes mellitus, hypertension, hyperlipidemia presented to the emergency room with sudden onset abdominal pain diarrhea and vomiting.  Evaluation in the emergency room showed leukocytosis lactic acidosis and acute renal failure.  CT abdomen pelvis shows bibasilar infiltrates in both lung bases, esophageal wall thickening and bladder wall thickening.  Admitted for sepsis from possible pneumonia, acute renal failure and gastroenteritis    Sepsis secondary to possible pneumonia and or gastroenteritis  (Leukocytosis, lactic acidosis, acute renal failure, review noted to be tachycardic on telemetry)  -Patient had been vomiting cannot exclude aspiration pneumonia  -Differential also includes community-acquired pneumonia  -Blood culture sent in the emergency room-  -started on ceftriaxone and azithromycin will continue  -IV fluids  -Recheck lactic acidosis after resuscitation  -We will check enteric panel  -Supportive care with antiemetics    Pneumonia possibly community-acquired pneumonia versus aspiration pneumonia  -Blood culture sent  -Procalcitonin ordered and pending-  -continue IV antibiotics    Gastroenteritis  -We will check enteric panel  -Supportive care  -IV fluids  -As needed antiemetics    Acute renal failure  -Baseline creatinine is 1 up to 1.3 today  -Secondary to infection nausea vomiting and diarrhea-  -IV fluids  -Avoid nephrotoxins    Diabetes mellitus  -Poor oral intake nausea and vomiting will hold off oral medications  -Sliding scale insulin    Lactic acidosis  -Secondary to nausea vomiting diarrhea and sepsis  -IV fluids  -Recheck lactic acid  levels    Hypertension  -Right admission patient on clonidine, Norvasc, metoprolol, hydrochlorothiazide and lisinopril for blood pressure control  -We will hold hydrochlorothiazide and lisinopril due to acute renal failure-  -continue Norvasc, clonidine and metoprolol with parameters      CT showing esophageal wall thickening  -We will likely need outpatient follow-up possible endoscopy    Abnormal EKG  -Nausea and vomiting  -Patient has T wave inversion in lead III and aVF  -No prior EKG available for comparison however reported EKG showed some T wave inversion in lateral leads in 2017  -Current presentation is not consistent with cardiac chest pain  -Patient however does have cardiac risk factors  -We will monitor on telemetry and serial troponins          DVT Prophylaxis: Enoxaparin (Lovenox) SQ  Code Status: Full Code    Disposition: Admitted as inpatient    Primary Care Physician   *[facility]    Chief Complaint   Abdominal pain nausea vomiting and diarrhea 1 day    History is obtained from the patient    History of Present Illness   Rosanna Romero is a 66 year old male with past medical history significant for diabetes mellitus, hypertension, hyperlipidemia presented to the emergency room with sudden onset abdominal pain nausea vomiting and diarrhea.  Patient denies any significant change in diet he has been trying to eat healthy denies any excessive use of fiber did not eat out.  Woke up with sudden abdominal pain central abdominal pain 10 out of 10 cramp-like associated with diarrhea and vomiting.  Has had about 3-4 diarrhea since morning and vomited about 5-6 times today.  Pain did improve with diarrhea and BM.  Did not take his meds.  Try to eat some which made the pain worse.  No fever no chills no chest pain or shortness of breath.  Complains of generalized malaise and weakness.  Feels quite lethargic.  Denies any blood in vomitus or BM.    Evaluation in the emergency room showed lactic  acidosis leukocytosis and possible bibasilar pneumonia.  10 point review of systems was completed was otherwise negative    Past Medical History    No past medical history on file.   Diabetes mellitus, hypertension, hyper lipidemia  History of ventral hernia repair      Past Surgical History   No past surgical history on file.    Prior to Admission Medications   Prior to Admission Medications   Prescriptions Last Dose Informant Patient Reported? Taking?   allopurinol (ZYLOPRIM) 300 MG tablet   No No   Sig: [ALLOPURINOL (ZYLOPRIM) 300 MG TABLET] TAKE ONE TABLET BY MOUTH DAILY.   amLODIPine (NORVASC) 10 MG tablet   No No   Sig: [AMLODIPINE (NORVASC) 10 MG TABLET] TAKE 1 TABLET(10 MG) BY MOUTH DAILY   atorvastatin (LIPITOR) 10 MG tablet   No No   Sig: [ATORVASTATIN (LIPITOR) 10 MG TABLET] TAKE 1 TABLET BY MOUTH EVERY DAY   cloNIDine HCl (CATAPRES) 0.2 MG tablet   No No   Sig: [CLONIDINE HCL (CATAPRES) 0.2 MG TABLET] TAKE 1 TABLET BY MOUTH ONCE DAILY   cloNIDine HCl (CATAPRES) 0.2 MG tablet   No No   Sig: [CLONIDINE HCL (CATAPRES) 0.2 MG TABLET] Take 1 tablet (0.2 mg total) by mouth daily.   cloNIDine HCl (CATAPRES) 0.2 MG tablet   No No   Sig: [CLONIDINE HCL (CATAPRES) 0.2 MG TABLET] TAKE 1 TABLET BY MOUTH ONCE DAILY   gabapentin (NEURONTIN) 300 MG capsule   No No   Sig: [GABAPENTIN (NEURONTIN) 300 MG CAPSULE] Take 1 capsule (300 mg total) by mouth at bedtime as needed.   glipiZIDE (GLUCOTROL) 10 MG tablet   No No   Sig: [GLIPIZIDE (GLUCOTROL) 10 MG TABLET] TAKE 1 TABLET(10 MG) BY MOUTH DAILY WITH SUPPER   glipiZIDE (GLUCOTROL) 5 MG tablet   No No   Sig: [GLIPIZIDE (GLUCOTROL) 5 MG TABLET] TAKE 1 TABLET(5 MG) BY MOUTH DAILY AFTER BREAKFAST   indomethacin (INDOCIN) 50 MG capsule   No No   Sig: [INDOMETHACIN (INDOCIN) 50 MG CAPSULE] Take 1 capsule (50 mg total) by mouth 2 (two) times a day as needed.   lisinopril-hydrochlorothiazide (PRINZIDE,ZESTORETIC) 20-25 mg per tablet   No No   Sig: [LISINOPRIL-HYDROCHLOROTHIAZIDE  (PRINZIDE,ZESTORETIC) 20-25 MG PER TABLET] TAKE 1 TABLET BY MOUTH DAILY   metFORMIN (GLUCOPHAGE) 1000 MG tablet   No No   Sig: [METFORMIN (GLUCOPHAGE) 1000 MG TABLET] TAKE 1 TABLET BY MOUTH TWICE DAILY   metoprolol succinate (TOPROL-XL) 100 MG 24 hr tablet   No No   Sig: [METOPROLOL SUCCINATE (TOPROL-XL) 100 MG 24 HR TABLET] TAKE 1 TABLET(100 MG) BY MOUTH DAILY   simvastatin (ZOCOR) 20 MG tablet   No No   Sig: [SIMVASTATIN (ZOCOR) 20 MG TABLET] Take 1 tablet (20 mg total) by mouth every evening.      Facility-Administered Medications: None     Allergies   No Known Allergies    Social History   Social History     Tobacco Use     Smoking status: Never Smoker   Substance Use Topics     Alcohol use: Yes     Comment: Alcoholic Drinks/day: rare; remote regular/daily     Social History     Social History Narrative    ; ex wife left him with 2 young children; Retires Aug 2017; two HS age kids (Raza age 17; Lisa 14)-2017; non smoker. Rare ETOH;     Lives with son and his girlfriend.  Denies smoking.  Occasional alcohol use    Family History   I have reviewed this patient's family history and updated it with pertinent information if needed.   No family history on file.  Patient has 15 siblings.  Family history significant for cancer and diabetes    Review of Systems   A Comprehensive greater than 10 system review of systems was carried out.  Pertinent positives and negatives are noted above.  Otherwise negative for contributory information.    Physical Exam   Temp: 98  F (36.7  C) Temp src: Temporal BP: (!) 151/97 Pulse: 93   Resp: 18 SpO2: 98 % O2 Device: None (Room air)    Vital Signs with Ranges  Temp:  [98  F (36.7  C)] 98  F (36.7  C)  Pulse:  [] 93  Resp:  [16-18] 18  BP: (118-164)/() 151/97  SpO2:  [96 %-100 %] 98 %  186 lbs 4.62 oz    GEN:  Alert, oriented x 3, appears comfortable, no overt distress  HEENT:  Normocephalic/atraumatic, no scleral icterus, no nasal discharge, mouth dry  CV:  Regular  rate and rhythm, no murmur or JVD.  S1 + S2 noted, no S3 or S4.  LUNGS:  Clear to auscultation bilaterally without rales/rhonchi/wheezing/retractions.  Symmetric chest rise on inhalation noted.  ABD:  Active bowel sounds, soft, non-tender/non-distended.  No rebound/guarding/rigidity.  EXT:  No edema.  No cyanosis.  No joint synovitis noted.  SKIN:  Dry to touch, no exanthems noted in the visualized areas.  NEURO:  Symmetric muscle strength, .  No new focal deficits appreciated.    Data   Data reviewed today:  I personally reviewed the EKG tracing showing Normal sinus rhythm with T inverted in 3 and aVF.  No prior EKG available for comparison other ST-T changes concerning for ischemia.    No results for input(s): PH, PHV, PO2, PO2V, SAT, PCO2, PCO2V, HCO3, HCO3V in the last 168 hours.  Recent Labs   Lab 06/30/21  1439   WBC 25.7*   HGB 14.0   HCT 41.5   MCV 87        Recent Labs   Lab 06/30/21  1439      POTASSIUM 3.9   CHLORIDE 103   CO2 27   ANIONGAP 7   *   BUN 22   CR 1.34*   GFRESTIMATED 55*   GFRESTBLACK 63   GRAZYNA 8.8     No results for input(s): CULT in the last 168 hours.  Recent Labs   Lab 06/30/21  1439   NTBNPI 580     Recent Labs   Lab 06/30/21  1439   *     Recent Labs   Lab 06/30/21  1439   HGB 14.0     Recent Labs   Lab 06/30/21  1439   AST 11   ALT 14   ALKPHOS 102   BILITOTAL 0.6     No results for input(s): INR in the last 168 hours.  Recent Labs   Lab 06/30/21  1439   LACT 3.4*       Recent Results (from the past 24 hour(s))   Abd/pelvis CT,  IV  contrast only TRAUMA / AAA    Narrative    CT ABDOMEN AND PELVIS WITH CONTRAST 6/30/2021 3:19 PM    CLINICAL HISTORY: Abdominal pain.    TECHNIQUE: CT scan of the abdomen and pelvis was performed following  injection of IV contrast. Multiplanar reformats were obtained. Dose  reduction techniques were used.  CONTRAST: 93mL Isovue-370  COMPARISON: None.    FINDINGS:   LOWER CHEST: Mild patchy groundglass opacities at both lung  bases,  greater on the left. Small hiatal hernia. Mild circumferential  thickening is noted involving the distal esophagus.    HEPATOBILIARY: Unremarkable. No hepatic masses are seen.    PANCREAS: Normal.    SPLEEN: There is mild splenomegaly. The spleen measures 13.8 cm in  length.    ADRENAL GLANDS: Normal.    KIDNEYS/BLADDER: There are scattered small renal cortical cysts  bilaterally. The kidneys are otherwise unremarkable. No  hydronephrosis. Mild diffuse bladder wall thickening is nonspecific,  but could be related to lack of distention and/or prostatic  enlargement.    BOWEL: No bowel obstruction. No convincing evidence for colitis or  diverticulitis. Unremarkable appendix.    PELVIC ORGANS: Mild prostatic enlargement.    LYMPH NODES: No enlarged lymph nodes are identified in the abdomen or  pelvis.    VASCULATURE: Unremarkable.    ADDITIONAL FINDINGS: None.    MUSCULOSKELETAL: Unremarkable.      Impression    IMPRESSION:   1.  Mild patchy groundglass opacities at both lung bases, greater on  the left, are nonspecific, but could be infectious in etiology.  2.  Mild circumferential thickening of the distal esophagus may be  inflammatory. Esophageal neoplasm is considered less likely from this  appearance, but is not excluded.  3.  Mild diffuse bladder wall thickening is nonspecific, but could be  related to prostatic enlargement and/or lack of distention.  4.  Mild splenomegaly.    CLOTILDE RODRIGUEZ MD          SYSTEM ID:  [id number]   Chest XR,  PA & LAT    Narrative    CHEST TWO VIEWS   6/30/2021 4:17 PM     HISTORY: Left lower lobe infiltrate on prior CT. Evaluate for  additional infiltrate.    COMPARISON: None.      Impression    IMPRESSION: PA and lateral views of the chest. Lungs are clear. Subtle  opacities at the lung bases described on CT is not well appreciated.  Pneumonia remains in the differential based on prior CT findings.  Heart is normal in size. No effusions are evident. No  pneumothorax.    FRITZ MADRID MD          SYSTEM ID:  [id number]

## 2021-06-30 NOTE — ED PROVIDER NOTES
History   Chief Complaint:  Abdominal Pain       The history is provided by the patient.      Rosanna Romero is a 66 year old male with history of hypertension, GERD, and type two diabetes who presents with abdominal pain. This morning the patient woke and thought he was hungry so he ate some food. After this he began to have abdominal pain, diarrhea, and vomiting starting approximately five hours ago. The abdominal pain covers the whole abdomen, not localized to one spot. He also feels lightheaded and weak. He has had a slight cough as well but non-productive.  He had the same symptoms a couple weeks ago but it is worse today. No blood in stool. Denies chest pain or shortness of breath. No fever. Has had multiple abdominal surgeries.  No recent antibiotic use or hospitalization.      Review of Systems   Constitutional: Negative for fever.   Respiratory: Negative for shortness of breath.    Cardiovascular: Negative for chest pain.   Gastrointestinal: Positive for abdominal pain, diarrhea, nausea and vomiting. Negative for blood in stool.   All other systems reviewed and are negative.      Allergies:  The patient has no known allergies.     Medications:  Zyloprim  Norvasc  Lipitor  Catapres  Neurontin  Glucotrol  Indocin  Prinzide  Glucophage  Toprolxl  Zocor  Prilosec  Oretic  Zestril  Zyloprim  Colcrys  Jardiance    Past Medical History:    Coronary angiogram  Type 2 diabetes  Gout  Cardiomyopathy  Hypertension  Nephrolithiasis  GERD  Polyp of intestine  Fatty liver - alcoholic  Cataract  Hyperuricemia  Peripheral neuropathy  Hyperlipidemia  Erectile disorder  Supraventricular tachycardia  Rheumatic fever    Past Surgical History:    Hernia repair x3  Abdominal mesh  Knee arthroscopy  Cataract surgery  Colonoscopy    Family History:    Brother: cancer, diabetes  Father: heart disease, diabetes  Sister: cancer, diabetes, heart disease    Social History:  Presents to ED with visitor    Physical Exam     Patient Vitals  for the past 24 hrs:   BP Temp Temp src Pulse Resp SpO2 Weight   06/30/21 1815 113/76 -- -- 96 -- 96 % --   06/30/21 1800 126/87 -- -- 90 -- 97 % --   06/30/21 1745 (!) 137/99 -- -- 99 -- 99 % --   06/30/21 1730 104/75 -- -- 100 -- 96 % --   06/30/21 1715 115/70 -- -- 79 -- 97 % --   06/30/21 1700 136/74 -- -- 94 -- 95 % --   06/30/21 1645 (!) 151/97 -- -- 93 18 98 % --   06/30/21 1630 (!) 113/94 -- -- 95 -- 97 % --   06/30/21 1615 (!) 147/100 -- -- -- -- -- --   06/30/21 1600 (!) 153/89 -- -- 95 -- -- --   06/30/21 1545 (!) 159/85 -- -- 95 -- 96 % --   06/30/21 1530 (!) 149/85 -- -- 95 16 98 % --   06/30/21 1515 (!) 164/88 -- -- -- -- -- --   06/30/21 1507 -- -- -- -- -- 100 % --   06/30/21 1500 (!) 146/91 -- -- 93 -- 97 % --   06/30/21 1446 -- -- -- -- -- 99 % --   06/30/21 1445 (!) 151/86 -- -- 92 16 98 % --   06/30/21 1344 118/79 98  F (36.7  C) Temporal 103 16 98 % 84.5 kg (186 lb 4.6 oz)       Physical Exam  General: Awake, alert, pleasant, non-toxic.  Head:  Scalp is NC/AT  Eyes:  Conjunctiva normal, PERRL  ENT:  The external nose and ears are normal.     The oropharynx is normal and without erythema or tonsillar swelling.   Neck:  Normal range of motion without rigidity.  CV:  Regular rate and rhythm    No pathologic murmur, rubs, or gallops.  Resp:  Breath sounds are clear bilaterally.  No crackles, wheezes, rhonchi, stridor.    Non-labored, no retractions or accessory muscle use  Abdomen: Abdomen is soft, no distension, no tenderness, no masses. No CVA tenderness.  MS:  No lower extremity edema or asymmetric calf swelling. Normal ROM in all joints without effusions.    No midline cervical, thoracic, or lumbar tenderness  Skin:  Warm and dry, No rash or lesions noted. 2+ peripheral pulses in all extremities   Neuro: Alert and oriented x3.  5/5 strength BL in UE and LE, Decreased sensation BL in LE from knees downward (baseline per pt)  Psych: Awake. Alert. Normal affect. Appropriate  interactions.    Emergency Department Course   ECG  ECG taken at 1504, ECG read at 1510  Normal sinus rhythm  Possible left atrial enlargement  T wave abnormality, consider inferior ischemia  Abnormal ECG   Rate 97 bpm. WA interval 136 ms. QRS duration 96 ms. QT/QTc 376/477 ms. P-R-T axes 51 82 -5.     Imaging:  Abd/Pelvis CT, IV contrast only Trauma/AAA  1.  Mild patchy groundglass opacities at both lung bases, greater on   the left, are nonspecific, but could be infectious in etiology.   2.  Mild circumferential thickening of the distal esophagus may be   inflammatory. Esophageal neoplasm is considered less likely from this   appearance, but is not excluded.   3.  Mild diffuse bladder wall thickening is nonspecific, but could be   related to prostatic enlargement and/or lack of distention.   4.  Mild splenomegaly.  As per radiology.       Laboratory:  CBC: WBC 25.7(H), HGB 14.0,    CMP: Glucose: 277(H), Creatinine: 1.34(H), GFR: 55(L), o/w WNL  Lipase: 45(L)  Lactic acid (result time 1450): 3.4(H)  Lactic acid (result time 1808): 1.8  Troponin (Collected 1439): <0.015  Blood culture x2: Pending  Asymptomatic SARS-CoV-2 COVID-19 Virus by PCR: Negative  UA with microscopic: Urine glucose: 30(A), Blood: small(A), RBC: 4(H), Mucous: Present(A) o/w WNL   Nt probnp inpatient: 580  Procalcitonin: Pending      Emergency Department Course:    Reviewed:  I reviewed nursing notes, vitals, past medical history and care everywhere    Assessments:  1431 I obtained history and examined the patient as noted above.   1639 I rechecked the patient and explained findings.       Consults:   1723 I spoke with  from the hospitalist service regarding patient's presentation, findings, and plan of care.     Interventions:  1446 NS, 1L, IV  1446 Zofran, 4 mg, IV  1447 Dilaudid, 0.5 mg, IV  1645 Rocephin, 2 g, IV    Disposition:  The patient was admitted to the hospital under the care of Dr. Garcia.       Impression & Plan      CMS Diagnoses:   The patient has signs of Severe Sepsis        If one the following conditions is present, a 30 mL/kg bolus is recommended as part of the 6 hour bundle (IBW can be used for BMI >30, or document refusal/contraindication):      1.   Initial hypotension  defined as 2 bps < 90 or map < 65 in the 6hrs before or 6hrs after time zero.     2.  Lactate >4.     The patient has signs of Severe Sepsis as evidenced by:    1. 2 SIRS criteria, AND  2. Suspected infection, AND   3. Organ dysfunction: Lactic Acidosis with value >2.0    Time severe sepsis diagnosis confirmed: 1603  06/30/21 as this was the time when evidence of bacterial infection resulted.    3 Hour Severe Sepsis Bundle Completion:    1. Initial Lactic Acid Result:   Recent Labs   Lab Test 06/30/21  1808 06/30/21  1439   LACT 1.8 3.4*     2. Blood Cultures before Antibiotics: Yes  3. Broad Spectrum Antibiotics Administered:  yes       Anti-infectives (From admission through now)    Start     Dose/Rate Route Frequency Ordered Stop    06/30/21 1630  cefTRIAXone (ROCEPHIN) 2 g vial to attach to  ml bag for ADULTS or NS 50 ml bag for PEDS      2 g  over 30 Minutes Intravenous ONCE 06/30/21 1627 06/30/21 1808    06/30/21 1630  azithromycin 500 mg (ZITHROMAX) in 0.9% NaCl 250 mL intermittent infusion 500 mg      500 mg  over 1 Hours Intravenous ONCE 06/30/21 1627            4. Fluid volume administered in ED: 1 L.  Full fluid bolus not given due to lactate less than 4, no hypotension, vitally stable, history of cardiomyopathy with reduced EF.    BMI Readings from Last 1 Encounters:   06/30/21 25.27 kg/m      30 mL/kg fluids based on weight: 2,540 mL  30 mL/kg fluids based on IBW (must be >= 60 inches tall): Patient ideal weight not available.                Severe Sepsis reassessment:  1. Repeat Lactic Acid Level: 1.8  2. MAP>65 after initial IVF bolus, will continue to monitor fluid status and vital signs      and None    Medical Decision  Makin-year-old male presents with vomiting, diarrhea, abdominal pain, cough.  Broad differential considered.  Work-up here shows no evidence of intra-abdominal catastrophe on CT scan, but does show bilateral lower lung infiltrates consistent with possible pneumonia though these are not well seen on chest x-ray.  He does have significant leukocytosis but is afebrile and vitally stable.  Lactate was elevated initially which is likely multifactorial due to both dehydration, as well as possible infection and Metformin use.  Normalized following 1 L of fluid.  Culture sent and pending.  Urine not consistent with infection.  Creatinine mildly elevated consistent with AZALIA due to prerenal volume losses.  No obstructive cause on CT.  No evidence of other source of infection such as meningitis, deep space infection of the head or neck, cellulitis, septic arthritis, spinal epidural abscess/discitis/osteomyelitis.  His EKG does show some T wave inversions in the inferior leads which were previously seen more in the lateral leads though troponin undetectable no chest pain or shortness of breath doubt ACS.  Nevertheless after discussion with hospitalist we will trend troponins and admit to telemetry.  BNP normal no clinical evidence of congestive heart failure.    We will treat for community-acquired pneumonia.  No indication for coverage of Pseudomonas or MRSA.  Discussed with hospitalist agrees to accept the patient.    Covid-19  Rosanna Romero was evaluated during a global COVID-19 pandemic, which necessitated consideration that the patient might be at risk for infection with the SARS-CoV-2 virus that causes COVID-19.   Applicable protocols for evaluation were followed during the patient's care.   COVID-19 was considered as part of the patient's evaluation. The plan for testing is:  a test was obtained during this visit.    Diagnosis:    ICD-10-CM    1. AZALIA (acute kidney injury) (H)  N17.9 UA with Microscopic     Blood  culture     Asymptomatic SARS-CoV-2 COVID-19 Virus (Coronavirus) by PCR     Nt probnp inpatient     Nt probnp inpatient     Procalcitonin     Procalcitonin     Lactic acid whole blood     Lactic acid whole blood     CANCELED: BNP     CANCELED: Procalcitonin     CANCELED: Lactic acid whole blood   2. Vomiting and diarrhea  R11.10     R19.7    3. Lactic acidosis  E87.2    4. Community acquired bilateral lower lobe pneumonia  J18.9        Discharge Medications:  New Prescriptions    No medications on file       Scribe Disclosure:  I, Perez Elmore, am serving as a scribe at 2:31 PM on 6/30/2021 to document services personally performed by Juan Carlos Swartz PA-C based on my observations and the provider's statements to me.      Juan Carlos Swartz PA-C  06/30/21 1849       Juan Carlos Swartz PA-C  06/30/21 1850

## 2021-06-30 NOTE — ED PROVIDER NOTES
Emergency Department Attending Supervision Note  6/30/2021  5:56 PM      I evaluated this patient in conjunction with Shakira Swartz PA-C      Briefly, the patient presented with abdominal pain. He reports that he began experiencing abdominal pain, diarrhea, and vomiting, approximately five hours ago after eating breakfast. He currently feels lightheaded and weak. An episode of similar symptoms occurred about a week ago, but are more severe today.       On my exam,   General: Patient is alert and interactive when I enter the room, tired appearing  Head:  The scalp, face, and head appear normal  Eyes:  Conjunctivae are normal  ENT:    The nose is normal    Pinnae are normal    External acoustic canals are normal  Neck:  Trachea midline  CV:  Pulses are normal, RRR.    Resp:  No respiratory distress, CTAB   Abdomen:      Soft, non-tender, non-distended  Musc:  Normal muscular tone    No major joint effusions    No asymmetric leg swelling  Skin:  No rash or lesions noted  Neuro:  Speech is normal and fluent. Face is symmetric.     Moving all extremities well.   Psych: Awake. Alert.  Normal affect.  Appropriate interactions.      Results:    Imaging:  Abd/Pelvis CT, IV contrast only Trauma/AAA  1.  Mild patchy groundglass opacities at both lung bases, greater on   the left, are nonspecific, but could be infectious in etiology.   2.  Mild circumferential thickening of the distal esophagus may be   inflammatory. Esophageal neoplasm is considered less likely from this   appearance, but is not excluded.   3.  Mild diffuse bladder wall thickening is nonspecific, but could be   related to prostatic enlargement and/or lack of distention.   4.  Mild splenomegaly.  As per radiology.     ED course:    1759 I obtained history and examined the patient     Rosanna Romero is a 66 year old male with a history of DM and HTN who presents abdominal pain and vomiting.  Patient is likely dehydrated as he has poor self care at home.   Patient was given IV fluids.  CT did not reveal any intra-abdominal process but likely has pneumonia on imaging.  Blood cultures obtained fluids and antibiotics given.  Patient not hypoxic.  Pressure normal.  Patient admitted to medicine for pneumonia and lactic acidosis.        Diagnosis    ICD-10-CM    1. AZALIA (acute kidney injury) (H)  N17.9 UA with Microscopic     Blood culture     Asymptomatic SARS-CoV-2 COVID-19 Virus (Coronavirus) by PCR     Nt probnp inpatient     Nt probnp inpatient     Procalcitonin     Procalcitonin     Lactic acid whole blood     Lactic acid whole blood     CANCELED: BNP     CANCELED: Procalcitonin     CANCELED: Lactic acid whole blood   2. Vomiting and diarrhea  R11.10     R19.7    3. Lactic acidosis  E87.2    4. Community acquired bilateral lower lobe pneumonia  J18.9      Dr. Rakel Gr MD.          Rakel Gr MD  06/30/21 4802

## 2021-07-01 NOTE — PLAN OF CARE
End of Shift Summary  For vital signs and complete assessments, please see documentation flowsheets.     Pertinent assessments: VSS, afebrile. Denies pain or nausea. Alert and oriented. Elevated lactic overnight, bolus given.   Major Shift Events admit to inpatient  Treatment Plan: CL Diet - ADAT, IVF, IV Rocephin, azithromycin

## 2021-07-01 NOTE — PROGRESS NOTES
SPIRITUAL HEALTH SERVICES Progress Note  [facility] Med. Surg. 5    Saw pt Rosanna per staff referral, requesting emotional support for pt.  Oriented Rosanna to [facility].  He deferred  support at this time, adding that his two children (who live locally) are supportive.    Informed pt how he can request further  support.  This author and other chaplains remain available per pt/family request.    Ector Banks M.Div., [facility]  Staff   Phone [phone]

## 2021-07-01 NOTE — CONSULTS
[facility]  Gastroenterology Consultation    Rosanna Romero  [address]  [place] [address]  66 year old male    Admission Date/Time: 6/30/2021  Primary Care Provider: [facility], [place]    We were asked to see the patient in consultation by Dr. Garcia for evaluation of drop in hemoglobin.        HPI:  Rosanna Romero is a 66 year old male who has a past medical history of diabetes mellitus, hypertension, hyperlipidemia who presents with sudden onset of abdominal pain, nausea, vomiting, diarrhea.  No bad food exposures.  Woke up yesterday with periumbilical abdominal pain, crampy in nature, and with associated diarrhea and vomiting.  Eating made pain worse.  No fever or chills.  Complains of lethargy.  No hematochezia, melena, hematemesis.    Hemoglobin 14.0 on admission, drop to 10.6.  MCV 87.  CT A/P showed mild patchy groundglass opacities at both lung bases, greater on the left.  Mild circumferential thickening of the distal esophagus, possibly inflammatory.  Mild splenomegaly.  CXR unrevealing.      Patient reports that he has a history of GI bleeding last fall when he was in  [place].  They were not able to give him a cause of the bleeding and it does not sound as though he underwent any procedures at that time.    His last colonoscopy was 11 years ago.  He reports that he did have polyps at that time.  He is currently scheduled for a colonoscopy in August with [facility].    The patient is feeling better this am.  No further abdominal pain.  Small amount of diarrhea again this but no blood present.  No further nausea, vomiting,  He denies a history of heartburn/indigestion.  No dysphagia.  He has never had an EGD.    ROS: A comprehensive ten point review of systems was negative aside from those in mentioned in the HPI.      MEDICATIONS: No current facility-administered medications on file prior to encounter.   allopurinol (ZYLOPRIM) 300 MG tablet, [ALLOPURINOL  (ZYLOPRIM) 300 MG TABLET] TAKE ONE TABLET BY MOUTH DAILY.  amLODIPine (NORVASC) 10 MG tablet, [AMLODIPINE (NORVASC) 10 MG TABLET] TAKE 1 TABLET(10 MG) BY MOUTH DAILY  atorvastatin (LIPITOR) 10 MG tablet, [ATORVASTATIN (LIPITOR) 10 MG TABLET] TAKE 1 TABLET BY MOUTH EVERY DAY  cloNIDine HCl (CATAPRES) 0.2 MG tablet, [CLONIDINE HCL (CATAPRES) 0.2 MG TABLET] TAKE 1 TABLET BY MOUTH ONCE DAILY  gabapentin (NEURONTIN) 300 MG capsule, [GABAPENTIN (NEURONTIN) 300 MG CAPSULE] Take 1 capsule (300 mg total) by mouth at bedtime as needed.  glipiZIDE (GLUCOTROL XL) 10 MG 24 hr tablet, Take 10 mg by mouth daily (with breakfast)  indomethacin (INDOCIN) 50 MG capsule, [INDOMETHACIN (INDOCIN) 50 MG CAPSULE] Take 1 capsule (50 mg total) by mouth 2 (two) times a day as needed.  lisinopril-hydrochlorothiazide (PRINZIDE,ZESTORETIC) 20-25 mg per tablet, [LISINOPRIL-HYDROCHLOROTHIAZIDE (PRINZIDE,ZESTORETIC) 20-25 MG PER TABLET] TAKE 1 TABLET BY MOUTH DAILY  metFORMIN (GLUCOPHAGE-XR) 500 MG 24 hr tablet, Take 2,000 mg by mouth daily (with breakfast)  metoprolol succinate (TOPROL-XL) 100 MG 24 hr tablet, [METOPROLOL SUCCINATE (TOPROL-XL) 100 MG 24 HR TABLET] TAKE 1 TABLET(100 MG) BY MOUTH DAILY        ALLERGIES: No Known Allergies    No past medical history on file.    No past surgical history on file.      SOCIAL HISTORY:  Social History     Tobacco Use     Smoking status: Never Smoker   Substance Use Topics     Alcohol use: Yes     Comment: Alcoholic Drinks/day: rare; remote regular/daily     Drug use: Not on file       FAMILY HISTORY:  No family history on file.    PHYSICAL EXAM:   BP (!) 146/75   Pulse 73   Temp 98.6  F (37  C) (Oral)   Resp 18   Ht 1.829 m (6')   Wt 84.5 kg (186 lb 4.8 oz)   SpO2 95%   BMI 25.27 kg/m      Constitutional: NAD, comfortable  Cardiovascular: RRR, normal S1 and S2, no r/c/g/m  Respiratory: CTAB  Psychiatric: mentation appears normal and affect normal  Head: Normocephalic. Atraumatic.    Neck: Neck  supple. No adenopathy. Thyroid symmetric, normal size, trachea midline  Eyes:  PERRL, no icterus  ENT: Hearing adequate, pharynx normal without erythema or exudate  Abdomen:   Auscultation: + BS  Appearance: nondistended  Palpation: non-tender  NEURO: grossly negative  SKIN: no suspicious lesions or rashes  LYMPH:   anterior cervical: no adenopathy  posterior cervical: no adenopathy  supraclavicular: no adenopathy          ADDITIONAL COMMENTS:   I reviewed the patient's new clinical lab test results.   Recent Labs   Lab Test 07/01/21  0924 07/01/21 0716 06/30/21  1439   WBC  --  8.6 25.7*   HGB 10.6* 10.8* 14.0   MCV  --  87 87   PLT  --  144* 214     Recent Labs   Lab Test 07/01/21 0716 06/30/21 2004 06/30/21  1439     --  137   POTASSIUM 3.8  --  3.9   CHLORIDE 111*  --  103   CO2 26  --  27   BUN 18  --  22   CR 1.08 1.29* 1.34*   ANIONGAP 4  --  7   GRAZYNA 7.5*  --  8.8   *  --  277*     Recent Labs   Lab Test 06/30/21  1551 06/30/21  1439   ALBUMIN  --  3.5   BILITOTAL  --  0.6   ALT  --  14   AST  --  11   ALKPHOS  --  102   PROTEIN Negative  --    LIPASE  --  45*             .    CONSULTATION ASSESSMENT AND PLAN:      65 yo male who presents with abdominal pain, nausea, vomiting, diarrhea.  Found to have leukocytosis, lactic acidosis in ED.  Suspect gastroenteritis.  We are consulted for drop in hemoglobin.  14 on admission, drop to 10.8, recheck 10.6.  No signs of GI bleeding.  Suspect dehydration led to falsely high hemoglobin on admission.      Symptoms resolved today.  Patient is overdue for colonoscopy, last 11 years ago and reports polyps.  He is scheduled at [facility] in August.  No upper GI symptoms.  No previous EGD.  Reports bleed of unknown etiology last fall in [place].  No endoscopic workup at that time.     -  Patient needs EGD/colonoscopy for evaluation of anemia.  Patient is currently asymptomatic and it is reasonable to do this as an outpatient.  He is scheduled for a  colonoscopy at  in 8/2021 and was told he could not get in sooner.  Will need EGD as well.   -  Monitor H/H; transfuse prn.  Watch for signs of bleeding.    I discussed the patient's findings and plan with Dr. Alejo.          Hilda Singh, PAC  [facility]  Office:  [phone] call if needed after 12PM  Cell:  [phone], not available after 12PM at this number  -----------------------  I agree with the assessment and plan of Hilda Singh.  Patient reports he is feeling better now.  No further diarrhea since this morning, N/V has resolved.  No GI bleeding.  On exam abd is benign, pleasant cooperative, NAD, non labored breathing.    A/P  Anemia- no active GI bleeding, needs workup with EGD/Colon.  I have arranged for him to have this done here at [facility] on July 15th as an outpatient.  My office will call him with details.    N/V, diarrhea- symptoms improved, likely infectious gastroenteritis.  Clear liquid diet and advance as tolerated.  If does well can hopefully go home tonight or tomorrow morning.    CT scan with thickening of distal esophagus- likely reflux esophagitis.  Will be evaluated further with upcoming EGD.    Signing off but please call with questions or concerns.    Gisele Alejo MD  [facility]

## 2021-07-01 NOTE — CONSULTS
CLINICAL NUTRITION SERVICES  -  ASSESSMENT NOTE      MALNUTRITION:  % Weight Loss:  Weight loss does not meet criteria for malnutrition   % Intake:  <75% for > 7 days (non-severe malnutrition)  Subcutaneous Fat Loss:  Upper arm region mild to moderate depletion --> not using as indicator with only 1 region present   Muscle Loss:  Temporal region mild depletion, Clavicle bone region mild to moderate depletion, Acromion bone region mild to moderate depletion, Patellar region moderate depletion, Anterior thigh region moderate depletion and Posterior calf region moderate depletion  Fluid Retention:  None noted    Malnutrition Diagnosis: Non-Severe malnutrition  In Context of:  Acute illness or injury  Chronic illness or disease        REASON FOR ASSESSMENT  Rosanna Romero is a 66 year old male seen by Registered Dietitian for Admission Nutrition Risk Screen for positive.    PMH of: DM, HTN.    Admit 2/2: Abdominal pain, diarrhea, and vomiting, found to be septic r/t possible PNA or gastroenteritis, AZALIA.     NUTRITION HISTORY  - Information obtained from patient and chart.  - Diet at home: Regular.  - Usual intakes: Meals used to be TID + snacks, now BID per report.  - Barriers to PO intakes: Overall decreased appetite described for a period of weeks.  Difficult to determine overall as somewhat vague.  He does feel he was consistently eating <50% usual intakes during this time but not reflected in wt trending and overall hard to determine.  He does report consistently eating less than usual.  - Use of oral supplements: None, has heard of Ensure/Boost.  - Chewing/swallowing issues: Denied.  - Meal preparation/grocery shopping: Son lives with him.  - Allergies: NKFA.      CURRENT NUTRITION ORDERS  Diet Order:     NPO    Current Intake/Tolerance:  NPO with plans for EGD/colonoscpy today.      NUTRITION FOCUSED PHYSICAL ASSESSMENT FOR DIAGNOSING MALNUTRITION)  Yes     Obtained from Chart/Interdisciplinary Team:  - No  "documentation of PI  - Stooling patterns reviewed    ANTHROPOMETRICS  Height: 6' 0\"  Weight: 186 lbs 4.8 oz  Body mass index is 25.27 kg/m .  Weight Status:  Overweight BMI 25-29.9  Weight History:  Wt Readings from Last 10 Encounters:   06/30/21 84.5 kg (186 lb 4.8 oz)   01/24/19 90.7 kg (199 lb 14.4 oz)   10/10/17 95.4 kg (210 lb 6.4 oz)   07/24/17 93.4 kg (206 lb)   05/31/17 93 kg (205 lb)   05/15/17 92.5 kg (204 lb)   09/04/14 100.2 kg (221 lb)     - Wt of 188# from 5/27/2021.  Wt stable since that time.    - Wt of 199# from 3/01/2021.  7% wt loss in 4 months.  - Wt of 191# from 10/23/2020.    - Patient himself verbalizes  Weighing closer to 230# a few months ago. This is not verbalized above.      LABS  Labs reviewed:  Electrolytes  Potassium (mmol/L)   Date Value   07/01/2021 3.8   06/30/2021 3.9   02/14/2010 3.8    Blood Glucose  Glucose (mg/dL)   Date Value   07/01/2021 173 (H)   06/30/2021 277 (H)   02/14/2010 207 (H)   02/13/2010 283 (H)     Hemoglobin A1C (%)   Date Value   06/30/2021 8.1 (H)    Inflammatory Markers  WBC (10e9/L)   Date Value   07/01/2021 8.6   06/30/2021 25.7 (H)   02/14/2010 16.8 (H)     Albumin (g/dL)   Date Value   06/30/2021 3.5   02/14/2010 3.3   02/13/2010 3.6      Sodium (mmol/L)   Date Value   07/01/2021 141   06/30/2021 137   02/14/2010 139    Renal  Urea Nitrogen (mg/dL)   Date Value   07/01/2021 18   06/30/2021 22   02/14/2010 19     Creatinine (mg/dL)   Date Value   07/01/2021 1.08   06/30/2021 1.29 (H)   06/30/2021 1.34 (H)     Additional  Ketones Urine (mg/dL)   Date Value   06/30/2021 Negative        B/P: 146/75, T: 98.6, P: 73, R: 18      MEDICATIONS  Medications reviewed:    amLODIPine  10 mg Oral Daily     atorvastatin  10 mg Oral QPM     azithromycin  250 mg Oral Daily     cefTRIAXone  2 g Intravenous Q24H     cloNIDine  0.2 mg Oral Daily     enoxaparin ANTICOAGULANT  40 mg Subcutaneous Q24H     insulin aspart  1-7 Units Subcutaneous TID AC     insulin aspart  1-5 " Units Subcutaneous At Bedtime     metoprolol succinate ER  100 mg Oral Daily     pantoprazole (PROTONIX) IV  40 mg Intravenous Daily with breakfast     sodium chloride (PF)  3 mL Intracatheter Q8H        sodium chloride 125 mL/hr at 07/01/21 1033          ASSESSED NUTRITION NEEDS PER APPROVED PRACTICE GUIDELINES:    Dosing Weight 85 kg   Estimated Energy Needs: 20-25 Kcal/Kg  Justification: maintenance  Estimated Protein Needs: 1-1.2+ g pro/Kg  Justification: preservation of lean body mass and repletion  Estimated Fluid Needs: per MD      NUTRITION DIAGNOSIS:  Inadequate oral intake related to acutely NPO for procedure and decreased appetite as evidenced by likely meeting <75% needs on some days for period of weeks PTA, e/o muscle loss, malnutrition coding.    NUTRITION INTERVENTIONS  Recommendations / Nutrition Prescription  Diet per MD.    Plan for Ensure trial with diet advancement.      Implementation  Nutrition education: Provided education on diet per MD.  Possible Ensure trial or at least protein focus with diet advancement.    Medical Food Supplement: As above.    Collaboration and Referral of Nutrition care: Discussed POC with team during rounds.     Nutrition Goals  Diet advancement w/in 24 hrs.      MONITORING AND EVALUATION:  Progress towards goals will be monitored and evaluated per protocol and Practice Guidelines          Louise León RDN, LD  Clinical Dietitian  3rd floor/ICU: [phone]  All other floors: [phone]  Weekend/holiday: [phone]

## 2021-07-01 NOTE — PROVIDER NOTIFICATION
DATE:  6/30/2021   TIME OF RECEIPT FROM LAB:  20:20  LAB TEST:  Lactic acid   LAB VALUE:  4.1  RESULTS GIVEN WITH READ-BACK TO (PROVIDER):  Paged hospitalist     TIME LAB VALUE REPORTED TO PROVIDER:   20:22

## 2021-07-01 NOTE — PHARMACY-ADMISSION MEDICATION HISTORY
Admission medication history interview status for this patient is complete. See [facility] admission navigator for allergy information, prior to admission medications and immunization status.     Medication history interview done, indicate source(s): Patient  Medication history resources (including written lists, pill bottles, clinic record):None  Pharmacy: [facility] [place] [address]     Changes made to PTA medication list:  Added: none   Deleted: simvastatin   Changed: metformin 1000 mg BID --> Metformin ER 2000 mg daily     Actions taken by pharmacist (provider contacted, etc): Left reminder for provider to review med history     Additional medication history information: Patient takes atorvastatin instead of simvastatin. He stated he takes his PRN gabapentin every night      Medication reconciliation/reorder completed by provider prior to medication history?  Yes       Prior to Admission medications    Medication Sig Last Dose Taking? Auth Provider   allopurinol (ZYLOPRIM) 300 MG tablet [ALLOPURINOL (ZYLOPRIM) 300 MG TABLET] TAKE ONE TABLET BY MOUTH DAILY. 6/29/2021 at am Yes Tomy Thomson MD   amLODIPine (NORVASC) 10 MG tablet [AMLODIPINE (NORVASC) 10 MG TABLET] TAKE 1 TABLET(10 MG) BY MOUTH DAILY 6/29/2021 at am Yes Tomy Fischer MD   atorvastatin (LIPITOR) 10 MG tablet [ATORVASTATIN (LIPITOR) 10 MG TABLET] TAKE 1 TABLET BY MOUTH EVERY DAY 6/29/2021 at am Yes Tomy Fischer MD   cloNIDine HCl (CATAPRES) 0.2 MG tablet [CLONIDINE HCL (CATAPRES) 0.2 MG TABLET] TAKE 1 TABLET BY MOUTH ONCE DAILY 6/29/2021 at am Yes Tomy Fischer MD   gabapentin (NEURONTIN) 300 MG capsule [GABAPENTIN (NEURONTIN) 300 MG CAPSULE] Take 1 capsule (300 mg total) by mouth at bedtime as needed.  Yes Tomy Fischer MD   glipiZIDE (GLUCOTROL XL) 10 MG 24 hr tablet Take 10 mg by mouth daily (with breakfast) 6/29/2021 at am  Yes Unknown, Entered By History   indomethacin (INDOCIN) 50 MG capsule [INDOMETHACIN (INDOCIN) 50 MG  CAPSULE] Take 1 capsule (50 mg total) by mouth 2 (two) times a day as needed.  Yes Tomy Fischer MD   lisinopril-hydrochlorothiazide (PRINZIDE,ZESTORETIC) 20-25 mg per tablet [LISINOPRIL-HYDROCHLOROTHIAZIDE (PRINZIDE,ZESTORETIC) 20-25 MG PER TABLET] TAKE 1 TABLET BY MOUTH DAILY 6/29/2021 at am  Yes Tomy Fischer MD   metFORMIN (GLUCOPHAGE-XR) 500 MG 24 hr tablet Take 2,000 mg by mouth daily (with breakfast) 6/29/2021 at am Yes Unknown, Entered By History   metoprolol succinate (TOPROL-XL) 100 MG 24 hr tablet [METOPROLOL SUCCINATE (TOPROL-XL) 100 MG 24 HR TABLET] TAKE 1 TABLET(100 MG) BY MOUTH DAILY 6/29/2021 at am  Yes Tomy Thomson MD

## 2021-07-01 NOTE — PLAN OF CARE
To Do:  End of Shift Summary  For vital signs and complete assessments, please see documentation flowsheets.     Pertinent assessments: Patient is Aox4 this shift. Denies pain, nausea, and SOB. Active bowel sounds, abdomen soft and non tender.     Major Shift Events: Advanced to full liquids    Treatment Plan: Continue to advance diet as patient can tolerate. IV fluids, IV Abx.     Bedside Nurse: Bonnie Zee RN

## 2021-07-01 NOTE — PROGRESS NOTES
[facility]    Hospitalist Progress Note  Name: Rosanna Romero    MRN: [id number]  Provider: Gia Garcia MD  Date of Service: 07/01/2021    Assessment & Plan   Summary of Stay: Rosanna Romero is a 66 year old male who was admitted on 6/30/2021 for sepsis secondary to possible pneumonia and gastroenteritis.  past medical history significant for diabetes mellitus, hypertension, hyperlipidemia presented to the emergency room with sudden onset abdominal pain diarrhea and vomiting.  Evaluation in the emergency room showed leukocytosis lactic acidosis and acute renal failure.  CT abdomen pelvis shows bibasilar infiltrates in both lung bases, esophageal wall thickening and bladder wall thickening.    Sepsis secondary to possible pneumonia and or gastroenteritis  (Leukocytosis, lactic acidosis, acute renal failure, review noted to be tachycardic on telemetry)  -Patient had been vomiting cannot exclude aspiration pneumonia  -Differential also includes community-acquired pneumonia  -Blood culture sent in the emergency room-  -started on ceftriaxone and azithromycin will continue  -IV fluids  -Recheck lactic acidosis after resuscitation  -We will check enteric panel  -Supportive care with antiemetics     Pneumonia possibly community-acquired pneumonia versus aspiration pneumonia  -Blood culture pending  -Procalcitonin 0.28 indicative of possible early systemic infection  -continue IV antibiotics     Gastroenteritis  -Fecal lactoferrin positive  -We will check enteric panel  -Supportive care  -IV fluids  -As needed antiemetics     Acute renal failure: Resolved with hydration  -Baseline creatinine is 1 up to 1.3 today  -Secondary to infection nausea vomiting and diarrhea-  -IV fluids  -Avoid nephrotoxins     Diabetes mellitus  -Poor oral intake nausea and vomiting will hold off oral medications  -Sliding scale insulin     Lactic acidosis: Resolved with fluids  -Secondary to nausea vomiting diarrhea and sepsis  -IV  fluids       Hypertension  -Right admission patient on clonidine, Norvasc, metoprolol, hydrochlorothiazide and lisinopril for blood pressure control  -We will hold hydrochlorothiazide and lisinopril due to acute renal failure-  -continue Norvasc, clonidine and metoprolol with parameters        CT showing esophageal wall thickening  -We will likely need outpatient follow-up possible endoscopy     Abnormal EKG  -Nausea and vomiting  -Patient has T wave inversion in lead III and aVF  -No prior EKG available for comparison however reported EKG showed some T wave inversion in lateral leads in 2017  -Current presentation is not consistent with cardiac chest pain  -Patient however does have cardiac risk factors  -We will troponins negative    Drop in hemoglobin  Bright red blood per rectum  -From 14-10.8  -Hemodilution versus GI losses  -Patient has been ranging around 14 in all his prior to hospitalization review of records  -Recheck hemoglobin if is lower we will consider GI consult given esophageal thickening noted on the CT as well  -I will add PPI    DVT Prophylaxis: Enoxaparin (Lovenox) SQ  Code Status: Full Code    Disposition: Expected discharge to be decided      Interval History   Reviewed chart patient does have significant drop in hemoglobin also per report did have bright red blood per rectum.  Complains of abdominal discomfort still but cramps are better than yesterday still had looser stools 2-3.  No cough fever or chills.  10 point review of system is otherwise negative    -Data reviewed today: I reviewed all new labs and imaging reports over the last 24 hours. I personally reviewed no images or EKG's today.    Physical Exam   Temp: 98.6  F (37  C) Temp src: Oral BP: 131/75 Pulse: 68   Resp: 18 SpO2: 95 % O2 Device: None (Room air)    Vitals:    06/30/21 1344 06/30/21 1943   Weight: 84.5 kg (186 lb 4.6 oz) 84.5 kg (186 lb 4.8 oz)     Vital Signs with Ranges  Temp:  [98  F (36.7  C)-98.9  F (37.2  C)] 98.6   F (37  C)  Pulse:  [] 68  Resp:  [16-18] 18  BP: (104-164)/() 131/75  SpO2:  [95 %-100 %] 95 %  I/O last 3 completed shifts:  In: 2498.08 [I.V.:498.08; IV Piggyback:2000]  Out: 375 [Urine:375]      GEN:  Alert, oriented x 3, appears comfortable, NAD.  HEENT:  Normocephalic/atraumatic, no scleral icterus, no nasal discharge, mouth moist.  CV:  Regular rate and rhythm, no murmur or JVD.  S1 + S2 noted, no S3 or S4.  LUNGS:  Clear to auscultation bilaterally without rales/rhonchi/wheezing/retractions.  Symmetric chest rise on inhalation noted.  ABD:  Active bowel sounds, soft, minimal lower abdominal tenderness no rebound/guarding/rigidity.  EXT:  No edema.  No cyanosis.  No joint synovitis noted.  SKIN:  Dry to touch, no exanthems noted in the visualized areas.    Medications     sodium chloride 125 mL/hr at 07/01/21 0403       amLODIPine  10 mg Oral Daily     atorvastatin  10 mg Oral QPM     azithromycin  250 mg Oral Daily     cefTRIAXone  2 g Intravenous Q24H     cloNIDine  0.2 mg Oral Daily     enoxaparin ANTICOAGULANT  40 mg Subcutaneous Q24H     insulin aspart  1-7 Units Subcutaneous TID AC     insulin aspart  1-5 Units Subcutaneous At Bedtime     metoprolol succinate ER  100 mg Oral Daily     sodium chloride (PF)  3 mL Intracatheter Q8H     Data     Recent Labs   Lab 07/01/21  0716 06/30/21  1439   WBC 8.6 25.7*   HGB 10.8* 14.0   HCT 31.8* 41.5   MCV 87 87   * 214     Recent Labs   Lab 07/01/21  0716 06/30/21 2004 06/30/21  1439     --  137   POTASSIUM 3.8  --  3.9   CHLORIDE 111*  --  103   CO2 26  --  27   ANIONGAP 4  --  7   *  --  277*   BUN 18  --  22   CR 1.08 1.29* 1.34*   GFRESTIMATED 71 57* 55*   GFRESTBLACK 82 66 63   GRAZYNA 7.5*  --  8.8     Recent Labs   Lab 06/30/21  1642 06/30/21  1602   CULT No growth after 11 hours No growth after 11 hours     Recent Labs   Lab 06/30/21  1439   NTBNPI 580     Recent Labs   Lab 07/01/21  0716 06/30/21  1439   HGB 10.8* 14.0      Recent Labs   Lab 06/30/21  1439   AST 11   ALT 14   ALKPHOS 102   BILITOTAL 0.6     No results for input(s): INR in the last 168 hours.  Recent Labs   Lab 07/01/21  0332 06/30/21  2202 06/30/21  2004   LACT 1.2 2.1* 4.1*     Recent Labs   Lab 06/30/21  1439   LIPASE 45*     No results for input(s): TSH in the last 168 hours.  Recent Labs   Lab 07/01/21  0716 07/01/21  0131 06/30/21  2202   TROPI <0.015 <0.015 <0.015     Recent Labs   Lab 06/30/21  1551   COLOR Light Yellow   APPEARANCE Clear   URINEGLC 30*   URINEBILI Negative   URINEKETONE Negative   SG 1.010   UBLD Small*   URINEPH 5.0   PROTEIN Negative   NITRITE Negative   LEUKEST Negative   RBCU 4*   WBCU 2       Recent Results (from the past 24 hour(s))   Abd/pelvis CT,  IV  contrast only TRAUMA / AAA    Narrative    CT ABDOMEN AND PELVIS WITH CONTRAST 6/30/2021 3:19 PM    CLINICAL HISTORY: Abdominal pain.    TECHNIQUE: CT scan of the abdomen and pelvis was performed following  injection of IV contrast. Multiplanar reformats were obtained. Dose  reduction techniques were used.  CONTRAST: 93mL Isovue-370  COMPARISON: None.    FINDINGS:   LOWER CHEST: Mild patchy groundglass opacities at both lung bases,  greater on the left. Small hiatal hernia. Mild circumferential  thickening is noted involving the distal esophagus.    HEPATOBILIARY: Unremarkable. No hepatic masses are seen.    PANCREAS: Normal.    SPLEEN: There is mild splenomegaly. The spleen measures 13.8 cm in  length.    ADRENAL GLANDS: Normal.    KIDNEYS/BLADDER: There are scattered small renal cortical cysts  bilaterally. The kidneys are otherwise unremarkable. No  hydronephrosis. Mild diffuse bladder wall thickening is nonspecific,  but could be related to lack of distention and/or prostatic  enlargement.    BOWEL: No bowel obstruction. No convincing evidence for colitis or  diverticulitis. Unremarkable appendix.    PELVIC ORGANS: Mild prostatic enlargement.    LYMPH NODES: No enlarged lymph  nodes are identified in the abdomen or  pelvis.    VASCULATURE: Unremarkable.    ADDITIONAL FINDINGS: None.    MUSCULOSKELETAL: Unremarkable.      Impression    IMPRESSION:   1.  Mild patchy groundglass opacities at both lung bases, greater on  the left, are nonspecific, but could be infectious in etiology.  2.  Mild circumferential thickening of the distal esophagus may be  inflammatory. Esophageal neoplasm is considered less likely from this  appearance, but is not excluded.  3.  Mild diffuse bladder wall thickening is nonspecific, but could be  related to prostatic enlargement and/or lack of distention.  4.  Mild splenomegaly.    CLOTILDE RODRIGUEZ MD          SYSTEM ID:  [id number]   Chest XR,  PA & LAT    Narrative    CHEST TWO VIEWS   6/30/2021 4:17 PM     HISTORY: Left lower lobe infiltrate on prior CT. Evaluate for  additional infiltrate.    COMPARISON: None.      Impression    IMPRESSION: PA and lateral views of the chest. Lungs are clear. Subtle  opacities at the lung bases described on CT is not well appreciated.  Pneumonia remains in the differential based on prior CT findings.  Heart is normal in size. No effusions are evident. No pneumothorax.    FRITZ MADRID MD          SYSTEM ID:  [id number]

## 2021-07-01 NOTE — PLAN OF CARE
Pertinent assessments: no n/v or pain this shift. Did have 2 episodes of diarrhea, one w/small amount of blood. Pt is pale.   Major Shift Events: hgb dropped since admission, recheck today, now 10.8, pt was NPO for possible scope, now clears ADAT. C/s [facility], they will scope him as outpt.   Treatment Plan: advance diet, IVF, IV Rocephin, azithromycin, tele running SR.     Will cont w/ POC.

## 2021-07-02 NOTE — DISCHARGE SUMMARY
[facility]  Discharge Summary  Hospitalist      Date of Admission:  6/30/2021  Date of Discharge:  7/2/2021  Provider:  Gia Garcia MD  Date of Service (when I last saw the patient): 07/02/21      Primary Provider: [facility], [place]          Discharge Diagnosis:   Discharge Diagnoses   Sepsis secondary to possible community-acquired pneumonia and gastroenteritis  Gastroenteritis  Acute renal failure  Anemia : Bright red blood per rectum noted during hospitalization concern for chronic blood loss anemia  Lactic acidosis    Abnormal esophageal wall thickening noted on CT.  Abnormal EKG:       Other medical issues:  No past medical history on file.       History of Present Illness   Rosanna Romeor is an 66 year old male who presented with abdominal pain diarrhea and vomiting.  Please see the admission history and physical for full details.    Hospital Course     Rosanna Romero was admitted on 6/30/2021.  He is a 66 year old male who was admitted on  for sepsis secondary to possible pneumonia and gastroenteritis.  past medical history significant for diabetes mellitus, hypertension, hyperlipidemia presented to the emergency room with sudden onset abdominal pain diarrhea and vomiting.  Evaluation in the emergency room showed leukocytosis lactic acidosis and acute renal failure.  CT abdomen pelvis shows bibasilar infiltrates in both lung bases, esophageal wall thickening and bladder wall thickening.     The following problems were addressed during his hospitalization:    Sepsis secondary to possible pneumonia and or gastroenteritis  (Leukocytosis, lactic acidosis, acute renal failure, review noted to be tachycardic on telemetry)  -Patient had been vomiting cannot exclude aspiration pneumonia  -Differential also includes community-acquired pneumonia  -Blood culture sent in the emergency room-  -started on ceftriaxone and azithromycin  -Resuscitated with IV fluid       Pneumonia possibly  community-acquired pneumonia versus aspiration pneumonia  -Blood culture with no growth to date  -Procalcitonin 0.28 indicative of possible early systemic infection  -continue IV antibiotics initially and will discharge home on oral azithromycin to complete for total of 5 days     Gastroenteritis  -Fecal lactoferrin positive  -Enteric panel was negative  -Clinically improved with no more looser stools diarrhea or vomiting  -Abdominal pain resolved     Acute renal failure: Resolved with hydration  -Baseline creatinine is 1 up to 1.3 today  -Improved with hydration     Diabetes mellitus  -Stable.  Resume prior to admission meds     Lactic acidosis: Resolved with fluids  -Secondary to nausea vomiting diarrhea and sepsis  -I improved with hydration     Hypertension  -Right admission patient on clonidine, Norvasc, metoprolol, hydrochlorothiazide and lisinopril for blood pressure control  -Resumed all prior to admission meds on discharge with resolution of sepsis and renal failure    CT showing esophageal wall thickening  -Patient was seen by GI during hospitalization with anemia and rectal bleeding  -Patient will need outpatient upper GI endoscopy to further assess for abnormal findings and to rule out other causes for anemia     Abnormal EKG  -Nausea and vomiting  -Patient has T wave inversion in lead III and aVF  -No prior EKG available for comparison however reported EKG showed some T wave inversion in lateral leads in 2017  -Current presentation is not consistent with cardiac chest pain  -Patient however does have cardiac risk factors  -Serial troponins were negative.  Patient will need outpatient evaluation will defer to primary care provider     Drop in hemoglobin  Bright red blood per rectum  -From 14-10.8  -Hemodilution versus GI losses  -Patient has been ranging around 14 in all his prior to hospitalization review of records  -Recheck hemoglobin if is lower we will consider GI consult given esophageal thickening  noted on the CT as well  -GI was consulted and recommended outpatient work-up    Significant Results and Procedures   As noted above    Pending Results   Unresulted Labs Ordered in the Past 30 Days of this Admission     Date and Time Order Name Status Description    6/30/2021 1456 Blood culture Preliminary     6/30/2021 1456 Blood culture Preliminary           Code Status   Full Code       Primary Care Physician   [place] [facility]    Physical Exam   Temp: 98.2  F (36.8  C) Temp src: Oral BP: 139/66 Pulse: 72   Resp: 16 SpO2: 95 % O2 Device: None (Room air)    Vitals:    06/30/21 1344 06/30/21 1943   Weight: 84.5 kg (186 lb 4.6 oz) 84.5 kg (186 lb 4.8 oz)     Vital Signs with Ranges  Temp:  [98.1  F (36.7  C)-98.3  F (36.8  C)] 98.2  F (36.8  C)  Pulse:  [64-96] 72  Resp:  [16-18] 16  BP: (121-139)/(57-67) 139/66  SpO2:  [94 %-97 %] 95 %  I/O last 3 completed shifts:  In: 3436 [P.O.:490; I.V.:2946]  Out: 1950 [Urine:1950]    Constitutional: Awake, alert, cooperative, no apparent distress, and appears stated age.  Respiratory: No increased work of breathing, good air exchange, clear to auscultation bilaterally, no crackles or wheezing.  Cardiovascular: Normal apical impulse,normal S1 and S2,   GI: normal bowel sounds, soft, non-distended, non-tender    Discharge Disposition   Discharged to home    Consultations This Hospital Stay   GASTROENTEROLOGY IP CONSULT    Time Spent on this Encounter   I, Gia Garcia MD, personally saw the patient today and spent greater than 30 minutes discharging this patient.    Discharge Orders      Reason for your hospital stay    Please refer to discharge summary.  Briefly patient admitted for nausea vomiting diarrhea and possible pneumonia.  Treated with IV antibiotics for pneumonia and supportive care for gastroenteritis.  Clinically has improved is able to tolerate p.o. no more diarrhea or abdominal pain.  Vitals are stable electrolytes stable no fever.  Patient is  being discharged home.  All home meds would be resumed.  Patient to closely monitor blood glucose.     Follow-up and recommended labs and tests     Follow up with primary care provider, [place] [facility], within 7 days for hospital follow- up.  The following labs/tests are recommended:   Monitor blood glucose and follow with primary care provider.    Please call or come if there are any new or worsening symptoms.     Activity    Your activity upon discharge: activity as tolerated     Follow-up and recommended labs and tests     Follow up with primary care provider, [place] [facility], within 7 days for hospital follow- up.  The following labs/tests are recommended:  Hemoglobin   you will need endoscopy and colonoscopy for blood loss and anemia  Also CT scan showed espohageal changes and would recommend GI follow up and endoscopy.     Full Code     Diet    Follow this diet upon discharge: Orders Placed This Encounter      Advance Diet as Tolerated: Regular Diet Adult     Discharge Medications   Current Discharge Medication List      START taking these medications    Details   !! atorvastatin (LIPITOR) 10 MG tablet Take 1 tablet (10 mg) by mouth every evening  Qty: 30 tablet, Refills: 0    Associated Diagnoses: Hyperlipidemia LDL goal <100      azithromycin (ZITHROMAX) 250 MG tablet Take 1 tablet (250 mg) by mouth daily for 2 days  Qty: 2 tablet, Refills: 0    Associated Diagnoses: Community acquired bilateral lower lobe pneumonia       !! - Potential duplicate medications found. Please discuss with provider.      CONTINUE these medications which have NOT CHANGED    Details   allopurinol (ZYLOPRIM) 300 MG tablet [ALLOPURINOL (ZYLOPRIM) 300 MG TABLET] TAKE ONE TABLET BY MOUTH DAILY.  Qty: 100 tablet, Refills: 3    Comments: PLEASE INFORM PT TO EST CARE  Associated Diagnoses: Gout      amLODIPine (NORVASC) 10 MG tablet [AMLODIPINE (NORVASC) 10 MG TABLET] TAKE 1 TABLET(10 MG) BY MOUTH DAILY  Qty: 90 tablet,  Refills: 0    Associated Diagnoses: Benign essential HTN      !! atorvastatin (LIPITOR) 10 MG tablet [ATORVASTATIN (LIPITOR) 10 MG TABLET] TAKE 1 TABLET BY MOUTH EVERY DAY  Qty: 90 tablet, Refills: 0    Associated Diagnoses: Type 2 diabetes mellitus without complication, without long-term current use of insulin (H)      cloNIDine HCl (CATAPRES) 0.2 MG tablet [CLONIDINE HCL (CATAPRES) 0.2 MG TABLET] TAKE 1 TABLET BY MOUTH ONCE DAILY  Qty: 90 tablet, Refills: 3    Associated Diagnoses: Benign essential HTN      gabapentin (NEURONTIN) 300 MG capsule [GABAPENTIN (NEURONTIN) 300 MG CAPSULE] Take 1 capsule (300 mg total) by mouth at bedtime as needed.  Qty: 30 capsule, Refills: 0    Associated Diagnoses: Neuropathy      glipiZIDE (GLUCOTROL XL) 10 MG 24 hr tablet Take 10 mg by mouth daily (with breakfast)      lisinopril-hydrochlorothiazide (PRINZIDE,ZESTORETIC) 20-25 mg per tablet [LISINOPRIL-HYDROCHLOROTHIAZIDE (PRINZIDE,ZESTORETIC) 20-25 MG PER TABLET] TAKE 1 TABLET BY MOUTH DAILY  Qty: 90 tablet, Refills: 3      metFORMIN (GLUCOPHAGE-XR) 500 MG 24 hr tablet Take 2,000 mg by mouth daily (with breakfast)      metoprolol succinate (TOPROL-XL) 100 MG 24 hr tablet [METOPROLOL SUCCINATE (TOPROL-XL) 100 MG 24 HR TABLET] TAKE 1 TABLET(100 MG) BY MOUTH DAILY  Qty: 90 tablet, Refills: 3    Comments: PLEASE INFORM PT TO EST CARE  Associated Diagnoses: Benign essential HTN       !! - Potential duplicate medications found. Please discuss with provider.      STOP taking these medications       indomethacin (INDOCIN) 50 MG capsule Comments:   Reason for Stopping:         simvastatin (ZOCOR) 20 MG tablet Comments:   Reason for Stopping:             Allergies   No Known Allergies  Data   Most Recent 3 CBC's:  Recent Labs   Lab Test 07/02/21  0727 07/01/21  0924 07/01/21  0716 06/30/21  1439   WBC 5.7  --  8.6 25.7*   HGB 10.8* 10.6* 10.8* 14.0   MCV 88  --  87 87   *  --  144* 214      Most Recent 3 BMP's:  Recent Labs   Lab  Test 07/02/21  0727 07/01/21  0716 06/30/21 2004 06/30/21  1439    141  --  137   POTASSIUM 3.4 3.8  --  3.9   CHLORIDE 109 111*  --  103   CO2 26 26  --  27   BUN 10 18  --  22   CR 0.82 1.08 1.29* 1.34*   ANIONGAP 6 4  --  7   GRAZYNA 7.8* 7.5*  --  8.8   * 173*  --  277*     Most Recent 2 LFT's:  Recent Labs   Lab Test 06/30/21  1439   AST 11   ALT 14   ALKPHOS 102   BILITOTAL 0.6     Most Recent INR's and Anticoagulation Dosing History:  Anticoagulation Dose History     There is no flowsheet data to display.        Most Recent 3 Troponin's:  Recent Labs   Lab Test 07/01/21  0716 07/01/21  0131 06/30/21  2202   TROPI <0.015 <0.015 <0.015     Most Recent Cholesterol Panel:No lab results found.  Most Recent 6 Bacteria Isolates From Any Culture (See EPIC Reports for Culture Details):  Recent Labs   Lab Test 06/30/21  1642 06/30/21  1602   CULT No growth after 2 days No growth after 2 days     Most Recent TSH, T4 and A1c Labs:  Recent Labs   Lab Test 06/30/21 2004   A1C 8.1*     Results for orders placed or performed during the hospital encounter of 06/30/21   Abd/pelvis CT,  IV  contrast only TRAUMA / AAA    Narrative    CT ABDOMEN AND PELVIS WITH CONTRAST 6/30/2021 3:19 PM    CLINICAL HISTORY: Abdominal pain.    TECHNIQUE: CT scan of the abdomen and pelvis was performed following  injection of IV contrast. Multiplanar reformats were obtained. Dose  reduction techniques were used.  CONTRAST: 93mL Isovue-370  COMPARISON: None.    FINDINGS:   LOWER CHEST: Mild patchy groundglass opacities at both lung bases,  greater on the left. Small hiatal hernia. Mild circumferential  thickening is noted involving the distal esophagus.    HEPATOBILIARY: Unremarkable. No hepatic masses are seen.    PANCREAS: Normal.    SPLEEN: There is mild splenomegaly. The spleen measures 13.8 cm in  length.    ADRENAL GLANDS: Normal.    KIDNEYS/BLADDER: There are scattered small renal cortical cysts  bilaterally. The kidneys are  otherwise unremarkable. No  hydronephrosis. Mild diffuse bladder wall thickening is nonspecific,  but could be related to lack of distention and/or prostatic  enlargement.    BOWEL: No bowel obstruction. No convincing evidence for colitis or  diverticulitis. Unremarkable appendix.    PELVIC ORGANS: Mild prostatic enlargement.    LYMPH NODES: No enlarged lymph nodes are identified in the abdomen or  pelvis.    VASCULATURE: Unremarkable.    ADDITIONAL FINDINGS: None.    MUSCULOSKELETAL: Unremarkable.      Impression    IMPRESSION:   1.  Mild patchy groundglass opacities at both lung bases, greater on  the left, are nonspecific, but could be infectious in etiology.  2.  Mild circumferential thickening of the distal esophagus may be  inflammatory. Esophageal neoplasm is considered less likely from this  appearance, but is not excluded.  3.  Mild diffuse bladder wall thickening is nonspecific, but could be  related to prostatic enlargement and/or lack of distention.  4.  Mild splenomegaly.    CLOTILDE RODRIGUEZ MD          SYSTEM ID:  [id number]   Chest XR,  PA & LAT    Narrative    CHEST TWO VIEWS   6/30/2021 4:17 PM     HISTORY: Left lower lobe infiltrate on prior CT. Evaluate for  additional infiltrate.    COMPARISON: None.      Impression    IMPRESSION: PA and lateral views of the chest. Lungs are clear. Subtle  opacities at the lung bases described on CT is not well appreciated.  Pneumonia remains in the differential based on prior CT findings.  Heart is normal in size. No effusions are evident. No pneumothorax.    FRITZ MADRID MD          SYSTEM ID:  [id number]           Disclaimer: This note consists of symbols derived from keyboarding, dictation and/or voice recognition software. As a result, there may be errors in the script that have gone undetected. Please consider this when interpreting information found in this chart.

## 2021-07-02 NOTE — PLAN OF CARE
End of Shift Summary  For vital signs and complete assessments, please see documentation flowsheets.     Pertinent assessments: A/O x 4. Denies pain, nausea, and SOB. Active bowel sounds, abdomen soft and non tender. Tolerating full liquids.     Major Shift Events: uneventful    Treatment Plan: Continue to advance diet as patient can tolerate. IV fluids, IV Abx.

## 2021-07-02 NOTE — PLAN OF CARE
End of Shift Summary  For vital signs and complete assessments, please see documentation flowsheets.     Pertinent assessments: Pt up ad cheo, A&Ox4. Abdomen soft, nontender. No c/o pain, afebrile. Diet advanced, tolerating fine. HGB stable.     Major Shift Events: none    Treatment Plan: Possible discharge later today

## 2021-07-02 NOTE — PROVIDER NOTIFICATION
Asked MD if we should hold Lovenox dose this evening since PLT yesterday were 214, today they were 144. His hgb dropped yesterday from 14 yesterday, to 10.4 today. Monitoring hgb for possible bleeding.     Addendum: Per Dr Scarlet crisostomo to give Lovenox as labs likely were decreased d/t hemodilution.

## 2021-07-03 NOTE — PLAN OF CARE
AVS reviewed with patient. All questions answered. Pt aware of need to  filled prescriptions at his [facility]. Pt also aware of all follow ups need post-hospitalization and states that some appts have  already been made. No further needs identified. Given wheelchair transport to personal vehicle where family to transport home. Stable condition at time of discharge.

## 2021-11-14 PROBLEM — U07.1 COVID-19: Status: ACTIVE | Noted: 2021-01-01

## 2021-11-14 PROBLEM — M62.81 GENERALIZED MUSCLE WEAKNESS: Status: ACTIVE | Noted: 2021-01-01

## 2021-11-14 PROBLEM — U07.1 INFECTION DUE TO 2019 NOVEL CORONAVIRUS: Status: ACTIVE | Noted: 2021-01-01

## 2021-11-14 PROBLEM — R53.81 PHYSICAL DECONDITIONING: Status: ACTIVE | Noted: 2021-01-01

## 2021-11-14 NOTE — ED NOTES
[facility]  ED Nurse Handoff Report    Rosanna Romero is a 66 year old male   ED Chief complaint: Fall and Covid Concern  . ED Diagnosis:   Final diagnoses:   None     Allergies: No Known Allergies    Code Status: Full Code  Activity level - Baseline/Home:  Independent. Activity Level - Current:   Assist X 2. Lift room needed: No. Bariatric: No   Needed: No   Isolation: Yes. Infection: Not Applicable  COVID r/o and special precautions.     Vital Signs:   Vitals:    11/14/21 1400 11/14/21 1410 11/14/21 1445 11/14/21 1515   BP: (!) 158/87 (!) 154/88 (!) 160/89 (!) 153/90   Pulse: 89 85 87 89   Resp:  18 25 27   Temp:       TempSrc:       SpO2: 93% 96% 95% 95%       Cardiac Rhythm:  ,      Pain level:    Patient confused: No. Patient Falls Risk: Yes.   Elimination Status: Has voided   Patient Report - Initial Complaint: Fall, Covid Concern. Focused Assessment:   Pt presents today with increased weakness at home, COVID positive. Pt more confused upon arrival, pt is now A&O x4. Pt states he normally gets around ok with a cane at home. Here with increased weakness he is a 2 person assist. GCS-15.   Tests Performed:   Labs Ordered and Resulted from Time of ED Arrival to Time of ED Departure   GLUCOSE BY METER - Abnormal       Result Value    GLUCOSE BY METER POCT 212 (*)    COMPREHENSIVE METABOLIC PANEL - Abnormal    Sodium 134      Potassium 3.7      Chloride 99      Carbon Dioxide (CO2) 25      Anion Gap 10      Urea Nitrogen 27      Creatinine 1.22      Calcium 8.0 (*)     Glucose 231 (*)     Alkaline Phosphatase 80      AST 21      ALT 13      Protein Total 6.5 (*)     Albumin 2.8 (*)     Bilirubin Total 0.6      GFR Estimate 61     ROUTINE UA WITH MICROSCOPIC REFLEX TO CULTURE - Abnormal    Color Urine Yellow      Appearance Urine Clear      Glucose Urine 70  (*)     Bilirubin Urine Negative      Ketones Urine Negative      Specific Gravity Urine 1.020      Blood Urine Moderate (*)     pH Urine  5.5      Protein Albumin Urine 100  (*)     Urobilinogen Urine Normal      Nitrite Urine Negative      Leukocyte Esterase Urine Negative      Mucus Urine Present (*)     RBC Urine 8 (*)     WBC Urine 2      Squamous Epithelials Urine <1      Granular Casts Urine 7 (*)    CBC WITH PLATELETS AND DIFFERENTIAL - Abnormal    WBC Count 7.4      RBC Count 4.39 (*)     Hemoglobin 12.8 (*)     Hematocrit 38.3 (*)     MCV 87      MCH 29.2      MCHC 33.4      RDW 12.8      Platelet Count 122 (*)     % Neutrophils 79      % Lymphocytes 11      % Monocytes 10      % Eosinophils 0      % Basophils 0      % Immature Granulocytes 0      NRBCs per 100 WBC 0      Absolute Neutrophils 5.8      Absolute Lymphocytes 0.8      Absolute Monocytes 0.7      Absolute Eosinophils 0.0      Absolute Basophils 0.0      Absolute Immature Granulocytes 0.0      Absolute NRBCs 0.0       XR Chest 1 View   Final Result   IMPRESSION: Shallow inspiration. Heart size magnified in AP projection with normal vascularity. Right midlung and medial left basilar airspace opacities. No pneumothorax nor pleural effusion.      Findings could represent early Covid pneumonia.      Head CT w/o contrast   Final Result   IMPRESSION:   HEAD CT:   1.  No acute intracranial process.   2.  Mild nonspecific white matter changes most likely due to chronic microvascular ischemic disease.      FACIAL BONE CT:   1.  No facial bone or mandibular fracture.   2.  Marked odontogenic disease of the right maxillary premolar.      CT Facial Bones without Contrast   Final Result   IMPRESSION:   HEAD CT:   1.  No acute intracranial process.   2.  Mild nonspecific white matter changes most likely due to chronic microvascular ischemic disease.      FACIAL BONE CT:   1.  No facial bone or mandibular fracture.   2.  Marked odontogenic disease of the right maxillary premolar.        . Abnormal Results: COVID positive.   Treatments provided: IV Fluids  Family Comments: SonRzaa updated.  OBS  brochure/video discussed/provided to patient:  No  ED Medications:   Medications   lactated ringers BOLUS 1,000 mL (1,000 mLs Intravenous New Bag 11/14/21 1319)     Drips infusing:  No  For the majority of the shift, the patient's behavior Green. Interventions performed were Call light within reach..    Sepsis treatment initiated: No     Patient tested for COVID 19 prior to admission: NO, pt positive for COVID 11/13.    ED Nurse Name/Phone Number: Von Dan RN,   3:26 PM    RECEIVING UNIT ED HANDOFF REVIEW    Above ED Nurse Handoff Report was reviewed: Yes  Reviewed by: Ramsey Multani RN on November 14, 2021 at 5:10 PM

## 2021-11-14 NOTE — H&P
[facility]    History and Physical  Hospitalist       Date of Admission:  11/14/2021    Assessment and Plan:      Rosanna Romero is a 66 year old male male with history of hypertension, hyperlipidemia, only discharged from our facility for sepsis secondary to community-acquired pneumonia and gastroenteritis who presents with symptoms of weakness, falls and recent diagnosis of Covid 11/13/2021.      In the ED vitals temp 98 pulse 89 Abdulaziz rate 17 /90 95% on room air.  Labs sodium 134 potassium 3.7 BUN/creatinine of 27/1.22 calcium less than 8 albumin 2.8 blood glucose 231, globin hematocrit 12.8/38.3 platelets of 122.  He was given 1 L IV fluids and admitted for further management of his weakness and falls related to Covid pneumonia. CXR shows finding of COVID pneumonia, CT head no acute abnormality, facial bone CT Marked odontogenic disease of the right maxillary premolar.    Acute Covid pneumonitis  -Monitor hold off treatment with prednisone or remdesivir is on room air not hypoxic.  - will add pro calcitonin    Weakness/ falls  -Most likely related to his acute infection, will monitor on telemetry, continue to trend troponin  -Order orthostatic  -PT OT consult     Hypertension  - hold in the setting of borderline blood pressures    Hyperlipidemia  -Resume medication once reconciled    Diabetes myelitis  -Insulin sliding scale and diabetic diet while in the hospital    Chronic anemia/thrombocytopenia  - monitor iron studies in AM  ---------     # Code status:   # Anticipated discharge date and Disposition:1-2 days  # DVT: Lovenox   # IVF: Normal saline at 75 ml/hour                       Jyoti Bean MD  Text Page (7am - 6pm, M-F)          Primary Care Physician   [facility]    Chief Complaint   weakness    History is obtained from the patient    History of Present Illness   Rosanna Romero is a 66 year old male who presents with weakness.  Patient recently diagnosed with  Covid on the 1 per 1321.  He has been having increased weakness of the past week poor oral intake, he states appetite still not great yet. This a.m. patient was found on the floor after an unwitnessed fall.  Patient fell forward hit his head unsure if he had any loss of conscious.  He states he was getting out of bed and felt dizzy and fell, and hit his head. He denies any chest pain or shortness of breath.  He currently resides with his son and daughter-in-law.  He is vaccinated against Covid in spring 2021. Other then generalized weakness, poor appeptite and dizziness patient has minimal complaints    Past Medical History    I have reviewed this patient's medical history and updated it with pertinent information if needed.   No past medical history on file.    Past Surgical History   I have reviewed this patient's surgical history and updated it with pertinent information if needed.  Past Surgical History:   Procedure Laterality Date     C UNLISTED PROCEDURE, ABDOMEN/PERITONEUM/OMENTUM      Description: Hernia Repair;  Proc Date: 03/09/2009;  Comments: ventral with bovine graft and re-do because of incarceration May 2009     C UNLISTED PROCEDURE, ABDOMEN/PERITONEUM/OMENTUM      Description: Hernia Repair;  Recorded: 02/03/2010;  Comments: redo ventral hernia failed bovine graft with incarceration     EYE SURGERY  2016    cataract     HC KNEE SCOPE, DIAGNOSTIC      Description: Arthroscopy Knee Right;  Recorded: 02/03/2009;     HC REPAIR UMBILICAL KATJA,<6Y/O,REDUC      Description: Umbilical Hernia Repair;  Recorded: 02/03/2009;       Prior to Admission Medications   Prior to Admission Medications   Prescriptions Last Dose Informant Patient Reported? Taking?   allopurinol (ZYLOPRIM) 300 MG tablet   No No   Sig: [ALLOPURINOL (ZYLOPRIM) 300 MG TABLET] TAKE ONE TABLET BY MOUTH DAILY.   amLODIPine (NORVASC) 10 MG tablet   No No   Sig: [AMLODIPINE (NORVASC) 10 MG TABLET] TAKE 1 TABLET(10 MG) BY MOUTH DAILY    atorvastatin (LIPITOR) 10 MG tablet   No No   Sig: [ATORVASTATIN (LIPITOR) 10 MG TABLET] TAKE 1 TABLET BY MOUTH EVERY DAY   atorvastatin (LIPITOR) 10 MG tablet   No No   Sig: Take 1 tablet (10 mg) by mouth every evening   cloNIDine HCl (CATAPRES) 0.2 MG tablet   No No   Sig: [CLONIDINE HCL (CATAPRES) 0.2 MG TABLET] TAKE 1 TABLET BY MOUTH ONCE DAILY   gabapentin (NEURONTIN) 300 MG capsule   No No   Sig: [GABAPENTIN (NEURONTIN) 300 MG CAPSULE] Take 1 capsule (300 mg total) by mouth at bedtime as needed.   glipiZIDE (GLUCOTROL XL) 10 MG 24 hr tablet   Yes No   Sig: Take 10 mg by mouth daily (with breakfast)   lisinopril-hydrochlorothiazide (PRINZIDE,ZESTORETIC) 20-25 mg per tablet   No No   Sig: [LISINOPRIL-HYDROCHLOROTHIAZIDE (PRINZIDE,ZESTORETIC) 20-25 MG PER TABLET] TAKE 1 TABLET BY MOUTH DAILY   metFORMIN (GLUCOPHAGE-XR) 500 MG 24 hr tablet   Yes No   Sig: Take 2,000 mg by mouth daily (with breakfast)   metoprolol succinate (TOPROL-XL) 100 MG 24 hr tablet   No No   Sig: [METOPROLOL SUCCINATE (TOPROL-XL) 100 MG 24 HR TABLET] TAKE 1 TABLET(100 MG) BY MOUTH DAILY      Facility-Administered Medications: None     Allergies   No Known Allergies    Social History   I have reviewed this patient's social history and updated it with pertinent information if needed. Rosanna Romero  reports that he has never smoked. He does not have any smokeless tobacco history on file. He reports current alcohol use.    Family History   Family history reviewed with patient and is noncontributory.    ROS  12 point review of system discussed with patient negative as per HPI       Physical Exam   Temp: 98  F (36.7  C) Temp src: Temporal BP: (!) 153/90 Pulse: 89   Resp: 27 SpO2: 95 % O2 Device: None (Room air)    Vital Signs with Ranges  Temp:  [98  F (36.7  C)] 98  F (36.7  C)  Pulse:  [] 89  Resp:  [17-30] 27  BP: (148-175)/(86-96) 153/90  SpO2:  [93 %-97 %] 95 %  0 lbs 0 oz    General: Pt in NAD, normal appearance  HEENT: HERO GIRALDO,  normocephalic / atraumatic no cervical LAD, no bruit, no pallor, WNL oropharynx, neck supple  Cardiac: +S1, S2, RRR, no MRG, no edema  Lungs: Bibasilar crackles, normal breathing without accessory muscle usage, no wheezing, rhonchi or crackles  GI: soft NT/ND +bowel sounds all quadrants  Psych: normal mood and affect, A&Ox3  Neurological: A&O x3,non focal  Skin: warm, dry, normal turgor, no rash    Data   Data reviewed today:  I personally reviewed the EKG tracing showing unchanged from previous ecg.  Recent Labs   Lab 11/14/21  1318 11/14/21  1256   WBC 7.4  --    HGB 12.8*  --    MCV 87  --    *  --      --    POTASSIUM 3.7  --    CHLORIDE 99  --    CO2 25  --    BUN 27  --    CR 1.22  --    ANIONGAP 10  --    GRAZYNA 8.0*  --    * 212*   ALBUMIN 2.8*  --    PROTTOTAL 6.5*  --    BILITOTAL 0.6  --    ALKPHOS 80  --    ALT 13  --    AST 21  --        Recent Results (from the past 24 hour(s))   CT Facial Bones without Contrast    Narrative    EXAM: CT HEAD W/O CONTRAST, CT FACIAL BONES WITHOUT CONTRAST  LOCATION: [facility]  DATE/TIME: 11/14/2021 1:41 PM    INDICATION: Fall, forehead contusion.  COMPARISON: Head CT 5/8/2017.  TECHNIQUE:   1) Routine CT Head without IV contrast. Multiplanar reformats. Dose reduction techniques were used.  2) Routine CT Facial Bones without IV contrast. Multiplanar reformats. Dose reduction techniques were used.    FINDINGS:  HEAD CT:   INTRACRANIAL CONTENTS: No intracranial hemorrhage, extraaxial collection, or mass effect. No CT evidence of acute infarct. Mild presumed chronic small vessel ischemic changes. The ventricles and sulci are normal for age.     OSSEOUS STRUCTURES/SOFT TISSUES: No significant abnormality.     FACIAL BONE CT:  OSSEOUS STRUCTURES/SOFT TISSUES: No localized soft tissue swelling/inflammation. No facial bone fracture or malalignment.    Multiple carious lesions in the teeth. Prominent lucency around the tooth roots of the  right maxillary premolar.    ORBITAL CONTENTS: No acute abnormality.    SINUSES: Mild scattered mucosal thickening in the paranasal sinuses. No air-fluid layers or hyperdense layering hemorrhage within the sinuses.      Impression    IMPRESSION:  HEAD CT:  1.  No acute intracranial process.  2.  Mild nonspecific white matter changes most likely due to chronic microvascular ischemic disease.    FACIAL BONE CT:  1.  No facial bone or mandibular fracture.  2.  Marked odontogenic disease of the right maxillary premolar.   Head CT w/o contrast    Narrative    EXAM: CT HEAD W/O CONTRAST, CT FACIAL BONES WITHOUT CONTRAST  LOCATION: [facility]  DATE/TIME: 11/14/2021 1:41 PM    INDICATION: Fall, forehead contusion.  COMPARISON: Head CT 5/8/2017.  TECHNIQUE:   1) Routine CT Head without IV contrast. Multiplanar reformats. Dose reduction techniques were used.  2) Routine CT Facial Bones without IV contrast. Multiplanar reformats. Dose reduction techniques were used.    FINDINGS:  HEAD CT:   INTRACRANIAL CONTENTS: No intracranial hemorrhage, extraaxial collection, or mass effect. No CT evidence of acute infarct. Mild presumed chronic small vessel ischemic changes. The ventricles and sulci are normal for age.     OSSEOUS STRUCTURES/SOFT TISSUES: No significant abnormality.     FACIAL BONE CT:  OSSEOUS STRUCTURES/SOFT TISSUES: No localized soft tissue swelling/inflammation. No facial bone fracture or malalignment.    Multiple carious lesions in the teeth. Prominent lucency around the tooth roots of the right maxillary premolar.    ORBITAL CONTENTS: No acute abnormality.    SINUSES: Mild scattered mucosal thickening in the paranasal sinuses. No air-fluid layers or hyperdense layering hemorrhage within the sinuses.      Impression    IMPRESSION:  HEAD CT:  1.  No acute intracranial process.  2.  Mild nonspecific white matter changes most likely due to chronic microvascular ischemic disease.    FACIAL BONE  CT:  1.  No facial bone or mandibular fracture.  2.  Marked odontogenic disease of the right maxillary premolar.   XR Chest 1 View    Narrative    EXAM: XR CHEST 1 VIEW  LOCATION: [facility]  DATE/TIME: 11/14/2021 1:23 PM    INDICATION: Syncope, status post fall. Covid infection.  COMPARISON: 6/30/2021      Impression    IMPRESSION: Shallow inspiration. Heart size magnified in AP projection with normal vascularity. Right midlung and medial left basilar airspace opacities. No pneumothorax nor pleural effusion.    Findings could represent early Covid pneumonia.

## 2021-11-14 NOTE — ED NOTES
Pt attempted to stand, pt needed assist of 2. PT unable to take steps without assistance of 2 and ques for taking steps, MD notified.

## 2021-11-14 NOTE — ED TRIAGE NOTES
"Presents with son after fall at home. Pt covid +. Son reports severe decline in cognition and strength over the past week which lead him to be tested. Per son pt is A&O x 4 at baseline. Tested postitie yesterday. Found pt on floor in room around 0500. Pt is verbal in triage only with prompting. son states \"he's just too weak.\"  Abrasions noted to right forehead, right shoulder and nose, son reports blood found on pts face \"may have been from nose.\" Hx DMII    Addendum: Son reports pt hasn't eaten or had BM in about 1 week.   "

## 2021-11-14 NOTE — ED PROVIDER NOTES
History   Chief Complaint:  Fall and Covid Concern     HPI   Rosanna Romero is a 66 year old male who presents for evaluation of fall and generalized weakness in the setting of known COVID-19 infection (tested positive 11/13/21). Over the past week, the patient has been increasingly weak. His son states that he as not been eating or having bowel movements. He also feels the patient has had a severe cognitive decline over the last week. Around 0500 this morning, the patient was found on the floor in his room after an unwitnessed fall. He states that he fell forward and hit his head but is unsure if he lost consciousness. He has abrasions to his right forehead, right shoulder, and nose. He denies chest pain, shortness of breath, vomiting, or diarrhea. The patient lives at home with his son and daughter in law. The patient was vaccinated against COVID-19 in Spring 2021.    Review of Systems   Constitutional: Positive for activity change and appetite change.   Respiratory: Negative for shortness of breath.    Cardiovascular: Negative for chest pain.   Gastrointestinal: Negative for blood in stool, constipation, diarrhea and vomiting.   Skin: Positive for wound.   Neurological: Positive for weakness. Negative for syncope.   Psychiatric/Behavioral: Positive for confusion and decreased concentration.   All other systems reviewed and are negative.        Allergies:  The patient has no known allergies.     Medications:  allopurinol (ZYLOPRIM) 300 MG tablet  amLODIPine (NORVASC) 10 MG tablet  atorvastatin (LIPITOR) 10 MG tablet  atorvastatin (LIPITOR) 10 MG tablet  cloNIDine HCl (CATAPRES) 0.2 MG tablet  gabapentin (NEURONTIN) 300 MG capsule  glipiZIDE (GLUCOTROL XL) 10 MG 24 hr tablet  lisinopril-hydrochlorothiazide (PRINZIDE,ZESTORETIC) 20-25 mg per tablet  metFORMIN (GLUCOPHAGE-XR) 500 MG 24 hr tablet  metoprolol succinate (TOPROL-XL) 100 MG 24 hr tablet    Past Medical History:    Lactic acidosis   Acute kidney injury    Community acquired bilateral lower lobe pneumonia   Gout  Cardiomyopathy  Hypertension  Sleep apnea  Nephrolithiasis  Chronic reflux esophagitis  Alcoholic fatty liver    Past Surgical History:    Ventral with bovine graft   Hernia repair  Cataract surgery   Knee arthroscopy   Umbilical hernia repair    Family History:    Heart disease - father, sister   Diabetes - sister, brother  Colon cancer - sister  Stomach cancer - brother  Ovarian cancer - sister    Social History:  Presents to the ED: via EMS  PCP: [facility]    Physical Exam     Patient Vitals for the past 24 hrs:   BP Temp Temp src Pulse Resp SpO2   11/14/21 1515 (!) 153/90 -- -- 89 27 95 %   11/14/21 1445 (!) 160/89 -- -- 87 25 95 %   11/14/21 1410 (!) 154/88 -- -- 85 18 96 %   11/14/21 1400 (!) 158/87 -- -- 89 -- 93 %   11/14/21 1340 -- -- -- 87 30 95 %   11/14/21 1330 (!) 175/88 -- -- 87 28 96 %   11/14/21 1315 (!) 163/96 -- -- 89 28 97 %   11/14/21 1300 (!) 154/86 -- -- 96 28 97 %   11/14/21 1244 (!) 148/88 98  F (36.7  C) Temporal 100 17 97 %       Physical Exam  HENT: Mucous membranes dry, no rhinorrhea  Eyes: periorbital tissues and sclera normal   Neck: supple, no abnormal swelling  Lungs: Mild tachypnea, no significant accessory muscle use, lungs clear to auscultation  CV: rrr, no m/r/g, ppi  Abd: soft, nontender, nondistended, no rebound/masses/guarding/hsm  Ext: no peripheral edema  Skin: warm, dry, well perfused, no rashes/bruising/lesions on exposed skin  Neuro: Awake, alert, cranial nerves II through XII intact symmetric, 5 out of 5  strength, 5 out of 5 plantar flexion dorsiflexion.  Decreased sensation to light touch bilateral lower extremities to the knee (chronic due to neuropathy and chronic)   psych: Normal mood, normal affect            Emergency Department Course     ECG:  ECG taken at 1316, ECG read at 1322  Sinus rhythm with premature supraventricular complexes   Septal infarct, age undetermined   Abnormal ECG    Rate 97 bpm. ND interval 14 ms. QRS duration 92 ms. QT/QTc 388/492 ms. P-R-T axes 57 80 34.    Imaging:  XR Chest 1 View   Final Result   IMPRESSION: Shallow inspiration. Heart size magnified in AP projection with normal vascularity. Right midlung and medial left basilar airspace opacities. No pneumothorax nor pleural effusion.      Findings could represent early Covid pneumonia.      Head CT w/o contrast   Final Result   IMPRESSION:   HEAD CT:   1.  No acute intracranial process.   2.  Mild nonspecific white matter changes most likely due to chronic microvascular ischemic disease.      FACIAL BONE CT:   1.  No facial bone or mandibular fracture.   2.  Marked odontogenic disease of the right maxillary premolar.      CT Facial Bones without Contrast   Final Result   IMPRESSION:   HEAD CT:   1.  No acute intracranial process.   2.  Mild nonspecific white matter changes most likely due to chronic microvascular ischemic disease.      FACIAL BONE CT:   1.  No facial bone or mandibular fracture.   2.  Marked odontogenic disease of the right maxillary premolar.        Report per radiology    Laboratory:  Labs Ordered and Resulted from Time of ED Arrival to Time of ED Departure   GLUCOSE BY METER - Abnormal       Result Value    GLUCOSE BY METER POCT 212 (*)    COMPREHENSIVE METABOLIC PANEL - Abnormal    Sodium 134      Potassium 3.7      Chloride 99      Carbon Dioxide (CO2) 25      Anion Gap 10      Urea Nitrogen 27      Creatinine 1.22      Calcium 8.0 (*)     Glucose 231 (*)     Alkaline Phosphatase 80      AST 21      ALT 13      Protein Total 6.5 (*)     Albumin 2.8 (*)     Bilirubin Total 0.6      GFR Estimate 61     ROUTINE UA WITH MICROSCOPIC REFLEX TO CULTURE - Abnormal    Color Urine Yellow      Appearance Urine Clear      Glucose Urine 70  (*)     Bilirubin Urine Negative      Ketones Urine Negative      Specific Gravity Urine 1.020      Blood Urine Moderate (*)     pH Urine 5.5      Protein Albumin Urine 100   (*)     Urobilinogen Urine Normal      Nitrite Urine Negative      Leukocyte Esterase Urine Negative      Mucus Urine Present (*)     RBC Urine 8 (*)     WBC Urine 2      Squamous Epithelials Urine <1      Granular Casts Urine 7 (*)    CBC WITH PLATELETS AND DIFFERENTIAL - Abnormal    WBC Count 7.4      RBC Count 4.39 (*)     Hemoglobin 12.8 (*)     Hematocrit 38.3 (*)     MCV 87      MCH 29.2      MCHC 33.4      RDW 12.8      Platelet Count 122 (*)     % Neutrophils 79      % Lymphocytes 11      % Monocytes 10      % Eosinophils 0      % Basophils 0      % Immature Granulocytes 0      NRBCs per 100 WBC 0      Absolute Neutrophils 5.8      Absolute Lymphocytes 0.8      Absolute Monocytes 0.7      Absolute Eosinophils 0.0      Absolute Basophils 0.0      Absolute Immature Granulocytes 0.0      Absolute NRBCs 0.0     TROPONIN I     Emergency Department Course:    Reviewed:  I reviewed the patient's nursing notes, vitals and past medical history.     Assessments:  1300 I performed an exam of the patient in room ED12 as documented above.  1435 I spoke with the patient's son regarding the patient's presentation and plan of care.  1515 I updated the patient on results and discussed plan of care.    Consults:    1605 I spoke with Dr. Bean of the hospitalist service regarding patient's presentation, findings, and plan of care.    Interventions:  1319 Lactated Ringers BOLUS, 1000 mL, IV    Disposition:  The patient was admitted to the hospital     Impression & Plan     Medical Decision Makin-year-old male with known Covid currently living with his son and daughter-in-law with fall this morning and worsening generalized weakness over the last week.  Trauma work-up negative as above.  Mild tachypnea no significant hypoxia.  No significant electrolyte abnormality or renal failure however on a road test the patient is a assist of 2 and so from a deconditioning standpoint falls risk and safe environment standpoint  recommend admission to observation until he is stronger and can return home.      Covid-19  Rosanna Romero was evaluated during a global COVID-19 pandemic, which necessitated consideration that the patient might be at risk for infection with the SARS-CoV-2 virus that causes COVID-19.   Applicable protocols for evaluation were followed during the patient's care.   COVID-19 was considered as part of the patient's evaluation. The plan for testing is:  a test was obtained during this visit.    Diagnosis:    ICD-10-CM    1. Infection due to 2019 novel coronavirus  U07.1    2. Generalized muscle weakness  M62.81    3. Physical deconditioning  R53.81      Scribe Disclosure:  I, Jennifer Resendiz, am serving as a scribe at 12:50 PM on 11/14/2021 to document services personally performed by Tanner Erazo MD based on my observations and the provider's statements to me.    This note was completed in part using Dragon voice recognition software. Although reviewed after completion, some word and grammatical errors may occur.      Tanner Erazo MD  11/14/21 2020

## 2021-11-15 NOTE — PLAN OF CARE
ROOM # 220    Living Situation (if not independent, order SW consult): home at [facility]  Facility name:  : son     Activity level at baseline: indepenent  Activity level on admit: assist x2      Patient registered to observation; given Patient Bill of Rights; given the opportunity to ask questions about observation status and their plan of care.  Patient has been oriented to the observation room, bathroom and call light is in place.    Discussed discharge goals and expectations with patient/family.     OBSERVATION patient IN TIME: 1800

## 2021-11-15 NOTE — PROVIDER NOTIFICATION
3:19 AM  Provider: X  Message: YO: Md note mentions monitoring tele and trops, no tele or trop orders placed. Also has no code status ordered.  Response: Tele ordered w/ Pt care order to discontinue tele if 2nd Trop is negative.    6:05 AM  Provider: X  Message: YO: Tele reported 4 beat run of VT w/ PSVT, Pt denies chest pain, 137/76 HR 84 S1S2. 1st Trop negative.    0621: Tele reported 6 beat run of VT.     0637: Tele reported another 6 beat run of VT followed by another egual run of VT.   Do you want to draw a mag level?  Response: Magnesium level ordered

## 2021-11-15 NOTE — PROGRESS NOTES
[facility]  Hospitalist Progress Note  Chanda Ramirez PA-C 11/15/2021    Reason for Stay (Diagnosis): Fevers/weakness due to COVID 19          Assessment and Plan:      Mr. Rosanna Romero is a 66 year old VACCINATED male with past medical history significant for diabetes  Mellitus (not on insulin), DM neuropathy, HTN, gout, HLP and recent dx of positive COVID 19 (on 11/13-Care Everywhere) who presented with complaints of worsening fever, malaise, weakness.     --Initial Sxs 11/6 (symptoms of fever nausea general weakness malaise loss of taste and smell.)  Mild cough but no shortness of breath or chest pain.  --Went to urgent care 11/13, with continued fevers as well episode of weakness and syncope and hitting his head.  Work-up at that time showed clear chest x-ray, nonischemic EKG and stable labs. Discharged home   --Return to ED 11/14 after unwitnessed fall.   --CT head and face NEGATIVE  --CXR: Right midlung and medial left basilar airspace opacities  --EKG NSR with septal infarct and premature supraventricular beats    Problem List:     #Fever/weakness/s/p fall in the setting of COVID 19    #COVID-19 pneumonia  --Currently day 9 from the initial onset of symptoms.  --Continues to spike fevers, feels fairly weak and lethargic.    --Chest x-ray shows right mid and lower lobe infiltrate  --O2 sats remain around 91 to 93%, given his vitals and appearance, concern for potential decompensation  --Currently not hypoxic, could be candidate for Regeneron.  Will discuss with infectious disease  --Continue gentle IV fluids for now the patient appears to be quite dry  --Monitor O2 sats closely, if not a candidate for Regeneron if he does start to decompensate patient is agreeable to both dexamethasone and remdesivir  --No indication for antibiotics for now, low suspicion for concomitant bacterial pneumonia.  --Procalcitonin is pending, blood cultures and urine pending    Addendum:  Discussed Regeneron candidacy  with Dr. Saenz, at this point he does not qualify.  Instead we will continue to monitor closely his O2 saturation, high suspicion that he probably will become hypoxic.  Once he does put with supplemental O2 and start dexamethasone and remdesivir.    #HTN:  --Blood pressure elevated overnight, and spikes during periods of fever  --PT blood pressure meds reviewed and resumed  --Toprol XL, clonidine, Norvasc, all resumed with parameters  --Lisinopril on hold due to mild AZALIA    #Diabetes mellitus-non-insulin-dependent  --Recent A1c 8.4 on admission, 8.1 in June  --Resume Metformin, glipizide and medium resistance sliding scale.  --Watch blood sugar closely if dexamethasone is initiated, may need short-term Lantus    #Weakness/fall  --Due to COVID 19  --Normally ambulatory with a cane   --PT eval   --Possible need for TCU vs home at discharge  --SW on standby    #Mild AZALIA-suspect due to hypovolemia, poor p.o. intake  --Creatinine 1.27 today   --Continue gentle IV fluids, hold lisinopril    #Vtach overnight   -- Tele reported 3 episodes of VT (4-6 sec in duration)   --Patient remains asymptomatic  --Serial troponin negative  --Potassium 3.5 today magnesium 1.9, will aim to keep potassium above 4 and magnesium above 2  --Continue telemetry monitoring  --Check echocardiogram to ensure no evidence of cardiomyopathy    #Iron deficiency anemia/thrombocytopenia   --Hemoglobin 7.6 today, had been 14 earlier this year.  --Iron levels low consistent with iron deficiency anemia  --No complaints of GI bleed  --Last colonoscopy in 2012, still need to have EGD and colonoscopy as outpatient  --Start iron supplement  --Will request a smear       DVT Prophylaxis: Enoxaparin (Lovenox) SQ  Code Status: Full Code, d/w PT   Discharge Dispo: suspect TCU?  Estimated Disch Date / # of Days until Disch: unclear, will transition to IP status, will at least need 2-3 days         Interval History (Subjective):      Patient  looks very fatigued,  states that he is tired and weak spiking fevers but not wanting to eat.  Denies any chest pain or shortness of breath per se.  No significant cough.  No complaints of palpitations.                  Physical Exam:      Last Vital Signs:  /66 (BP Location: Left arm)   Pulse 84   Temp 100  F (37.8  C) (Oral)   Resp 18   SpO2 92%       Constitutional: Awake, cooperative, no apparent distress, fatigued appearing    Respiratory: Dec bs at bases, o/w clear to auscultation bilaterally, no crackles or wheezing   Cardiovascular: Regular rate and rhythm, normal S1 and S2, and no murmur noted   Abdomen: Normal bowel sounds, soft, non-distended, non-tender   Skin: No rashes, no cyanosis, dry to touch   Neuro: Alert and oriented x3, no weakness, numbness, memory loss   Extremities: No edema, normal range of motion   Other(s):        All other systems: Negative          Medications:      All current medications were reviewed with changes reflected in problem list.         Data:      All new lab and imaging data was reviewed.   Labs:  Recent Labs   Lab 11/15/21  1225 11/15/21  0905 11/15/21  0618 11/14/21  1816 11/14/21  1318   NA  --   --  136  --  134   POTASSIUM  --   --  3.5  --  3.7   CHLORIDE  --   --  102  --  99   CO2  --   --  23  --  25   ANIONGAP  --   --  11  --  10   * 121* 141*   < > 231*   BUN  --   --  36*  --  27   CR  --   --  1.27*  --  1.22   GFRESTIMATED  --   --  58*  --  61   GRAZYNA  --   --  7.9*  --  8.0*    < > = values in this interval not displayed.     Recent Labs   Lab 11/15/21  0618 11/14/21  1318   WBC 5.1 7.4   HGB 11.6* 12.8*   HCT 34.0* 38.3*   MCV 87 87   * 122*     Recent Labs   Lab 11/15/21  0914   SED 34*   .0*     Recent Labs   Lab 11/15/21  0416 11/14/21  1318   TROPONIN <0.015 <0.015     Recent Labs   Lab 11/15/21  0914   TSH 0.46     Recent Labs   Lab 11/14/21  1436   COLOR Yellow   APPEARANCE Clear   URINEGLC 70 *   URINEBILI Negative   URINEKETONE Negative    SG 1.020   UBLD Moderate*   URINEPH 5.5   PROTEIN 100 *   NITRITE Negative   LEUKEST Negative   RBCU 8*   WBCU 2      Imaging:   Results for orders placed or performed during the hospital encounter of 11/14/21   Head CT w/o contrast    Narrative    EXAM: CT HEAD W/O CONTRAST, CT FACIAL BONES WITHOUT CONTRAST  LOCATION: [facility]  DATE/TIME: 11/14/2021 1:41 PM    INDICATION: Fall, forehead contusion.  COMPARISON: Head CT 5/8/2017.  TECHNIQUE:   1) Routine CT Head without IV contrast. Multiplanar reformats. Dose reduction techniques were used.  2) Routine CT Facial Bones without IV contrast. Multiplanar reformats. Dose reduction techniques were used.    FINDINGS:  HEAD CT:   INTRACRANIAL CONTENTS: No intracranial hemorrhage, extraaxial collection, or mass effect. No CT evidence of acute infarct. Mild presumed chronic small vessel ischemic changes. The ventricles and sulci are normal for age.     OSSEOUS STRUCTURES/SOFT TISSUES: No significant abnormality.     FACIAL BONE CT:  OSSEOUS STRUCTURES/SOFT TISSUES: No localized soft tissue swelling/inflammation. No facial bone fracture or malalignment.    Multiple carious lesions in the teeth. Prominent lucency around the tooth roots of the right maxillary premolar.    ORBITAL CONTENTS: No acute abnormality.    SINUSES: Mild scattered mucosal thickening in the paranasal sinuses. No air-fluid layers or hyperdense layering hemorrhage within the sinuses.      Impression    IMPRESSION:  HEAD CT:  1.  No acute intracranial process.  2.  Mild nonspecific white matter changes most likely due to chronic microvascular ischemic disease.    FACIAL BONE CT:  1.  No facial bone or mandibular fracture.  2.  Marked odontogenic disease of the right maxillary premolar.   CT Facial Bones without Contrast    Narrative    EXAM: CT HEAD W/O CONTRAST, CT FACIAL BONES WITHOUT CONTRAST  LOCATION: [facility]  DATE/TIME: 11/14/2021 1:41  PM    INDICATION: Fall, forehead contusion.  COMPARISON: Head CT 5/8/2017.  TECHNIQUE:   1) Routine CT Head without IV contrast. Multiplanar reformats. Dose reduction techniques were used.  2) Routine CT Facial Bones without IV contrast. Multiplanar reformats. Dose reduction techniques were used.    FINDINGS:  HEAD CT:   INTRACRANIAL CONTENTS: No intracranial hemorrhage, extraaxial collection, or mass effect. No CT evidence of acute infarct. Mild presumed chronic small vessel ischemic changes. The ventricles and sulci are normal for age.     OSSEOUS STRUCTURES/SOFT TISSUES: No significant abnormality.     FACIAL BONE CT:  OSSEOUS STRUCTURES/SOFT TISSUES: No localized soft tissue swelling/inflammation. No facial bone fracture or malalignment.    Multiple carious lesions in the teeth. Prominent lucency around the tooth roots of the right maxillary premolar.    ORBITAL CONTENTS: No acute abnormality.    SINUSES: Mild scattered mucosal thickening in the paranasal sinuses. No air-fluid layers or hyperdense layering hemorrhage within the sinuses.      Impression    IMPRESSION:  HEAD CT:  1.  No acute intracranial process.  2.  Mild nonspecific white matter changes most likely due to chronic microvascular ischemic disease.    FACIAL BONE CT:  1.  No facial bone or mandibular fracture.  2.  Marked odontogenic disease of the right maxillary premolar.   XR Chest 1 View    Narrative    EXAM: XR CHEST 1 VIEW  LOCATION: [facility]  DATE/TIME: 11/14/2021 1:23 PM    INDICATION: Syncope, status post fall. Covid infection.  COMPARISON: 6/30/2021      Impression    IMPRESSION: Shallow inspiration. Heart size magnified in AP projection with normal vascularity. Right midlung and medial left basilar airspace opacities. No pneumothorax nor pleural effusion.    Findings could represent early Covid pneumonia.

## 2021-11-15 NOTE — PLAN OF CARE
"PRIMARY DIAGNOSIS: GENERALIZED WEAKNESS w/ COVID-19     GOALS TO BE MET BEFORE DISCHARGE  1. Orthostatic performed: No     2. Tolerating PO medications: Yes     3. Return to near baseline physical activity: No     4. Cleared for discharge by consultants (if involved): No     Discharge Planner Nurse   Safe discharge environment identified: No  Barriers to discharge: Yes          Hypertensive w/ temps up to 102.0 today. PRN Tylenol administered. Triggered sepsis protocol, LA WDL. AxOx4 flat affect, pt can't describe symptoms besides \"I don't feel great\". Tolerating diabetic diet. Denies chest pain or SOB. NS running at 75mL/hr. Multiple bruises and scabs noted on head and face, no open wounds, left open to air. On tele running SR 80s. Tele reported 6 beat run of STACY Acosta notified. Magnesium and Potassium replacement protocols ordered. PT deferred to tomorrow. Pt still on room air, satting 91-93. States infrequent productive cough, but no SOB.  "

## 2021-11-15 NOTE — PLAN OF CARE
PRIMARY DIAGNOSIS: COVID+; GENERALIZED WEAKNESS and ALTERED MENTAL STATUS    Patient lethargic but alert and oriented - giving very short verbal responses and not a thorough  of his own needs/condition at this time, assist x2 with gait belt and walker - needs extensive cueing and is not stable, consistent carb diet - tolerating PO meds - poor appetite and poor PO fluid intake. Fluid bolus/order requested. VS - see progress note and flowsheet. Lung sounds clear but diminished. Pt denies pain, denies SOB. Skin - see flowsheet. Continued monitoring and supportive cares.  OUTPATIENT/OBSERVATION GOALS TO BE MET BEFORE DISCHARGE  1. Orthostatic performed: No    2. Tolerating PO medications: Yes    3. Return to near baseline physical activity: No    4. Cleared for discharge by consultants (if involved): No    Discharge Planner Nurse   Safe discharge environment identified: No  Barriers to discharge: Yes       Entered by: Ramsey Multani 11/14/21    Please review provider order for any additional goals.   Nurse to notify provider when observation goals have been met and patient is ready for discharge.

## 2021-11-15 NOTE — PROGRESS NOTES
Admission from ED at 1800    Patient arrived from ED with elevated temps. Patient did not have an updated temp with VS prior to transfer from ED since 1244, at which time (1244) temp was WDL at 98.0. On arrival from ED temp elevated reading 100.5 on initial check and 102.2 on recheck with second thermometer.    Provider notified (Lucy KUMAR), charge nurse notified, tylenol administered, ice packs placed, and stayed with patient to monitor. Reduced temp of 100.6 at 1848. See flowsheet for all temps and times.    Ramsey Multani, RN

## 2021-11-15 NOTE — PLAN OF CARE
PRIMARY DIAGNOSIS: GENERALIZED WEAKNESS w/ COVID-19    OUTPATIENT/OBSERVATION GOALS TO BE MET BEFORE DISCHARGE  1. Orthostatic performed: No    2. Tolerating PO medications: Yes    3. Return to near baseline physical activity: No    4. Cleared for discharge by consultants (if involved): No    Discharge Planner Nurse   Safe discharge environment identified: No  Barriers to discharge: Yes       Entered by: Carlos Loyola 11/15/2021 5:15 AM  BP (!) 155/83 (BP Location: Left arm)   Pulse 86   Temp 99.4  F (37.4  C) (Oral)   Resp 22   SpO2 90%   Hypertensive w/ mild temp, PRN Tylenol administered. AxOx4 but has yet to demonstrated ability to call appropriately, bed alarm active. Tolerating diabetic diet. Denies chest pain or SOB. IVF Bolus of 1000mL administered as ordered (see MAR). NS running at 75mL/hr. Multiple bruises and scabs noted on head and face, no open wounds, left open to air. On tele running SR 80s. Tele reported 4 beat run of VT w/ PSVT @0536 followed by two more 6 beat runs of VT, MD notified. Magnesium level ordered. Plan to provide PTA meds, and monitor Iron studies, while working w/ PT/OT. Pt agreeable to POC and resting peacefully. Will continue to provide supportive cares.  Please review provider order for any additional goals.   Nurse to notify provider when observation goals have been met and patient is ready for discharge.

## 2021-11-15 NOTE — PLAN OF CARE
PRIMARY DIAGNOSIS: GENERALIZED WEAKNESS w/ COVID-19    OUTPATIENT/OBSERVATION GOALS TO BE MET BEFORE DISCHARGE  1. Orthostatic performed: No    2. Tolerating PO medications: Yes    3. Return to near baseline physical activity: No    4. Cleared for discharge by consultants (if involved): No    Discharge Planner Nurse   Safe discharge environment identified: No  Barriers to discharge: Yes       Entered by: Carlos Loyola 11/15/2021 2:51 AM  BP (!) 142/84 (BP Location: Left arm)   Pulse 70   Temp 97.4  F (36.3  C) (Oral)   Resp 22   SpO2 96%   Hypertensive but otherwise VSS on RA. AxOx4 but has yet to demonstrated ability to call appropriately, bed alarm active. Tolerating diabetic diet. Denies chest pain or SOB. IVF Bolus of 1000mL administered as ordered (see MAR). NS running at 75mL/hr. Multiple bruises and scabs noted on head and face, no open wounds, left open to air. Plan to provide PTA meds, and monitor Iron studies, while working w/ PT/OT. Pt agreeable to POC and resting peacefully. Will continue to provide supportive cares.  Please review provider order for any additional goals.   Nurse to notify provider when observation goals have been met and patient is ready for discharge.

## 2021-11-15 NOTE — PHARMACY-ADMISSION MEDICATION HISTORY
Admission medication history interview status for this patient is complete. See [facility] admission navigator for allergy information, prior to admission medications and immunization status.     Medication history interview done, indicate source(s): Family  Medication history resources (including written lists, pill bottles, clinic record): pill bottles at home (see notes)  Pharmacy: [facility] in [place], [place]    Changes made to PTA medication list:  Added: none  Changed: split lisinopril-hydrochlorothiazide into separate entries to get to current doses, gabapentin (decrease from 300 mg nightly to 100 - 200 mg nightly)  Reported as Not Taking: none  Removed: atorvastatin (removed duplicate entry)    Actions taken by pharmacist (provider contacted, etc): I called Rosanna's son, Raza, who read off prescription bottles his father takes and specifically those he took today. Rosanna had numerous bottles of the same medication, sometimes with different doses or things filled in Spring 2021 or in 2020. The medications Raza knew his father takes are those listed at 1130 today. Other potential issues/confusion from multiple bottles:    1. Atorvastatin 20 mg daily per home bottle but 10 mg in our system with refills therefore I did not remove it; Raza stated he didn't think Rosanna takes this and didn't know a last dose if Rosanna does.     2. Gabapentin 100 mg - 200 mg nightly per home bottle but 300 mg in our system with refills therefore I did not remove it; Raza didn't see a 300 mg bottle at home but stated Rosanna uses the 100 mg caps, one or two, at bedtime.    3. Bottles of indomethacin, colchicine and others from early 2021 or 2020 and Raza wasn't sure if his father takes these therefore I did not add them. Dispense records from outside meds confirms meds are old or non-compliant with how they were prescribed.    Additional medication history information: Ondansetron 4 mg every 8 hours as needed hasn't been started.     Medication  reconciliation/reorder completed by provider prior to medication history?  N    Prior to Admission medications    Medication Sig Last Dose Taking? Auth Provider   allopurinol (ZYLOPRIM) 300 MG tablet [ALLOPURINOL (ZYLOPRIM) 300 MG TABLET] TAKE ONE TABLET BY MOUTH DAILY. 11/14/2021 at 1130 Yes Tomy Thomson MD   amLODIPine (NORVASC) 10 MG tablet [AMLODIPINE (NORVASC) 10 MG TABLET] TAKE 1 TABLET(10 MG) BY MOUTH DAILY 11/14/2021 at 1130 Yes Tomy Fischer MD   cloNIDine HCl (CATAPRES) 0.2 MG tablet [CLONIDINE HCL (CATAPRES) 0.2 MG TABLET] TAKE 1 TABLET BY MOUTH ONCE DAILY 11/14/2021 at 1130 Yes Tomy Fischer MD   hydrochlorothiazide (HYDRODIURIL) 25 MG tablet Take 25 mg by mouth daily Past Week Yes Reported, Patient   lisinopril (ZESTRIL) 40 MG tablet Take 40 mg by mouth daily 11/14/2021 at 1130 Yes Reported, Patient   metFORMIN (GLUCOPHAGE-XR) 500 MG 24 hr tablet Take 2,000 mg by mouth daily (with breakfast) 11/14/2021 at 1130 Yes Unknown, Entered By History   metoprolol succinate (TOPROL-XL) 100 MG 24 hr tablet [METOPROLOL SUCCINATE (TOPROL-XL) 100 MG 24 HR TABLET] TAKE 1 TABLET(100 MG) BY MOUTH DAILY 11/14/2021 at 1130 Yes Tomy Thomson MD   atorvastatin (LIPITOR) 10 MG tablet Take 1 tablet (10 mg) by mouth every evening  at Unknown time  Gia Garcia MD   gabapentin (NEURONTIN) 100 MG capsule Take 100-200 mg by mouth nightly as needed  at Unknown time  Reported, Patient   glipiZIDE (GLUCOTROL XL) 10 MG 24 hr tablet Take 10 mg by mouth daily (with breakfast)   Unknown, Entered By History   simvastatin (ZOCOR) 20 MG tablet [SIMVASTATIN (ZOCOR) 20 MG TABLET] Take 1 tablet (20 mg total) by mouth every evening.   Tomy Fischer MD

## 2021-11-16 NOTE — PROGRESS NOTES
Pt is A/O x4, flat affect. VSS, temp 99.3. Denies any shortness of breath, lungs diminished, infrequent/productive cough present. On RA sating in the low 90's. Tele: SR. Incontinent of urine. NS running @ 75. Refused dinner. Pt did stand at the bedside for a few minutes and c/o of feeling dizzy. Pt very weak and needs encouragement.

## 2021-11-16 NOTE — PROVIDER NOTIFICATION
Interpretation of cardiac rhythm per telemetry tech:  and 6 beats of Vtach.  Patient denies any chest pain or discomfort.   BG was 58 and is being corrected now with IV push dextrose and IV fluids are being hanged.     Temp: 99.3  F (37.4  C) Temp src: Oral BP: 133/75 Pulse: 94   Resp: 18 SpO2: 90 % O2 Device: None (Room air)

## 2021-11-16 NOTE — PROGRESS NOTES
[facility] Progress Note     -- On call overhead page for adult rapid response team to patient's room.     -- Upon arrival, I was told that SHS was not needed.      Rev. Yanet Cook M.Div.  Staff   Phone  [phone]

## 2021-11-16 NOTE — PROGRESS NOTES
Alert and oriented x 4. BP and temperature elevated. BG 58. Being corrected. Tele 6 beat of V Tach. Denies any chest pain or discomfort. O2 Sat 80% on RA. Continues to desat. O2 80 on 2 LPM NC. Flow rate increased to 6 LPM NC Sat 79%. Now on Oxymask 15 LPM O2 Sat 90 %. C/o SOB. Having difficulty breathing. 99.1. Current . Tele Tech called again, stated Pt had another 7 beats of VTach. .   At 2230 MD at bedside. Pt had 2 runs of 11 beats of VTach, . Denies pain or discomfort. At 2245 on Hi Flow 50L and FiO2 100%. O2 Sat 88%. On IV Abx - Zosyn. Strict I & O. Inserted indwelling catheter at 0220.    Now on BIPAP. FiO2 60%, 15 L of oxygen. @ 0415 SR 90s w/ PAC      BP (!) 160/93 (BP Location: Left arm)   Pulse 97   Temp 99.1  F (37.3  C) (Oral)   Resp 28   Wt 85.5 kg (188 lb 9.6 oz)   SpO2 (!) 89%   BMI 25.58 kg/m       BP (!) 140/77 (BP Location: Right arm)   Pulse 93   Temp (!) 102.5  F (39.2  C) (Axillary)   Resp 24   Wt 85.5 kg (188 lb 9.6 oz)   SpO2 97%   BMI 25.58 kg/m

## 2021-11-16 NOTE — PROGRESS NOTES
Cross Cover    Called for rapidly progressive hypoxia and respiratory distress    Admit 11/13 with generalized weakness and covid pna without hypoxia    He had low normal sats 90-93% range up untili 10 pm tonight when he started to precipitously drop.  88%->80% with tachypnea and increased WOB.     I was notified and immediately came to check on the patient.  He looked to be in moderate respiratory distress, lung sounds coarse with some crackles in the bases.      Fever also trending up with associated tachycardia.  Furthermore he's had multiple episodes of NSVT throughout the day.       Stopped IVF  Furosemide 40 mg iv x 1  HF->Bipap to ease wob    Stat labs:  Bun/creat 39/1.4 (slightly worse) but otherwise cmp looks ok.  bnp 1000 and trop is undetectable (shocking!).  CBC stable and without leukocytosis    Crp from earlier today 127, procal 2.7, d dime 0.83  BCx obtained at that time too     EKG to my read NSR flipped t's in inferior leads, previously they were flat  CXR to my read looks like bibasilar infiltrates and infiltrate in RML    I know see that he had an echo earlier today showing mild to mod cLVH and normal EF    A/P:  I suspect this is combination of brewing pna (bacterial vs pneumonitis) with possible aspiration based on location, and acute diastolic heart failure with pulmonary edema    Single dose furosemide as above  Pip-tazo for now  Treat fever  Bilateral dopplers to r/o clot in setting of covid (although I think less likely)  Titrate back on respiratory support as able.     45 minutes spent on multiple evaluations, assessment, and medical thought process              We are operating under sub optimal conditions in the setting of a world wide pandemic, hospitals are running at full capacity with limited ICU bed availability. We are providing the best possible patient care with limited resources.

## 2021-11-16 NOTE — PROGRESS NOTES
Hospitalist cross cover note:    Notified by RN of hypoglycemia down to 57. Patient is not tolerating glucose gel. Will switch IVFs to D5 + NS from NS at 100 ml/hour for now. Hold parameters placed on glipizide and metformin. If patient's BG >150 would discontinue IVFs with D5.    Contacted by charge RN of 6 beat run of Vtach where patient was asymptomatic. Patient currently on Metoprolol. Appears patient has had episodes of Vtach the night before with serial troponins negative, magnesium and potassium WNL, and echo today shows EF of 60-65% with mild to moderate LVH. Continue to monitor on telemetry and for symptoms.     Gifty Schroeder PA-C

## 2021-11-16 NOTE — PROGRESS NOTES
"Clinical Swallow Evaluation     11/16/21 0949   General Information   Onset of Illness/Injury or Date of Surgery 11/14/21   Referring Physician Ramirez, Chanda Terry PA-C   Patient/Family Therapy Goal Statement (SLP) did not state   Pertinent History of Current Problem \"Mr. Rosanna Romero is a 66 year old VACCINATED male with past medical history significant for diabetes  Mellitus (not on insulin), DM neuropathy, HTN, gout, HLP and recent dx of positive COVID 19 (on 11/13-Care Everywhere) who presented with complaints of worsening fever, malaise, weakness. Chest x-ray shows right mid and lower lobe infiltrate. I suspect this is combination of brewing pna (bacterial vs pneumonitis) with possible aspiration based on location, and acute diastolic heart failure with pulmonary edema.\"    General Observations Pt alert, limited interaction. Oriented to place, however, not situation/reason for admission   Past History of Dysphagia Pt reported several month history of coughing after eating/drinking with sensation of \"going down the wrong tube\" or getting stuck and having \"narrowing.\" Pt reported having an EGD with dilation several years ago. No records in EMR. 7/1/21 GI note, \"CT scan with thickening of distal esophagus- likely reflux esophagitis. Esophageal neoplasm is considered less likely from this appearance, but is not excluded. Will be evaluated further with upcoming EGD.\"   Type of Evaluation   Type of Evaluation Swallow Evaluation   Oral Motor   Oral Musculature generally intact   Dentition (Oral Motor)   Dentition (Oral Motor) significant number of missing teeth   Vocal Quality/Secretion Management (Oral Motor)   Vocal Quality (Oral Motor) WNL   Secretion Management (Oral Motor) WNL   Comment, Vocal Quality/Secretion Management (Oral Motor) Clear vocal quality; baseline throat clearing and cough   General Swallowing Observations   Current Diet/Method of Nutritional Intake (General Swallowing Observations, NIS) NPO "   Respiratory Support (General Swallowing Observations)   (40LPM HFNC)   Swallowing Evaluation Clinical swallow evaluation   Clinical Swallow Evaluation   Feeding Assistance minimal assistance required   Esophageal Phase of Swallow   Patient reports or presents with symptoms of esophageal dysphagia Yes   Swallowing Recommendations   Diet Consistency Recommendations regular diet;thin liquids (level 0)   Supervision Level for Intake close supervision needed   Mode of Delivery Recommendations bolus size, small;slow rate of intake;food moistened   Swallowing Maneuver Recommendations alternate food and liquid intake;double dry swallow   Monitoring/Assistance Required (Eating/Swallowing) check mouth frequently for oral residue/pocketing;cue for finger/lingual sweep if oral pocketing present;stop eating activities when fatigue is present   Recommended Feeding/Eating Techniques (Swallow Eval) maintain upright sitting position for eating;maintain upright posture during/after eating for 30 minutes;provide 6 smaller meals throughout day;minimize distractions during oral intake;moisten oral mucosa prior to intake   Medication Administration Recommendations, Swallowing (SLP) per pt preference   Comment, Swallowing Recommendations SLP: Pt presents with mild oropharyngeal dysphagia and suspected chronic/significant esophageal dysphagia. Dry cough prior to PO intake. Pt on 40LPM on HFNC. Oral motor exam WNL. Ice chips x2 with no difficulty. Gulping thin liquids by straw with good timing. Delayed dry coughing with no additional s/sx of aspiration. No difficulty with applesauce or crackers. Pt was receptive to cup sips of water, one at a time with no immediate s/sx of aspiration. Pt denied additional intake and requested to rest. Reviewed with pt and education provided on GERD precautions. Okay to continue a regular diet and thin liquids, however, increase use of GERD precautions: Fully upright for all intake; small bites and sips,  eat/drink slowly, alternate solids and liquids, stay upright after eating for 60 minutes; encourage smaller snacks instead of large meals. Would consider an Outpatient Video and Esophagram or GI consult. Will continue to follow while inpatient.    General Therapy Interventions   Planned Therapy Interventions Dysphagia Treatment   Dysphagia treatment Instruction of safe swallow strategies   SLP Therapy Assessment/Plan   Criteria for Skilled Therapeutic Interventions Met (SLP Eval) yes   SLP Diagnosis Dysphagia   Rehab Potential (SLP Eval) good, to achieve stated therapy goals   Therapy Frequency (SLP Eval) 3 times/wk   Therapy Plan Review/Discharge Plan (SLP)   Therapy Plan Review (SLP) evaluation/treatment results reviewed;care plan/treatment goals reviewed;risks/benefits reviewed;current/potential barriers reviewed;participants voiced agreement with care plan;participants included;patient   SLP Discharge Planning    SLP Discharge Recommendation (DC Rec) home with outpatient speech therapy   SLP Rationale for DC Rec Consider OP Video Swallow and Esophagram; GI consult   SLP Brief overview of current status  Okay to continue a regular diet and thin liquids, however, increase use of GERD precautions: Fully upright for all intake; small bites and sips, eat/drink slowly, alternate solids and liquids, stay upright after eating for 60 minutes; encourage smaller snacks instead of large meals    Total Evaluation Time   Total Evaluation Time (Minutes) 15

## 2021-11-16 NOTE — PLAN OF CARE
Lethargic and sleepy today. VSS. BG were low overnight. Fine this shift. Multiple beats of V Tach today. PA aware. Metoprolol given. K and Mg protocol, replacing K currently via IV. No further runs of vtach since Metoprolol was given. Pt was on Bipap overnight. RT switched him to highflow this morning, which he tolerated for several hours. This afternoon on high flow he desatted to 82, Rrt was called. Pt placed back on bipap. Pt had some dysphagia per report. Speech Therapy consulted, recommending small bites and no straws. Pt has no appetite, has only had sips of water today. [place] catheter patent. PT consulted, deferred until tomorrow.

## 2021-11-16 NOTE — PROGRESS NOTES
[facility]  Hospitalist Progress Note  Chanda Ramirez PA-C 11/16/2021    Reason for Stay (Diagnosis): Fevers/weakness due to COVID 19   Date of admission: 11/14          Assessment and Plan:      Mr. Rosanna Romero is a 66 year old VACCINATED male with past medical history significant for diabetes mellitus (not on insulin), DM neuropathy, HTN, gout, HLP and hx of LINDSAY (con-compliant with CPAP) and recent dx of positive COVID 19 (on 11/13-Care Everywhere) who presented with complaints of worsening fever, malaise, weakness.      --Initial Sxs 11/6 (symptoms of fever nausea general weakness malaise loss of taste and smell.)  Mild cough but no shortness of breath or chest pain.  --Went to urgent care 11/13, with continued fevers as well episode of weakness and syncope and hitting his head.  Work-up at that time showed clear chest x-ray, nonischemic EKG and stable labs. Discharged home   --Return to ED 11/14 after unwitnessed fall.   --CT head and face NEGATIVE  --CXR: Right midlung and medial left basilar airspace opacities  --EKG NSR with septal infarct and premature supraventricular beats     Interval hx/overnight events:  Had episode of hypoglycemia down to 50 and Desat to mid 80's evening of 11/15.   Transitioned to High Poli 50L, with sats of 97% but c/o work of breathing.   Ultimately transitioned to BiPap overnight 60% FIO2.   Zosyn started for fevers and cover for potential concomitant bacterial pneumonia. Dexamethasone started.   Doppler ultrasound performed was negative for DVT   Episodes of NSVT overnight, echo performed was negative for any acute findings electrolytes were okay to give 1 dose of 40 mg IV Lasix for up  Possible diastolic heart failure.  --This morning, transitioned back to HFNC sats at 95%   Issue with swallowing today, apparently had some issue with dysphagia in the past?    Problem List:   #Fever/weakness/s/p fall in the setting of COVID 19    #COVID-19 pneumonia  --Ongoing fever  "last night up to 103 along with procal back @ 2.7.   --Repeat CXR 11/15 shows increasing bibasilar opacity c/w worsening PNA.   --Cont Zosyn for potential concomitant bacterial pneumonia (Day1) and to cover for possible aspiration (due to recent issue with dysphagia)  --Cont HFNC for now as able, may need bipap at night (Does have underlying untreated LINDSAY so suspect the cause of precipitous drop on O2 at night).   --Cont Dexamethasone (2/10)  --Remdesvir started (1/5)   --Discussed need for aggressive IS and increase activity in the room     #Questionable dysphagia  --Pt had difficulty managing oral pills per nursing this am  --Sounds like he had issue with dysphagia in the past? (No hx found in epic. No hx of CVA or neurologic conditions)  --Recently had EGD in July via [facility] for \"circumferential thickening of the distal esophagus\"  -- Will request records (sounds like he may amalia some esophagitis hx)  -- S/p Speech eval, no overt aspiration, ok for regular diet and thin liquids but small bolus with GERD precautions.   --May need outpt Speech eval at follow up.      #Episode of hypoglycemia (50's--11/15 pm)  #Diabetes mellitus-non-insulin-dependent  --BG 66-90's this am.   --Will need gentle fluids rosalia now if NPO and AZALIA  --Recent A1c 8.4 on admission, 8.1 in June  --Metformin, glipizide on hold, cont medium resistance sliding scale if needed.  --Watch blood sugar closely, suspect BG may go up with dexamethasone     #Mild AZALIA-suspect due to hypovolemia, poor p.o. intake (baseline Cr 0.9-1.0)  --BUN/Cr elevated 42/1.57 today from  Cr 1.22 on admission   --Given increased SOB last night, with mild BNP elevation at 999, 40 IV lasix given.   --CXR reviewed looks more c/w PNA than CHF.   --Today clinically he looks dry, dark urine with dry mucous membrane and poor po intake. D/w Dr. Morrell, he needs fluids, will give 1L ov fluid over the day then stop.     #Episodes of NSVT--asymptomatic   --Tele w reports of multiple VT " (4-10 sec in duration)   --Patient remains asymptomatic  --Serial troponin negative  --Already on high potassium replacement protocol, and magnesium replacement protocol.   Aim to keep potassium above 4 and magnesium above 2  --Continue telemetry monitoring  --Echocardiogram unremarkable EF 60-65% and mild LVH, Gd1 diastolic dysfunction.     #HTN  --Blood pressure elevated overnight, and spikes during periods of fever  --PTA blood pressure meds reviewed and resumed  --Toprol XL, clonidine, Norvasc, all resumed with parameters  --Lisinopril on hold due to mild AZALIA     #Weakness/fall  --Due to COVID 19  --Normally ambulatory with a cane   --PT eval   --Possible need for TCU vs home at discharge  --SW on standby     #Iron deficiency anemia/thrombocytopenia   --Hemoglobin 11.9 today, had been 14 earlier this year.  --Iron levels low consistent with iron deficiency anemia  --No complaints of GI bleed  --Last colonoscopy in 2012, still need to have EGD and colonoscopy as outpatient  --Start iron supplement  --Will request a peripheral smear         DVT Prophylaxis: Enoxaparin (Lovenox) subcutaneous cont 40 subcutaneous   Code Status: Full Code, d/w pt.   Discharge Dispo: likely TCU   Estimated Disch Date / # of Days until Disch: unclear, will transition to IP status, will at least need 2-3 days   Care d/w Gama Person        Interval History (Subjective):        Events noted yesterday.  This morning seen earlier today.  He has high flow nasal cannula.  Clinically this morning he actually looked pretty good.  He states that he does not feel too short of breath and was actually requested to eat but we were trying to figure out his food status after his speech evaluation.    I saw him again this afternoon around 1:00.  He looked pretty fatigued but did not look like he was having increased work of breathing.  He did desat to 84% on high flow, he has been transitioned to BiPAP again.  Listen to his lungs, he did not have any  significant crackles at all.  I did not think he was fluid overloaded.  I did start him on IV fluids earlier this morning around 9 AM                  Physical Exam:      Last Vital Signs:  /67   Pulse 88   Temp 99.4  F (37.4  C) (Oral)   Resp 26   Wt 83.7 kg (184 lb 8 oz)   SpO2 97%   BMI 25.02 kg/m        Constitutional: Awake, alert, cooperative, no apparent distress   Respiratory: Dec bs but no crackles or wheezing   Cardiovascular: Regular rate and rhythm, normal S1 and S2, and no murmur noted, No JVP   Abdomen: Normal bowel sounds, soft, non-distended, non-tender   Skin: No rashes, no cyanosis, dry to touch   Neuro: Alert and oriented x3, no weakness, numbness, memory loss   Extremities: No edema, normal range of motion   Other(s):        All other systems: Negative          Medications:      All current medications were reviewed with changes reflected in problem list.         Data:      All new lab and imaging data was reviewed.   Labs:       Lab Results   Component Value Date     11/16/2021     11/15/2021     11/15/2021     07/02/2021     07/01/2021     06/30/2021    Lab Results   Component Value Date    CHLORIDE 106 11/16/2021    CHLORIDE 104 11/15/2021    CHLORIDE 102 11/15/2021    CHLORIDE 109 07/02/2021    CHLORIDE 111 07/01/2021    CHLORIDE 103 06/30/2021    Lab Results   Component Value Date    BUN 42 11/16/2021    BUN 39 11/15/2021    BUN 36 11/15/2021    BUN 10 07/02/2021    BUN 18 07/01/2021    BUN 22 06/30/2021      Lab Results   Component Value Date    POTASSIUM 3.0 11/16/2021    POTASSIUM 3.7 11/15/2021    POTASSIUM 3.7 11/15/2021    POTASSIUM 3.4 07/02/2021    POTASSIUM 3.8 07/01/2021    POTASSIUM 3.9 06/30/2021    Lab Results   Component Value Date    CO2 18 11/16/2021    CO2 20 11/15/2021    CO2 23 11/15/2021    CO2 26 07/02/2021    CO2 26 07/01/2021    CO2 27 06/30/2021    Lab Results   Component Value Date    CR 1.57 11/16/2021    CR 1.37  11/15/2021    CR 1.27 11/15/2021    CR 0.82 07/02/2021    CR 1.08 07/01/2021    CR 1.29 06/30/2021        Recent Labs   Lab 11/16/21  0550 11/15/21  2347 11/15/21  1457   WBC 7.8 6.6 7.9   HGB 12.6* 13.6 11.9*   HCT 36.4* 41.4 36.6*   MCV 85 87 90   * 136* 129*     Recent Labs   Lab 11/16/21  0550 11/15/21  0914   SED  --  34*   .0* 127.0*     Recent Labs   Lab 11/16/21  0550 11/15/21  2347 11/15/21  0618   BUN 42* 39* 36*   CR 1.57* 1.37* 1.27*     Recent Labs   Lab 11/15/21  2347 11/15/21  0416 11/14/21  1318   TROPONIN <0.015 <0.015 <0.015     Recent Labs   Lab 11/15/21  0914   TSH 0.46      Imaging:   Results for orders placed or performed during the hospital encounter of 11/14/21   Head CT w/o contrast    Narrative    EXAM: CT HEAD W/O CONTRAST, CT FACIAL BONES WITHOUT CONTRAST  LOCATION: [facility]  DATE/TIME: 11/14/2021 1:41 PM    INDICATION: Fall, forehead contusion.  COMPARISON: Head CT 5/8/2017.  TECHNIQUE:   1) Routine CT Head without IV contrast. Multiplanar reformats. Dose reduction techniques were used.  2) Routine CT Facial Bones without IV contrast. Multiplanar reformats. Dose reduction techniques were used.    FINDINGS:  HEAD CT:   INTRACRANIAL CONTENTS: No intracranial hemorrhage, extraaxial collection, or mass effect. No CT evidence of acute infarct. Mild presumed chronic small vessel ischemic changes. The ventricles and sulci are normal for age.     OSSEOUS STRUCTURES/SOFT TISSUES: No significant abnormality.     FACIAL BONE CT:  OSSEOUS STRUCTURES/SOFT TISSUES: No localized soft tissue swelling/inflammation. No facial bone fracture or malalignment.    Multiple carious lesions in the teeth. Prominent lucency around the tooth roots of the right maxillary premolar.    ORBITAL CONTENTS: No acute abnormality.    SINUSES: Mild scattered mucosal thickening in the paranasal sinuses. No air-fluid layers or hyperdense layering hemorrhage within the sinuses.       Impression    IMPRESSION:  HEAD CT:  1.  No acute intracranial process.  2.  Mild nonspecific white matter changes most likely due to chronic microvascular ischemic disease.    FACIAL BONE CT:  1.  No facial bone or mandibular fracture.  2.  Marked odontogenic disease of the right maxillary premolar.   CT Facial Bones without Contrast    Narrative    EXAM: CT HEAD W/O CONTRAST, CT FACIAL BONES WITHOUT CONTRAST  LOCATION: [facility]  DATE/TIME: 11/14/2021 1:41 PM    INDICATION: Fall, forehead contusion.  COMPARISON: Head CT 5/8/2017.  TECHNIQUE:   1) Routine CT Head without IV contrast. Multiplanar reformats. Dose reduction techniques were used.  2) Routine CT Facial Bones without IV contrast. Multiplanar reformats. Dose reduction techniques were used.    FINDINGS:  HEAD CT:   INTRACRANIAL CONTENTS: No intracranial hemorrhage, extraaxial collection, or mass effect. No CT evidence of acute infarct. Mild presumed chronic small vessel ischemic changes. The ventricles and sulci are normal for age.     OSSEOUS STRUCTURES/SOFT TISSUES: No significant abnormality.     FACIAL BONE CT:  OSSEOUS STRUCTURES/SOFT TISSUES: No localized soft tissue swelling/inflammation. No facial bone fracture or malalignment.    Multiple carious lesions in the teeth. Prominent lucency around the tooth roots of the right maxillary premolar.    ORBITAL CONTENTS: No acute abnormality.    SINUSES: Mild scattered mucosal thickening in the paranasal sinuses. No air-fluid layers or hyperdense layering hemorrhage within the sinuses.      Impression    IMPRESSION:  HEAD CT:  1.  No acute intracranial process.  2.  Mild nonspecific white matter changes most likely due to chronic microvascular ischemic disease.    FACIAL BONE CT:  1.  No facial bone or mandibular fracture.  2.  Marked odontogenic disease of the right maxillary premolar.   XR Chest 1 View    Narrative    EXAM: XR CHEST 1 VIEW  LOCATION: [facility]OSPITAL  DATE/TIME: 11/14/2021 1:23 PM    INDICATION: Syncope, status post fall. Covid infection.  COMPARISON: 6/30/2021      Impression    IMPRESSION: Shallow inspiration. Heart size magnified in AP projection with normal vascularity. Right midlung and medial left basilar airspace opacities. No pneumothorax nor pleural effusion.    Findings could represent early Covid pneumonia.   XR Chest Port 1 View    Narrative    EXAM: XR CHEST PORT 1 VIEW  LOCATION: [facility]  DATE/TIME: 11/15/2021 10:51 PM    INDICATION: hypoxia  COMPARISON: 11/14/2021      Impression    IMPRESSION: Stable cardiomediastinal contours. Increasing bilateral mid and lower lung zone airspace opacities consistent with worsened pneumonia. No visible pneumothorax or pleural effusion.   US Lower Extremity Venous Duplex Bilateral    Narrative    EXAM: US LOWER EXTREMITY VENOUS DUPLEX BILATERAL  LOCATION: [facility]  DATE/TIME: 11/16/2021 2:24 AM    INDICATION: acute hypoxic respiratory failure in one with covid and luis daniel with rapid decompensation in respiratory status, COVID 19 positive  COMPARISON: None.  TECHNIQUE: Venous Duplex ultrasound of bilateral lower extremities with and without compression, augmentation and duplex. Color flow and spectral Doppler with waveform analysis performed.    FINDINGS: Exam includes the common femoral, femoral, popliteal veins as well as segmentally visualized deep calf veins and greater saphenous vein.     RIGHT: No deep vein thrombosis. No superficial thrombophlebitis. No popliteal cyst.    LEFT: No deep vein thrombosis. No superficial thrombophlebitis. No popliteal cyst.      Impression    IMPRESSION:  1.  No deep venous thrombosis in the bilateral lower extremities.   US Abdomen Limited    Narrative    This exam was marked as non-reportable because it will not be read by a   radiologist or a [place] non-radiologist provider.         Echocardiogram Complete     Value     LVEF  60-65%    [facility]    [id number]  [id number]  [id number]  [id number]^MYRNA^RAI^KIRSTIE     [facility]  Echocardiography Laboratory  [address]  [place] [address]     Name: FANTASMA ELLIOTT  MRN: [id number]  : 1955  Study Date: 11/15/2021 02:33 PM  Age: 66 yrs  Gender: Male  Patient Location: [facility]  Reason For Study: Arrhythmia  Ordering Physician: RAI NORRIS  Performed By: Wagner Jauregui RDCS     BSA: 2.0 m2  Height: 72 in  Weight: 181 lb  HR: 76  BP: 161/84 mmHg  ______________________________________________________________________________  Procedure  Complete Portable Echo Adult. Optison (NDC #[id number]) given intravenously.  ______________________________________________________________________________  Interpretation Summary     Left ventricular systolic function is normal.  The visual ejection fraction is 60-65%.  There is mild to moderate concentric left ventricular hypertrophy.  The ascending aorta is Mildly dilated.  Doppler interrogation does not demonstrate signficant stenosis or  insufficiency involving cardiac valves.     No old studies available for comparison.  ______________________________________________________________________________  Left Ventricle  The left ventricle is normal in size. There is mild to moderate concentric  left ventricular hypertrophy. Left ventricular systolic function is normal.  The visual ejection fraction is 60-65%. Grade I or early diastolic  dysfunction. No regional wall motion abnormalities noted. There is no thrombus  seen in the left ventricle.     Right Ventricle  The right ventricle is normal in structure, function and size. There is no  mass or thrombus in the right ventricle.     Atria  Normal left atrial size. Right atrial size is normal. There is no color  Doppler evidence of an atrial shunt. The left atrial appendage is not well  visualized.     Mitral Valve  The mitral valve leaflets appear normal. There is no evidence of  stenosis,  fluttering, or prolapse. There is no mitral regurgitation noted. There is no  mitral valve stenosis.     Tricuspid Valve  Normal tricuspid valve. No tricuspid regurgitation. Right ventricular systolic  pressure could not be approximated due to inadequate tricuspid regurgitation.  There is no tricuspid stenosis.     Aortic Valve  The aortic valve is trileaflet. No aortic regurgitation is present. No aortic  stenosis is present.     Pulmonic Valve  Normal pulmonic valve. There is no pulmonic valvular regurgitation. There is  no pulmonic valvular stenosis.     Vessels  Borderline aortic root dilatation. The ascending aorta is Mildly dilated. The  inferior vena cava is normal. The pulmonary artery is normal size.     Pericardium  The pericardium appears normal. There is no pleural effusion.     Rhythm  Sinus rhythm was noted.  ______________________________________________________________________________  MMode/2D Measurements & Calculations     IVSd: 1.4 cm  LVIDd: 4.4 cm  LVIDs: 3.1 cm  LVPWd: 1.5 cm  FS: 29.3 %  LV mass(C)d: 243.5 grams  LV mass(C)dI: 119.2 grams/m2  Ao root diam: 3.9 cm  LA dimension: 3.1 cm  asc Aorta Diam: 3.9 cm  LA/Ao: 0.80  LVOT diam: 2.3 cm  LVOT area: 4.2 cm2  LA Volume (BP): 50.0 ml  LA Volume Index (BP): 24.5 ml/m2  RWT: 0.67     Doppler Measurements & Calculations  MV E max serafin: 73.5 cm/sec  MV A max serafin: 72.1 cm/sec  MV E/A: 1.0  MV dec time: 0.25 sec  E/E' av.8  Lateral E/e': 14.0  Medial E/e': 13.7     ______________________________________________________________________________  Report approved by: Dr. Godfrey Gomez 11/15/2021 03:20 PM

## 2021-11-16 NOTE — PROVIDER NOTIFICATION
Provider notified patient is been declining since beginning of shift. We need patient seen now. De-sating at 89-90% on Oxymask at 15 LPM. Multiple Vtact. Last one Vtach 7 beats Denies chest pain. Just treated for hypoglycemia. Nurse is been in patient room since 8 pm.

## 2021-11-16 NOTE — PROGRESS NOTES
Pt sats 84-86% on 15 L oxymask. Placed on HFNC 50 L 100%, sats improved very slowly. Sats 97% but still having increased WOB with abdominal muscle use. Pt reporting sob not improving. Pt placed on BIPAP 14/7 and 100% initially. FIO2 titrated to 60%, sats now 96%. WOB slowly improving. RT will monitor.    Geraldine Salguero, RT

## 2021-11-16 NOTE — PROGRESS NOTES
----------------------------------------------------------  HTNPN-XU-KMC SCREEN/FAIL Day 0  ----------------------------------------------------------  EWNPI-YZ-XFL Study (splenic ultrasound treatment for lowering inflammation associated with COVID-19)  ----------------------------------------------------------  Rosanna Romero   Date of Admission: 11/14/2021   Room Number: 0220/0220-01     Date: 11/16/21     Researcher performing screening step: Junior Haji    Notes about visit:  Screening outcome: Passed    If failed, notes about exclusion: N/A    If passed, patient ID: [id number]    Pt body position during skin marking: Sitting up at an angle  Arm position: Arm out creating 45 degree angle    Has the patient received a COVID-19 vaccine?: Yes  If YES, ask patient for as much information about the vaccine to the best of their ability (I.e. brand/date/one or both shots): Pfizer Spring 2021    Other comments    No adverse events reported    ----------------------------------------------------------  IN RESPONSE TO : NKFNW-LY-UQS STUDY VISIT TREATMENT NOTES   ----------------------------------------------------------  *copy/paste the above as the 'summary' for progress note      Rosanna Romero   This patient's status has been monitored by me. 11/16/21 4:08 PM     Based on my review:     I suggest we CONTINUE this patients enrollment in the RZINF-VI-LEN Study.      Signed:  SHEILA  11/16/21 4:08 PM

## 2021-11-17 NOTE — PROGRESS NOTES
----------------------------------------------------------  RSYMY-CZ-QEX STUDY VISIT TREATMENT NOTES   ----------------------------------------------------------  FGRCF-EG-DUX Study (splenic ultrasound treatment for lowering inflammation associated with COVID-19)    ----------------------------------------------------------  Rosanna Romero   Date of Admission: 11/14/2021   Room Number: 0220/0220-01     Participant ID: [id number]  Date: 11/17/21   Study Day 1    Researcher making note/applying tx: Celestina Organ    ULTRASOUND TREATMENT? Yes    IF YES:  MINI Device Serial #: [id number]  Time at the beginning of study visit (opening progress note): 10:01 AM   Time at the end of study visit: 10:25      Pt body position: Sitting up at an angle  Pt arm position: Arm out creating 45 degree angle     NOTES:   First 9-minute stimulation:   START 10:06   STOP 10:15   Was stimulation inturrupted: No   Reason: Doctor came to evaluate the patient for a STAT CAT scan and sat him up for about 30 seconds towards the end of the first stimulation.      Second 9-minute stimulation:   START 10:15    STOP 10:24   Was stimulation inturrupted: No   Reason: N/A     Repeat stimulation if necessary: No   START    STOP        Pt feedback on stimulation:       Other overall notes:  Patient tolerated ultrasound stimulation    AE events?  No adverse events reported    Note: if AE, use smartphrase 'UNTSAE'    ----------------------------------------------------------  PATIENT STATUS  ----------------------------------------------------------    Instructions for 'on-call' study doctor:  Monitor this patient's status, advise researchers if there are any concerns with patients continued enrollment in this study    ----------------------------------------------------------  O2 Device: High Flow Nasal Cannula (HFNC)  FiO2 (%): 100 %  Oxygen Delivery: 50 LPM   Vitals:    11/17/21 0447 11/17/21 0454 11/17/21 0847 11/17/21 0927   BP:  (!) 141/81  (!) 151/85    BP Location:  Left arm     Pulse: 79 80 89 94   Resp:  20 24 (!) 32   Temp:  98.7  F (37.1  C)  98.7  F (37.1  C)   TempSrc:  Oral  Oral   SpO2:  94% 93% 94%   Weight:  82.6 kg (182 lb 1.6 oz)       ----------------------------------------------------------  No results displayed because visit has over 200 results.         ----------------------------------------------------------    Researcher sent to 'on call' study doctor for review. 11/17/21 10:01 AM     ----------------------------------------------------------  IN RESPONSE TO : BTSOG-FS-ITE STUDY VISIT TREATMENT NOTES   ----------------------------------------------------------  *copy/paste the above as the 'summary' for progress note      Rosanna Romero   This patient's status has been monitored by me. 11/17/21 11:43 AM     Based on my review:     I suggest we CONTINUE this patients enrollment in the NWEPH-JM-CLM Study. Monitor results of planned CT chest for new diagnoses.    Signed:  SHEILA  11/17/21 11:43 AM

## 2021-11-17 NOTE — PROVIDER NOTIFICATION
11/17/21 0400   Vital Signs   Resp 20   Pulse 89   Oxygen Therapy   SpO2 94 %   O2 Device High Flow Nasal Cannula (HFNC)   FiO2 (%) 60 %   Oxygen Delivery 40 LPM     On  HFNC 60-80% depending on activity . Pt has remained off Bipap this shift, feeling as though he did not need the extra support.

## 2021-11-17 NOTE — PLAN OF CARE
Pt is much more alert than yesterday. VSS. One run of VT this am. MD aware. Metoprolol IV Q8, . K and Mg protocol, WDL today.  Pt was taken off Bipap last evening and was on highflow until around lunch time, and then was put back on Bipap. He had a chest CT today which showed consolidation in all lobes. Pt had some dysphagia yesterday. Speech Therapy was consulted and are recommending small bites and no straws. Pt has a poor appetite, had just a couple bites of scrambled eggs today. Del Valle catheter patent. PT consulted, deferred until tomorrow. Pt stood and pivoted today. Very Heavy assist of one with gait belt.

## 2021-11-17 NOTE — PROGRESS NOTES
"[facility]  Hospitalist Progress Note  Arslan Morrell MD 11/17/2021    Reason for Stay (Diagnosis): Fevers/weakness due to COVID 19   Date of admission: 11/14          Assessment and Plan:      Mr. Rosanna Romero is a 66 year old VACCINATED male with past medical history significant for diabetes mellitus (not on insulin), DM neuropathy, HTN, gout, HLP and hx of LINDSAY (con-compliant with CPAP) and recent dx of positive COVID 19 (on 11/13-Care Everywhere) who presented with complaints of worsening fever, malaise, weakness.     Acute hypoxic respiratory failure due to Covid 19 PNA    - vaccinated    - had repeat CXR on 11/15 which showed increasing bibasilar opacity c/w worsening PNA    - started on zosyn (Day #2)    - on max support with HFNC, using BiPAP at night (has LINDSAY)    - on dexamethasone (Day #3/10)    - on Remdesivir (Day #2/5)    - received lasix 40mg IV x1 on nights (11/16) for possible failure causing increasing oxygen needs    - does not appear to be in failure (actually will start IVF- see below)    - will obtain CTPE today to assess increasing oxygen needs    Secondary bacterial PNA    - CXR on 11/15 showed increasing mid/low bilat infiltrated    - on zosyn (Day #2)    - also had fevers (now resolved)    - blood cultures from 11/15 were negative    - repeat cultures if spikes fever    Addendum: CT shows severe multifocal PNA. Will add vanco, attempt to obtain sputum culture. If worsens, should consider bronch, ID consult for +/- tocilizumab    Acute renal failure    - Cr has been increasing since admission (1.22>1.27>1.37>1.57>1.9)    - did received lasix on nights (11/16)    - start IVF especially since planning on obtaining CT with constrast    Questionable dysphagia    - nursing reported possible difficulty managing oral pills on 11/16 am    - had recent EGD in July via [facility] for \"circumferential thickening of the distal esophagus\"    - evaluated by speech: no overt aspiration, ok for regular " diet and thin liquids but small bolus with GERD precautions     Episode of hypoglycemia    - 50's on 11/15 pm    - holding all home meds (metformin/glipizide)    - on ISS    Frequent asymptomatic v tach    - had increased frequency yesterday    - in am there was question of him being able to take his oral meds    - home Toprol 100mg was changed to metoprolol inj 5mg Q8hr    - cont IV for now    - frequency has improved    - cont tele    Diabetes mellitus-non-insulin-dependent    - had episode of hypoglycemia (as above)    - holding metformin and glipizide    - on ISS    - BS now increasing    - will consider resuming home meds if BS remain stable until tomorrow    HTN    - cont home clonidine, norvasc    - increase clonidine to BID    - lisinopril on hold due to AZALIA    Weakness/fall    - due to COVID 19    - normally ambulatory with a cane     - PT/OT pending    - possible need for TCU vs home at discharge     Iron deficiency anemia/thrombocytopenia     - Hb 12.6 today    - iron studies done were consistent with iron deficiency anemia    - started iron supplement    Called and left a VM message    DVT Prophylaxis: Enoxaparin (Lovenox) subcutaneous cont 40 subcutaneous   Code Status: Full Code, d/w pt.   Discharge Dispo: likely TCU   Estimated Disch Date / # of Days until Disch: unclear        Interval History (Subjective):        Patient in bed. Study staff in room. Patient denies any new complaints. He does not have increasing chest pain or symptoms of SOB. No abdo pain, n/v/d.                  Physical Exam:      Last Vital Signs:  BP (!) 151/85   Pulse 94   Temp 98.7  F (37.1  C) (Oral)   Resp (!) 32   Wt 82.6 kg (182 lb 1.6 oz)   SpO2 94%   BMI 24.70 kg/m        Constitutional: Awake, alert, cooperative, no apparent distress   Respiratory: Decreased bs, difficult  but no crackles or wheezing   Cardiovascular: Regular rate and rhythm, normal S1 and S2, and no murmur noted, No JVP   Abdomen: Normal bowel  sounds, soft, non-distended, non-tender   Skin: No rashes, no cyanosis, dry to touch   Neuro: Alert and oriented x3, no weakness, numbness, memory loss   Extremities: No edema, normal range of motion   Other(s):        All other systems: Negative          Medications:      All current medications were reviewed with changes reflected in problem list.         Data:      All new lab and imaging data was reviewed.   Labs:       Lab Results   Component Value Date     11/16/2021     11/15/2021     11/15/2021     07/02/2021     07/01/2021     06/30/2021    Lab Results   Component Value Date    CHLORIDE 106 11/16/2021    CHLORIDE 104 11/15/2021    CHLORIDE 102 11/15/2021    CHLORIDE 109 07/02/2021    CHLORIDE 111 07/01/2021    CHLORIDE 103 06/30/2021    Lab Results   Component Value Date    BUN 42 11/16/2021    BUN 39 11/15/2021    BUN 36 11/15/2021    BUN 10 07/02/2021    BUN 18 07/01/2021    BUN 22 06/30/2021      Lab Results   Component Value Date    POTASSIUM 3.0 11/16/2021    POTASSIUM 3.7 11/15/2021    POTASSIUM 3.7 11/15/2021    POTASSIUM 3.4 07/02/2021    POTASSIUM 3.8 07/01/2021    POTASSIUM 3.9 06/30/2021    Lab Results   Component Value Date    CO2 18 11/16/2021    CO2 20 11/15/2021    CO2 23 11/15/2021    CO2 26 07/02/2021    CO2 26 07/01/2021    CO2 27 06/30/2021    Lab Results   Component Value Date    CR 1.57 11/16/2021    CR 1.37 11/15/2021    CR 1.27 11/15/2021    CR 0.82 07/02/2021    CR 1.08 07/01/2021    CR 1.29 06/30/2021        Recent Labs   Lab 11/17/21  0719 11/16/21  0550 11/15/21  2347   WBC 9.6 7.8 6.6   HGB 12.6* 12.6* 13.6   HCT 38.3* 36.4* 41.4   MCV 88 85 87   * 122* 136*     Recent Labs   Lab 11/17/21 0719 11/16/21  0550 11/15/21  0914   SED  --   --  34*   .0* 208.0* 127.0*     Recent Labs   Lab 11/17/21 0719 11/16/21 0550 11/15/21  2347   BUN 57* 42* 39*   CR 1.90* 1.57* 1.37*     Recent Labs   Lab 11/17/21  0719 11/15/21  2343  11/15/21  0416   TROPONIN 0.019 <0.015 <0.015     Recent Labs   Lab 11/15/21  0914   TSH 0.46      Imaging:   Results for orders placed or performed during the hospital encounter of 11/14/21   Head CT w/o contrast    Narrative    EXAM: CT HEAD W/O CONTRAST, CT FACIAL BONES WITHOUT CONTRAST  LOCATION: [facility]  DATE/TIME: 11/14/2021 1:41 PM    INDICATION: Fall, forehead contusion.  COMPARISON: Head CT 5/8/2017.  TECHNIQUE:   1) Routine CT Head without IV contrast. Multiplanar reformats. Dose reduction techniques were used.  2) Routine CT Facial Bones without IV contrast. Multiplanar reformats. Dose reduction techniques were used.    FINDINGS:  HEAD CT:   INTRACRANIAL CONTENTS: No intracranial hemorrhage, extraaxial collection, or mass effect. No CT evidence of acute infarct. Mild presumed chronic small vessel ischemic changes. The ventricles and sulci are normal for age.     OSSEOUS STRUCTURES/SOFT TISSUES: No significant abnormality.     FACIAL BONE CT:  OSSEOUS STRUCTURES/SOFT TISSUES: No localized soft tissue swelling/inflammation. No facial bone fracture or malalignment.    Multiple carious lesions in the teeth. Prominent lucency around the tooth roots of the right maxillary premolar.    ORBITAL CONTENTS: No acute abnormality.    SINUSES: Mild scattered mucosal thickening in the paranasal sinuses. No air-fluid layers or hyperdense layering hemorrhage within the sinuses.      Impression    IMPRESSION:  HEAD CT:  1.  No acute intracranial process.  2.  Mild nonspecific white matter changes most likely due to chronic microvascular ischemic disease.    FACIAL BONE CT:  1.  No facial bone or mandibular fracture.  2.  Marked odontogenic disease of the right maxillary premolar.   CT Facial Bones without Contrast    Narrative    EXAM: CT HEAD W/O CONTRAST, CT FACIAL BONES WITHOUT CONTRAST  LOCATION: [facility]  DATE/TIME: 11/14/2021 1:41 PM    INDICATION: Fall, forehead  contusion.  COMPARISON: Head CT 5/8/2017.  TECHNIQUE:   1) Routine CT Head without IV contrast. Multiplanar reformats. Dose reduction techniques were used.  2) Routine CT Facial Bones without IV contrast. Multiplanar reformats. Dose reduction techniques were used.    FINDINGS:  HEAD CT:   INTRACRANIAL CONTENTS: No intracranial hemorrhage, extraaxial collection, or mass effect. No CT evidence of acute infarct. Mild presumed chronic small vessel ischemic changes. The ventricles and sulci are normal for age.     OSSEOUS STRUCTURES/SOFT TISSUES: No significant abnormality.     FACIAL BONE CT:  OSSEOUS STRUCTURES/SOFT TISSUES: No localized soft tissue swelling/inflammation. No facial bone fracture or malalignment.    Multiple carious lesions in the teeth. Prominent lucency around the tooth roots of the right maxillary premolar.    ORBITAL CONTENTS: No acute abnormality.    SINUSES: Mild scattered mucosal thickening in the paranasal sinuses. No air-fluid layers or hyperdense layering hemorrhage within the sinuses.      Impression    IMPRESSION:  HEAD CT:  1.  No acute intracranial process.  2.  Mild nonspecific white matter changes most likely due to chronic microvascular ischemic disease.    FACIAL BONE CT:  1.  No facial bone or mandibular fracture.  2.  Marked odontogenic disease of the right maxillary premolar.   XR Chest 1 View    Narrative    EXAM: XR CHEST 1 VIEW  LOCATION: [facility]  DATE/TIME: 11/14/2021 1:23 PM    INDICATION: Syncope, status post fall. Covid infection.  COMPARISON: 6/30/2021      Impression    IMPRESSION: Shallow inspiration. Heart size magnified in AP projection with normal vascularity. Right midlung and medial left basilar airspace opacities. No pneumothorax nor pleural effusion.    Findings could represent early Covid pneumonia.   XR Chest Port 1 View    Narrative    EXAM: XR CHEST PORT 1 VIEW  LOCATION: [facility]  DATE/TIME: 11/15/2021 10:51  PM    INDICATION: hypoxia  COMPARISON: 2021      Impression    IMPRESSION: Stable cardiomediastinal contours. Increasing bilateral mid and lower lung zone airspace opacities consistent with worsened pneumonia. No visible pneumothorax or pleural effusion.   US Lower Extremity Venous Duplex Bilateral    Narrative    EXAM: US LOWER EXTREMITY VENOUS DUPLEX BILATERAL  LOCATION: [facility]  DATE/TIME: 2021 2:24 AM    INDICATION: acute hypoxic respiratory failure in one with covid and luis daniel with rapid decompensation in respiratory status, COVID 19 positive  COMPARISON: None.  TECHNIQUE: Venous Duplex ultrasound of bilateral lower extremities with and without compression, augmentation and duplex. Color flow and spectral Doppler with waveform analysis performed.    FINDINGS: Exam includes the common femoral, femoral, popliteal veins as well as segmentally visualized deep calf veins and greater saphenous vein.     RIGHT: No deep vein thrombosis. No superficial thrombophlebitis. No popliteal cyst.    LEFT: No deep vein thrombosis. No superficial thrombophlebitis. No popliteal cyst.      Impression    IMPRESSION:  1.  No deep venous thrombosis in the bilateral lower extremities.   US Abdomen Limited    Narrative    This exam was marked as non-reportable because it will not be read by a   radiologist or a [place] non-radiologist provider.         Echocardiogram Complete     Value    LVEF  60-65%    Narrative    [id number]  [id number]  [id number]  [id number]^MYRNA^RAI^KIRSTIE     [facility]  Echocardiography Laboratory  [address] [place] [address]     Name: FANTASMA ELLIOTT  MRN: [id number]  : 1955  Study Date: 11/15/2021 02:33 PM  Age: 66 yrs  Gender: Male  Patient Location: [facility]  Reason For Study: Arrhythmia  Ordering Physician: RAI NORRIS  Performed By: Wagner Jauregui RDCS     BSA: 2.0 m2  Height: 72 in  Weight: 181 lb  HR: 76  BP: 161/84  mmHg  ______________________________________________________________________________  Procedure  Complete Portable Echo Adult. Optison (NDC #[id number]) given intravenously.  ______________________________________________________________________________  Interpretation Summary     Left ventricular systolic function is normal.  The visual ejection fraction is 60-65%.  There is mild to moderate concentric left ventricular hypertrophy.  The ascending aorta is Mildly dilated.  Doppler interrogation does not demonstrate signficant stenosis or  insufficiency involving cardiac valves.     No old studies available for comparison.  ______________________________________________________________________________  Left Ventricle  The left ventricle is normal in size. There is mild to moderate concentric  left ventricular hypertrophy. Left ventricular systolic function is normal.  The visual ejection fraction is 60-65%. Grade I or early diastolic  dysfunction. No regional wall motion abnormalities noted. There is no thrombus  seen in the left ventricle.     Right Ventricle  The right ventricle is normal in structure, function and size. There is no  mass or thrombus in the right ventricle.     Atria  Normal left atrial size. Right atrial size is normal. There is no color  Doppler evidence of an atrial shunt. The left atrial appendage is not well  visualized.     Mitral Valve  The mitral valve leaflets appear normal. There is no evidence of stenosis,  fluttering, or prolapse. There is no mitral regurgitation noted. There is no  mitral valve stenosis.     Tricuspid Valve  Normal tricuspid valve. No tricuspid regurgitation. Right ventricular systolic  pressure could not be approximated due to inadequate tricuspid regurgitation.  There is no tricuspid stenosis.     Aortic Valve  The aortic valve is trileaflet. No aortic regurgitation is present. No aortic  stenosis is present.     Pulmonic Valve  Normal pulmonic valve. There is no  pulmonic valvular regurgitation. There is  no pulmonic valvular stenosis.     Vessels  Borderline aortic root dilatation. The ascending aorta is Mildly dilated. The  inferior vena cava is normal. The pulmonary artery is normal size.     Pericardium  The pericardium appears normal. There is no pleural effusion.     Rhythm  Sinus rhythm was noted.  ______________________________________________________________________________  MMode/2D Measurements & Calculations     IVSd: 1.4 cm  LVIDd: 4.4 cm  LVIDs: 3.1 cm  LVPWd: 1.5 cm  FS: 29.3 %  LV mass(C)d: 243.5 grams  LV mass(C)dI: 119.2 grams/m2  Ao root diam: 3.9 cm  LA dimension: 3.1 cm  asc Aorta Diam: 3.9 cm  LA/Ao: 0.80  LVOT diam: 2.3 cm  LVOT area: 4.2 cm2  LA Volume (BP): 50.0 ml  LA Volume Index (BP): 24.5 ml/m2  RWT: 0.67     Doppler Measurements & Calculations  MV E max serafin: 73.5 cm/sec  MV A max serafin: 72.1 cm/sec  MV E/A: 1.0  MV dec time: 0.25 sec  E/E' av.8  Lateral E/e': 14.0  Medial E/e': 13.7     ______________________________________________________________________________  Report approved by: Dr. Godfrey Gomez 11/15/2021 03:20 PM

## 2021-11-17 NOTE — PHARMACY-VANCOMYCIN DOSING SERVICE
"Pharmacy Vancomycin Initial Note  Date of Service 2021  Patient's  1955  66 year old, male    Indication: [facility]ated Pneumonia    Current estimated CrCl = Estimated Creatinine Clearance: 44.7 mL/min (A) (based on SCr of 1.9 mg/dL (H)).    Creatinine for last 3 days  11/15/2021:  6:18 AM Creatinine 1.27 mg/dL; 11:47 PM Creatinine 1.37 mg/dL  2021:  5:50 AM Creatinine 1.57 mg/dL  2021:  7:19 AM Creatinine 1.90 mg/dL    Recent Vancomycin Level(s) for last 3 days  No results found for requested labs within last 72 hours.      Vancomycin IV Administrations (past 72 hours)                   vancomycin 1500 mg in 0.9% NaCl 250 ml intermittent infusion 1,500 mg (mg) 1,500 mg New Bag 21 1601                Nephrotoxins and other renal medications (From now, onward)    Start     Dose/Rate Route Frequency Ordered Stop    21 1600  vancomycin (VANCOCIN) 1000 mg in dextrose 5% 200 mL PREMIX         1,000 mg  200 mL/hr over 1 Hours Intravenous EVERY 24 HOURS 21 1621      21 1330  vancomycin 1500 mg in 0.9% NaCl 250 ml intermittent infusion 1,500 mg         1,500 mg  over 90 Minutes Intravenous ONCE 21 1321      21 0030  piperacillin-tazobactam (ZOSYN) intermittent infusion 4.5 g        Note to Pharmacy: For [facility], [facility] and [facility]: For Zosyn-naive patients, use the \"Zosyn initial dose + extended infusion\" order panel.    4.5 g  200 mL/hr over 30 Minutes Intravenous EVERY 6 HOURS 21 0024            Contrast Orders - past 72 hours (72h ago, onward)            Start     Dose/Rate Route Frequency Ordered Stop    21 1130  iopamidol (ISOVUE-370) solution 500 mL         500 mL Intravenous ONCE 21 1116 21 1119    11/15/21 1500  perflutren diluted 1mL to 2mL with saline (OPTISON) diluted injection 3 mL         3 mL Intravenous ONCE 11/15/21 1444 11/15/21 1445          Insight[employer] Prediction of Planned Initial Vancomycin Regimen  Loading dose: " 1,500 on 11/17/2021  Regimen: 1000 mg IV every 24 hours.  Start time: 1600 on 11/18/2021  Exposure target: AUC24 (range)400-600 mg/L.hr   AUC24,ss: 478 mg/L.hr  Probability of AUC24 > 400: 70 %  Ctrough,ss: 15.3 mg/L  Probability of Ctrough,ss > 20: 26 %  Probability of nephrotoxicity (Lodise UMA 2009): 11 %        Plan:  1. Start vancomycin  1,000 mg IV q24h after initial loading dose.    2. Vancomycin monitoring method: AUC  3. Vancomycin therapeutic monitoring goal: 400-600 mg*h/L  4. Pharmacy will check vancomycin levels as appropriate in 1-3 Days.    5. Serum creatinine levels will be ordered daily for the first week of therapy and at least twice weekly for subsequent weeks.      Tabitha Flores, [facility]

## 2021-11-17 NOTE — PROGRESS NOTES
Cared for 1900 - 0700    Alert and oriented x 4. VSS. BP slightly elevated. On Hi Flow, FiO2 60, 40 LPM. Speech therapist recommended small bites and no straw because patient had some dysphasia yesterday. Tele SR HR 87.

## 2021-11-18 NOTE — PROGRESS NOTES
Right wrist and Left wrist restraints initiated on patient on 11/18/2021 at 0400.    Clinical Justification: Pulling lines, pulling tubes, and pulling equipment  Less Restrictive Alternative: Repositioning,Re-evaluate equipment,Pain management,Reorientation  Attending Physician Notified: MD ordered restraint,     Order received: Yes         Criteria explained to Patient  Patient's Response: No evidence of learning  Restraint care Plan initiated: Yes    SUNIL MANCINI RN

## 2021-11-18 NOTE — PLAN OF CARE
DATE:  11/18/2021   TIME OF RECEIPT FROM LAB:  0348  LAB TEST:  PH    LAB VALUE:  6.93  RESULTS GIVEN WITH READ-BACK TO (PROVIDER):  Dr. Rush  TIME LAB VALUE REPORTED TO PROVIDER:   0349

## 2021-11-18 NOTE — PLAN OF CARE
PT: Orders received, chart reviewed.  PT not indicated as pt now intubated/sedated in ICU.  Defer discharge recommendations to care team.  Will complete IP PT orders at this time. Please re-consult when medically appropriate

## 2021-11-18 NOTE — ANESTHESIA CARE TRANSFER NOTE
Patient: Rosanna Romero    Procedure: * No procedures listed *       Diagnosis: * No pre-op diagnosis entered *  Diagnosis Additional Information: No value filed.    Anesthesia Type:   No value filed.     Note:    Oropharynx: endotracheal tube in place  Level of Consciousness: iatrogenic sedation      Independent Airway: airway patency not satisfactory and stable  Dentition: dentition unchanged  Vital Signs Stable: post-procedure vital signs reviewed and stable  Report to RN Given: handoff report given  Patient transferred to: ICU    ICU Handoff: Call for PAUSE to initiate/utilize ICU HANDOFF, Identified Patient, Identified Responsible Provider, Reviewed the Pertinent Medical History, Discussed Surgical Course, Reviewed Intra-OP Anesthesia Management and Issues during Anesthesia, Set Expectations for Post Procedure Period and Allowed Opportunity for Questions and Acknowledgement of Understanding      Vitals:  Vitals Value Taken Time   BP 83/53 11/18/21 0412   Temp     Pulse 123 11/18/21 0414   Resp     SpO2 91 % 11/18/21 0414   Vitals shown include unvalidated device data.    Electronically Signed By: MELISSA Gay CRNA  November 18, 2021  4:16 AM

## 2021-11-18 NOTE — PROGRESS NOTES
[facility]    ICU Progress Note       Date of Admission:  11/14/2021    Assessment: Critical Care   Rosanna Romero is a 66 year old male admitted on 11/14/2021 for COVID-19 infection. He has a history of DM, HTN, gout, HLD, and LINDSAY. He was admitted to the ICU overnight 11/18 due to worsening hypoxemia and afib RVR, requiring intubation.       Plan: Critical Care   Neuro/ pain/ sedation:  - Propofol, versed, fentanyl for sedation and vent synchrony    Pulmonary: history of LINDSAY  Ventilation Mode: CMV/AC  (Continuous Mandatory Ventilation/ Assist Control)  FiO2 (%): 80 %  Rate Set (breaths/minute): 16 breaths/min  Tidal Volume Set (mL): 500 mL  PEEP (cm H2O): 10 cmH2O  Oxygen Concentration (%): 70 %  Resp: 27  - continue current vent settings. Plateau pressures high 20s.  - hyperventilation to compensate for metabolic acidosis      Cardiovascular: history of hypertension, HLD  - Hypotension secondary to suspected septic shock  - Wean vasopressors (levophed, phenylephrine, vasopressin) as tolerated for MAP > 65    GI/Nutrition:  - Diet: Moderate Consistent Carb (60 g CHO per Meal) Diet No Straws  Snacks/Supplements Pediatric: Boost glucose control; Between Meals    - Will start TFs    Renal/ Fluid Balance: Acute kidney injury and metabolic acidosis  - Metabolic acidosis -- fluid resuscitation and bicarb drip  - ICU electrolyte replacement protocol     Endocrine: history of DM  - Hyperglycemia, possible DKA--insulin drip until BG better controlled    ID: suspected septic shock  - empiric meropenem (11/18) and vancomycin (11/17)  - follow up cultures    Hematology:  - hgb normal and thrombocytopenia resolved    MSK: history of gout  - continue home allopurinol    Lines/ tubes/ drains:  Del Valle Catheter: PRESENT, indication: Strict 1-2 Hour I&O  Central Lines: PRESENT  CVC TRIPLE LUMEN Right Internal jugular-Site Assessment: WDL  Intraosseous Line 11/18/21 Anterior;Proximal;Right Tibia-Line Status:  Infusing    Prophylaxis:  - DVT Prophylaxis: Heparin SQ  - PUD Prophylaxis: protonix    Code Status: Full Code      Disposition:  - ICU    The patient's care was discussed with the Bedside Nurse.  Critical care time exclusive of procedures: 45    Pravin Nick MD  [facility]  Securely message with the Vocera Web Console (learn more here)  Text page via [employer] Paging/Directory      Clinically Significant Risk Factors Present on Admission             Present on Admission:    Generalized muscle weakness    Physical deconditioning    Infection due to 2019 novel coronavirus    COVID-19      ______________________________________________________________________    Interval History   Admitted to the ICU overnight due to hypoxia and afib RVR    Physical Exam   Vital Signs: Temp: 98.2  F (36.8  C) Temp src: Axillary BP: 105/62 Pulse: 114   Resp: 27 SpO2: 93 % O2 Device: Mechanical Ventilator Oxygen Delivery: 50 LPM  Weight: 186 lbs 11.67 oz  Physical Exam  Constitutional:       Comments: Intubated, sedated   HENT:      Head: Normocephalic and atraumatic.      Right Ear: External ear normal.      Left Ear: External ear normal.   Eyes:      General: No scleral icterus.        Right eye: No discharge.         Left eye: No discharge.   Cardiovascular:      Rate and Rhythm: Tachycardia present. Rhythm irregular.   Pulmonary:      Comments: Coarse, no vent dyssynchrony  Abdominal:      General: There is no distension.      Palpations: Abdomen is soft.      Tenderness: There is no abdominal tenderness.   Musculoskeletal:      Right lower leg: No edema.      Left lower leg: No edema.   Skin:     Coloration: Skin is not jaundiced.      Findings: No rash.   Neurological:      Comments: Sedated, unresponsive to stimuli           Data   I reviewed all medications, new labs and imaging results over the last 24 hours.  Arterial Blood Gases   Recent Labs   Lab 11/18/21  1602 11/18/21  1128 11/18/21  0529 11/18/21  7379    PH 7.17* 7.11* 7.14* 6.93*   PCO2 31* 26* 32* 53*   PO2 69* 75* 90 107*   HCO3 11* 8* 11* 11*       Complete Blood Count   Recent Labs   Lab 11/18/21  1445 11/18/21  0932 11/18/21  0422 11/17/21  0719 11/16/21  0550 11/15/21  2347   WBC  --   --  15.9* 9.6 7.8 6.6   HGB 11.5* 11.8* 12.3* 12.6* 12.6* 13.6   PLT  --   --  266 149* 122* 136*       Basic Metabolic Panel  Recent Labs   Lab 11/18/21  1552 11/18/21  1500 11/18/21  1406 11/18/21  1304 11/18/21  1159 11/18/21  1126 11/18/21  0802 11/18/21  0422 11/17/21  1826 11/17/21  0719 11/16/21  0914 11/16/21  0550   NA  --   --   --   --   --  140  --  139  --  134  --  138   POTASSIUM  --   --   --   --   --  3.5  --  4.7  --  4.0  --  3.0*   CHLORIDE  --   --   --   --   --  105  --  105  --  103  --  106   CO2  --   --   --   --   --  10*  --  10*  --  11*  --  18*   BUN  --   --   --   --   --  82*  --  73*  --  57*  --  42*   CR  --   --   --   --   --  2.45*  --  2.42*  --  1.90*  --  1.57*   * 227* 275* 291*   < > 386*   < > 379*   < > 206*   < > 66*    < > = values in this interval not displayed.       Liver Function Tests  Recent Labs   Lab 11/18/21  0522 11/18/21  0422 11/17/21  0719 11/15/21  2347 11/15/21  0914 11/14/21  1318   AST  --  45  --  40 34 21   ALT  --  16  --  16 15 13   ALKPHOS  --  73  --  78 75 80   BILITOTAL  --  0.5  --  0.5 0.7 0.6   ALBUMIN  --  1.9*  --  2.7* 2.7* 2.8*   INR 1.10  --  1.05  --   --   --        Pancreatic Enzymes  No lab results found in last 7 days.    Coagulation Profile  Recent Labs   Lab 11/18/21  0522 11/17/21  0719   INR 1.10 1.05       IMAGING:  Recent Results (from the past 24 hour(s))   XR Abdomen Port 1 View    Narrative    EXAM: XR ABDOMEN PORT 1 VIEWS  LOCATION: [facility]  DATE/TIME: 11/18/2021 4:06 AM    INDICATION: NG tube placement  COMPARISON: 11/15/2021      Impression    IMPRESSION: Nasogastric tube terminates in the distal stomach. Central venous catheter tip in the mid  SVC. Bilateral pulmonary infiltrates incompletely imaged.   XR Chest Port 1 View    Narrative    EXAM: XR CHEST PORT 1 VIEW  LOCATION: [facility]  DATE/TIME: 11/18/2021 4:06 AM    INDICATION: intubation and PICC  COMPARISON: CTA chest yesterday.      Impression    IMPRESSION: Endotracheal tube in satisfactory position terminating 5.8 cm above the robin. Right internal jugular venous catheter tip in the mid SVC. Enteric tube passes below the diaphragm. Extensive bilateral pulmonary infiltrates again demonstrated   and consistent with pneumonia. No significant pleural fluid. No pneumothorax.

## 2021-11-18 NOTE — CONSULTS
Pt admitted with acute hypoxia due to COVID.  Pt is vaccinated, noted to have unplanned readmission risk of 30%. Pt currently intubated and in the ICU. Care Coordination team is following and will assess for discharge needs when medically appropriate.      Mandy Enamorado RN, BSN, PHN, [facility]  Care Coordinator  [facility]  [phone]

## 2021-11-18 NOTE — PROGRESS NOTES
Pt intubated with an 8.0 ETT, 24 cm at the teeth. Placement confirmed by positive color change and BBS. Placed on vent settings:  Ventilation Mode: CMV/AC  (Continuous Mandatory Ventilation/ Assist Control)  FiO2 (%): 100 %  Rate Set (breaths/minute): 16 breaths/min  Tidal Volume Set (mL): 500 mL  PEEP (cm H2O): 10 cmH2O  Oxygen Concentration (%): 100 %  Resp: 16    Geraldine Salguero, RT

## 2021-11-18 NOTE — PROGRESS NOTES
Physician Attestation   I, Parminder Hernadez, was present with the medical/JUAN student who participated in the service and in the documentation of the note.  I have verified the history and personally performed the physical exam and medical decision making.  I agree with the assessment and plan of care as documented in the note.      I personally reviewed vital signs, medications, labs and imaging.    66-year-old man with medical history including diabetes not on insulin, neuropathy, hypertension, LINDSAY who is vaccinated against COVID-19 presented with cough and shortness of breath and recent positive COVID-19 test.  He was admitted here with suspected secondary bacterial pneumonia and also is being treated for COVID-19 with dexamethasone and remdesivir.  He has had evolving renal failure with worsening respiratory status and metabolic acidosis which prompted stabilization early 11/18 with intubation, central line A-line placement and transfer to the ICU.  He is being given bicarbonate containing fluids, insulin drip, vasopressors and remains on the vent.    1.  Septic shock, suspected pulmonary source.  Expand to meropenem and vancomycin.  Continue pressors and IV fluids.  Await blood cultures.    2.  Severe metabolic acidosis: Suspect due to combination of ATN from renal failure and ketosis potentially from DKA given severe hyperglycemia.  --Nephrology consulted  --Bicarbonate drip  --Insulin drip  --We will check ABGs at least every 8 hours  --Trend basic metabolic panel    3.  Acute renal failure: Nephrology following.  May need dialysis.  Currently on a bicarbonate drip, avoid nephrotoxins.    4.  DKA: Somewhat unusual.  Is a type II diabetic not normally on insulin and presents with significantly elevated ketones and now blood sugars in the 300s.  Has a high insulin requirement so far.    5.  Acute hypoxemic respiratory failure due to pneumonia, hemodynamic instability    Parminder Hernadez,  MD  Date of Service (when I saw the patient): 11/18/21    [facility]    Progress Note - ICU Service 11/18/21       Date of Admission:  11/14/2021    Today's Updates  1. Switch levo to phenylephrine  2. Switch vancomycin to meropenem  3. Hold remdesivir  4. Start insulin gtt    Summary:  Rosanna Romero is a 66 year old male with history of DM, HTN, HLD, LINDSAY admitted for COVID-19 pneumonia on 11/14 now intubated for worsening metabolic acidosis and hypoxemia. Patient initially admitted for 1 week of symptoms found to be COVID positive in the ED, started on decadron and remdesivir and escalated on HFNC. She was eventually transitioned to BiPAP on 11/17. On AM of 11/18 was hypoxic on BiPAP 70% with ABG showing acute metabolic acidosis with pH 6.95 and started on a bicarb drip. She was intubated at that time. Also found to be in A fib with RVR in the 130-140's started on amiodarone with hypotension requiring levophed and vasopressin. CXR showed worsening infiltrates at that time and there was concern for secondary bacterial pneumonia with septic shock so empiric zosyn and meropenem initiated. Repeat blood cultures drawn and awaiting results. Repeat ABGs showing continued metabolic acidosis despite interventions, lactate normalized though ketones returned at 4.3 and suspect there some underlying DKA so insulin drip started.    Assessment & Plan      Rosanna Romero is a 66 year old male admitted on 11/14/2021. He has a history of DM, HTN, gout, LINDSAY and is admitted for COVID-19 pneumonia.    NEURO  #Sedation for Mechanical Ventilation  Sedated with propofol, versed, and analgesia with fentanyl.    CV  #Afib w/ RVR  #Septic Shock vs Cardiogenic Shock  #Lactic Acidosis  Patient developed hypotension on evening of 11/18 with lactic 3.0 and new Afib w/ RVR rate in the 130-140's. EKG with T wave inversions inferiorly and troponin negative. Patient started on NS for IVF, amiodarone 0.5, and levophed+vasopressin.  Patient has a known COVID-19 infection though CXR shows worsening bilateral infiltrates and now with hemodynamic instability requiring 2x pressors and lactic acidosis raises suspicion for development of secondary bacterial pneumonia with septic shock. Lactic acid now 1.9 and has been afebrile throughout.   - switch levo to phenylephrine d/t tachycardia  - continue vasopressin   - IVF @ 100ml/hr  - amiodarone gtt  - empiric meropenem and zosyn  - may consider CT chest abdomen pelvis should hemodynamic instability persist to look for additional infectious source    RESP  #Acute Hypoxic Respiratory Failure secondary to COVID-19  #Confirmed COVID-19 infection  Symptom Onset 11/6/21   Date of 1st Positive Test 11/13/21   Vaccination Status Fully Vaccinated   - Oxygen: ventilation via ETT, PEEP 10 mmHg, 70% FiO2  - Labs: labs notable for Ddimer 4.63, INR 1.10, Fibrinogen 718, , lactic 1.9 (3.0)   - Imaging: CXR with interval worsening of bilateral infiltrates  - Breathing treatments: no inhalers  - IV fluids: NS at 100 mL/hr  - Antibiotics: indicated due to concern of bacterial superinfection; zosyn and meropenem ordered   - COVID-Focused Medications: Dexamethasone 6 mg x 10 days (day 4/10) and Remdesivir x 5 days (day 3/5), will hold remdesivir today d/t renal failure  - DVT Prophylaxis: lovenox 40mg daily    #?Secondary Bacterial Pneumonia  Patient had become hypoxemic and a profound metabolic acidosis on ABG (pH 6.95) intubated and started on bicarb drip. She was also hypotensive and started on pressors x2. Patient has been afebrile and empiric antibiotics initiated with zosyn and meropenem.  - continue zosyn, meropenem    GI  #Coffee Ground gastric fluid  Patient had EGD in July 2021 that showed distal esophagitis though no active bleeding or ulcers. Gastric suctioning producing gastric fluid with coffee ground appearance though no linda blood. Hgb slowly downtrending (12.3-->11.8) though no clinical signs of  bleeding. Continue IV protonix for CROCKER ppx. No transfusion indicated at this time.  - protonix IV  - hold off on GI consult    RENAL  #Metabolic Acidosis, Increased Anion Gap  #Lactic Acidosis  #Acute Renal Failure  Patient with acute renal failure BUN 73, Cr 2.42 also with worsening metabolic acidosis 7.11/26/75/8 on ABG despite bicarb gtt and IVF. I suspect this acute drop in kidney function from recent hypotension and shock. Treating with IV fluids and pressors for blood pressure support. Persistent anion gap metabolic acidosis likely multifactorial from lactic acidosis with concurrent diabetic ketoacidosis now with ketones 4.3. Treating with IV fluids, bicarb drip, insulin gtt, and will follow blood gases.  - IVF, insulin drip, bicarb drip  - trend ABG    ENDO  #Hyperglycemia  BS in the 300's this AM which may be a stress hyperglycemia versus an element of DKA given underlying DM and being on decadron with ketones 4.3. DKA could offer explanation for worsening AG metabolic acidosis though BS only in 300's. We will start insulin gtt and continue following sugars.   - insulin gtt  - follow sugars    HEME/ONC  #Leukocytosis  #Elevated D-dimer  Patient has a leukocytosis 15.6 with elevated d-dimer likely due to ongoing COVID pneumonia. Anticoagulating with lovenox       Diet: Moderate Consistent Carb (60 g CHO per Meal) Diet No Straws  Snacks/Supplements Pediatric: Boost glucose control; Between Meals    DVT Prophylaxis: Enoxaparin (Lovenox) SQ  Del Valle Catheter: PRESENT, indication: Strict 1-2 Hour I&O  Fluids: NS at 100 ml/h  Central Lines: PRESENT  CVC TRIPLE LUMEN Right Internal jugular-Site Assessment: WDL  Intraosseous Line 11/18/21 Anterior;Proximal;Right Tibia-Line Status: Infusing  Code Status: Full Code         The patient's care was discussed with the Attending Physician, Dr. raman.    Martinez Santoyo  Medical Student  ______________________________________________________________________    Interval  History   Rapid response called overnight for hypoxemia, A fib w/ RVR and patient was intubated with CVC and arterial lines placed.    Data reviewed today: I reviewed all medications, new labs and imaging results over the last 24 hours. I personally reviewed the EKG tracing showing T wave inversions inferiorly and the chest x-ray image(s) showing interval worsening bilateral infiltrates.    Physical Exam   Vital Signs: Temp: (!) 95.8  F (35.4  C) Temp src: Axillary BP: 127/66 Pulse: (!) 124   Resp: 25 SpO2: 98 % O2 Device: Mechanical Ventilator Oxygen Delivery: 50 LPM  Weight: 186 lbs 11.67 oz  General Appearance: Sedated, intubated  Respiratory: lung sounds coarse bilaterally, no wheezing  Cardiovascular: irregularly irregular, tachycardic  GI: abd soft, non distended, non tympanitic, + BS all quadrants  Skin: no apparent skin lesions over the chest, abdomen or extremities    Data   Recent Labs   Lab 11/18/21  1304 11/18/21  1159 11/18/21  1126 11/18/21  1006 11/18/21  0932 11/18/21  0802 11/18/21  0522 11/18/21  0422 11/17/21  1826 11/17/21  0719 11/16/21  0914 11/16/21  0550 11/16/21  0012 11/15/21  2347   WBC  --   --   --   --   --   --   --  15.9*  --  9.6  --  7.8  --  6.6   HGB  --   --   --   --  11.8*  --   --  12.3*  --  12.6*  --  12.6*  --  13.6   MCV  --   --   --   --   --   --   --  92  --  88  --  85  --  87   PLT  --   --   --   --   --   --   --  266  --  149*  --  122*  --  136*   INR  --   --   --   --   --   --  1.10  --   --  1.05  --   --   --   --    NA  --   --  140  --   --   --   --  139  --  134  --  138  --  136   POTASSIUM  --   --  3.5  --   --   --   --  4.7  --  4.0  --  3.0*  --  3.7   CHLORIDE  --   --  105  --   --   --   --  105  --  103  --  106  --  104   CO2  --   --  10*  --   --   --   --  10*  --  11*  --  18*  --  20   BUN  --   --  82*  --   --   --   --  73*  --  57*  --  42*  --  39*   CR  --   --  2.45*  --   --   --   --  2.42*  --  1.90*  --  1.57*  --  1.37*    ANIONGAP  --   --  25*  --   --   --   --  24*  --  20*  --  14  --  12   GRAZYNA  --   --  7.2*  --   --   --   --  7.5*  --  7.6*  --  7.6*  --  7.9*   * 319* 386*   < >  --    < >  --  379*   < > 206*   < > 66*   < > 98   ALBUMIN  --   --   --   --   --   --   --  1.9*  --   --   --   --   --  2.7*   PROTTOTAL  --   --   --   --   --   --   --  5.8*  --   --   --   --   --  6.8   BILITOTAL  --   --   --   --   --   --   --  0.5  --   --   --   --   --  0.5   ALKPHOS  --   --   --   --   --   --   --  73  --   --   --   --   --  78   ALT  --   --   --   --   --   --   --  16  --   --   --   --   --  16   AST  --   --   --   --   --   --   --  45  --   --   --   --   --  40   TROPONIN  --   --   --   --   --   --   --  <0.015  --  0.019  --   --   --  <0.015    < > = values in this interval not displayed.     Recent Results (from the past 24 hour(s))   XR Abdomen Port 1 View    Narrative    EXAM: XR ABDOMEN PORT 1 VIEWS  LOCATION: [facility]  DATE/TIME: 11/18/2021 4:06 AM    INDICATION: NG tube placement  COMPARISON: 11/15/2021      Impression    IMPRESSION: Nasogastric tube terminates in the distal stomach. Central venous catheter tip in the mid SVC. Bilateral pulmonary infiltrates incompletely imaged.   XR Chest Port 1 View    Narrative    EXAM: XR CHEST PORT 1 VIEW  LOCATION: [facility]  DATE/TIME: 11/18/2021 4:06 AM    INDICATION: intubation and PICC  COMPARISON: CTA chest yesterday.      Impression    IMPRESSION: Endotracheal tube in satisfactory position terminating 5.8 cm above the robin. Right internal jugular venous catheter tip in the mid SVC. Enteric tube passes below the diaphragm. Extensive bilateral pulmonary infiltrates again demonstrated   and consistent with pneumonia. No significant pleural fluid. No pneumothorax.

## 2021-11-18 NOTE — PLAN OF CARE
SLP: Per chart review pt with RRT overnight for worsening hypoxemia, a-fib with RVR - pt was intubated and admitted to the ICU. SLP to sign off at this time. Please re-consult once extubated/appropraite for swallow re-evaluation, as indicated.

## 2021-11-18 NOTE — ANESTHESIA PROCEDURE NOTES
Arterial Line Procedure Note    Pre-Procedure   Staff -        Anesthesiologist:  Savage Elliott MD       Performed By: anesthesiologist       Location: ICU       Procedure Start/Stop Times: 11/18/2021 4:11 AM and 11/18/2021 4:27 AM       Pre-Anesthestic Checklist: patient identified, IV checked, risks and benefits discussed, informed consent, monitors and equipment checked, pre-op evaluation and at physician/surgeon's request  Timeout:       Correct Patient: Yes        Correct Procedure: Yes        Correct Site: Yes        Correct Position: Yes   Procedure   Procedure: arterial line       Diagnosis: Covid pos and on vent       Laterality: right       Insertion Site: radial.  Sterile Prep        Standard elements of sterile barrier followed       Skin prep: Chloraprep  Insertion/Injection        Technique: Seldinger Technique   Narrative         Secured by: suture       Biopatch and Tegaderm dressing used.       Complications: None apparent,        Arterial waveform: Yes        IBP within 10% of NIBP: Yes

## 2021-11-18 NOTE — PLAN OF CARE
OT: Orders received. Chart reviewed and discussed with care team.  OT not indicated as pt now intubated/sedated in ICU.  Defer discharge recommendations to care team.  Will complete IP OT orders at this time. Please re-consult when medically appropriate.

## 2021-11-18 NOTE — PLAN OF CARE
[id number]: VSS on BiPAP- 70 % FiO2 on 50L O2.  and 284. Received Remdesivir, on ancef. Turned and repositioned. Del Valle in place, AUO.  IVF infusing. Tele reads SR.

## 2021-11-18 NOTE — CONSULTS
[facility]    Nephrology Consultation     Date of Admission:  11/14/2021    Assessment & Plan     Rosanna Romero is a 66 year old male who was admitted on 11/14/2021.     1) Baseline Normal Kidney Function.    2) COVID-19 Pneumonia/ARDS    3) Shock - likely vasoplegic    4) Severe Metabolic Acidosis - lactate + ketones    5) AZALIA - oliguric/anuric.  Likely ATN due to sepsis.  Had contrast yesterday but renal function was falling before then.    Plan:    Add Na HCO3 1 mEq/mL 25 mL/hour for now.  He is likely on his way to needing RRT if that is desired by all  His prognosis is guarded at best.      Owen Da Silva MD  [facility] - Nephrology  [phone]    Reason for Consult     I was asked to see the patient for AZALIA.     Primary Care Physician     Arun Mensah    Chief Complaint     AZALIA    History is obtained from the patient and chart review.      History of Present Illness     Rosanna Romero is a 66 year old male who presents with AZALIA.      Admitted 11/14 after presenting with weakness.  He was found to have COVID-19 despite full vaccination.  He has been treated with dexamethasone, remdesivir.     His hospital course is notable for progressive COVID-19 illness manifested by:    Hypoxic respiratory failure/ARDS - on 80 % FIO2, PEEP 10  Shock on vasopressin and phenylephrine  AFIB c RVR  AZALIA with oliguria.    Severe metabolic acidosis = lactic acidemia, ketonemia,   Bacterial superinfection ?      Past Medical History       Medical History Date Comments   Other ill-defined and unknown causes of morbidity and mortality   rheumatic fever at 12 years old and on penicillin prophylaxis to 19 92   Gout       Heart disease       Diabetes (HC)       Hypertension       Colon polyp 2012 every 5 years   Hyperlipidemia           Past Surgical History   I have reviewed this patient's surgical history and updated it with pertinent information if needed.  Past Surgical History:   Procedure  Laterality Date     C UNLISTED PROCEDURE, ABDOMEN/PERITONEUM/OMENTUM      Description: Hernia Repair;  Proc Date: 03/09/2009;  Comments: ventral with bovine graft and re-do because of incarceration May 2009     C UNLISTED PROCEDURE, ABDOMEN/PERITONEUM/OMENTUM      Description: Hernia Repair;  Recorded: 02/03/2010;  Comments: redo ventral hernia failed bovine graft with incarceration     EYE SURGERY  2016    cataract     HC KNEE SCOPE, DIAGNOSTIC      Description: Arthroscopy Knee Right;  Recorded: 02/03/2009;     HC REPAIR UMBILICAL KATJA,<6Y/O,REDUC      Description: Umbilical Hernia Repair;  Recorded: 02/03/2009;       Prior to Admission Medications   Prior to Admission Medications   Prescriptions Last Dose Informant Patient Reported? Taking?   allopurinol (ZYLOPRIM) 300 MG tablet 11/14/2021 at 1130  No Yes   Sig: [ALLOPURINOL (ZYLOPRIM) 300 MG TABLET] TAKE ONE TABLET BY MOUTH DAILY.   amLODIPine (NORVASC) 10 MG tablet 11/14/2021 at 1130  No Yes   Sig: [AMLODIPINE (NORVASC) 10 MG TABLET] TAKE 1 TABLET(10 MG) BY MOUTH DAILY   atorvastatin (LIPITOR) 10 MG tablet  at Unknown time  No No   Sig: Take 1 tablet (10 mg) by mouth every evening   cloNIDine HCl (CATAPRES) 0.2 MG tablet 11/14/2021 at 1130  No Yes   Sig: [CLONIDINE HCL (CATAPRES) 0.2 MG TABLET] TAKE 1 TABLET BY MOUTH ONCE DAILY   gabapentin (NEURONTIN) 100 MG capsule  at Unknown time Son Yes No   Sig: Take 100-200 mg by mouth nightly as needed   glipiZIDE (GLUCOTROL XL) 10 MG 24 hr tablet   Yes No   Sig: Take 10 mg by mouth daily (with breakfast)   hydrochlorothiazide (HYDRODIURIL) 25 MG tablet Past Week Son Yes Yes   Sig: Take 25 mg by mouth daily   lisinopril (ZESTRIL) 40 MG tablet 11/14/2021 at 1130 Son Yes Yes   Sig: Take 40 mg by mouth daily   metFORMIN (GLUCOPHAGE-XR) 500 MG 24 hr tablet 11/14/2021 at 1130  Yes Yes   Sig: Take 2,000 mg by mouth daily (with breakfast)   metoprolol succinate (TOPROL-XL) 100 MG 24 hr tablet 11/14/2021 at 1130  No Yes   Sig:  [METOPROLOL SUCCINATE (TOPROL-XL) 100 MG 24 HR TABLET] TAKE 1 TABLET(100 MG) BY MOUTH DAILY      Facility-Administered Medications: None     Allergies   No Known Allergies    Social History   I have reviewed this patient's social history and updated it with pertinent information if needed. Rosanna Romero  reports that he has never smoked. He does not have any smokeless tobacco history on file. He reports current alcohol use.    Family History   I have reviewed this patient's family history and updated it with pertinent information if needed.   Family History   Problem Relation Age of Onset     Heart Disease Father      Diabetes Sister      Cancer Sister         colon     Heart Disease Sister      Diabetes Brother      Cancer Brother         stomach? abd, testicle     Cancer Sister         ovarian     Heart Disease Sister        Review of Systems   The 10 point Review of Systems could not be done    Physical Exam   Temp: (!) 96.7  F (35.9  C) Temp src: Axillary BP: 121/70 Pulse: (!) 124   Resp: (!) 33 SpO2: 93 % O2 Device: Mechanical Ventilator Oxygen Delivery: 50 LPM  Vital Signs with Ranges  Temp:  [95.6  F (35.3  C)-97.8  F (36.6  C)] 96.7  F (35.9  C)  Pulse:  [] 124  Resp:  [16-35] 33  BP: ()/() 121/70  MAP:  [61 mmHg-99 mmHg] 74 mmHg  Arterial Line BP: ()/(50-79) 102/58  FiO2 (%):  [70 %-100 %] 80 %  SpO2:  [77 %-100 %] 93 %  186 lbs 11.67 oz    GENERAL: intubated, resting in be in ICU.     Data   BMP  Recent Labs   Lab 11/18/21  1406 11/18/21  1304 11/18/21  1159 11/18/21  1126 11/18/21  0802 11/18/21  0422 11/17/21  1826 11/17/21  0719 11/16/21  0914 11/16/21  0550   NA  --   --   --  140  --  139  --  134  --  138   POTASSIUM  --   --   --  3.5  --  4.7  --  4.0  --  3.0*   CHLORIDE  --   --   --  105  --  105  --  103  --  106   GRAZYNA  --   --   --  7.2*  --  7.5*  --  7.6*  --  7.6*   CO2  --   --   --  10*  --  10*  --  11*  --  18*   BUN  --   --   --  82*  --  73*  --  57*  --  42*    CR  --   --   --  2.45*  --  2.42*  --  1.90*  --  1.57*   * 291* 319* 386*   < > 379*   < > 206*   < > 66*    < > = values in this interval not displayed.     Phos@[email or web address](phos:4)  CBC)  Recent Labs   Lab 11/18/21  0932 11/18/21  0422 11/17/21  0719 11/16/21  0550 11/15/21  2347   WBC  --  15.9* 9.6 7.8 6.6   HGB 11.8* 12.3* 12.6* 12.6* 13.6   HCT  --  40.1 38.3* 36.4* 41.4   MCV  --  92 88 85 87   PLT  --  266 149* 122* 136*     Recent Labs   Lab 11/18/21 0422   AST 45   ALT 16   ALKPHOS 73   BILITOTAL 0.5     Recent Labs   Lab 11/18/21  0522   INR 1.10

## 2021-11-18 NOTE — ANESTHESIA CARE TRANSFER NOTE
Patient: Rosanna Romero    Procedure: * No procedures listed *       Diagnosis: * No pre-op diagnosis entered *  Diagnosis Additional Information: No value filed.    Anesthesia Type:   No value filed.     Note:    Oropharynx: ventilatory support  Level of Consciousness: unresponsive  Patient oxygen source: on vent.    Independent Airway: airway patency not satisfactory and stable  Dentition: dentition unchanged      Patient transferred to: ICU    ICU Handoff: Call for PAUSE to initiate/utilize ICU HANDOFF, Identified Patient, Identified Responsible Provider, Reviewed the Pertinent Medical History, Discussed Surgical Course, Reviewed Intra-OP Anesthesia Management and Issues during Anesthesia, Set Expectations for Post Procedure Period and Allowed Opportunity for Questions and Acknowledgement of Understanding      Vitals:  Vitals Value Taken Time   /77 11/18/21 0508   Temp     Pulse 140 11/18/21 0509   Resp     SpO2 77 % 11/18/21 0509   Vitals shown include unvalidated device data.    Electronically Signed By: Savage Elliott MD  November 18, 2021  5:11 AM

## 2021-11-18 NOTE — PLAN OF CARE
ICU End of Shift Summary.  For vital signs and complete assessments, please see documentation flowsheets.     Pertinent assessments: Patient admitted to ICU at approximately 0350. Intubated emergently at bedside. O2 saturations improved with intubation. HR in a-fib with RVR, sustained. Amiodarone initiated. BPs labile on pressors. Urine output decreased. Sedation & analgesia initiated. Sacrum with nonblanchable redness - foam applied. MD updated family.   Major Shift Events: Intubation, IO placement, internal jugular line placed, Arterial R radial line placed, NG placed, pressors started, sedation & analgesia started.   Plan (Upcoming Events): continue ICU cares  Discharge/Transfer Needs: TBD    Bedside Shift Report Completed : Y  Bedside Safety Check Completed: Y

## 2021-11-18 NOTE — PLAN OF CARE
[id number]  Patient is Alert and Oriented x4. Pt is a 2 assist. Repositions Q2hr.  Pt is a Mod carb diet, no straws.  Denying pain.  Patient has Normal Saline 0.9% running at 100 mL per hour. BiPap at 50L 70% FiO2. On Remdesivir and Zosin. Del Val[place] in place. Tele SR.     0325  Desaturating to the 80's. BiPap on. Pt unresponsive. Extremities cold. Unable to answer questions. RRT called. See RRT flowsheet. Pt transferred to ICU. Report given to ICU RN.

## 2021-11-18 NOTE — PROGRESS NOTES
66-year-old vaccinated male, past medical history significant for diabetes, hypertension gout hyperlipidemia and LINDSAY who presents with a COVID-19 pneumonia.  The patient had a rapid response overnight for worsening hypoxemia, and atrial fibrillation with RVR.  Patient was intubated admitted to the ICU.  Arterial blood gas shows primarily metabolic acidosis.  The patient's lactate is 3.  The patient received a central line and A-line was placed on norepinephrine for hypotension.  When we used the camera to view the patient was noted to be a frail looking 66-year-old male lying in bed in no acute distress.  Discussion with team plan will be  Place the patient on full mechanical ventilatory support, blood gas shows primarily metabolic acidosis we will continue patient on current arterial blood gas settings.  We will follow lactic acid levels.  Continue patient on Zosyn day 3 given concern for bacterial pneumonia.  Continue patient on amiodarone given RVR.  Given metabolic acidosis we will check CMP to assess renal function.  Would also recommend CT of chest abdomen pelvis to look for infectious etiology.  We will continue to monitor.    Michele Quiñones MD  Critical Care Medicine

## 2021-11-18 NOTE — ANESTHESIA PROCEDURE NOTES
Central Line/PA Catheter Placement    Pre-Procedure   Staff -        Anesthesiologist:  Savage Elliott MD       Performed By: anesthesiologist       Location: ICU       Procedure Start/Stop Times: 11/18/2021 4:28 AM and 11/18/2021 4:58 AM       Pre-Anesthestic Checklist: patient identified, IV checked, site marked, risks and benefits discussed, informed consent, monitors and equipment checked, pre-op evaluation and at physician/surgeon's request  Timeout:       Correct Patient: Yes        Correct Procedure: Yes        Correct Site: Yes        Correct Position: Yes        Correct Laterality: Yes     Procedure   Procedure: central line       Laterality: right       Insertion Site: internal jugular.       Patient Position: Trendelenburg  Sterile Prep        All elements of maximal sterile barrier technique followed       Patient Prep/Sterile Barriers: draped, hand hygiene, gloves , hat , mask , draped, gown, sterile gel and probe cover       Skin prep: Chloraprep  Insertion/Injection        Technique: ultrasound guided        1. Ultrasound was used to evaluate the access site.       2. Vein evaluated via ultrasound for patency/adequacy.       3. Using real-time ultrasound the needle/catheter was observed entering the artery/vein.       5. The visualized structures were anatomically normal.       6. There were no apparent abnormal pathologic findings.       Introducer Type: 9 Fr, 10 cm        Catheter Type/Size: 12 Fr, 16 cm, 3-lumen  Narrative         Secured by: suture and anchor securement device       Tegaderm and Biopatch dressing used.       Complications: None apparent,        blood aspirated from all lumens,        All lumens flushed: Yes       Verification method: X-ray       Tip termination: right atrium

## 2021-11-18 NOTE — PROGRESS NOTES
Responded to RRT for worsening hypoxia on BiPAP and lethargy.    Temp: 97.8  F (36.6  C) Temp src: Axillary BP: 101/67 Pulse: (!) 134   Resp: 16 SpO2: 96 % O2 Device: Mechanical Ventilator Oxygen Delivery: 50 LPM     Gen - lethargic.  Lungs - coarse BS.  Heart - tachycardic, S1+S2 nml, no m/g/r.  Ext - no edema.  Abd - soft, NT, + BS.    Last Arterial Blood Gas:  pH Arterial   Date Value Ref Range Status   11/18/2021 6.93 (LL) 7.35 - 7.45 Final     pCO2 Arterial   Date Value Ref Range Status   11/18/2021 53 (H) 35 - 45 mm Hg Final     pO2 Arterial   Date Value Ref Range Status   11/18/2021 107 (H) 80 - 105 mm Hg Final     Bicarbonate Arterial   Date Value Ref Range Status   11/18/2021 11 (L) 21 - 28 mmol/L Final     Base Excess/Deficit (+/-)   Date Value Ref Range Status   11/18/2021 -21.4 (L) -9.0 - 1.8 mmol/L Final       A/P- ABG obtained prior to intubation.  - ETT and MV.  - supportive measures.  - CXR, EKG, blood work.    EKG- shows a fib with RVR @ 117 bpm with T wave inversions in infero-lateral leads.    Pt likely with septic shock/a fib with RVR and blood Cx obtained. Already on broad spectrum IV abx to include zosyn and vanc.  Blood work notable for metabolic acidosis.    Addendum.  - notified of some coffee ground emesis.  - keep NPO, IV PPI, monitor H/H.  - given a fib with RVR, will start IV amiodarone.      > 30 mins CC time for hypoxia and MV.

## 2021-11-18 NOTE — PROGRESS NOTES
ICU End of Shift Summary. See flowsheets for vital signs and detailed assessment.    Changes this shift:     Neuro: Rass -3, Pupils PERRLA 2, opens eyes to movements, withdraws from pain.  Cardiac: Afib with RVR on IV Amiodarone. Hypothermic this morning, bear hugger initated. Normothermic now, bear hugger removed.   Respiratory:  Intubated on vent  AC Mode FiO2 80%  PEEP 10 RR 26; x2-4 desaturates to 84% then  recovers. Does not like being turned left.  GI/: Poor urine output via alex. BM smear x2  Diet/appetite: NPO. NG to LIS, clamped for meds  Activity:  Assist of 2, repositioning Q2H  Pain: At acceptable level on current regimen.   Skin: No new deficits noted. Non blanchable erythema on coccyx, WOC consulted. Pulsate mattress ordered  LDA's:   IO removed from R LE.  TL internal jugular  Bilateral PIV  Alex  NG 69 @ nare  ETT 24 @ teeth    Gtts:  Fentanyl 50mcg/hr  Versed 3 mg/hour  Propofol 5mcg/kg/min  Levo off- switched to phenylephrine d/t Afib RVR  Phenylephrine 3mcg/kg/min  Vasopressin 2.4 units/hr  Amiodarone 0.5mg/min  #1 Sodium Bicarb 125cc/hr -run until empty.  #2 Sodium Bicarb 25meq/hr (25cc/hr)  LR start at 1900 (when bicarb is done) 100cc/hour  NS-TKO with abx  Insulin gtt Alg 4 +4  * patient received 2 x 1L LR bolus this shift.  *Switched from zosyn to merrem  * received 1/2 amp of Sodium Bicarbonate  *Potassium 3.5 replaced x1, Recheck Potassium 3.0 replacment ordered and needs to be administered recheck 4hours after      Plan: CT chest, abdomen, pelvis when patient more stable to look for source of infection. Replace 3.0 potassium and recheck 4 hours after administration. Place pulsate Mattress. Initiate LR infusion 100cc/hour. Sodium 145 discuss adding FWF? Calcium 6.9 replace? NG to LIS discuss discontinuation. Patient part of the Covid study with daily lab draws and ultrasound of spleen which is marked on left rib side. Continue with POC. Notify primary team with changes.

## 2021-11-18 NOTE — PROVIDER NOTIFICATION
DATE:  11/18/2021   TIME OF RECEIPT FROM LAB:  0990  LAB TEST:  ketone  LAB VALUE:  4.3  RESULTS GIVEN WITH READ-BACK TO (PROVIDER):  Dr Nick  TIME LAB VALUE REPORTED TO PROVIDER:   1783

## 2021-11-18 NOTE — PROGRESS NOTES
----------------------------------------------------------  [id number] STUDY VISIT TREATMENT NOTES   ----------------------------------------------------------  [id number] Study (splenic ultrasound treatment for lowering inflammation associated with COVID-19)    ----------------------------------------------------------  Rosanna Romero   Date of Admission: 11/14/2021   Room Number: 0373/0373-01     Participant ID: [id number]  Date: 11/18/21   Study Day 2    Researcher making note/applying tx: Junior Haji    ULTRASOUND TREATMENT? Yes    IF YES:  MINI Device Serial #: [id number]  Time at the beginning of study visit (opening progress note): 10:38 AM   Time at the end of study visit: 11:02      Pt body position: Sitting up at an angle  Pt arm position: Arm out creating 45 degree angle     NOTES:   First 9-minute stimulation:   START 10:41   STOP 10:50   Was stimulation inturrupted: No   Reason: N/A     Second 9-minute stimulation:   START 10:50   STOP 10:59   Was stimulation inturrupted: No   Reason: N/A     Repeat stimulation if necessary: No   START    STOP        Pt feedback on stimulation: Patient is intubated, no comments.       Other overall notes:  Patient tolerated ultrasound stimulation  Patient was intubated 11/18/21 AM along with an NG tube. This was due to respiratory failure.     AE events?  Adverse event(s) reported    _______________________________________________________________________________  [facility] Study Adverse Event  Reported: 11/18/21 2:34 PM  Rosanna Romero   [id number]   1955   On-study: Yes  Adverse Event: Worsening hypoxic respiratory failure, acute kidney injury   For AE: Start Date/Time: 11/18/21 at 4:03 AM  Ongoing: Yes End Date: NA  Patient was randomized into the TREATMENT ARM  The AE was Expected  Relationship to Study Treatment: Unrelated  If Not Related (except for COVID-19 infection), specify alternative etiology:  COVID 19, possible bacterial pneumonia.   Severity of the AE:  Life-threatening  ____________________________________________  Is this a DUSTIN: Yes    Date adverse event became severe:   11/18/21  Did the adverse event result in death? N/A  Did the adverse event result in initial or prolonged hospitalization for the subject? N/A  Is the adverse event life threatening? Yes  Comments:  This appears to be worsening COVID, course not unexpected.   ___________________________________________  Did the adverse event cause the subject to be discontinued in the study? No  Action Taken: Continue to Monitor  Comments:   Will continue in study.  Outcome: Not Recovered/Not Resolved  Meets IRB reporting requirements: No  Comments:   ___________________________________________  Researcher sent to 'on call' study doctor for review. 11/18/21 2:34 PM     ----------------------------------------------------------  PATIENT STATUS  ----------------------------------------------------------    Instructions for 'on-call' study doctor:  Monitor this patient's status, advise researchers if there are any concerns with patients continued enrollment in this study    ----------------------------------------------------------  O2 Device: Mechanical Ventilator  FiO2 (%): 70 %  Oxygen Delivery: 50 LPM   Vitals:    11/18/21 0945 11/18/21 1000 11/18/21 1005 11/18/21 1015   BP: 123/71 131/65  121/70   BP Location:       Pulse: (!) 126 (!) 128  (!) 124   Resp: (!) 35 (!) 34  (!) 33   Temp:   (!) 96.7  F (35.9  C)    TempSrc:       SpO2: 99% 97%  98%   Weight:         ----------------------------------------------------------  No results displayed because visit has over 200 results.         ----------------------------------------------------------    Researcher sent to 'on call' study doctor for review. 11/18/21 11:18 AM     ----------------------------------------------------------  IN RESPONSE TO : [facility]NIE STUDY VISIT TREATMENT NOTES   ----------------------------------------------------------  *copy/paste  the above as the 'summary' for progress note      Rosanna Romero   This patient's status has been monitored by me. 11/18/21 2:30 PM     Based on my review:     I suggest we CONTINUE this patients enrollment in the [id number] Study.  Patient is clinically worsening with hypoxic respiratory failure as well as profound metabolic acidosis.  CT of the chest with multifocal pneumonia, but no clear other source of infection.  Suspect ongoing COVID and perhaps bacterial superinfection but would continue treatment.       Signed:  Martinez Paz MD    11/18/21 2:30 PM

## 2021-11-18 NOTE — PROVIDER NOTIFICATION
DATE:  11/18/2021   TIME OF RECEIPT FROM LAB:  552  LAB TEST:  pH  LAB VALUE:  7.14  RESULTS GIVEN WITH READ-BACK TO (PROVIDER):  Tele icu  TIME LAB VALUE REPORTED TO PROVIDER:   555

## 2021-11-18 NOTE — ANESTHESIA PREPROCEDURE EVALUATION
Anesthesia Pre-Procedure Evaluation    Patient: Rosanna Romero   MRN: [id number] : 1955        Preoperative Diagnosis: * No pre-op diagnosis entered *    Procedure : * No procedures listed *          No past medical history on file.   Past Surgical History:   Procedure Laterality Date     C UNLISTED PROCEDURE, ABDOMEN/PERITONEUM/OMENTUM      Description: Hernia Repair;  Proc Date: 2009;  Comments: ventral with bovine graft and re-do because of incarceration May 2009     C UNLISTED PROCEDURE, ABDOMEN/PERITONEUM/OMENTUM      Description: Hernia Repair;  Recorded: 2010;  Comments: redo ventral hernia failed bovine graft with incarceration     EYE SURGERY  2016    cataract     HC KNEE SCOPE, DIAGNOSTIC      Description: Arthroscopy Knee Right;  Recorded: 2009;     HC REPAIR UMBILICAL KATJA,<6Y/O,REDUC      Description: Umbilical Hernia Repair;  Recorded: 2009;      No Known Allergies   Social History     Tobacco Use     Smoking status: Never Smoker     Smokeless tobacco: Not on file   Substance Use Topics     Alcohol use: Yes     Comment: Alcoholic Drinks/day: rare; remote regular/daily      Wt Readings from Last 1 Encounters:   21 82.6 kg (182 lb 1.6 oz)        Anesthesia Evaluation            ROS/MED HX  ENT/Pulmonary:     (+) sleep apnea,     Neurologic:       Cardiovascular:     (+) hypertension-----    METS/Exercise Tolerance:     Hematologic:       Musculoskeletal:       GI/Hepatic:     (+) liver disease,     Renal/Genitourinary:     (+) renal disease,     Endo:     (+) type I DM,     Psychiatric/Substance Use:       Infectious Disease:       Malignancy:       Other:            Physical Exam    Airway   unable to assess          Respiratory Devices and Support         Dental           Cardiovascular             Pulmonary                   OUTSIDE LABS:  CBC:   Lab Results   Component Value Date    WBC 15.9 (H) 2021    WBC 9.6 2021    HGB 12.3 (L) 2021    HGB 12.6  (L) 11/17/2021    HCT 40.1 11/18/2021    HCT 38.3 (L) 11/17/2021     11/18/2021     (L) 11/17/2021     BMP:   Lab Results   Component Value Date     11/17/2021     11/16/2021    POTASSIUM 4.0 11/17/2021    POTASSIUM 3.0 (L) 11/16/2021    CHLORIDE 103 11/17/2021    CHLORIDE 106 11/16/2021    CO2 11 (L) 11/17/2021    CO2 18 (L) 11/16/2021    BUN 57 (H) 11/17/2021    BUN 42 (H) 11/16/2021    CR 1.90 (H) 11/17/2021    CR 1.57 (H) 11/16/2021     (H) 11/17/2021     (H) 11/17/2021     COAGS:   Lab Results   Component Value Date    INR 1.05 11/17/2021    FIBR 723 (H) 11/17/2021     POC:   Lab Results   Component Value Date     (H) 07/02/2021     HEPATIC:   Lab Results   Component Value Date    ALBUMIN 2.7 (L) 11/15/2021    PROTTOTAL 6.8 11/15/2021    ALT 16 11/15/2021    AST 40 11/15/2021    ALKPHOS 78 11/15/2021    BILITOTAL 0.5 11/15/2021     OTHER:   Lab Results   Component Value Date    PH 6.93 (LL) 11/18/2021    LACT 3.0 (H) 11/18/2021    A1C 8.4 (H) 11/14/2021    GRAZYNA 7.6 (L) 11/17/2021    PHOS 8.8 (H) 11/18/2021    MAG 2.3 11/18/2021    LIPASE 45 (L) 06/30/2021    TSH 0.46 11/15/2021    .0 (H) 11/17/2021    SED 34 (H) 11/15/2021       Anesthesia Plan    ASA Status:  4   - Procedure: Procedure only, no anesthetic delivered                    Consents            Postoperative Care            Comments:                Savage Elliott MD

## 2021-11-18 NOTE — PROVIDER NOTIFICATION
DATE:  11/18/2021   TIME OF RECEIPT FROM LAB:  8260  LAB TEST:  procalcitonin  LAB VALUE:  47.73  RESULTS GIVEN WITH READ-BACK TO (PROVIDER): Dr Hussein  TIME LAB VALUE REPORTED TO PROVIDER:   1309

## 2021-11-19 NOTE — PROGRESS NOTES
----------------------------------------------------------  [id number] STUDY VISIT TREATMENT NOTES   ----------------------------------------------------------  [id number] Study (splenic ultrasound treatment for lowering inflammation associated with COVID-19)    ----------------------------------------------------------  Rosanna Romero   Date of Admission: 11/14/2021   Room Number: 0373/0373-01     Participant ID: [id number]  Date: 11/19/21   Study Day 3    Researcher making note/applying tx: Rosita Sebastian    ULTRASOUND TREATMENT? Yes    IF YES:  MINI Device Serial #: [id number]  Time at the beginning of study visit (opening progress note): 10:17 AM   Time at the end of study visit: 10:40AM      Pt body position: Laying down flat on stomach  Pt arm position: Arm completely down by side     NOTES:   First 9-minute stimulation:   START 10:18AM   STOP 10:27AM   Was stimulation inturrupted: No   Reason: N/A     Second 9-minute stimulation:   START 10:27AM   STOP 10:36AM   Was stimulation inturrupted: No   Reason: N/A     Repeat stimulation if necessary: No   START    STOP        Pt feedback on stimulation: Patient on ventilator and unable to comment.  Other overall notes:  Patient tolerated ultrasound stimulation    AE events?  No adverse events reported    Note: if AE, use smartphrase 'UNTSAE'    ----------------------------------------------------------  PATIENT STATUS  ----------------------------------------------------------    Instructions for 'on-call' study doctor:  Monitor this patient's status, advise researchers if there are any concerns with patients continued enrollment in this study    ----------------------------------------------------------  O2 Device: Mechanical Ventilator  FiO2 (%): 55 %   Vitals:    11/19/21 0930 11/19/21 0945 11/19/21 1000 11/19/21 1015   BP:   118/71    BP Location:       Pulse: 60 60 60 60   Resp: 17 17 17 17   Temp: 97.5  F (36.4  C) 97.7  F (36.5  C) 97.9  F (36.6  C)  97.9  F (36.6  C)   TempSrc:       SpO2: 100% 100% 100% 100%   Weight:         ----------------------------------------------------------  No results displayed because visit has over 200 results.         ----------------------------------------------------------    Researcher sent to 'on call' study doctor for review. 11/19/21 10:17 AM     ----------------------------------------------------------  IN RESPONSE TO : [employer] STUDY VISIT TREATMENT NOTES   ----------------------------------------------------------  *copy/paste the above as the 'summary' for progress note      Rosanna Romero   This patient's status has been monitored by me. 11/19/21 2:45 PM     Based on my review:     I suggest we CONTINUE this patients enrollment in the [employer] Study.      Signed:  Martinez Paz MD    11/19/21 2:45 PM

## 2021-11-19 NOTE — PLAN OF CARE
Right wrist and Left wrist restraints continued 11/19/2021    Clinical Justification: Pulling lines, pulling tubes, and pulling equipment  Less Restrictive Alternative: 1:1 patient care,Disguise equipment,Pain management,Alarm,Reorientation  Attending Physician Notified: MD ordered restraint,     New orders placed Yes  Length of Order: 1 Day      Severino Ragland RN

## 2021-11-19 NOTE — PLAN OF CARE
ICU End of Shift Summary.  For vital signs and complete assessments, please see documentation flowsheets.     Pertinent assessments: RASS -4, Resp on full vent support, Ryhtydj-BS-Kcuq drip off switched to PO, on blanco &  vasopressin, GI/ NGT placed pending position verification, TF pending, incontinent of small BM, alex little UOP even after lasix IVP.  Major Shift Events: Amio drip off, CT completed, Keofeed placed   Plan (Upcoming Events): continue ICU cares  Discharge/Transfer Needs: pending progress    Bedside Shift Report Completed : Y  Bedside Safety Check Completed: Y

## 2021-11-19 NOTE — PROGRESS NOTES
Respiratory Therapy Note    [facility] ICU VENTILATOR RESPIRATORY NOTE  Date of Admission: 11/14/21  Date of Intubation (most recent): 11/18/21  Reason for Mechanical Ventilation: Respiratory Failure  Number of Days on Mechanical Ventilation: 1  Met Criteria for Pressure Support Trial: No  Reason for No Pressure Support Trial: Patient's PEEP +10 cmH20 and FiO2 at 100%  Significant Events Today: Patient was intubated this AM  ABG Results: 7.17/31/69/11  ETT appearance on chest x-ray: Endotracheal tube in satisfactory position terminating 5.8 cm above the robin.    Plan:  Continue to monitor patient's respiratory status.      RT Crissy  9:41 PM November 18, 2021

## 2021-11-19 NOTE — PROGRESS NOTES
DATE:  11/19/2021   TIME OF RECEIPT FROM LAB:  0544  LAB TEST:  Ketones  LAB VALUE:  2.1  RESULTS GIVEN WITH READ-BACK TO (PROVIDER):  Mauro in Tele-Hub who will report it to Dr. Quiñones  TIME LAB VALUE REPORTED TO PROVIDER:   0535

## 2021-11-19 NOTE — PROGRESS NOTES
[facility]    ICU Progress Note       Date of Admission:  11/14/2021    Assessment: Critical Care   Rosanna Romero is a 66 year old male admitted on 11/14/2021 for COVID-19 infection. He has a history of DM, HTN, gout, HLD, and LINDSAY. He was admitted to the ICU overnight 11/18 due to worsening hypoxemia and afib RVR, requiring intubation.       Summary plans for today  - MRSA nares  - gentle diuresis  - continue to wean vent as tolerated      Plan: Critical Care   Neuro/ pain/ sedation:  - Propofol, versed, fentanyl for sedation and vent synchrony    Pulmonary: history of LINDSAY  Ventilation Mode: CMV/AC  (Continuous Mandatory Ventilation/ Assist Control)  FiO2 (%): 55 %  Rate Set (breaths/minute): 18 breaths/min  Tidal Volume Set (mL): 500 mL  PEEP (cm H2O): 14 cmH2O  Oxygen Concentration (%): 55 %  Resp: 18  - continue current vent settings. Plateau pressures high 20s.    Cardiovascular: history of hypertension, HLD  - Hypotension secondary to suspected septic shock  - Wean vasopressors (levophed, phenylephrine, vasopressin) as tolerated for MAP > 65    GI/Nutrition:  - Diet: Moderate Consistent Carb (60 g CHO per Meal) Diet No Straws  Snacks/Supplements Pediatric: Boost glucose control; Between Meals    - Will start TFs    Renal/ Fluid Balance: Acute kidney injury and metabolic acidosis  - Metabolic acidosis -- greatly improved. No further resuscitation indicated  - keep euvolemic  - ICU electrolyte replacement protocol     Endocrine: history of DM  - Hyperglycemia, possible DKA--insulin drip until BG better controlled    ID: suspected septic shock  - empiric meropenem (11/18) and vancomycin (11/17)  - follow up cultures    Hematology:  - hgb normal and thrombocytopenia resolved    MSK: history of gout  - continue home allopurinol    Lines/ tubes/ drains:  [place] Valle Catheter: PRESENT, indication: Strict 1-2 Hour I&O  Central Lines: PRESENT  CVC TRIPLE LUMEN Right Internal jugular-Site Assessment:  WDL  Intraosseous Line 11/18/21 Anterior;Proximal;Right Tibia-Line Status: Other (Comment) (no longer present - removed on previous shift)    Prophylaxis:  - DVT Prophylaxis: Heparin SQ  - PUD Prophylaxis: protonix    Code Status: Full Code      Disposition:  - ICU    The patient's care was discussed with the Bedside Nurse.  Critical care time exclusive of procedures: 30    Pravin Nick MD  [facility]  Securely message with the Vocera Web Console (learn more here)  Text page via [employer] Paging/Directory      Clinically Significant Risk Factors Present on Admission             Present on Admission:    Generalized muscle weakness    Physical deconditioning    Infection due to 2019 novel coronavirus    COVID-19      ______________________________________________________________________    Interval History   Weaned down vent requirements overnight. No acute events.    Physical Exam   Vital Signs: Temp: 97  F (36.1  C) Temp src: Rectal (Bear hugger on) BP: 113/62 Pulse: 59   Resp: 18 SpO2: 100 % O2 Device: Mechanical Ventilator    Weight: 186 lbs 11.67 oz  Physical Exam  Constitutional:       Comments: Intubated, sedated   HENT:      Head: Normocephalic and atraumatic.      Right Ear: External ear normal.      Left Ear: External ear normal.   Eyes:      General: No scleral icterus.        Right eye: No discharge.         Left eye: No discharge.   Cardiovascular:      Rate and Rhythm: Tachycardia present. Rhythm irregular.   Pulmonary:      Comments: Coarse, no vent dyssynchrony  Abdominal:      General: There is no distension.      Palpations: Abdomen is soft.      Tenderness: There is no abdominal tenderness.   Musculoskeletal:      Right lower leg: No edema.      Left lower leg: No edema.   Skin:     Coloration: Skin is not jaundiced.      Findings: No rash.   Neurological:      Comments: Sedated, unresponsive to stimuli           Data   I reviewed all medications, new labs and imaging results over  the last 24 hours.  Arterial Blood Gases   Recent Labs   Lab 11/19/21  0526 11/19/21  0028 11/18/21  2207 11/18/21  1602   PH 7.35 7.41 7.36 7.17*   PCO2 38 29* 29* 31*   PO2 346* 99 68* 69*   HCO3 21 19* 16* 11*       Complete Blood Count   Recent Labs   Lab 11/19/21  0515 11/18/21  1445 11/18/21  0932 11/18/21  0422 11/17/21  0719 11/16/21  0550   WBC 16.3*  --   --  15.9* 9.6 7.8   HGB 10.6* 11.5* 11.8* 12.3* 12.6* 12.6*     --   --  266 149* 122*       Basic Metabolic Panel  Recent Labs   Lab 11/19/21  0832 11/19/21  0724 11/19/21  0615 11/19/21  0521 11/19/21  0515 11/19/21  0008 11/19/21  0005 11/18/21  1857 11/18/21  1813 11/18/21  1159 11/18/21  1126 11/18/21  0802 11/18/21  0422   NA  --   --   --   --  148*  --   --   --  145*  --  140  --  139   POTASSIUM  --   --   --   --  3.7  --  3.9  --  3.0*  --  3.5  --  4.7   CHLORIDE  --   --   --   --  108  --   --   --  106  --  105  --  105   CO2  --   --   --   --  21  --   --   --  14*  --  10*  --  10*   BUN  --   --   --   --  79*  --   --   --  79*  --  82*  --  73*   CR  --   --   --   --  2.84*  --   --   --  2.61*  --  2.45*  --  2.42*   * 141* 160* 177* 182*   < >  --    < > 198*   < > 386*   < > 379*    < > = values in this interval not displayed.       Liver Function Tests  Recent Labs   Lab 11/19/21  0515 11/18/21 0522 11/18/21 0422 11/17/21  0719 11/15/21  2347 11/15/21  0914 11/14/21  1318   AST  --   --  45  --  40 34 21   ALT  --   --  16  --  16 15 13   ALKPHOS  --   --  73  --  78 75 80   BILITOTAL  --   --  0.5  --  0.5 0.7 0.6   ALBUMIN  --   --  1.9*  --  2.7* 2.7* 2.8*   INR 0.97 1.10  --  1.05  --   --   --        Pancreatic Enzymes  No lab results found in last 7 days.    Coagulation Profile  Recent Labs   Lab 11/19/21  0515 11/18/21 0522 11/17/21 0719   INR 0.97 1.10 1.05       IMAGING:  No results found for this or any previous visit (from the past 24 hour(s)).

## 2021-11-19 NOTE — PLAN OF CARE
ICU End of Shift Summary.  For vital signs and complete assessments, please see documentation flowsheets.     Pertinent assessments: Pt remains vented and sedated this shift to a RASS score of -3 to -4 on Propofol and Fentanyl. Versed weaned to off at the start of the shift per Dr. Nick. Temp remains on the lower end of normal - bare hugger placed at the end of the shift d\t temps dropping into the 96s. CPOT 0. LSs remain diminished throughout with slight coarseness at times. No sputum from ET tube.  Major Shift Events: - Converted to SR at 2014 this shift. Tele-Hub notified with no changes.             - Called Tele-Hub at 2215 regarding patient's ABG results. Plan to increase PEEP to 18 and redraw ABGs in 1 hour. Pt proned after 1 hour results back per Dr. Quiñones around 0220. Sedation increased quicker then titration schedule per Tele-Hub for proning.            - [place] Valle with continued low urine output. Dr. Quiñones made aware with no changes made.            - Called [facility] at 0700 and updated on the events through the night and questions answered.   Plan (Upcoming Events): Continue prone positioning with ventilator support - wean FiO2 as able. Continue antibiotics. Continue Amiodarone and monitor tele. Monitor urine output closely - Nephro following. Pt still needs a CT of abdomen when stable. Possibly start tube feeds today? Call [facility] for endoscopy results.  Discharge/Transfer Needs: TBD. Continue ICU cares at this time.    Bedside Shift Report Completed   Bedside Safety Check Completed    Right wrist and Left wrist restraints continued 11/19/2021    Clinical Justification: Pulling lines, pulling tubes, and pulling equipment  Less Restrictive Alternative: Repositioning,Re-evaluate equipment,Pain management,Alarm  Attending Physician Notified: MD ordered restraint,     New orders placed Yes  Length of Order: 1 Day      Jinny Hsu, RN

## 2021-11-19 NOTE — PROGRESS NOTES
Physician Attestation   I, Parminder Hernadez, was present with the medical/JUAN student who participated in the service and in the documentation of the note.  I have verified the history and personally performed the physical exam and medical decision making.  I agree with the assessment and plan of care as documented in the note.      I personally reviewed vital signs, medications, labs and imaging.      66-year-old male with medical history including diabetes not on insulin, neuropathy, hypertension, LINDSAY who is vaccinated against COVID-19 presented with cough and shortness of breath and recent positive COVID-19 test.  He was admitted here with suspected secondary bacterial pneumonia and also is being treated for COVID-19 with dexamethasone and remdesivir.  He has had evolving renal failure with worsening respiratory status and metabolic acidosis which prompted stabilization early 11/18 with intubation, central line A-line placement and transfer to the ICU.  He was given bicarbonate containing fluids, insulin drip, vasopressors and remains on the vent.    Acid-base status has improved.  Procalcitonin was dramatically elevated suggesting bacterial sepsis.  CT of his chest abdomen and pelvis did not show obvious source other than pneumonia though there was a question of some enteritis.     1.  Septic shock, suspected pulmonary source.  Expanded to meropenem and vancomycin.  Continue pressors and IV fluids.  Await blood cultures.     2.  Severe metabolic acidosis: Suspect due to combination of ATN from renal failure and ketosis potentially from DKA given severe hyperglycemia.  Acid-base status much improved.  --Nephrology consulted  --Okay to stop bicarbonate drip  --Insulin drip, likely will transition to subcutaneous on 11/20.  --Trend basic metabolic panel     3.    Oliguric acute renal failure: Nephrology following.  May need dialysis but seeing improvement in acid-base status though still oliguric.     4.   DKA: Somewhat unusual.  Is a type II diabetic not normally on insulin and presents with significantly elevated ketones and blood sugars in the 300s.    --Treating with insulin drip.     5.  Acute hypoxemic respiratory failure due to pneumonia, hemodynamic instability    6.  FEN: Heart trophic feeds.  Feeding tube to be placed.    7.  Constipation seen on CT scan: Adding bowel regimen.  Monitor.  Might need an enema.     Parminder Hernadez MD  Date of Service (when I saw the patient): 11/18/21          Parminder Hernadez        [facility]    Progress Note - ICU Service 11/19/21       Date of Admission:  11/14/2021    Today's Updates  1. CT Chest/abd/pelvis  2. Switch to PO amiodarone  3. Start TF's    Summary:  Rosanna Romero is a 66 year old male with history of DM, HTN, HLD, LINDSAY admitted for COVID-19 pneumonia on 11/14 now intubated for worsening metabolic acidosis and hypoxemia. Patient initially admitted for 1 week of symptoms found to be COVID positive in the ED, started on decadron and remdesivir and escalated on HFNC. She was eventually transitioned to BiPAP on 11/17. On AM of 11/18 was hypoxic on BiPAP 70% with ABG showing acute metabolic acidosis with pH 6.95 and started on a bicarb drip. She was intubated at that time. Also found to be in A fib with RVR in the 130-140's started on amiodarone with hypotension requiring levophed and vasopressin. CXR showed worsening infiltrates at that time and there was concern for secondary bacterial pneumonia with septic shock so empiric zosyn and vancomycin initiated. Repeat blood cultures drawn and awaiting results. Repeat ABGs showing continued metabolic acidosis despite interventions, lactate normalized though ketones returned at 4.3 and suspect there some underlying DKA so insulin drip started. Patient also with acute renal failure so remdesivir held and zosyn switched to meropenem.    Assessment & Plan      Rosanna Rmoero is a 66 year  old male admitted on 11/14/2021. He has a history of DM, HTN, gout, LINDSAY and is admitted for COVID-19 pneumonia.    NEURO  #Sedation for Mechanical Ventilation  Sedated with propofol, versed, and analgesia with fentanyl.    CV  #Afib w/ RVR  #Septic Shock vs Cardiogenic Shock  #Lactic Acidosis  Patient developed hypotension on evening of 11/18 with lactic 3.0 and new Afib w/ RVR rate in the 130-140's. EKG with T wave inversions inferiorly and troponin negative. Patient started on NS for IVF, amiodarone 0.5, and levophed+vasopressin. Patient has a known COVID-19 infection though CXR shows worsening bilateral infiltrates and now with hemodynamic instability requiring 2x pressors and lactic acidosis raises suspicion for development of secondary bacterial pneumonia with septic shock. Lactic acid now 1.9 and has been afebrile throughout. On PM 11/18 pt converted back to NSR - amio discontinued. With continued high pressor requirement and cultures returning negative, will get CT chest abd pelvis to look for another source of infection.  - continue vasopressin, phenylephrine  - IVF @ 100ml/hr  - switch amiodarone to PO  - empiric meropenem and vancomycin  - CT chest abdomen pelvis for additional infectious source    RESP  #Acute Hypoxic Respiratory Failure secondary to COVID-19  #Confirmed COVID-19 infection  Symptom Onset 11/6/21   Date of 1st Positive Test 11/13/21   Vaccination Status Fully Vaccinated   - Oxygen: ventilation via ETT, PEEP 10 mmHg, 70% FiO2  - Labs: labs notable for Ddimer 4.63, INR 1.10, Fibrinogen 718, , lactic 1.9 (3.0)   - Imaging: CXR with interval worsening of bilateral infiltrates from 11/17  - Breathing treatments: no inhalers  - IV fluids: NS at 100 mL/hr  - Antibiotics: zosyn and meropenem  - COVID-Focused Medications: Dexamethasone 6 mg x 10 days (day 5/10) and Remdesivir x 5 days (completed 3/5 so far), holding remdesivir  - DVT Prophylaxis: heparin 5,000U    #?Secondary Bacterial  Pneumonia  Patient had become hypoxemic and a profound metabolic acidosis on ABG (pH 6.95) intubated and started on bicarb drip. She was also hypotensive and started on pressors x2. Patient has been afebrile and empiric antibiotics initiated with zosyn and meropenem. Resp culture growing yeast though procal 47. Obtaining CT CAP today.  - continue meropenem, vancomycin  - CT CAP today    GI  #Coffee Ground gastric fluid  Patient had EGD in July 2021 that showed distal esophagitis though no active bleeding or ulcers. Gastric suctioning producing gastric fluid with coffee ground appearance though no linda blood. Hgb slowly downtrending (12.3-->11.8) though no clinical signs of bleeding. Continue IV protonix for CROCKER ppx. No transfusion indicated at this time.  - protonix IV  - hold off on GI consult    RENAL  #Metabolic Acidosis, Increased Anion Gap  #Lactic Acidosis  #Acute Renal Failure  Patient with acute renal failure BUN 73, Cr 2.42 also with worsening metabolic acidosis 7.11/26/75/8 on ABG despite bicarb gtt and IVF. I suspect this acute drop in kidney function from recent hypotension and shock. Treating with IV fluids and pressors for blood pressure support. Persistent anion gap metabolic acidosis likely multifactorial from lactic acidosis with concurrent diabetic ketoacidosis now with ketones 4.3 though downtrending now 2.1 after insulin/fluids. Continue IV fluids, insulin gtt, and will follow blood gases.  - IVF, insulin gtt, bicarb gtt  - trend ABG    ENDO  #Hyperglycemia  BS in the 300's this AM which may be a stress hyperglycemia versus an element of DKA given underlying DM and being on decadron with ketones 4.3. DKA could offer explanation for worsening AG metabolic acidosis, BS in 300's. On insulin gtt, sugars in 160's and ketones down to 2.1, will transition insulin drip to medium sliding scale.  - discontinue insulin drip, start medium ss  - follow sugars    HEME/ONC  #Leukocytosis  #Elevated  D-dimer  Patient has a leukocytosis 15.6 with elevated d-dimer likely due to ongoing COVID pneumonia. Anticoagulating with lovenox. Leukocytosis may be related to underlying secondary infection versus iatrogenic from decadron. Treating empirically for possible secondary bacterial pneumonia.       Diet: Moderate Consistent Carb (60 g CHO per Meal) Diet No Straws  Snacks/Supplements Pediatric: Boost glucose control; Between Meals    DVT Prophylaxis: Enoxaparin (Lovenox) SQ  [place] Catheter: PRESENT, indication: Strict 1-2 Hour I&O  Fluids: NS at 100 ml/h  Central Lines: PRESENT  CVC TRIPLE LUMEN Right Internal jugular-Site Assessment: WDL  Intraosseous Line 11/18/21 Anterior;Proximal;Right Tibia-Line Status: Other (Comment) (no longer present - removed on previous shift)  Code Status: Full Code         The patient's care was discussed with the Attending Physician, Dr. Hernadez    **.Martinez Santoyo  Medical Student  ______________________________________________________________________    Interval History   NAOE. Versed turned off overnight and patient was proned. Very low UOP overnight (~80cc).    Data reviewed today: I reviewed all medications, new labs and imaging results over the last 24 hours.    Physical Exam   Vital Signs: Temp: 96.8  F (36  C) Temp src: Rectal BP: 109/69 Pulse: 57   Resp: 17 SpO2: 100 % O2 Device: Mechanical Ventilator    Weight: 186 lbs 11.67 oz  General Appearance: Sedated, intubated, proned  Respiratory: lung sounds distant bilaterally, no audible crackles or wheezes  Cardiovascular: exam limited (pt proned), regular rate and rhythm on monitor  GI: exam limited (pt proned)  Skin: no apparent skin lesions over the back or lower extremities    Data   Recent Labs   Lab 11/19/21  0615 11/19/21  0521 11/19/21  0515 11/19/21  0414 11/19/21  0008 11/19/21  0005 11/18/21  1857 11/18/21  1813 11/18/21  1500 11/18/21  1445 11/18/21  1159 11/18/21  1126 11/18/21  1006 11/18/21  0932 11/18/21  0802  11/18/21  0522 11/18/21  0422 11/17/21  1826 11/17/21  0719 11/16/21  0012 11/15/21  2347   WBC  --   --  16.3*  --   --   --   --   --   --   --   --   --   --   --   --   --  15.9*  --  9.6   < > 6.6   HGB  --   --  10.6*  --   --   --   --   --   --  11.5*  --   --   --  11.8*  --   --  12.3*  --  12.6*   < > 13.6   MCV  --   --  86  --   --   --   --   --   --   --   --   --   --   --   --   --  92  --  88   < > 87   PLT  --   --  177  --   --   --   --   --   --   --   --   --   --   --   --   --  266  --  149*   < > 136*   INR  --   --  0.97  --   --   --   --   --   --   --   --   --   --   --   --  1.10  --   --  1.05  --   --    NA  --   --   --   --   --   --   --  145*  --   --   --  140  --   --   --   --  139  --  134   < > 136   POTASSIUM  --   --   --   --   --  3.9  --  3.0*  --   --   --  3.5  --   --   --   --  4.7  --  4.0   < > 3.7   CHLORIDE  --   --   --   --   --   --   --  106  --   --   --  105  --   --   --   --  105  --  103   < > 104   CO2  --   --   --   --   --   --   --  14*  --   --   --  10*  --   --   --   --  10*  --  11*   < > 20   BUN  --   --   --   --   --   --   --  79*  --   --   --  82*  --   --   --   --  73*  --  57*   < > 39*   CR  --   --   --   --   --   --   --  2.61*  --   --   --  2.45*  --   --   --   --  2.42*  --  1.90*   < > 1.37*   ANIONGAP  --   --   --   --   --   --   --  25*  --   --   --  25*  --   --   --   --  24*  --  20*   < > 12   GRAZYNA  --   --   --   --   --   --   --  6.9*  --   --   --  7.2*  --   --   --   --  7.5*  --  7.6*   < > 7.9*   * 177*  --  179*   < >  --    < > 198*   < >  --    < > 386*   < >  --    < >  --  379*   < > 206*   < > 98   ALBUMIN  --   --   --   --   --   --   --   --   --   --   --   --   --   --   --   --  1.9*  --   --   --  2.7*   PROTTOTAL  --   --   --   --   --   --   --   --   --   --   --   --   --   --   --   --  5.8*  --   --   --  6.8   BILITOTAL  --   --   --   --   --   --   --   --   --   --   --   --    --   --   --   --  0.5  --   --   --  0.5   ALKPHOS  --   --   --   --   --   --   --   --   --   --   --   --   --   --   --   --  73  --   --   --  78   ALT  --   --   --   --   --   --   --   --   --   --   --   --   --   --   --   --  16  --   --   --  16   AST  --   --   --   --   --   --   --   --   --   --   --   --   --   --   --   --  45  --   --   --  40   TROPONIN  --   --   --   --   --   --   --   --   --   --   --   --   --   --   --   --  <0.015  --  0.019  --  <0.015    < > = values in this interval not displayed.     No results found for this or any previous visit (from the past 24 hour(s)).

## 2021-11-19 NOTE — PROGRESS NOTES
DATA:    VENT DAY#  2    CURRENT SETTINGS:  VENT SETTINGS   Ventilation Mode: CMV/AC  (Continuous Mandatory Ventilation/ Assist Control)  FiO2 (%): 55 %  Rate Set (breaths/minute): 18 breaths/min  Tidal Volume Set (mL): 500 mL  PEEP (cm H2O): 14 cmH2O  Oxygen Concentration (%): 55 %  Resp: 18            FIO2:   55    PATIENT PARAMETERS:    PIP 31  Pplat:  29  Pmean:  19  SBT: n/a  SECRETIONS:  Scant, cloudy, thick  02 SATS:  95-98%    ETT SIZE 8.0  Secured at  24 cm at teeth      Respiratory Medications: n/a     ABG: Results for LEXI FANTASMA E (MRN [id number]) as of 11/19/2021 17:15   Ref. Range 11/19/2021 15:38   pH Arterial Latest Ref Range: 7.35 - 7.45  7.47 (H)   pCO2 Arterial Latest Ref Range: 35 - 45 mm Hg 44   PO2 Arterial Latest Ref Range: 80 - 105 mm Hg 65 (L)   Bicarbonate Arterial Latest Ref Range: 21 - 28 mmol/L 31 (H)   Base Excess Art Latest Ref Range: -9.0 - 1.8 mmol/L 6.8 (H)   FIO2 Unknown 55

## 2021-11-19 NOTE — CONSULTS
CLINICAL NUTRITION SERVICES  -  ASSESSMENT NOTE    Recommendations Ordered by Registered Dietitian (RD):   Recommend TF as follows:   Type of Feeding Tube: pending   Enteral Frequency:  Continuous  Enteral Regimen: Promote at 10 mL/hr  Total Enteral Provisions: 240 mL daily provides 240 kcal, 15 g protein, 31 g CHO, 0 g fiber and 201 mL free water. Meets < 100% of DRI's. --> certavite   Free Water Flush: standard 60 mL q4 hours    Daily electrolyte check, Mg and Phos add on  Daily weights  Once FT placed and ok to use, start and hold at 10 mL/hr   Malnutrition:   % Weight Loss:  Weight loss does not meet criteria for malnutrition   % Intake: unable to determine  Subcutaneous Fat Loss: unable to determine  Muscle Loss: unable to determine   Fluid Retention: none noted    Malnutrition Diagnosis: Unable to determine due to lack of nutrition focused physical exam per direction of new department policy in the setting of COVID19     REASON FOR ASSESSMENT  Rosanna Romero is a 66 year old male seen by Registered Dietitian for Provider Order - Registered Dietitian to Assess and Order TF per Medical Nutrition Therapy Protocol    NUTRITION HISTORY  - Information obtained from chart, patient intubated and positive for COVID-19  - History of hypertension, hyperlipidemia, only discharged from our facility for sepsis secondary to community-acquired pneumonia and gastroenteritis   - Patient admitted for Acute Covid pneumonitis  - Unable to obtain nutrition history from the patient at this time, see above  - Food Allergies: NKFA    CURRENT NUTRITION ORDERS  Diet Order:     Moderate Consistent Carbohydrate Diet + No Straws + Boost Glucose Control.     Current Intake/Tolerance:  No information recorded in the flowsheets to comment on     NUTRITION FOCUSED PHYSICAL ASSESSMENT FOR DIAGNOSING MALNUTRITION)  No         Observed:    Unable to observe per direction of new department policy in the setting of COVID19    Obtained from  Chart/Interdisciplinary Team:  - SLP following   - Intubated on 11/18  - Nephrology following     ANTHROPOMETRICS  Height: 6' --> taken on 6/30/2021  Weight: 84.7 kg ( 186 lbs 11.67 oz)   Body mass index is 25.33 kg/m .  Weight Status:  Overweight BMI 25-29.9  Weight History: weight stable from ~ 5 months ago   Wt Readings from Last 10 Encounters:   11/18/21 84.7 kg (186 lb 11.7 oz)   06/30/21 84.5 kg (186 lb 4.8 oz)   01/24/19 90.7 kg (199 lb 14.4 oz)   10/10/17 95.4 kg (210 lb 6.4 oz)   07/24/17 93.4 kg (206 lb)   05/31/17 93 kg (205 lb)   05/15/17 92.5 kg (204 lb)   09/04/14 100.2 kg (221 lb)     LABS  Labs reviewed    Labs:  Electrolytes  Potassium (mmol/L)   Date Value   11/19/2021 3.7   11/19/2021 3.9   11/18/2021 3.0 (L)   07/02/2021 3.4   07/01/2021 3.8   06/30/2021 3.9     Phosphorus (mg/dL)   Date Value   11/19/2021 4.4   11/18/2021 8.8 (H)    Blood Glucose  Glucose (mg/dL)   Date Value   11/19/2021 182 (H)   11/18/2021 198 (H)   11/18/2021 386 (H)   11/18/2021 379 (H)   11/17/2021 206 (H)   07/02/2021 128 (H)   07/01/2021 173 (H)   06/30/2021 277 (H)   02/14/2010 207 (H)   02/13/2010 283 (H)     GLUCOSE BY METER POCT (mg/dL)   Date Value   11/19/2021 113 (H)   11/19/2021 124 (H)   11/19/2021 141 (H)   11/19/2021 160 (H)   11/19/2021 177 (H)     Hemoglobin A1C (%)   Date Value   11/14/2021 8.4 (H)   06/30/2021 8.1 (H)   01/24/2019 9.1 (H)   07/24/2017 7.5 (H)   05/15/2017 10.7 (H)   10/21/2014 7.3 (H)    Inflammatory Markers  CRP Inflammation (mg/L)   Date Value   11/19/2021 111.0 (H)   11/18/2021 156.0 (H)   11/17/2021 256.0 (H)     WBC (10e9/L)   Date Value   07/02/2021 5.7   07/01/2021 8.6   06/30/2021 25.7 (H)     WBC Count (10e3/uL)   Date Value   11/19/2021 16.3 (H)   11/18/2021 15.9 (H)   11/17/2021 9.6     Albumin (g/dL)   Date Value   11/18/2021 1.9 (L)   11/15/2021 2.7 (L)   11/15/2021 2.7 (L)   06/30/2021 3.5   02/14/2010 3.3   02/13/2010 3.6      Magnesium (mg/dL)   Date Value   11/19/2021 2.2    11/18/2021 2.0   11/18/2021 2.3     Sodium (mmol/L)   Date Value   11/19/2021 148 (H)   11/18/2021 145 (H)   11/18/2021 140   07/02/2021 141   07/01/2021 141   06/30/2021 137    Renal  Urea Nitrogen (mg/dL)   Date Value   11/19/2021 79 (H)   11/18/2021 79 (H)   11/18/2021 82 (H)   07/02/2021 10   07/01/2021 18   06/30/2021 22     Creatinine (mg/dL)   Date Value   11/19/2021 2.84 (H)   11/18/2021 2.61 (H)   11/18/2021 2.45 (H)   07/02/2021 0.82   07/01/2021 1.08   06/30/2021 1.29 (H)     Additional  Triglycerides (mg/dL)   Date Value   07/03/2014 345 (H)     Ketones Urine (mg/dL)   Date Value   11/18/2021 10  (A)   06/30/2021 Negative        MEDICATIONS  Medications reviewed  Pressors x 2   Propofol @ 17.8 mL/hr = 470 kcal     [Held by provider] remdesivir  100 mg Intravenous Q24H    And     [Held by provider] sodium chloride 0.9%  50 mL Intravenous Q24H     allopurinol  300 mg Oral Daily     dexamethasone  6 mg Intravenous Daily     heparin ANTICOAGULANT  5,000 Units Subcutaneous Q8H     meropenem  500 mg Intravenous Q8H     pantoprazole  40 mg Per Feeding Tube QAM AC    Or     pantoprazole (PROTONIX) IV  40 mg Intravenous QAM AC     vancomycin  1,000 mg Intravenous Q24H        amiodarone 0.5 mg/min (11/19/21 1000)     dextrose       fentaNYL 100 mcg/hr (11/19/21 1000)     insulin regular 1 Units/hr (11/19/21 1132)     lactated ringers 100 mL/hr at 11/19/21 1000     midazolam Stopped (11/18/21 2156)     norepinephrine Stopped (11/18/21 1432)     phenylephrine 1 mcg/kg/min (11/19/21 1000)     propofol (DIPRIVAN) infusion 35 mcg/kg/min (11/19/21 1000)     vasopressin 2.4 Units/hr (11/19/21 1131)      acetaminophen, acetaminophen, acetaminophen **OR** [DISCONTINUED] acetaminophen, dextrose, glucose **OR** dextrose **OR** glucagon, gabapentin, melatonin, midazolam, naloxone **OR** naloxone **OR** naloxone **OR** naloxone, ondansetron **OR** ondansetron, prochlorperazine **OR** prochlorperazine **OR** prochlorperazine      ASSESSED NUTRITION NEEDS PER APPROVED PRACTICE GUIDELINES:    Dosing Weight 84.7 kg   Estimated Energy Needs: 1814 kcals ((PSU) 2003b)   Justification: maintenance and vented  Estimated Protein Needs: 102-169 grams protein (1.2-2 g pro/Kg)  Justification: hypercatabolism with critical illness  Estimated Fluid Needs: per MD     MALNUTRITION:  % Weight Loss:  Weight loss does not meet criteria for malnutrition   % Intake: unable to determine  Subcutaneous Fat Loss: unable to determine  Muscle Loss: unable to determine   Fluid Retention: none noted    Malnutrition Diagnosis: Unable to determine due to lack of nutrition focused physical exam per direction of new department policy in the setting of COVID19    NUTRITION DIAGNOSIS:  Inadequate protein-energy intake related to intubation and pressor requirement as evidenced by pt currently meeting 470 kcal through propofol, planned EN start.     NUTRITION INTERVENTIONS  Recommendations / Nutrition Prescription  Recommend TF as follows:   Type of Feeding Tube: pending   Enteral Frequency:  Continuous  Enteral Regimen: Promote at 10 mL/hr  Total Enteral Provisions: 240 mL daily provides 240 kcal, 15 g protein, 31 g CHO, 0 g fiber and 201 mL free water. Meets < 100% of DRI's. --> certavite   Free Water Flush: standard 60 mL q4 hours    Daily electrolyte check, Mg and Phos add on  Daily weights  Once FT placed and ok to use, start and hold at 10 mL/hr    Implementation  Nutrition education: Not appropriate at this time due to patient condition  EN Composition, EN Schedule, Feeding Tube Flush and Collaboration and Referral of Nutrition care: discussed pt during interdisciplinary rounds this morning     Nutrition Goals  EN to meet >80% of nutritional needs over first 1 week of intubation     MONITORING AND EVALUATION:  Progress towards goals will be monitored and evaluated per protocol and Practice Guidelines    Bridget Joy RD, LD  Clinical Dietitian

## 2021-11-19 NOTE — PROGRESS NOTES
Renal Medicine Progress Note            Assessment/Plan:     Rosanna Romero is a 66 year old male who was admitted on 11/14/2021.      1) Baseline Normal Kidney Function.     2) COVID-19 Pneumonia/ARDS - oxygenation is improved.      3) Shock - likely vasoplegic     4) Severe Metabolic Acidosis - lactate + ketones - better.       5) AZALIA - oliguric/anuric.  Likely ATN due to sepsis.  Had contrast yesterday but renal function was falling before then. Still oliguric but acidosis is better.       Plan:     Stop NaHCO3 1 mEq/mL for now  No indication for HD.            Interval History:   Unable.  Pt is intubated/sedated.          Medications and Allergies:       [Held by provider] remdesivir  100 mg Intravenous Q24H    And     [Held by provider] sodium chloride 0.9%  50 mL Intravenous Q24H     allopurinol  300 mg Oral Daily     amiodarone  400 mg Oral or Feeding Tube BID    Followed by     [START ON 11/27/2021] amiodarone  200 mg Oral or Feeding Tube Daily     dexamethasone  6 mg Intravenous Daily     docusate  100 mg Oral or Feeding Tube BID     heparin ANTICOAGULANT  5,000 Units Subcutaneous Q8H     meropenem  500 mg Intravenous Q8H     multivitamins w/minerals  15 mL Per Feeding Tube Daily     pantoprazole  40 mg Per Feeding Tube QAM AC    Or     pantoprazole (PROTONIX) IV  40 mg Intravenous QAM AC     sennosides  10 mL Oral or Feeding Tube BID     vancomycin  1,000 mg Intravenous Q24H      No Known Allergies         Physical Exam:   Vitals were reviewed  /71   Pulse 56   Temp 97.8  F (36.6  C) (Axillary)   Resp 17   Wt 84.7 kg (186 lb 11.7 oz)   SpO2 95%   BMI 25.33 kg/m      Wt Readings from Last 3 Encounters:   11/18/21 84.7 kg (186 lb 11.7 oz)   06/30/21 84.5 kg (186 lb 4.8 oz)   01/24/19 90.7 kg (199 lb 14.4 oz)       Intake/Output Summary (Last 24 hours) at 11/19/2021 1436  Last data filed at 11/19/2021 1200  Gross per 24 hour   Intake 5268.3 ml   Output 193 ml   Net 5075.3 ml       GENERAL  APPEARANCE: intubated, sedated  HEENT:  Eyes/ears/nose/neck grossly normal  RESP: + vent sounds, lungs cta b c good efforts, no crackles, rhonchi or wheezes  CV: RRR, nl S1/S2, no m/r/g   ABDOMEN: o/s/nt/nd, bs present  EXTREMITIES/SKIN: no c/c/rashes/lesions; no edema  NEURO:  sedated  LINES:  + alex, R internal jugular TLC           Data:     BMP  Recent Labs   Lab 11/19/21  1323 11/19/21  1217 11/19/21  1130 11/19/21  1039 11/19/21  0521 11/19/21  0515 11/19/21  0008 11/19/21  0005 11/18/21  1857 11/18/21  1813 11/18/21  1159 11/18/21  1126 11/18/21  0802 11/18/21  0422   NA  --   --   --   --   --  148*  --   --   --  145*  --  140  --  139   POTASSIUM  --   --   --   --   --  3.7  --  3.9  --  3.0*  --  3.5  --  4.7   CHLORIDE  --   --   --   --   --  108  --   --   --  106  --  105  --  105   GRAZYNA  --   --   --   --   --  7.0*  --   --   --  6.9*  --  7.2*  --  7.5*   CO2  --   --   --   --   --  21  --   --   --  14*  --  10*  --  10*   BUN  --   --   --   --   --  79*  --   --   --  79*  --  82*  --  73*   CR  --   --   --   --   --  2.84*  --   --   --  2.61*  --  2.45*  --  2.42*   * 124* 110* 118*   < > 182*   < >  --    < > 198*   < > 386*   < > 379*    < > = values in this interval not displayed.     CBC  Recent Labs   Lab 11/19/21  0515 11/18/21  1445 11/18/21  0932 11/18/21  0422 11/17/21  0719 11/16/21  0550   WBC 16.3*  --   --  15.9* 9.6 7.8   HGB 10.6* 11.5* 11.8* 12.3* 12.6* 12.6*   HCT 31.3*  --   --  40.1 38.3* 36.4*   MCV 86  --   --  92 88 85     --   --  266 149* 122*     Lab Results   Component Value Date    AST 45 11/18/2021    ALT 16 11/18/2021    ALKPHOS 73 11/18/2021    BILITOTAL 0.5 11/18/2021     Lab Results   Component Value Date    INR 0.97 11/19/2021       Attestation:  I have reviewed today's vital signs, notes, medications, labs and imaging.    Owen Da Silva MD  [facility] - Nephrology  [phone]

## 2021-11-19 NOTE — CONSULTS
Clinical Nutrition Brief Note     Received consult for Registered Dietitian to order TF per Medical Nutrition Therapy Guidelines.     Please see full assessment completed on 11/19 for more information.     Bridget Joy, PRIYA, LD  Clinical Dietitian

## 2021-11-19 NOTE — PROGRESS NOTES
[facility]  Respiratory Care Note    [facility] ICU VENTILATOR RESPIRATORY NOTE  Date of Admission: 11/14/21  Date of Intubation (most recent): 11/18/21  Reason for Mechanical Ventilation: Respiratory Failure  Number of Days on Mechanical Ventilation: 2  Met Criteria for Pressure Support Trial: No  Reason for No Pressure Support Trial: Patient's PEEP +14 cmH20 and FiO2 at 100%  Significant Events Today: Pt was proned at 0230, ETT was tape  ABG Results: 7.41/29/99/19 @0028 11/19  ETT appearance on chest x-ray: Endotracheal tube in satisfactory position terminating 5.8 cm above the robin.     Plan:  Will continue to monitor and assess the pt's current respiratory status and needs, in hopes of liberating him from the ventilator.       Ventilation Mode: CMV/AC  (Continuous Mandatory Ventilation/ Assist Control)  FiO2 (%): 100 %  Rate Set (breaths/minute): 18 breaths/min  Tidal Volume Set (mL): 500 mL  PEEP (cm H2O): 14 cmH2O  Oxygen Concentration (%): 100 %  Resp: 18    Vital signs:  Temp: 97.3  F (36.3  C) Temp src: Rectal BP: 118/73 Pulse: 56   Resp: 18 SpO2: 100 % O2 Device: Mechanical Ventilator Oxygen Delivery: 50 LPM   Weight: 84.7 kg (186 lb 11.7 oz)  Estimated body mass index is 25.33 kg/m  as calculated from the following:    Height as of 6/30/21: 1.829 m (6').    Weight as of this encounter: 84.7 kg (186 lb 11.7 oz).    Recent Labs   Lab 11/19/21  0028 11/18/21  2207 11/18/21  1602 11/18/21  1128   PH 7.41 7.36 7.17* 7.11*   PCO2 29* 29* 31* 26*   PO2 99 68* 69* 75*   HCO3 19* 16* 11* 8*   O2PER 100 100 80 70     No past medical history on file.    Past Surgical History:   Procedure Laterality Date     C UNLISTED PROCEDURE, ABDOMEN/PERITONEUM/OMENTUM      Description: Hernia Repair;  Proc Date: 03/09/2009;  Comments: ventral with bovine graft and re-do because of incarceration May 2009     C UNLISTED PROCEDURE, ABDOMEN/PERITONEUM/OMENTUM      Description: Hernia Repair;  Recorded:  02/03/2010;  Comments: redo ventral hernia failed bovine graft with incarceration     EYE SURGERY  2016    cataract     HC KNEE SCOPE, DIAGNOSTIC      Description: Arthroscopy Knee Right;  Recorded: 02/03/2009;     HC REPAIR UMBILICAL KATJA,<6Y/O,REDUC      Description: Umbilical Hernia Repair;  Recorded: 02/03/2009;       Family History   Problem Relation Age of Onset     Heart Disease Father      Diabetes Sister      Cancer Sister         colon     Heart Disease Sister      Diabetes Brother      Cancer Brother         stomach? abd, testicle     Cancer Sister         ovarian     Heart Disease Sister        Social History     Tobacco Use     Smoking status: Never Smoker     Smokeless tobacco: Not on file   Substance Use Topics     Alcohol use: Yes     Comment: Alcoholic Drinks/day: rare; remote regular/daily     Godfrey FRY  [facility]  11/19/2021

## 2021-11-19 NOTE — PHARMACY-VANCOMYCIN DOSING SERVICE
Pharmacy Vancomycin Note    Creatinine for last 3 days  11/17/2021:  7:19 AM Creatinine 1.90 mg/dL  11/18/2021:  4:22 AM Creatinine 2.42 mg/dL; 11:26 AM Creatinine 2.45 mg/dL;  6:13 PM Creatinine 2.61 mg/dL  11/19/2021:  5:15 AM Creatinine 2.84 mg/dL;  2:22 PM Creatinine 3.13 mg/dL    Recent Vancomycin Levels (past 3 days)  11/19/2021:  5:15 AM Vancomycin 24.6 mg/L    Vancomycin IV Administrations (past 72 hours)                   vancomycin (VANCOCIN) 1000 mg in dextrose 5% 200 mL PREMIX (mg) 1,000 mg New Bag 11/18/21 1557    vancomycin 1500 mg in 0.9% NaCl 250 ml intermittent infusion 1,500 mg (mg) 1,500 mg New Bag 11/17/21 1601                Nephrotoxins and other renal medications (From now, onward)    Start     Dose/Rate Route Frequency Ordered Stop    11/18/21 1300  phenylephrine (LINCOLN-SYNEPHRINE) 50 mg in sodium chloride 0.9 % 250 mL infusion         0.1-6 mcg/kg/min × 84.7 kg  2.5-152.5 mL/hr  Intravenous CONTINUOUS 11/18/21 1233      11/18/21 0500  vasopressin 0.2 units/mL in NS (PITRESSIN) standard conc infusion         2.4 Units/hr  12 mL/hr  Intravenous CONTINUOUS 11/18/21 0437      11/18/21 0430  norepinephrine (LEVOPHED) 4 mg in  mL infusion PREMIX         0.01-0.6 mcg/kg/min × 82.6 kg  3.1-185.9 mL/hr  Intravenous CONTINUOUS 11/18/21 0406               Contrast Orders - past 72 hours (72h ago, onward)            Start     Dose/Rate Route Frequency Ordered Stop    11/17/21 1130  iopamidol (ISOVUE-370) solution 500 mL         500 mL Intravenous ONCE 11/17/21 1116 11/17/21 1119          Interpretation of levels and current regimen:    [facility]: current regimen: 1000 mg IV every 24 hours.  Start time: 16:00 on 11/19/2021  Exposure target: AUC24 (range)400-600 mg/L.hr   AUC24,ss: 876 mg/L.hr  Probability of AUC24 > 400: 100 %  Ctrough,ss: 29.8 mg/L  Probability of Ctrough,ss > 20: 94 %  Probability of nephrotoxicity (Lodise UMA 2009): 41 %    [facility] Prediction of Planned New Vancomycin  Regimen:    Regimen: 500 mg IV every 24 hours.  Start time: 16:00 on 11/19/2021  Exposure target: AUC24 (range)400-600 mg/L.hr   AUC24,ss: 459 mg/L.hr  Probability of AUC24 > 400: 75 %  Ctrough,ss: 15.6 mg/L  Probability of Ctrough,ss > 20: 16 %  Probability of nephrotoxicity (Lodise UMA 2009): 11 %    Plan:  1. Vanco 500mg IV q24h.  2. Vancomycin monitoring method: AUC.  3. Vancomycin therapeutic monitoring goal: 400-600 mg*h/L  4.   Pharmacy will check vancomycin level tomorrow am.     Carlie Kapoor [facility]

## 2021-11-20 NOTE — PLAN OF CARE
OT: Orders received. Chart reviewed and discussed with care team.  Acute care OT not indicated currently due to patient intubated and sedated in ICU with COVID. Physical therapy meeting pt's positioning needs at this time. Will complete orders.

## 2021-11-20 NOTE — PROGRESS NOTES
[facility]  Hospitalist Progress Note  Parminder Hernadez MD 11/20/21    Reason for Stay (Diagnosis): Shock         Assessment and Plan:      Summary of Stay: Rosanna Romero is a 66-year-old male with medical history including diabetes not on insulin, neuropathy, hypertension, LINDSAY who is vaccinated against COVID-19 presented with cough and shortness of breath and recent positive COVID-19 test.  He was admitted here with suspected secondary bacterial pneumonia and also is being treated for COVID-19 with dexamethasone and remdesivir.  He has had evolving renal failure with worsening respiratory status and metabolic acidosis which prompted stabilization early 11/18 with intubation, central line A-line placement and transfer to the ICU.  He was given bicarbonate containing fluids, insulin drip, vasopressors and remains on the vent.     Acid-base status has improved but he is oliguric still.  Procalcitonin was dramatically elevated suggesting bacterial sepsis.  CT of his chest abdomen and pelvis did not show obvious source other than pneumonia though there was a question of some enteritis.    He remains intubated with evolving renal failure and shock state.     1.  Septic shock, suspected pulmonary source, likely pneumonia.  Suspect the driving issue is COVID-19 pneumonia with bacterial superinfection  --Expanded to meropenem and vancomycin on 11/18.    --Continue pressors and IV fluids.    --Await blood cultures.     2.  Severe metabolic acidosis: Suspect due to combination of ATN from renal failure and ketosis potentially from DKA given severe hyperglycemia.  Acid-base status much improved.  --Nephrology consulted  --Bowel off bicarbonate drip  --Ketones improved, transition to subcu insulin.  --Trend basic metabolic panel     3.    Oliguric acute renal failure: Nephrology following.  May need dialysis but seeing improvement in acid-base status though still oliguric.     4.  Type 2 diabetes, evidence  of DKA: Somewhat unusual.  Is a type II diabetic not normally on insulin and presented with significantly elevated ketones and blood sugars in the 300s.    --Transition off insulin drip to 16 units Lantus daily plus sliding scale insulin.  --Anticipate needing to titrate once tube feeds are increased.     5.  Acute hypoxemic respiratory failure due to pneumonia, hemodynamic instability: Intubated and sedated.  Vent management as per intensivist.     6.  FEN:  Start trophic feeds via feeding tube.     7.  Constipation seen on CT scan: Adding bowel regimen.  Monitor.  Might need an enema.    8.  Atrial fibrillation with RVR: Transition from IV to oral amiodarone.    9.  History of upper GI bleed: Continue Protonix.    10.  COVID-19: Continuing Decadron.  Remdesivir stopped due to renal failure.    11.  Hypocalcemia: replacing.    DVT Prophylaxis: Heparin SQ  Code Status: Full Code  Discharge Dispo/Date: Multiple more days in the ICU expected  Lines: A-line, central line, [place], feeding tube, ET tube        Interval History (Subjective):      Vent requirement and hemodynamics gradually improving  Remains nearly an uric  Transition off insulin drip to subcu regimen                  Physical Exam:      Last Vital Signs:  /71   Pulse 59   Temp 98.4  F (36.9  C) (Temporal)   Resp 20   Wt 93.3 kg (205 lb 11 oz)   SpO2 100%   BMI 27.90 kg/m        Intake/Output Summary (Last 24 hours) at 11/20/2021 1334  Last data filed at 11/20/2021 1300  Gross per 24 hour   Intake 1690.33 ml   Output 180 ml   Net 1510.33 ml       General: Intubated and sedated.  Ill-appearing man with a ET tube in place.  HEENT: NC/AT, eyes anicteric  Cardiac: RRR, S1, S2.   Pulmonary: Normal chest rise and the vent.  Bilateral crackles.  Abdomen: soft, non-distended.  Bowel Sounds Present.    Extremities: Warm, well perfused.  Skin: no rashes or lesions noted.  Warm and Dry.  Neuro: Clonus or tremor.         Medications:      All current  medications were reviewed with changes reflected in problem list.         Data:      All new lab and imaging data was reviewed.   Labs:  Recent Labs   Lab 11/20/21  1200 11/20/21  0413 11/20/21  0407   NA  --   --  149*   POTASSIUM  --   --  4.0   CHLORIDE  --   --  109   CO2  --   --  24   ANIONGAP  --   --  16*   *   < > 144*   BUN  --   --  91*   CR  --   --  3.83*   GFRESTIMATED  --   --  15*   GRAZYNA  --   --  6.3*    < > = values in this interval not displayed.     Recent Labs   Lab 11/20/21  0407   WBC 15.0*   HGB 10.0*   HCT 29.6*   MCV 87         Imaging:   Recent Results (from the past 48 hour(s))   CT Chest Abdomen Pelvis w/o Contrast    Narrative    CT CHEST, ABDOMEN AND PELVIS WITHOUT CONTRAST  11/19/2021 12:57 PM    CLINICAL HISTORY: Sepsis.    TECHNIQUE: CT scan of the chest, abdomen, and pelvis was performed  without IV contrast. Multiplanar reformats were obtained. Dose  reduction techniques were used.   CONTRAST: None.    COMPARISON: CT chest 11/17/2021, CT abdomen and pelvis 6/30/2021.    FINDINGS:   LUNGS AND PLEURA: Extensive bilateral pulmonary consolidation  involving all lobes of both lungs showing interval areas of increased  consolidation at the lower lungs. Small right pleural fluid newly  identified.    MEDIASTINUM/AXILLAE: No acute mediastinal abnormality. Trace  pericardial effusion appears stable. ET tube identified ending above  the robin. Nasogastric tube identified.    CORONARY ARTERY CALCIFICATION: Mild.    HEPATOBILIARY: No acute liver abnormality. Increased density material  within the gallbladder lumen may be excretion of contrast.    PANCREAS: Normal.    SPLEEN: Normal.    ADRENAL GLANDS: No acute abnormality. Minimal stable thickening of  doubtful significance.    KIDNEYS/BLADDER: No significant mass, stones, or hydronephrosis. There  are simple or benign cysts. No follow up is needed. Catheter  decompresses the urinary bladder. Bladder wall thickening may  just  relate to incomplete distention.    BOWEL: Prominent stool distends the sigmoid colon and rectum. No small  bowel obstruction. Normal appendix. Rectal catheter identified. A few  small bowel loops demonstrate mild wall thickening. No free air.    LYMPH NODES: Normal.    VASCULATURE: Unremarkable.    PELVIC ORGANS: Normal.    OTHER: Small ascites newly identified. No abscess. No free air.  Anasarca is increased.    MUSCULOSKELETAL: No new significant abnormality. Diffuse spine  degenerative changes.      Impression    IMPRESSION:  1.  Extensive bilateral pulmonary consolidation with a degree of  progression at the lower lungs consistent with progressing pneumonia.  New but very small right pleural fluid.  2.  New anasarca. New but very small abdominal ascites.  3.  Prominent stool distends the sigmoid colon and rectum.  4.  Mild degree of wall prominence of small bowel loops could relate  to an enteritis.    KRUPA GRIJALVA MD         SYSTEM ID:  [id number]   XR Abdomen Port 1 View    Narrative    XR ABDOMEN PORT 1 VIEWS   11/19/2021 4:32 PM     HISTORY: Keofeed placement  confirmation    COMPARISON: None.      Impression    IMPRESSION: Feeding tube tip at the ligament of Treitz.    CRYSTAL DUMONT MD         SYSTEM ID:  [id number]         Parminder Hernadez MD , MD.

## 2021-11-20 NOTE — PROGRESS NOTES
DATA:    VENT DAY#  3    CURRENT SETTINGS:  VENT SETTINGS   Ventilation Mode: CMV/AC  (Continuous Mandatory Ventilation/ Assist Control)  FiO2 (%): 40 %  Rate Set (breaths/minute): 18 breaths/min  Tidal Volume Set (mL): 500 mL  PEEP (cm H2O): 10 cmH2O  Oxygen Concentration (%): 40 %  Resp: 22            FIO2:   40    PATIENT PARAMETERS:    PIP 25  Pplat:  14  Pmean:  475  SBT: n/a  SECRETIONS:  Small/modertae, cloudy, thick  02 SATS:  93-96%    ETT SIZE 8.0  Secured at  24 cm at teeth    Securing device changed / tube re-taped date 11/20/21      Respiratory Medications: n/a     ABG: Results for FANTASMA ELLIOTT (MRN [id number]) as of 11/20/2021 17:41   Ref. Range 11/20/2021 15:29   pH Arterial Latest Ref Range: 7.35 - 7.45  7.41   pCO2 Arterial Latest Ref Range: 35 - 45 mm Hg 31 (L)   PO2 Arterial Latest Ref Range: 80 - 105 mm Hg 88   Bicarbonate Arterial Latest Ref Range: 21 - 28 mmol/L 20 (L)   Base Excess Art Latest Ref Range: -9.0 - 1.8 mmol/L -4.2   FIO2 Unknown 40     PF = 220    NOTE / PLAN:   Patient remained intubated and respiratory status stable throughout shift. Please see all documented settings and patient parameters in flow sheets. RT will continue to monitor and provide support as needed.     Yasemin Cuevas, LRT, BSRT-RRT

## 2021-11-20 NOTE — PROGRESS NOTES
Renal Medicine Progress Note            Assessment/Plan:     Rosanna Romero is a 66 year old male who was admitted on 11/14/2021.      1) Baseline Normal Kidney Function.     2) COVID-19 Pneumonia/ARDS - oxygenation is improved.      3) Shock - likely vasoplegic/sepsis     4) Severe Metabolic Acidosis - lactate + ketones - better.       5) AZALIA - oliguric/anuric.  Likely ATN due to sepsis.  Had contrast yesterday but renal function was falling before then. Still oliguric but acidosis is better. He is likely to need HD in the next couple days.  Though he is on pressors, I think we may be able to manage with IHD.  [place] a try anyway.  No indication for HD today. Potassium, acid/base and volume are all acceptable.      6) Hypocalcemia:  Calcium corrects to 8.0 accounting for low albumin.  He did receive calcium gluconate 2 grams x 1.       Plan:    Same Rx  Following.          Interval History:   Unable.  Pt is intubated/sedated.          Medications and Allergies:       allopurinol  300 mg Oral Daily     amiodarone  400 mg Oral or Feeding Tube BID    Followed by     [START ON 11/27/2021] amiodarone  200 mg Oral or Feeding Tube Daily     calcium gluconate  2 g Intravenous Once     dexamethasone  6 mg Intravenous Daily     docusate  100 mg Oral or Feeding Tube BID     heparin ANTICOAGULANT  5,000 Units Subcutaneous Q8H     insulin aspart  1-6 Units Subcutaneous Q4H     insulin glargine  16 Units Subcutaneous QAM AC     meropenem  500 mg Intravenous Q8H     multivitamins w/minerals  15 mL Per Feeding Tube Daily     pantoprazole  40 mg Per Feeding Tube QAM AC    Or     pantoprazole (PROTONIX) IV  40 mg Intravenous QAM AC     sennosides  10 mL Oral or Feeding Tube BID     vancomycin  500 mg Intravenous Q24H      No Known Allergies         Physical Exam:   Vitals were reviewed  /71   Pulse 59   Temp 97.4  F (36.3  C) (Axillary)   Resp 17   Wt 93.3 kg (205 lb 11 oz)   SpO2 100%   BMI 27.90 kg/m      Wt Readings from  Last 3 Encounters:   11/20/21 93.3 kg (205 lb 11 oz)   06/30/21 84.5 kg (186 lb 4.8 oz)   01/24/19 90.7 kg (199 lb 14.4 oz)       Intake/Output Summary (Last 24 hours) at 11/20/2021 0912  Last data filed at 11/20/2021 0600  Gross per 24 hour   Intake 2115.32 ml   Output 170 ml   Net 1945.32 ml       GENERAL APPEARANCE: intubated, sedated  HEENT:  Nasal FT,some blood around nares, oral ET tube  RESP: + vent sounds, lungs cta b c good efforts, no crackles, rhonchi or wheezes  CV: RRR, nl S1/S2, no m/r/g   ABDOMEN: o/s/nt/nd, bs present  EXTREMITIES/SKIN: no c/c/rashes/lesions; no edema  NEURO:  sedated  LINES:  + alex, R internal jugular TLC, Art line           Data:     BMP  Recent Labs   Lab 11/20/21  0655 11/20/21  0619 11/20/21  0511 11/20/21  0413 11/20/21  0407 11/19/21  1430 11/19/21  1422 11/19/21  0521 11/19/21  0515 11/19/21  0008 11/19/21  0005 11/18/21  1857 11/18/21  1813   NA  --   --   --   --  149*  --  150*  --  148*  --   --   --  145*   POTASSIUM  --   --   --   --  4.0  --  3.8  --  3.7  --  3.9  --  3.0*   CHLORIDE  --   --   --   --  109  --  108  --  108  --   --   --  106   GRAZYNA  --   --   --   --  6.3*  --  6.6*  --  7.0*  --   --   --  6.9*   CO2  --   --   --   --  24  --  25  --  21  --   --   --  14*   BUN  --   --   --   --  91*  --  82*  --  79*  --   --   --  79*   CR  --   --   --   --  3.83*  --  3.13*  --  2.84*  --   --   --  2.61*   * 118* 124* 133* 144*   < > 143*   < > 182*   < >  --    < > 198*    < > = values in this interval not displayed.     CBC  Recent Labs   Lab 11/20/21  0407 11/19/21  0515 11/18/21  1445 11/18/21  0932 11/18/21  0422 11/17/21  0719   WBC 15.0* 16.3*  --   --  15.9* 9.6   HGB 10.0* 10.6* 11.5* 11.8* 12.3* 12.6*   HCT 29.6* 31.3*  --   --  40.1 38.3*   MCV 87 86  --   --  92 88    177  --   --  266 149*     Lab Results   Component Value Date    AST 45 11/18/2021    ALT 16 11/18/2021    ALKPHOS 73 11/18/2021    BILITOTAL 0.5 11/18/2021     Lab  Results   Component Value Date    INR 1.01 11/20/2021       Attestation:  I have reviewed today's vital signs, notes, medications, labs and imaging.    Owen Da Silva MD  [facility]phrology  [phone]

## 2021-11-20 NOTE — PROGRESS NOTES
Respiratory Therapy Note     [facility] ICU VENTILATOR RESPIRATORY NOTE  Date of Admission: 11/14/21  Date of Intubation (most recent): 11/18/21  Reason for Mechanical Ventilation: Respiratory Failure  Number of Days on Mechanical Ventilation: 3  Met Criteria for Pressure Support Trial: No  Reason for No Pressure Support Trial: Patient's PEEP +14 cmH20   Significant Events Today: Q2 Head turns  ABG Results:Results for FANTASMA ELLIOTT (MRN [id number]) as of 11/20/2021 05:19   Ref. Range 11/20/2021 04:09   pH Arterial Latest Ref Range: 7.35 - 7.45  7.40   pCO2 Arterial Latest Ref Range: 35 - 45 mm Hg 37   PO2 Arterial Latest Ref Range: 80 - 105 mm Hg 116 (H)   Bicarbonate Arterial Latest Ref Range: 21 - 28 mmol/L 23   Base Excess Art Latest Ref Range: -9.0 - 1.8 mmol/L -1.3   FIO2 Unknown 40     ETT appearance on chest x-ray: Endotracheal tube in satisfactory position terminating 5.8 cm above the robin.     Plan:  Continue to monitor patient's respiratory status.  Yenny Tse, RT

## 2021-11-20 NOTE — PROGRESS NOTES
[facility]    ICU Progress Note       Date of Admission:  11/14/2021    Assessment: Critical Care   Rosanna Romero is a 66 year old male admitted on 11/14/2021 for COVID-19 infection. He has a history of DM, HTN, gout, HLD, and LINDSAY. He was admitted to the ICU overnight 11/18 due to worsening hypoxemia and afib RVR, requiring intubation.       Summary plans for today  - lasix challenge  - continue to wean vent as tolerated  - wean sedation      Plan: Critical Care   Neuro/ pain/ sedation:  - Fentanyl and propofol for sedation and vent synchrony  - limit propofol due to increased triglycerides  - starting precedex to reduce propofol rate  - Goal RASS now -1 to 0    Pulmonary: history of LINDSAY  Ventilation Mode: CMV/AC  (Continuous Mandatory Ventilation/ Assist Control)  FiO2 (%): 40 %  Rate Set (breaths/minute): 18 breaths/min  Tidal Volume Set (mL): 500 mL  PEEP (cm H2O): 14 cmH2O  Oxygen Concentration (%): 40 %  Resp: 17  - decrease peep 14->10    Cardiovascular: history of hypertension, HLD  - Hypotension secondary to suspected septic shock  - Wean vasopressors (levophed, phenylephrine, vasopressin) as tolerated for MAP > 65    GI/Nutrition:  - Diet: Moderate Consistent Carb (60 g CHO per Meal) Diet No Straws  Adult Formula Drip Feeding: Continuous Promote; Other - Specify in Comment; Goal Rate: 10; mL/hr; Medication - Feeding Tube Flush Frequency: At least 15-30 mL water before and after medication administration and with tube clogging; Once FT placed ...    - Advance TFs to goal    Renal/ Fluid Balance: Acute kidney injury and metabolic acidosis  - Metabolic acidosis -- greatly improved. No further resuscitation indicated  - keep euvolemic  - Lasix challenge. Renal function continues to worsen and may ultimately require RRT  - nephrology consult and appreciate recs  - ICU electrolyte replacement protocol     Endocrine: history of DM  - Hyperglycemia, possible DKA--on insulin drip now transition  to long acting insulin    ID: suspected septic shock  - empiric meropenem (11/18) and vancomycin (11/17)  - follow up cultures--NGTD  - discontinue vancomycin today (MRSA nares negative). Likely discontinue meropenem tomorrow    Hematology:  - hgb normal and thrombocytopenia resolved    MSK: history of gout  - continue home allopurinol    Lines/ tubes/ drains:  [place] Catheter: PRESENT, indication: Strict 1-2 Hour I&O;Deep Sedation/Paralysis  Central Lines: PRESENT  CVC TRIPLE LUMEN Right Internal jugular-Site Assessment: WDL    Prophylaxis:  - DVT Prophylaxis: Heparin SQ  - PUD Prophylaxis: protonix    Code Status: Full Code      Disposition:  - ICU    The patient's care was discussed with the patient's son Raza Romero  Critical care time exclusive of procedures: 30    Pravin Nick MD  [facility]  Securely message with the Vocera Web Console (learn more here)  Text page via [employer] Paging/Directory      Clinically Significant Risk Factors Present on Admission              Present on Admission:    Generalized muscle weakness    Physical deconditioning    Infection due to 2019 novel coronavirus    COVID-19      ______________________________________________________________________    Interval History   Weaned down vent requirements overnight. No acute events.    Physical Exam   Vital Signs: Temp: 97.4  F (36.3  C) Temp src: Axillary BP: 118/71 Pulse: 59   Resp: 17 SpO2: 100 % O2 Device: Mechanical Ventilator    Weight: 205 lbs 11.03 oz  Physical Exam  Constitutional:       Comments: Intubated, sedated   HENT:      Head: Normocephalic and atraumatic.      Right Ear: External ear normal.      Left Ear: External ear normal.   Eyes:      General: No scleral icterus.        Right eye: No discharge.         Left eye: No discharge.   Cardiovascular:      Rate and Rhythm: Tachycardia present. Rhythm irregular.   Pulmonary:      Comments: Coarse, no vent dyssynchrony  Abdominal:      General: There is no  distension.      Palpations: Abdomen is soft.      Tenderness: There is no abdominal tenderness.   Musculoskeletal:      Right lower leg: No edema.      Left lower leg: No edema.   Skin:     Coloration: Skin is not jaundiced.      Findings: No rash.   Neurological:      Comments: Sedated, unresponsive to stimuli           Data   I reviewed all medications, new labs and imaging results over the last 24 hours.  Arterial Blood Gases   Recent Labs   Lab 11/20/21  0409 11/19/21  1538 11/19/21  0526 11/19/21  0028   PH 7.40 7.47* 7.35 7.41   PCO2 37 44 38 29*   PO2 116* 65* 346* 99   HCO3 23 31* 21 19*       Complete Blood Count   Recent Labs   Lab 11/20/21  0407 11/19/21  0515 11/18/21  1445 11/18/21  0932 11/18/21  0422 11/17/21  0719   WBC 15.0* 16.3*  --   --  15.9* 9.6   HGB 10.0* 10.6* 11.5* 11.8* 12.3* 12.6*    177  --   --  266 149*       Basic Metabolic Panel  Recent Labs   Lab 11/20/21  0655 11/20/21  0619 11/20/21  0511 11/20/21  0413 11/20/21  0407 11/19/21  1430 11/19/21  1422 11/19/21  0521 11/19/21  0515 11/19/21  0008 11/19/21  0005 11/18/21  1857 11/18/21  1813   NA  --   --   --   --  149*  --  150*  --  148*  --   --   --  145*   POTASSIUM  --   --   --   --  4.0  --  3.8  --  3.7  --  3.9  --  3.0*   CHLORIDE  --   --   --   --  109  --  108  --  108  --   --   --  106   CO2  --   --   --   --  24  --  25  --  21  --   --   --  14*   BUN  --   --   --   --  91*  --  82*  --  79*  --   --   --  79*   CR  --   --   --   --  3.83*  --  3.13*  --  2.84*  --   --   --  2.61*   * 118* 124* 133* 144*   < > 143*   < > 182*   < >  --    < > 198*    < > = values in this interval not displayed.       Liver Function Tests  Recent Labs   Lab 11/20/21  0407 11/19/21  0515 11/18/21  0522 11/18/21  0422 11/17/21  0719 11/15/21  2347 11/15/21  0914 11/14/21  1318   AST  --   --   --  45  --  40 34 21   ALT  --   --   --  16  --  16 15 13   ALKPHOS  --   --   --  73  --  78 75 80   BILITOTAL  --   --   --   0.5  --  0.5 0.7 0.6   ALBUMIN  --   --   --  1.9*  --  2.7* 2.7* 2.8*   INR 1.01 0.97 1.10  --  1.05  --   --   --        Pancreatic Enzymes  No lab results found in last 7 days.    Coagulation Profile  Recent Labs   Lab 11/20/21  0407 11/19/21  0515 11/18/21  0522 11/17/21  0719   INR 1.01 0.97 1.10 1.05       IMAGING:  Recent Results (from the past 24 hour(s))   CT Chest Abdomen Pelvis w/o Contrast    Narrative    CT CHEST, ABDOMEN AND PELVIS WITHOUT CONTRAST  11/19/2021 12:57 PM    CLINICAL HISTORY: Sepsis.    TECHNIQUE: CT scan of the chest, abdomen, and pelvis was performed  without IV contrast. Multiplanar reformats were obtained. Dose  reduction techniques were used.   CONTRAST: None.    COMPARISON: CT chest 11/17/2021, CT abdomen and pelvis 6/30/2021.    FINDINGS:   LUNGS AND PLEURA: Extensive bilateral pulmonary consolidation  involving all lobes of both lungs showing interval areas of increased  consolidation at the lower lungs. Small right pleural fluid newly  identified.    MEDIASTINUM/AXILLAE: No acute mediastinal abnormality. Trace  pericardial effusion appears stable. ET tube identified ending above  the robin. Nasogastric tube identified.    CORONARY ARTERY CALCIFICATION: Mild.    HEPATOBILIARY: No acute liver abnormality. Increased density material  within the gallbladder lumen may be excretion of contrast.    PANCREAS: Normal.    SPLEEN: Normal.    ADRENAL GLANDS: No acute abnormality. Minimal stable thickening of  doubtful significance.    KIDNEYS/BLADDER: No significant mass, stones, or hydronephrosis. There  are simple or benign cysts. No follow up is needed. Catheter  decompresses the urinary bladder. Bladder wall thickening may just  relate to incomplete distention.    BOWEL: Prominent stool distends the sigmoid colon and rectum. No small  bowel obstruction. Normal appendix. Rectal catheter identified. A few  small bowel loops demonstrate mild wall thickening. No free air.    LYMPH NODES:  Normal.    VASCULATURE: Unremarkable.    PELVIC ORGANS: Normal.    OTHER: Small ascites newly identified. No abscess. No free air.  Anasarca is increased.    MUSCULOSKELETAL: No new significant abnormality. Diffuse spine  degenerative changes.      Impression    IMPRESSION:  1.  Extensive bilateral pulmonary consolidation with a degree of  progression at the lower lungs consistent with progressing pneumonia.  New but very small right pleural fluid.  2.  New anasarca. New but very small abdominal ascites.  3.  Prominent stool distends the sigmoid colon and rectum.  4.  Mild degree of wall prominence of small bowel loops could relate  to an enteritis.    KRUPA GRIJALVA MD         SYSTEM ID:  [id number]   XR Abdomen Port 1 View    Narrative    XR ABDOMEN PORT 1 VIEWS   11/19/2021 4:32 PM     HISTORY: Keofeed placement  confirmation    COMPARISON: None.      Impression    IMPRESSION: Feeding tube tip at the ligament of Treitz.    CRYSTAL DUMONT MD         SYSTEM ID:  [id number]

## 2021-11-20 NOTE — PLAN OF CARE
ICU End of Shift Summary.  For vital signs and complete assessments, please see documentation flowsheets.     Pertinent assessments: Proned all night, P/F ratio improved, able to decrease FiO2.  Very decreased urine output- nephrology involved.  Insulin drip continued, blood glucoses stable.  Decrease blanco-synephrine drip.  Major Shift Events:   Plan (Upcoming Events): Continue to decrease FiO2, monitor urine output closely.  Discharge/Transfer Needs: TBD.    Bedside Shift Report Completed : Y   Bedside Safety Check Completed: Y

## 2021-11-20 NOTE — PROGRESS NOTES
----------------------------------------------------------  [id number] STUDY VISIT TREATMENT NOTES   ----------------------------------------------------------  [id number] Study (splenic ultrasound treatment for lowering inflammation associated with COVID-19)    ----------------------------------------------------------  Rosanna Romero   Date of Admission: 11/14/2021   Room Number: 0373/0373-01     Participant ID: [id number]  Date: 11/20/21   Study Day 4    Researcher making note/applying tx: Jyotsna Downsjoyce    ULTRASOUND TREATMENT? Yes    IF YES:  MINI Device Serial #: [id number]  Time at the beginning of study visit (opening progress note): 10:10 AM   Time at the end of study visit: 1030      Pt body position: Laying down flat on back  Pt arm position: Arm completely down by side     NOTES:   First 9-minute stimulation:   START 1008   STOP 1017   Was stimulation inturrupted: No   Reason: N/A     Second 9-minute stimulation:   START 1017   STOP 1026   Was stimulation inturrupted: No   Reason: N/A     Repeat stimulation if necessary: No   START    STOP        Pt feedback on stimulation:  Patient was receiving mechanical ventilation in supine position during this stimulation.         Other overall notes:  Patient had minimal movement during stimulations, patient was mechanically ventilated during stimulation. Spleen location was accessible.     AE events?  No adverse events reported    Note: if AE, use smartphrase 'UNTSAE'    ----------------------------------------------------------  PATIENT STATUS  ----------------------------------------------------------    Instructions for 'on-call' study doctor:  Monitor this patient's status, advise researchers if there are any concerns with patients continued enrollment in this study    ----------------------------------------------------------  O2 Device: Mechanical Ventilator  FiO2 (%): 40 %   Vitals:    11/20/21 0615 11/20/21 0630 11/20/21 0645 11/20/21 0800   BP:        BP Location:       Pulse: 61 60 59 60   Resp: 12 17 17 17   Temp:    97.5  F (36.4  C)   TempSrc:    Axillary   SpO2: 100% 100% 100% 100%   Weight:         ----------------------------------------------------------  No results displayed because visit has over 200 results.         ----------------------------------------------------------    Researcher sent to 'on call' study doctor for review. 11/20/21 10:10 AM     ----------------------------------------------------------  IN RESPONSE TO : [facility] STUDY VISIT TREATMENT NOTES   ----------------------------------------------------------  *copy/paste the above as the 'summary' for progress note      Rosanna Romero   This patient's status has been monitored by me. 11/20/21 2:30 PM     Based on my review:     I suggest we CONTINUE this patients enrollment in the [facility] Study.    Yesterday's events reviewed, continue with treatment and enrollment today.    Signed:  Martinez Paz MD    11/20/21 2:30 PM

## 2021-11-21 NOTE — PROGRESS NOTES
----------------------------------------------------------  [id number] STUDY VISIT TREATMENT NOTES   ----------------------------------------------------------  THBRV-YR-[id number] Study (splenic ultrasound treatment for lowering inflammation associated with COVID-19)    ----------------------------------------------------------  Rosanna Romero   Date of Admission: 11/14/2021   Room Number: 0373/0373-01     Participant ID: [id number]  Date: 11/21/21   Study Day 5    Researcher making note/applying tx: Jyotsna Downsjoyce    ULTRASOUND TREATMENT? Yes    IF YES:  MINI Device Serial #: [id number]  Time at the beginning of study visit (opening progress note): 9:50 AM   Time at the end of study visit: 1015      Pt body position: Laying down flat on back  Pt arm position: Arm completely down by side     NOTES:   First 9-minute stimulation:   START 949   STOP 958   Was stimulation inturrupted: No   Reason: N/A     Second 9-minute stimulation:   START 959   STOP 1008   Was stimulation inturrupted: No   Reason: N/A     Repeat stimulation if necessary: No   START    STOP        Pt feedback on stimulation: N/A      Other overall notes:  Patient was receiving mechanical ventilation in supine position.    AE events?  No adverse events reported    Note: if AE, use smartphrase 'UNTSAE'    ----------------------------------------------------------  PATIENT STATUS  ----------------------------------------------------------    Instructions for 'on-call' study doctor:  Monitor this patient's status, advise researchers if there are any concerns with patients continued enrollment in this study    ----------------------------------------------------------  O2 Device: Mechanical Ventilator  FiO2 (%): 45 %   Vitals:    11/21/21 0500 11/21/21 0600 11/21/21 0630 11/21/21 0700   BP:       BP Location:       Pulse: 118 116  109   Resp: 19 18  18   Temp:       TempSrc:       SpO2: 90% 92%  93%   Weight:   95.9 kg (211 lb 6.7 oz)       ----------------------------------------------------------  No results displayed because visit has over 200 results.         ----------------------------------------------------------    Researcher sent to 'on call' study doctor for review. 11/21/21 9:50 AM     ----------------------------------------------------------  IN RESPONSE TO : [facility] STUDY VISIT TREATMENT NOTES   ----------------------------------------------------------  *copy/paste the above as the 'summary' for progress note      Rosanna Romero   This patient's status has been monitored by me. 11/21/21 10:14 PM     Based on my review:     I suggest we CONTINUE this patients enrollment in the [facility] Study.      Signed:  Martinez Paz MD    11/21/21 10:14 PM

## 2021-11-21 NOTE — PLAN OF CARE
"ICU End of Shift Summary.  For vital signs and complete assessments, please see documentation flowsheets.     Pertinent assessments: Patient in metabolic acidosis- telehub notified.  Patient went into afib overnight- now on amio drip.  Is now off of all vasopressors.  Urine output is still inadequate but it is increasing relative to the night before.  See morning labs.    Major Shift Events:  14 beat run of vtach, went into afib, is \"guppy breathing\".  Plan (Upcoming Events): Watch for new orders in response to worsening labs today- possible dialysis??  Discharge/Transfer Needs:  TBD    Bedside Shift Report Completed : Y  Bedside Safety Check Completed: Y    Right wrist and Left wrist restraints continued 11/21/2021    Clinical Justification: Pulling lines, pulling tubes, and pulling equipment  Less Restrictive Alternative: 1:1 patient care,Disguise equipment,Pain management,Alarm,Reorientation  Attending Physician Notified: MD ordered restraint,     New orders placed Yes  Length of Order: 1 Day      Fior Belcher RN        "

## 2021-11-21 NOTE — PROGRESS NOTES
In contact with teleb multiple times overnight regarding patient's heart rhythm and labs.    2055: Called telehub to notify of patient in sinus bradycardia.  Advised to still give po amiodarone, and stop vasopressin and then blanco-synephrine if tolerated by patient.    2330: Called telehub to notify of patient having runs of vtach.  Advised to do stat EKG.    0130: Patient went into afib, EKG was performed and faxed to teleb, new orders were placed (see MAR).  Continued to monitor patient.    0700:  Called telehub to notify of metabolic acidosis.  New orders were entered.

## 2021-11-21 NOTE — PLAN OF CARE
ICU End of Shift Summary.  For vital signs and complete assessments, please see documentation flowsheets.     Pertinent assessments: RASS goal 0 to -1, [id number] RVR in the beginning-appears to have converted to SR at end of shift, Resp on full vent see RT note for settings, GI/ TF rate increased per new order changes, incontinent of BM alex minimal UOP, penis wound noted from proning WOCN pending  Major Shift Events: vent settings changed,PICC placed  Plan (Upcoming Events): Dialysis tomorrow, remove RIJ, follow labs, continue ICU cares  Discharge/Transfer Needs: TBD    Bedside Shift Report Completed : Y  Bedside Safety Check Completed: Y

## 2021-11-21 NOTE — PROGRESS NOTES
Renal Medicine Progress Note            Assessment/Plan:     Rosanna Romero is a 66 year old male who was admitted on 11/14/2021.      1) Baseline Normal Kidney Function.     2) COVID-19 Pneumonia/ARDS - oxygenation is improved.      3) Shock - likely vasoplegic/sepsis     4) Metabolic Acidosis - returning.  Lactate is only 2.2.  Must be due to AZALIA.      5) AZALIA - oliguric/anuric.  Likely ATN due to sepsis.  Had contrast 11/17/21 but renal function was falling before then. Still oliguric.  Acidosis worse.       6) Hypocalcemia:  Calcium corrects to 9.2 accounting for hypoalbuminemia.      7) Severe hypoalbuminemia - Protein malnutrition.        Plan:     Will plan on R internal jugular tunneled dialysis catheter tomorrow with dialysis to follow.  Much prefer R internal jugular line.  Remove R internal jugular TLC if needed and place alternative central line such as PICC if needed.    Resume Na HCO3 1 mEq/mL for now to address acidosis. Can stop it tomorrow once dialysis initiated.               Interval History:     Unable.  Pt is intubated/sedated.  Bradycardia overnight.  Now off pressors. Off Precedex.   Went into AFIB c RVR - on amio.  Acidosis worse.  Urine output still low.  Cr and BUN up.         Medications and Allergies:       chlorhexidine  15 mL Mouth/Throat BID     dexamethasone  6 mg Intravenous Daily     docusate  100 mg Oral or Feeding Tube BID     furosemide  80 mg Intravenous Q12H     heparin ANTICOAGULANT  5,000 Units Subcutaneous Q8H     insulin aspart  1-12 Units Subcutaneous Q4H     insulin glargine  14 Units Subcutaneous Once     [START ON 11/22/2021] insulin glargine  30 Units Subcutaneous QAM AC     multivitamins w/minerals  15 mL Per Feeding Tube Daily     pantoprazole  40 mg Per Feeding Tube QAM AC    Or     pantoprazole (PROTONIX) IV  40 mg Intravenous QAM AC     sennosides  10 mL Oral or Feeding Tube BID      No Known Allergies         Physical Exam:   Vitals were reviewed  /71    Pulse 120   Temp 97.5  F (36.4  C) (Axillary)   Resp 9   Wt 95.9 kg (211 lb 6.7 oz)   SpO2 93%   BMI 28.67 kg/m      Wt Readings from Last 3 Encounters:   11/21/21 95.9 kg (211 lb 6.7 oz)   06/30/21 84.5 kg (186 lb 4.8 oz)   01/24/19 90.7 kg (199 lb 14.4 oz)       Intake/Output Summary (Last 24 hours) at 11/21/2021 1002  Last data filed at 11/21/2021 0600  Gross per 24 hour   Intake 1144.1 ml   Output 405 ml   Net 739.1 ml       GENERAL APPEARANCE: intubated, sedated           Data:     BMP  Recent Labs   Lab 11/21/21  0812 11/21/21  0555 11/21/21  0421 11/21/21  0011 11/20/21  0413 11/20/21  0407 11/19/21  1430 11/19/21  1422 11/19/21  0521 11/19/21  0515   NA  --  145*  --   --   --  149*  --  150*  --  148*   POTASSIUM  --  4.5  --   --   --  4.0  --  3.8  --  3.7   CHLORIDE  --  105  --   --   --  109  --  108  --  108   GRAZYNA  --  7.1*  --   --   --  6.3*  --  6.6*  --  7.0*   CO2  --  14*  --   --   --  24  --  25  --  21   BUN  --  115*  --   --   --  91*  --  82*  --  79*   CR  --  4.71*  --   --   --  3.83*  --  3.13*  --  2.84*   * 303* 258* 214*   < > 144*   < > 143*   < > 182*    < > = values in this interval not displayed.     CBC  Recent Labs   Lab 11/21/21  0555 11/20/21  0407 11/19/21  0515 11/18/21  1445 11/18/21  0932 11/18/21  0422   WBC 13.8* 15.0* 16.3*  --   --  15.9*   HGB 9.9* 10.0* 10.6* 11.5*   < > 12.3*   HCT 30.4* 29.6* 31.3*  --   --  40.1   MCV 91 87 86  --   --  92   * 153 177  --   --  266    < > = values in this interval not displayed.     Lab Results   Component Value Date    AST 45 11/18/2021    ALT 16 11/18/2021    ALKPHOS 73 11/18/2021    BILITOTAL 0.5 11/18/2021     Lab Results   Component Value Date    INR 0.99 11/21/2021       Attestation:  I have reviewed today's vital signs, notes, medications, labs and imaging.    Owen Da Silva MD  [facility] - Nephrology  [phone]

## 2021-11-21 NOTE — PROCEDURES
[facility]    Triple Lumen PICC Placement    Date/Time: 11/21/2021 2:11 PM  Performed by: Terry Carlisle RN  Authorized by: Pravin Nick MD   Indications: vascular access      UNIVERSAL PROTOCOL   Site Marked: Yes  Prior Images Obtained and Reviewed:  Yes  Required items: Required blood products, implants, devices and special equipment available    Patient identity confirmed:  Verbally with patient  NA - No sedation, light sedation, or local anesthesia  Confirmation Checklist:  Patient's identity using two indicators, relevant allergies, procedure was appropriate and matched the consent or emergent situation and correct equipment/implants were available  Time out: Immediately prior to the procedure a time out was called    Universal Protocol: the Joint Commission Universal Protocol was followed    Preparation: Patient was prepped and draped in usual sterile fashion       ANESTHESIA    Anesthesia: Local infiltration  Local Anesthetic:  Lidocaine 1% without epinephrine  Anesthetic Total (mL):  1      SEDATION  Patient Sedated: Yes    Vital signs: Vital signs monitored during sedation        Preparation: skin prepped with ChloraPrep  Skin prep agent: skin prep agent completely dried prior to procedure  Sterile barriers: maximum sterile barriers were used: cap, mask, sterile gown, sterile gloves, and large sterile sheet  Hand hygiene: hand hygiene performed prior to central venous catheter insertion  Type of line used: PICC  Catheter type: triple lumen  Lumen type: valved  Catheter size: 5 Fr  Brand: Bard  Lot number: [id number]  Placement method: venipuncture, ultrasound and tip confirmation system  Number of attempts: 1  Successful placement: yes  Orientation: left  Location: basilic vein  Extremity circumference: 34  Visible catheter length: 2  Internal length: 52 cm  Total catheter length: 54  Dressing and securement: alcohol impregnated caps, sterile dressing applied and occlusive  dressing applied  Post procedure assessment: blood return through all ports and free fluid flow    Patient tolerated procedure well and without incidence.

## 2021-11-21 NOTE — PROGRESS NOTES
[facility]    ICU Progress Note       Date of Admission:  11/14/2021    Assessment: Critical Care   Rosanna Romero is a 66 year old male admitted on 11/14/2021 for COVID-19 infection. He has a history of DM, HTN, gout, HLD, and LINDSAY. He was admitted to the ICU overnight 11/18 due to worsening hypoxemia and afib RVR, requiring intubation.       Summary plans for today  - continue lasix challenge, may require dialysis. Tentatively planned for tomorrow  - discontinue meropenem  - on bicarb drip for metabolic acidosis  - on amio for afib RVR      Plan: Critical Care   Neuro/ pain/ sedation:  - Fentanyl and propofol for sedation and vent synchrony  - limit propofol due to increased triglycerides  - continue precedex to wean down propofol  - Goal RASS now -1 to 0    Pulmonary: history of LINDSAY  Ventilation Mode: CMV/AC  (Continuous Mandatory Ventilation/ Assist Control)  FiO2 (%): 45 %  Rate Set (breaths/minute): 18 breaths/min  Tidal Volume Set (mL): 500 mL  PEEP (cm H2O): 10 cmH2O  Oxygen Concentration (%): 45 %  Resp: 18  - increase RR 18->24    Cardiovascular: history of hypertension, HLD  - off vasopressors  - Amio bolus and drip started for afib RVR    GI/Nutrition:  - Diet: Moderate Consistent Carb (60 g CHO per Meal) Diet No Straws  Adult Formula Drip Feeding: Continuous Promote; Other - Specify in Comment; Goal Rate: 10; mL/hr; Medication - Feeding Tube Flush Frequency: At least 15-30 mL water before and after medication administration and with tube clogging; Once FT placed ...    - Advance TFs to goal    Renal/ Fluid Balance: Acute kidney injury and metabolic acidosis  - Metabolic acidosis, recurrent. Back on bicarb drip. Etiology uncertain  - keep euvolemic  - Lasix challenge. Renal function continues to worsen and may ultimately require RRT  - nephrology consult and appreciate recs    Endocrine: history of DM  - Hyperglycemia, possible DKA--on insulin drip now transition to long acting  insulin    ID: suspected septic shock  - empiric meropenem (11/18-11/21) and vancomycin (11/17-11/20)  - follow up cultures--NGTD  - stop antibiotics    Hematology:  - hgb normal and thrombocytopenia resolved    MSK: history of gout  - holding allopurinol    Lines/ tubes/ drains:  Del Valle Catheter: PRESENT, indication: Strict 1-2 Hour I&O  Central Lines: PRESENT  CVC TRIPLE LUMEN Right Internal jugular-Site Assessment: WDL    Prophylaxis:  - DVT Prophylaxis: Heparin SQ  - PUD Prophylaxis: protonix    Code Status: Full Code      Disposition:  - ICU    The patient's care was discussed with the patient's son Raza Romero  Critical care time exclusive of procedures: 30    Pravin Nick MD  [facility]  Securely message with the Vocera Web Console (learn more here)  Text page via [employer] Paging/Directory      Clinically Significant Risk Factors Present on Admission              Present on Admission:    Generalized muscle weakness    Physical deconditioning    Infection due to 2019 novel coronavirus    COVID-19      ______________________________________________________________________    Interval History   Recurrent metabolic acidosis and put back on bicarb drip. Afib RVR and started on amiodarone    Physical Exam   Vital Signs: Temp: 98.4  F (36.9  C) Temp src: Temporal   Pulse: 109   Resp: 18 SpO2: 93 % O2 Device: Mechanical Ventilator    Weight: 211 lbs 6.74 oz  Physical Exam  Constitutional:       Comments: Intubated, sedated   HENT:      Head: Normocephalic and atraumatic.      Right Ear: External ear normal.      Left Ear: External ear normal.   Eyes:      General: No scleral icterus.        Right eye: No discharge.         Left eye: No discharge.   Cardiovascular:      Rate and Rhythm: Tachycardia present. Rhythm irregular.   Pulmonary:      Comments: Coarse, no vent dyssynchrony  Abdominal:      General: There is no distension.      Palpations: Abdomen is soft.      Tenderness: There is no abdominal  tenderness.   Musculoskeletal:      Right lower leg: No edema.      Left lower leg: No edema.   Skin:     Coloration: Skin is not jaundiced.      Findings: No rash.   Neurological:      Comments: Sedated, unresponsive to stimuli           Data   I reviewed all medications, new labs and imaging results over the last 24 hours.  Arterial Blood Gases   Recent Labs   Lab 11/21/21  0554 11/20/21  1529 11/20/21  0409 11/19/21  1538   PH 7.28* 7.41 7.40 7.47*   PCO2 29* 31* 37 44   PO2 70* 88 116* 65*   HCO3 14* 20* 23 31*       Complete Blood Count   Recent Labs   Lab 11/21/21  0555 11/20/21  0407 11/19/21  0515 11/18/21  1445 11/18/21  0932 11/18/21  0422   WBC 13.8* 15.0* 16.3*  --   --  15.9*   HGB 9.9* 10.0* 10.6* 11.5*   < > 12.3*   * 153 177  --   --  266    < > = values in this interval not displayed.       Basic Metabolic Panel  Recent Labs   Lab 11/21/21  0812 11/21/21  0555 11/21/21  0421 11/21/21  0011 11/20/21  0413 11/20/21  0407 11/19/21  1430 11/19/21  1422 11/19/21  0521 11/19/21  0515   NA  --  145*  --   --   --  149*  --  150*  --  148*   POTASSIUM  --  4.5  --   --   --  4.0  --  3.8  --  3.7   CHLORIDE  --  105  --   --   --  109  --  108  --  108   CO2  --  14*  --   --   --  24  --  25  --  21   BUN  --  115*  --   --   --  91*  --  82*  --  79*   CR  --  4.71*  --   --   --  3.83*  --  3.13*  --  2.84*   * 303* 258* 214*   < > 144*   < > 143*   < > 182*    < > = values in this interval not displayed.       Liver Function Tests  Recent Labs   Lab 11/21/21  0555 11/20/21  0407 11/19/21  0515 11/18/21  0522 11/18/21  0422 11/17/21  0719 11/15/21  2347 11/15/21  0914 11/14/21  1318   AST  --   --   --   --  45  --  40 34 21   ALT  --   --   --   --  16  --  16 15 13   ALKPHOS  --   --   --   --  73  --  78 75 80   BILITOTAL  --   --   --   --  0.5  --  0.5 0.7 0.6   ALBUMIN 1.4*  --   --   --  1.9*  --  2.7* 2.7* 2.8*   INR 0.99 1.01 0.97 1.10  --    < >  --   --   --     < > = values in  this interval not displayed.       Pancreatic Enzymes  No lab results found in last 7 days.    Coagulation Profile  Recent Labs   Lab 11/21/21  0555 11/20/21  0407 11/19/21  0515 11/18/21  0522   INR 0.99 1.01 0.97 1.10       IMAGING:  No results found for this or any previous visit (from the past 24 hour(s)).

## 2021-11-21 NOTE — PROGRESS NOTES
DATA:    VENT DAY#  4    CURRENT SETTINGS:  VENT SETTINGS   Ventilation Mode: CMV/AC  (Continuous Mandatory Ventilation/ Assist Control)  FiO2 (%): 45 %  Rate Set (breaths/minute): (S) 24 breaths/min (MD changed order)  Tidal Volume Set (mL): 500 mL  PEEP (cm H2O): 10 cmH2O  Oxygen Concentration (%): 45 %  Resp: 8          FIO2:   45    PATIENT PARAMETERS:    PIP 25  Pplat:  22  Pmean:  15  SBT: n/a  SECRETIONS:  Small, cloudy/creamy, thick  02 SATS:  94-96%    ETT SIZE 8  Secured at  24 cm at teeth    Securing device changed / tube re-taped date 11/20/21    Respiratory Medications: n/a     ABG: Results for LEXI FANTASMA E (MRN [id number]) as of 11/21/2021 15:40   Ref. Range 11/21/2021 14:02   pH Arterial Latest Ref Range: 7.35 - 7.45  7.35   pCO2 Arterial Latest Ref Range: 35 - 45 mm Hg 26 (L)   PO2 Arterial Latest Ref Range: 80 - 105 mm Hg 103   Bicarbonate Arterial Latest Ref Range: 21 - 28 mmol/L 14 (L)   Base Excess Art Latest Ref Range: -9.0 - 1.8 mmol/L -9.9 (L)   FIO2 Unknown 45       NOTE / PLAN:   Patient remained intubated and respiratory status stable throughout shift. Please see all documented settings and patient parameters in flow sheets. RT will continue to monitor and provide support as needed.       Yasemin Cuevas, LRT, BSRT-RRT

## 2021-11-21 NOTE — PROGRESS NOTES
Respiratory Therapy Note     [facility] ICU VENTILATOR RESPIRATORY NOTE  Date of Admission: 11/14/21  Date of Intubation (most recent): 11/18/21  Reason for Mechanical Ventilation: Respiratory Failure  Number of Days on Mechanical Ventilation: 4  Met Criteria for Pressure Support Trial: No  Reason for No Pressure Support Trial: Patient's PEEP +10 cmH20   Significant Events Today: Pt supine  ETT appearance on chest x-ray: Endotracheal tube in satisfactory position terminating 5.8 cm above the robin.     Plan:  Continue to monitor patient's respiratory status.      Yenny Tse, RT

## 2021-11-21 NOTE — PROGRESS NOTES
[facility]  Hospitalist Progress Note  Parminder Hernadez MD 11/21/2021    Reason for Stay (Diagnosis): Shock         Assessment and Plan:      Summary of Stay: Rosanna Romero is a 66-year-old male with medical history including diabetes not on insulin, neuropathy, hypertension, LINDSAY who is vaccinated against COVID-19 presented with cough and shortness of breath and recent positive COVID-19 test.  He was admitted here with suspected secondary bacterial pneumonia and also is being treated for COVID-19 with dexamethasone and remdesivir.  He has had evolving renal failure with worsening respiratory status and metabolic acidosis which prompted stabilization early 11/18 with intubation, central line A-line placement and transfer to the ICU.  He was given bicarbonate containing fluids, insulin drip, vasopressors and remains on the vent.     Acid-base status has improved but he is oliguric still.  Procalcitonin was dramatically elevated suggesting bacterial sepsis.  CT of his chest abdomen and pelvis did not show obvious source other than pneumonia though there was a question of some enteritis.    He remains intubated with evolving renal failure and shock, hemodynamics have now improved.     1.  Septic shock, suspected pulmonary source, likely pneumonia.  Suspect the driving issue is COVID-19 pneumonia with bacterial superinfection.  Procalcitonin was 47.  --Expanded to meropenem and vancomycin on 11/18.  Discontinued vancomycin.  --Mostly weaned off of vasopressors  --Blood cultures no growth to date  --Has a central line and arterial line     2.  Severe metabolic acidosis: Suspect due to combination of ATN from renal failure and ketosis potentially from DKA given severe hyperglycemia.  Acid-base status improved.  --Nephrology consulted  --Back on bicarbonate drip  --Ketones improved, transitioned to subcu insulin.  --Trend basic metabolic panel     3.    Oliguric acute renal failure: Nephrology  following.  Likely starting dialysis on Monday 11/22.     4.  Type 2 diabetes, evidence of DKA: Somewhat unusual.  Is a type II diabetic not normally on insulin and presented with significantly elevated ketones and blood sugars in the 300s.    --Transitioned off insulin drip to 30 units Lantus daily,5 units aspart every 4 hours while on tube feeds plus sliding scale insulin.  --Anticipate needing to titrate further.     5.  Acute hypoxemic respiratory failure due to pneumonia, hemodynamic instability: Intubated and sedated.    --Vent management as per intensivist.     6.  FEN:  Increasing to goal feeds via feeding tube.     7.    Prominent constipation seen on CT scan: Adding bowel regimen.  Monitor.  Might need an enema.    8.  Atrial fibrillation with RVR: Transitioned from IV to oral amiodarone.    9.  History of upper GI bleed: Continue Protonix.    10.  COVID-19: Continuing Decadron.  Remdesivir stopped due to renal failure.    11.  Hypocalcemia: replacing.    DVT Prophylaxis: Heparin SQ  Code Status: Full Code  Discharge Dispo/Date: Multiple more days in the ICU expected  Lines: A-line, central line, Del Valle, feeding tube, ET tube        Interval History (Subjective):      Vent requirement and hemodynamics gradually improving  Remains nearly an uric  Increasing insulin regimen significantly  Back on bicarbonate drip as metabolic acidosis worsened                  Physical Exam:      Last Vital Signs:  /71   Pulse 111   Temp 97.5  F (36.4  C) (Axillary)   Resp 8   Wt 95.9 kg (211 lb 6.7 oz)   SpO2 96%   BMI 28.67 kg/m        Intake/Output Summary (Last 24 hours) at 11/21/2021 1154  Last data filed at 11/21/2021 0600  Gross per 24 hour   Intake 1134.1 ml   Output 405 ml   Net 729.1 ml       General: Intubated and sedated.  Ill-appearing man with a ET tube in place.  HEENT: NC/AT, eyes anicteric  Cardiac: RRR, S1, S2.   Pulmonary: Normal chest rise and the vent.  Bilateral crackles.  Abdomen: soft,  non-distended.  Bowel Sounds Present.    Extremities: Warm, well perfused.  Skin: no rashes or lesions noted.  Warm and Dry.  Neuro: Clonus or tremor.         Medications:      All current medications were reviewed with changes reflected in problem list.         Data:      All new lab and imaging data was reviewed.   Labs:  Recent Labs   Lab 11/21/21  0812 11/21/21  0555   NA  --  145*   POTASSIUM  --  4.5   CHLORIDE  --  105   CO2  --  14*   ANIONGAP  --  26*   * 303*   BUN  --  115*   CR  --  4.71*   GFRESTIMATED  --  12*   GRAZYNA  --  7.1*     Recent Labs   Lab 11/21/21  0555   WBC 13.8*   HGB 9.9*   HCT 30.4*   MCV 91   *      Imaging:   Recent Results (from the past 48 hour(s))   CT Chest Abdomen Pelvis w/o Contrast    Narrative    CT CHEST, ABDOMEN AND PELVIS WITHOUT CONTRAST  11/19/2021 12:57 PM    CLINICAL HISTORY: Sepsis.    TECHNIQUE: CT scan of the chest, abdomen, and pelvis was performed  without IV contrast. Multiplanar reformats were obtained. Dose  reduction techniques were used.   CONTRAST: None.    COMPARISON: CT chest 11/17/2021, CT abdomen and pelvis 6/30/2021.    FINDINGS:   LUNGS AND PLEURA: Extensive bilateral pulmonary consolidation  involving all lobes of both lungs showing interval areas of increased  consolidation at the lower lungs. Small right pleural fluid newly  identified.    MEDIASTINUM/AXILLAE: No acute mediastinal abnormality. Trace  pericardial effusion appears stable. ET tube identified ending above  the robin. Nasogastric tube identified.    CORONARY ARTERY CALCIFICATION: Mild.    HEPATOBILIARY: No acute liver abnormality. Increased density material  within the gallbladder lumen may be excretion of contrast.    PANCREAS: Normal.    SPLEEN: Normal.    ADRENAL GLANDS: No acute abnormality. Minimal stable thickening of  doubtful significance.    KIDNEYS/BLADDER: No significant mass, stones, or hydronephrosis. There  are simple or benign cysts. No follow up is needed.  Catheter  decompresses the urinary bladder. Bladder wall thickening may just  relate to incomplete distention.    BOWEL: Prominent stool distends the sigmoid colon and rectum. No small  bowel obstruction. Normal appendix. Rectal catheter identified. A few  small bowel loops demonstrate mild wall thickening. No free air.    LYMPH NODES: Normal.    VASCULATURE: Unremarkable.    PELVIC ORGANS: Normal.    OTHER: Small ascites newly identified. No abscess. No free air.  Anasarca is increased.    MUSCULOSKELETAL: No new significant abnormality. Diffuse spine  degenerative changes.      Impression    IMPRESSION:  1.  Extensive bilateral pulmonary consolidation with a degree of  progression at the lower lungs consistent with progressing pneumonia.  New but very small right pleural fluid.  2.  New anasarca. New but very small abdominal ascites.  3.  Prominent stool distends the sigmoid colon and rectum.  4.  Mild degree of wall prominence of small bowel loops could relate  to an enteritis.    KRUPA GRIJALVA MD         SYSTEM ID:  [id number]   XR Abdomen Port 1 View    Narrative    XR ABDOMEN PORT 1 VIEWS   11/19/2021 4:32 PM     HISTORY: Keofeed placement  confirmation    COMPARISON: None.      Impression    IMPRESSION: Feeding tube tip at the ligament of Treitz.    CRYSTAL DUMONT MD         SYSTEM ID:  [id number]         Parminder Hernadez MD

## 2021-11-21 NOTE — PLAN OF CARE
ICU End of Shift Summary.  For vital signs and complete assessments, please see documentation flowsheets.     Pertinent assessments: RASS 0 to -1, cardiac bradycardia-Precedex turned off, Resp on full vent support see RT note for settings, GI/ TF at 10, diarrhea noted, alex minimal UOP-MD aware, supine at 9am remained supine.  Major Shift Events: worsening bradycardia-MD paged and notified, no call received yet, continue to monitor  Plan (Upcoming Events): monitor HR, follow labs, sedation weaning in progress  Discharge/Transfer Needs: TBD    Bedside Shift Report Completed : Y  Bedside Safety Check Completed: Y

## 2021-11-22 NOTE — PROGRESS NOTES
Renal Medicine Inpatient Dialysis Note                                Rosanna Romero MRN# [id number]   Age: 66 year old YOB: 1955   Date of Admission: 11/14/2021 Hospital LOS: 7          Assessment/Plan:     Rosanna Romero is a 66 year old male who was admitted on 11/14/2021.      1) Baseline Normal Kidney Function.     2) COVID-19 Pneumonia/ARDS - oxygenation is improved.      3) Shock - likely vasoplegic/sepsis     4) Metabolic Acidosis      5) AZALIA - oliguric/anuric.  Likely ATN due to sepsis.  Had contrast 11/17/21 but renal function was falling before then. Still oliguric.  Acidosis worse.       6) Hypocalcemia:  Calcium corrects to 9.2 accounting for hypoalbuminemia.       7) Severe hypoalbuminemia - Protein malnutrition.         Initial dialysis 11/22/21  Plan second run 11/23/21    Interval History:     Dialysis run parameters reviewed with dialysis RN at patient bedside.    2.5 hour run  3K  250 ml/min BFR  UF 1 liter     IJ access    Stable initial portion of run     ROS     Intubated and sedated: ROS unable    Dialysis Parameters:     Vitals were reviewed  Patient Vitals for the past 8 hrs:   BP Temp Temp src Pulse Resp SpO2 Weight   11/22/21 1230 109/54 -- -- 100 (!) 31 91 % --   11/22/21 1220 -- -- -- 100 (!) 31 90 % --   11/22/21 1215 -- 97.6  F (36.4  C) Axillary 95 13 96 % --   11/22/21 1200 -- -- -- 89 23 (!) 87 % --   11/22/21 1145 -- 97.9  F (36.6  C) Axillary 90 13 (!) 88 % --   11/22/21 1130 -- -- -- 90 13 95 % --   11/22/21 1115 -- -- -- 93 15 96 % --   11/22/21 1100 -- 97.9  F (36.6  C) Axillary 90 14 92 % --   11/22/21 1045 -- -- -- 91 14 94 % --   11/22/21 1030 -- -- -- 87 23 93 % --   11/22/21 1015 -- -- -- 85 23 94 % --   11/22/21 1000 -- 97.3  F (36.3  C) Axillary 86 24 99 % --   11/22/21 0945 -- -- -- 80 29 (!) 88 % --   11/22/21 0930 -- -- -- 81 25 (!) 88 % --   11/22/21 0915 -- -- -- 83 24 (!) 89 % --   11/22/21 0900 -- -- -- 85 26 (!) 89 % --   11/22/21 0845 -- -- -- 81 28 (!)  89 % --   11/22/21 0834 (!) 142/57 -- -- 84 27 91 % --   11/22/21 0830 -- -- -- 86 28 90 % --   11/22/21 0815 -- -- -- 85 22 91 % --   11/22/21 0800 -- -- -- 86 19 91 % --   11/22/21 0756 -- -- -- -- -- 90 % --   11/22/21 0745 -- 97.5  F (36.4  C) Axillary 77 27 (!) 89 % --   11/22/21 0730 -- -- -- 77 23 90 % --   11/22/21 0715 -- -- -- 76 24 95 % --   11/22/21 0700 -- -- -- 77 27 95 % --   11/22/21 0645 -- -- -- 76 23 95 % --   11/22/21 0630 -- -- -- 78 25 95 % --   11/22/21 0615 -- -- -- 81 26 96 % --   11/22/21 0600 -- -- -- 73 26 96 % 93.1 kg (205 lb 4 oz)   11/22/21 0500 -- -- -- 75 27 96 % --     I/O last 3 completed shifts:  In: 2412.09 [I.V.:1342.09; NG/GT:390]  Out: 510 [Urine:510]    Vitals:    11/17/21 0454 11/18/21 0600 11/20/21 0400 11/21/21 0630   Weight: 82.6 kg (182 lb 1.6 oz) 84.7 kg (186 lb 11.7 oz) 93.3 kg (205 lb 11 oz) 95.9 kg (211 lb 6.7 oz)    11/22/21 0600   Weight: 93.1 kg (205 lb 4 oz)       Current Weight: 93.1  Dry Weight: 83 ?range  Dialysis Temp: 36.5  C  Access Device: IJ  Access Site: right  Dialyzer: Revaclear  Dialysis Bath: 3  Sodium Profile: n  UF Goal: 1  Blood Flow Rate (mL/min): 250  Total Treatment Time (hrs): 2.5  Heparin: Low dose as required      EPO dose: n  Zemplar: n  IV Fe: n      Medications and Allergies:     Reviewed      Physical Exam:     Seen and examined during course of dialysis run    /54   Pulse 100   Temp 97.6  F (36.4  C) (Axillary)   Resp (!) 31   Wt 93.1 kg (205 lb 4 oz)   SpO2 91%   BMI 27.84 kg/m      GENERAL: intubated  HEENT: ETT  SKIN: catheter site clean without drainage    Data:       Recent Labs   Lab 11/22/21  1204 11/22/21  0827 11/22/21  0448   NA  --   --  149*   POTASSIUM  --   --  3.7   CHLORIDE  --   --  105   CO2  --   --  21   ANIONGAP  --   --  23*   *   < > 289*   BUN  --   --  136*   CR  --   --  4.89*   GFRESTIMATED  --   --  11*   GRAZYNA  --   --  6.5*    < > = values in this interval not displayed.     Recent Labs    Lab Test 11/22/21  0448 11/21/21  1404 11/21/21  0555 11/20/21  0407 11/19/21  1422 11/19/21  0515 11/18/21  1813 11/18/21  1126 11/18/21  0422 11/17/21  0719   CR 4.89* 4.80* 4.71* 3.83* 3.13* 2.84* 2.61* 2.45* 2.42* 1.90*     Recent Labs   Lab 11/22/21  0448 11/21/21  0555 11/18/21  0422 11/15/21  2347   ALBUMIN 1.3* 1.4* 1.9* 2.7*     Recent Labs   Lab 11/22/21  0448 11/21/21  0555 11/20/21  0407 11/19/21  0515   PHOS 5.7*  5.8* 7.1* 5.0* 4.4   HGB 10.6* 9.9* 10.0* 10.6*         G Arun Kumar MD    [facility] - Nephrology  [phone]

## 2021-11-22 NOTE — PROGRESS NOTES
[facility]  Hospitalist Progress Note  Parminder Hernadez MD 11/22/2021    Reason for Stay (Diagnosis): Shock         Assessment and Plan:      Summary of Stay: Rosanna Romero is a 66-year-old male with medical history including diabetes not on insulin, neuropathy, hypertension, LINDSAY who is vaccinated against COVID-19 presented with cough and shortness of breath and recent positive COVID-19 test.  He was admitted here with suspected secondary bacterial pneumonia and also is being treated for COVID-19 with dexamethasone and remdesivir.  He has had evolving renal failure with worsening respiratory status and metabolic acidosis which prompted stabilization early 11/18 with intubation, central line A-line placement and transfer to the ICU.  He was given bicarbonate containing fluids, insulin drip, vasopressors and remains on the vent.     Acid-base status has improved but he is oliguric still.  Procalcitonin was dramatically elevated and he was covered for bacterial sepsis w/ empiric abx though these were stopped due to negative cultures on 11/22.  CT of his chest abdomen and pelvis did not show obvious source other than pneumonia though there was a question of some enteritis.    He remains intubated with evolving renal failure and shock, hemodynamics have now improved.    Planning on hemodialysis on 11/22.     1.  Septic shock, suspected pulmonary source, likely pneumonia.  Suspect the driving issue is COVID-19 pneumonia.  Treated w/ empiric abx, now off.  --zosyn 11/16-11/8.  --meropenem and vancomycin on 11/18.  Discontinued vancomycin though that will be on board until HD.  --stopped meropenem 11/22.  --weaned off of vasopressors  --Blood cultures no growth to date  --Has a central line and arterial line     2.  Severe metabolic acidosis: Suspect due to combination of ATN from renal failure and ketosis potentially from DKA given severe hyperglycemia.  Acid-base status improved.  --Nephrology  consulted  --Back on bicarbonate drip  --Ketones improved, transitioned to subcu insulin.  --Trend basic metabolic panel     3.    Oliguric acute renal failure: Nephrology following.  Likely starting dialysis on Monday 11/22.     4.  Type 2 diabetes, evidence of DKA: Somewhat unusual.  Is a type II diabetic not normally on insulin and presented with significantly elevated ketones and blood sugars in the 300s.    --Transitioned off insulin drip to 42 units Lantus daily, 10 units aspart every 4 hours while on tube feeds plus sliding scale insulin.  --Anticipate needing to titrate further.     5.  Acute hypoxemic respiratory failure due to pneumonia, hemodynamic instability: Intubated and sedated.    --Vent management as per intensivist.     6.  FEN:  Increasing to goal feeds via feeding tube.     7.    Prominent constipation seen on CT scan: Adding bowel regimen.  Monitor.  Might need an enema.    8.  Atrial fibrillation with RVR: Transitioned from IV to oral amiodarone.    9.  History of upper GI bleed: Continue Protonix.    10.  COVID-19: Continuing Decadron.  Remdesivir stopped due to renal failure.    11.  Hypocalcemia: replacing.    DVT Prophylaxis: Heparin SQ  Code Status: Full Code  Discharge Dispo/Date: Multiple more days in the ICU expected  Lines: A-line, central line, Del Valle, feeding tube, ET tube        Interval History (Subjective):      Planning for HD line placement, dialysis today  Increasing insulin  Contacted his sonRaza for an update  Stopping antibiotics                    Physical Exam:      Last Vital Signs:  /71   Pulse 73   Temp 97  F (36.1  C) (Temporal)   Resp 26   Wt 93.1 kg (205 lb 4 oz)   SpO2 96%   BMI 27.84 kg/m        Intake/Output Summary (Last 24 hours) at 11/22/2021 0737  Last data filed at 11/22/2021 0700  Gross per 24 hour   Intake 2437.09 ml   Output 510 ml   Net 1927.09 ml       General: Intubated and sedated.  Ill-appearing man with a ET tube in place.  HEENT: NC/AT,  eyes anicteric  Cardiac: RRR, S1, S2.   Pulmonary: Normal chest rise and the vent.  Bilateral crackles.  Abdomen: soft, non-distended.  Bowel Sounds Present.    Extremities: Warm, well perfused.  Skin: no rashes or lesions noted.  Warm and Dry.  Neuro: Clonus or tremor.         Medications:      All current medications were reviewed with changes reflected in problem list.         Data:      All new lab and imaging data was reviewed.   Labs:  Recent Labs   Lab 11/22/21  0448   *   POTASSIUM 3.7   CHLORIDE 105   CO2 21   ANIONGAP 23*   *   *   CR 4.89*   GFRESTIMATED 11*   GRAZYNA 6.5*     Recent Labs   Lab 11/22/21  0448   WBC 17.8*   HGB 10.6*   HCT 31.4*   MCV 87         Imaging:   Recent Results (from the past 48 hour(s))   CT Chest Abdomen Pelvis w/o Contrast    Narrative    CT CHEST, ABDOMEN AND PELVIS WITHOUT CONTRAST  11/19/2021 12:57 PM    CLINICAL HISTORY: Sepsis.    TECHNIQUE: CT scan of the chest, abdomen, and pelvis was performed  without IV contrast. Multiplanar reformats were obtained. Dose  reduction techniques were used.   CONTRAST: None.    COMPARISON: CT chest 11/17/2021, CT abdomen and pelvis 6/30/2021.    FINDINGS:   LUNGS AND PLEURA: Extensive bilateral pulmonary consolidation  involving all lobes of both lungs showing interval areas of increased  consolidation at the lower lungs. Small right pleural fluid newly  identified.    MEDIASTINUM/AXILLAE: No acute mediastinal abnormality. Trace  pericardial effusion appears stable. ET tube identified ending above  the robin. Nasogastric tube identified.    CORONARY ARTERY CALCIFICATION: Mild.    HEPATOBILIARY: No acute liver abnormality. Increased density material  within the gallbladder lumen may be excretion of contrast.    PANCREAS: Normal.    SPLEEN: Normal.    ADRENAL GLANDS: No acute abnormality. Minimal stable thickening of  doubtful significance.    KIDNEYS/BLADDER: No significant mass, stones, or hydronephrosis.  There  are simple or benign cysts. No follow up is needed. Catheter  decompresses the urinary bladder. Bladder wall thickening may just  relate to incomplete distention.    BOWEL: Prominent stool distends the sigmoid colon and rectum. No small  bowel obstruction. Normal appendix. Rectal catheter identified. A few  small bowel loops demonstrate mild wall thickening. No free air.    LYMPH NODES: Normal.    VASCULATURE: Unremarkable.    PELVIC ORGANS: Normal.    OTHER: Small ascites newly identified. No abscess. No free air.  Anasarca is increased.    MUSCULOSKELETAL: No new significant abnormality. Diffuse spine  degenerative changes.      Impression    IMPRESSION:  1.  Extensive bilateral pulmonary consolidation with a degree of  progression at the lower lungs consistent with progressing pneumonia.  New but very small right pleural fluid.  2.  New anasarca. New but very small abdominal ascites.  3.  Prominent stool distends the sigmoid colon and rectum.  4.  Mild degree of wall prominence of small bowel loops could relate  to an enteritis.    KRUPA GRIJALVA MD         SYSTEM ID:  [id number]   XR Abdomen Port 1 View    Narrative    XR ABDOMEN PORT 1 VIEWS   11/19/2021 4:32 PM     HISTORY: Keofeed placement  confirmation    COMPARISON: None.      Impression    IMPRESSION: Feeding tube tip at the ligament of Treitz.    CRYSTAL DUMONT MD         SYSTEM ID:  [id number]         Parminder Hernadez MD

## 2021-11-22 NOTE — PLAN OF CARE
ICU End of Shift Summary.  For vital signs and complete assessments, please see documentation flowsheets.     Pertinent assessments: Pt sedated with propofol and fentanyl.  RASS -2 to -3, doesn't follow directions.  Tele SR, had 2 runs of vtach 21-27 beats each.  BP stable, afebrile.  LS diminished, weaned FiO2 some.  Del Valle with fair urine output, received lasix.  One loose BM during night.  Tolerating tub feed. 3-4+ edema.  Wound noted on meatus of penis, inner aspect of right shin, and Right side of upper lip, blanchable area on coccyx.  Told to increase amdiodarone to full dose due to runs of vtach.  Major Shift Events: runs of vtach, bath  Plan (Upcoming Events): monitor tele rhythm and respiratory status  Discharge/Transfer Needs: TBD    Bedside Shift Report Completed : y  Bedside Safety Check Completed:y

## 2021-11-22 NOTE — PROGRESS NOTES
Potassium   Date Value Ref Range Status   11/22/2021 3.7 3.4 - 5.3 mmol/L Final     Comment:     Specimen slightly hemolyzed, potassium may be falsely elevated.   07/02/2021 3.4 3.4 - 5.3 mmol/L Final     Hemoglobin   Date Value Ref Range Status   11/22/2021 10.6 (L) 13.3 - 17.7 g/dL Final   07/02/2021 10.8 (L) 13.3 - 17.7 g/dL Final     Creatinine   Date Value Ref Range Status   11/22/2021 4.89 (H) 0.66 - 1.25 mg/dL Final   07/02/2021 0.82 0.66 - 1.25 mg/dL Final     Urea Nitrogen   Date Value Ref Range Status   11/22/2021 136 (H) 7 - 30 mg/dL Final   07/02/2021 10 7 - 30 mg/dL Final     Sodium   Date Value Ref Range Status   11/22/2021 149 (H) 133 - 144 mmol/L Final   07/02/2021 141 133 - 144 mmol/L Final     INR   Date Value Ref Range Status   11/22/2021 0.90 0.85 - 1.15 Final       DIALYSIS PROCEDURE NOTE  Hepatitis status of previous patient on machine log was checked and verified ok to use with this patients hepatitis status.  Patient dialyzed for 2.5 hrs. on a K3 bath with a net fluid removal of  1L.  A BFR of 250 ml/min was obtained via a IJ.      The treatment plan was discussed with Dr. Kumar during the treatment.    Total heparin received during the treatment: 0 units.     Line flushed, clamped and capped with heparin 1:1000 1.3 mL (1300 units) per lumen    Meds  given: none   Complications: none      Unable to provide education related to mentation.    ICEBOAT? Timeout performed pre-treatment  I: Patient was identified using 2 identifiers  C:  Consent Signed Yes  E: Equipment preventative maintenance is current and dialysis delivery system OK to use  B: Hepatitis B Surface Antigen: Negative; Draw Date: 11/20/21      Hepatitis B Surface Antibody: Susceptible; Draw Date: 11/20/21  O: Dialysis orders present and complete prior to treatment  A: Vascular access verified and assessed prior to treatment  T: Treatment was performed at a clinically appropriate time  ?: Patient was allowed to ask questions and  address concerns prior to treatment  See flowsheet in EPIC for further details and post assessment.  Machine water alarm in place and functioning. Transducer pods intact and checked every 15min.   Pt dialyzed at bs.  Chlorine/Chloramine water system checked every 4 hours.  Outpatient Dialysis at TBD.

## 2021-11-22 NOTE — PROGRESS NOTES
Respiratory Therapy Note     [facility] ICU VENTILATOR RESPIRATORY NOTE  Date of Admission: 11/14/21  Date of Intubation (most recent): 11/18/21  Reason for Mechanical Ventilation: Respiratory Failure  Number of Days on Mechanical Ventilation: 5  Met Criteria for Pressure Support Trial: No  Reason for No Pressure Support Trial: Patient's PEEP +10 cmH20   Significant Events Today: Pt supine  ETT appearance on chest x-ray: Endotracheal tube in satisfactory position terminating 5.8 cm above the robin.     Plan:  Continue to monitor patient's respiratory status.        Yenny Tse, RT

## 2021-11-22 NOTE — CONSULTS
[facility] Nurse Inpatient Wound Assessment   Reason for consultation: Evaluate and treat  Lip, penis, right shin wounds    Assessment  Right upper lip wound due to bite injury  Status: initial assessment  White mucosal tissue in shape of right front tooth    Penis wound due to Friction  Status: initial assessment  Small area covered with dried drainage    Right medial shin wound due to Skin Tear  Status: initial assessment  Covered with a thin layer of dried drainage    Treatment Plan  Right upper lip wound: Daily    1. Leave open to air  2. Ensure ET tube is off area     Penis wound: BID  1. Cleanse with water and dry  2. Apply Vaseline to wound  3. Continue stabilizer for alex catheter    Right medial shin wound: Leave open to air     Orders Written  Recommended provider order: None, at this time  WO Nurse follow-up plan:weekly  Nursing to notify the Provider(s) and re-consult the WO Nurse if wound(s) deteriorates or new skin concern.    Patient History  According to provider note(s):  Rosanna Romero is a 66 year old male admitted on 11/14/2021 for COVID-19 infection. He has a history of DM, HTN, gout, HLD, and LINDSAY. Intubated 11/18 due to worsening hypoxemia and afib RVR, requiring intubation. AZALIA.      Objective Data  Containment of urine/stool: Indwelling catheter    Active Diet Order  Orders Placed This Encounter      Moderate Consistent Carb (60 g CHO per Meal) Diet No Straws      Output:   I/O last 3 completed shifts:  In: 2412.09 [I.V.:1342.09; NG/GT:390]  Out: 510 [Urine:510]    Risk Assessment:   Sensory Perception: 2-->very limited  Moisture: 3-->occasionally moist  Activity: 1-->bedfast  Mobility: 2-->very limited  Nutrition: 3-->adequate  Friction and Shear: 2-->potential problem  Taras Score: 13                          Labs:   Recent Labs   Lab 11/22/21  0448   ALBUMIN 1.3*   HGB 10.6*   INR 0.90   WBC 17.8*   CRP 56.5*       Physical Exam  Areas of skin assessed: focused lip, penis, right shin    Wound  Location:  Right upper lip  Date of last photo NA  Wound History: Bite injury on right upper lip in shape of right front tooth  Wound Base: 100 % white mucosal tissue     Palpation of the wound bed: normal      Drainage: none     Description of drainage: none     Measurements (length x width x depth, in cm) 0.5  x 1 cm      Tunneling N/A     Undermining N/A  Periwound skin: intact      Color: normal and consistent with surrounding tissue      Temperature: normal   Odor: none   Pain: unable to assess due to  sedation , none    Wound Location:  Penis  Date of last photo NA  Wound History: Friction injury from catheter movement  Wound Base: 100 % dry drainage     Palpation of the wound bed: normal      Drainage: none     Description of drainage: none     Measurements (length x width x depth, in cm) 0.7 x 0.5 cm      Tunneling N/A     Undermining N/A  Periwound skin: intact      Color: normal and consistent with surrounding tissue      Temperature: normal   Odor: none     Wound Location:  Right medial shin  Date of last photo NA  Wound History: Skin tear with adhesive removal  Wound Base: 100 % dry drainage-thin layer     Palpation of the wound bed: normal      Drainage: none     Description of drainage: none     Measurements (length x width x depth, in cm) 2 x 1.5 cm      Tunneling N/A     Undermining N/A  Periwound skin: intact      Color: normal and consistent with surrounding tissue      Temperature: normal   Odor: none     Interventions  Visual inspection and assessment completed   Wound Care Rationale Promote moist wound healing without tissue dehydration  and Provide protection   Wound Care: completed by RN  Supplies: discussed with RN  Current off-loading measures: Pillows under calves and Pillows  Current support surface: Standard  ICU  Education provided to: plan of care and Off-loading pressure  Discussed plan of care with Patient and Nurse    Israel Mayes RN CWOCN

## 2021-11-22 NOTE — PROGRESS NOTES
----------------------------------------------------------  [id number] STUDY VISIT TREATMENT NOTES   ----------------------------------------------------------  [id number] Study (splenic ultrasound treatment for lowering inflammation associated with COVID-19)    ----------------------------------------------------------  Rosanna Romero   Date of Admission: 11/14/2021   Room Number: 0373/0373-01     Participant ID: [id number]  Date: 11/22/21   Study Day 6    Researcher making note/applying tx: Celestina Organ    ULTRASOUND TREATMENT? Yes    IF YES:  MINI Device Serial #: [id number]  Time at the beginning of study visit (opening progress note): 10:21 AM   Time at the end of study visit: 10:45      Pt body position: Sitting up at an angle  Pt arm position: Arm out creating 45 degree angle     NOTES:   First 9-minute stimulation:   START 10:21   STOP 10:30   Was stimulation inturrupted: No   Reason: N/A     Second 9-minute stimulation:   START 10:30   STOP 10:39   Was stimulation inturrupted: No   Reason: N/A     Repeat stimulation if necessary: No   START    STOP        Pt feedback on stimulation: Patient on mechanical ventilation during this stimulation.      Other overall notes:  Patient had minimal movement during stimulations, patient receiving mechanical ventilation.    AE events?  Adverse event(s) reported     _______________________________________________________________________________  UNITE Study Adverse Event  Reported: 11/22/21 2:18 PM  Rosanna Romero   [id number]   1955   On-study: Yes  Adverse Event: acute kidney injury requiring hemodialysis   For AE: Start Date/Time: 11/22/21, 0700  Ongoing: Yes End Date:   Patient was randomized into the TREATMENT ARM  The AE was Unexpected  Relationship to Study Treatment: Unrelated  If Not Related (except for COVID-19 infection), specify alternative etiology:  Severity of the AE: Life-threatening  ____________________________________________  Is this a DUSTIN: Yes     Date adverse event became severe: 11/22/21  Did the adverse event result in death? No  Did the adverse event result in initial or prolonged hospitalization for the subject? Yes  Is the adverse event life threatening? Yes  Comments:  Hemodialysis   ___________________________________________  Did the adverse event cause the subject to be discontinued in the study? No  Action Taken: Continue to Monitor  Comments:   Outcome: Not Recovered/Not Resolved  Meets IRB reporting requirements: No  Comments:   ___________________________________________  Researcher sent to 'on call' study doctor for review. 11/22/21 2:18 PM       Note: if AE, use smartphrase 'UNTSAE'    ----------------------------------------------------------  PATIENT STATUS  ----------------------------------------------------------    Instructions for 'on-call' study doctor:  Monitor this patient's status, advise researchers if there are any concerns with patients continued enrollment in this study    ----------------------------------------------------------  O2 Device: Mechanical Ventilator  FiO2 (%): 40 %   Vitals:    11/22/21 0730 11/22/21 0745 11/22/21 0756 11/22/21 0834   BP:    (!) 142/57   BP Location:       Pulse: 77 77  84   Resp: 23 27  27   Temp:  97.5  F (36.4  C)     TempSrc:  Axillary     SpO2: 90% (!) 89% 90% 91%   Weight:         ----------------------------------------------------------  No results displayed because visit has over 200 results.         ----------------------------------------------------------    Researcher sent to 'on call' study doctor for review. 11/22/21 10:21 AM     ----------------------------------------------------------  IN RESPONSE TO : ERNIE STUDY VISIT TREATMENT NOTES   ----------------------------------------------------------  *copy/paste the above as the 'summary' for progress note      Rosanna Romero   This patient's status has been monitored by me. 11/22/21 2:19 PM     Based on my review:     I  suggest we CONTINUE this patients enrollment in the [id number] Study.      Signed:  Martinez Paz MD    11/22/21 2:19 PM

## 2021-11-22 NOTE — PROGRESS NOTES
[facility]  Respiratory Care Note    [facility] ICU VENTILATOR RESPIRATORY NOTE  Date of Admission: 11/14/2021  Date of Intubation (most recent): 11/18/2021  Reason for Mechanical Ventilation: Respiratory Failure  Number of Days on Mechanical Ventilation: 5  Met Criteria for Pressure Support Trial: No  Length of Pressure Support Trial:   Reason for Stopping Pressure Support Trial:   Reason for No Pressure Support Trial: O2 is currently @ 75%  Significant Events Today: Pt was supined all day but needed to be proned this afternoon.   ETT appearance on chest x-ray: 5.8 above Manisha  Plan: Will continue to monitor and assess the pt's current respiratory status and needs, in hopes of liberating him from the ventilator.    Ventilation Mode: CMV/AC  (Continuous Mandatory Ventilation/ Assist Control)  FiO2 (%): 75 %  Rate Set (breaths/minute): 24 breaths/min  Tidal Volume Set (mL): 500 mL  PEEP (cm H2O): 5 cmH2O  Oxygen Concentration (%): (S) 75 %  Resp: 24    Vital signs:  Temp: 98  F (36.7  C) Temp src: Axillary BP: (!) 153/67 Pulse: 74   Resp: 24 SpO2: 100 % O2 Device: Mechanical Ventilator Oxygen Delivery: 50 LPM   Weight: 93.1 kg (205 lb 4 oz)  Estimated body mass index is 27.84 kg/m  as calculated from the following:    Height as of 6/30/21: 1.829 m (6').    Weight as of this encounter: 93.1 kg (205 lb 4 oz).      Recent Labs   Lab 11/22/21  1646 11/22/21  0447 11/21/21  1402 11/21/21  0554   PH 7.53* 7.46* 7.35 7.28*   PCO2 33* 30* 26* 29*   PO2 75* 82 103 70*   HCO3 28 21 14* 14*   O2PER 75 35 45 45     No past medical history on file.    Past Surgical History:   Procedure Laterality Date     C UNLISTED PROCEDURE, ABDOMEN/PERITONEUM/OMENTUM      Description: Hernia Repair;  Proc Date: 03/09/2009;  Comments: ventral with bovine graft and re-do because of incarceration May 2009     C UNLISTED PROCEDURE, ABDOMEN/PERITONEUM/OMENTUM      Description: Hernia Repair;  Recorded: 02/03/2010;   Comments: redo ventral hernia failed bovine graft with incarceration     EYE SURGERY  2016    cataract     HC KNEE SCOPE, DIAGNOSTIC      Description: Arthroscopy Knee Right;  Recorded: 02/03/2009;     HC REPAIR UMBILICAL KATJA,<6Y/O,REDUC      Description: Umbilical Hernia Repair;  Recorded: 02/03/2009;       Family History   Problem Relation Age of Onset     Heart Disease Father      Diabetes Sister      Cancer Sister         colon     Heart Disease Sister      Diabetes Brother      Cancer Brother         stomach? abd, testicle     Cancer Sister         ovarian     Heart Disease Sister        Social History     Tobacco Use     Smoking status: Never Smoker     Smokeless tobacco: Not on file   Substance Use Topics     Alcohol use: Yes     Comment: Alcoholic Drinks/day: rare; remote regular/daily     Godfrey FRY  [facility]  11/22/2021

## 2021-11-22 NOTE — PROCEDURES
[facility]    Dialysis catheter insertion     Date/Time: 11/22/2021 11:31 AM  Performed by: Pastor Warren MD  Authorized by: Pastor Warren MD   Indications: vascular access      UNIVERSAL PROTOCOL   Site Marked: NA  Prior Images Obtained and Reviewed:  NA  Required items: Required blood products, implants, devices and special equipment available    Patient identity confirmed:  Arm band  NA - No sedation, light sedation, or local anesthesia  Confirmation Checklist:  Correct equipment/implants were available  Universal Protocol: the Joint Commission Universal Protocol was followed    Preparation: Patient was prepped and draped in usual sterile fashion    ESBL (mL):  0    SEDATION    Patient Sedated: No      Preparation: skin prepped with ChloraPrep  Skin prep agent dried: skin prep agent completely dried prior to procedure  Sterile barriers: all five maximum sterile barriers used - cap, mask, sterile gown, sterile gloves, and large sterile sheet  Hand hygiene: hand hygiene performed prior to central venous catheter insertion  Site selection rationale: placed over wire from prior TLC   Patient position: flat  Catheter type: double lumen  Catheter size: 12 Fr  Ultrasound guidance: no  Number of attempts: 1  Successful placement: yes  Post-procedure: line sutured and dressing applied  Assessment: blood return through all ports      PROCEDURE  Describe Procedure: DX: acute kidney failure    Used consent from prior line insertion--this was  Re-wire using a dialysis catheter    Length of time physician/provider present for 1:1 monitoring during sedation: 0

## 2021-11-22 NOTE — PROGRESS NOTES
[facility]    ICU Progress Note       Date of Admission:  11/14/2021    Assessment: Critical Care   Rosanna Romero is a 66 year old male admitted on 11/14/2021 for COVID-19 infection. He has a history of DM, HTN, gout, HLD, and LINDSAY. Intubated 11/18 due to worsening hypoxemia and afib RVR, requiring intubation. AZALIA.         Summary plans for today  Stop meropenem  HD today  Increase insulin coverage  Decrease PEEP to 5        Plan: Critical Care   Neuro/ pain/ sedation:  - Fentanyl and propofol for sedation and vent synchrony  - limit propofol due to increased triglycerides  - stopped precedex seconday to bradycardia   - Goal RASS now -1 to 0    Pulmonary: history of LINDSAY  Ventilation Mode: CMV/AC  (Continuous Mandatory Ventilation/ Assist Control)  FiO2 (%): 35 %  Rate Set (breaths/minute): 24 breaths/min  Tidal Volume Set (mL): 500 mL  PEEP (cm H2O): 10 cmH2O  Oxygen Concentration (%): 35 %  Resp: 26  -Ventilator day 5  Improved O2  P: decrease PEEP to 5  PS trials after HD which should improve the metabolic acidosis and need for high RR     Cardiovascular:   - off vasopressors  - Amio bolus and drip started for afib RVR and ventricular tachycardia     GI/Nutrition:  - Advance TFs at  goal    Renal/ Fluid Balance: Acute kidney injury and metabolic acidosis  - Metabolic acidosis, recurrent seconday to AZALIA  Back on bicarb drip which is also causing hypernatremia   +1400 ml net with 450 urinary output   HD today after line placed and then stop bicarb drip and fix hypernatremia     Endocrine: history of DM  - Hyperglycemia, possible DKA--off insulin drip but glucoses still high   P: increase AM lantus to 42 and novolog q 4 to 10     ID: suspected septic shock  COVID pneumonia   - empiric meropenem (11/18-11/21) and vancomycin (11/17-11/20)  Afebrile x 4 days   Decadron day 7   Got only 2 d remdesivir seconday to AZALIA   - follow up cultures--NGTD  - P: stop antibiotic    Hematology:  - hgb normal and  thrombocytopenia resolved    MSK: history of gout  - holding allopurinol    Lines/ tubes/ drains:  Del Valle Catheter: PRESENT, indication: Strict 1-2 Hour I&O  Central Lines: PRESENT  PICC Triple Lumen 11/21/21 Left Basilic-Site Assessment: WDL  CVC TRIPLE LUMEN Right Internal jugular-Site Assessment: WDL    Prophylaxis:  - DVT Prophylaxis: Heparin SQ  - PUD Prophylaxis: protonix    Code Status: Full Code      Disposition:  - ICU  Critical care time exclusive of procedures: 30    Pastor Warren MD  [facility]  Securely message with the Vocera Web Console (learn more here)  Text page via [email or web address] Paging/Directory      Clinically Significant Risk Factors Present on Admission           ______________________________________________________________________    Interval History   picc line placed      Physical Exam   Vital Signs: Temp: 97  F (36.1  C) Temp src: Temporal   Pulse: 73   Resp: 26 SpO2: 96 % O2 Device: Mechanical Ventilator    Weight: 205 lbs 3.97 oz    No spontaneous movement or response to voice  ETT ok  Right IJ  Lungs clear  H- Rr w/o m  Abdomen non tender non distended  No scrotal edema  Hands 2+ edema  Feet and hands are warm     Data   I reviewed all medications, new labs and imaging results over the last 24 hours.  Arterial Blood Gases   Recent Labs   Lab 11/22/21  0447 11/21/21  1402 11/21/21  0554 11/20/21  1529   PH 7.46* 7.35 7.28* 7.41   PCO2 30* 26* 29* 31*   PO2 82 103 70* 88   HCO3 21 14* 14* 20*       Complete Blood Count   Recent Labs   Lab 11/22/21  0448 11/21/21  0555 11/20/21  0407 11/19/21  0515   WBC 17.8* 13.8* 15.0* 16.3*   HGB 10.6* 9.9* 10.0* 10.6*    127* 153 177       Basic Metabolic Panel  Recent Labs   Lab 11/22/21  0448 11/22/21  0426 11/22/21  0010 11/21/21 2028 11/21/21  1525 11/21/21  1404 11/21/21  0812 11/21/21  0555 11/20/21  0413 11/20/21  0407   *  --   --   --   --  146*  --  145*  --  149*   POTASSIUM 3.7  --   --   --   --  4.2   --  4.5  --  4.0   CHLORIDE 105  --   --   --   --  106  --  105  --  109   CO2 21  --   --   --   --  17*  --  14*  --  24   *  --   --   --   --  125*  --  115*  --  91*   CR 4.89*  --   --   --   --  4.80*  --  4.71*  --  3.83*   * 246* 257* 286*   < > 324*   < > 303*   < > 144*    < > = values in this interval not displayed.       Liver Function Tests  Recent Labs   Lab 11/22/21 0448 11/21/21 0555 11/20/21 0407 11/19/21 0515 11/18/21 0522 11/18/21 0422 11/17/21 0719 11/15/21  2347 11/15/21  0914   AST  --   --   --   --   --  45  --  40 34   ALT  --   --   --   --   --  16  --  16 15   ALKPHOS  --   --   --   --   --  73  --  78 75   BILITOTAL  --   --   --   --   --  0.5  --  0.5 0.7   ALBUMIN 1.3* 1.4*  --   --   --  1.9*  --  2.7* 2.7*   INR 0.90 0.99 1.01 0.97   < >  --    < >  --   --     < > = values in this interval not displayed.       Coagulation Profile  Recent Labs   Lab 11/22/21 0448 11/21/21 0555 11/20/21 0407 11/19/21 0515   INR 0.90 0.99 1.01 0.97       IMAGING:  No results found for this or any previous visit (from the past 24 hour(s)).

## 2021-11-23 NOTE — PROGRESS NOTES
[facility]    ICU Progress Note       Date of Admission:  11/14/2021    Assessment: Critical Care   Rosanna Romero is a 66 year old male admitted on 11/14/2021 for COVID-19 infection. He has a history of DM, HTN, gout, HLD, and LINDSAY. Intubated 11/18 due to worsening hypoxemia and afib RVR,  AZALIA.  --needing HD starting 11/22        Summary plans for today  Supine   HD today  Increase insulin coverage  Decrease RR to 20 and TV to 420     Plan: Critical Care   Neuro/ pain/ sedation:  - Fentanyl and propofol for sedation and vent synchrony  - limit propofol due to increased triglycerides  - stopped precedex seconday to bradycardia   - Goal RASS now -1 to 0 when supine     Pulmonary: COVID ARDS   Ventilation Mode: CMV/AC  (Continuous Mandatory Ventilation/ Assist Control)  FiO2 (%): 40 %  Rate Set (breaths/minute): 20 breaths/min  Tidal Volume Set (mL): 420 mL  PEEP (cm H2O): 10 cmH2O  Oxygen Concentration (%): 40 %  Resp: 24  -Ventilator day 6  O2 worsening after HD needing proning again. Now improved ABG with alkalosis   P: decrease TV to 420 and RR to 20   Continue PEEP 10  3 pm ABG to determine proning needs     Cardiovascular:   - off vasopressors  - Amio bolus and drip started for afib RVR and ventricular tachycardia --now in NSR  P: decrease amiodarone to 0.5 mg/min.     GI/Nutrition:  - TFs at  goal    Renal/ Fluid Balance: Acute kidney injury and metabolic acidosis  - Metabolic acidosis, recurrent seconday to AZALIA --started HD yesterday with 1 L off.  580 urinary output   HD today again.      Endocrine: history of DM  - Hyperglycemia, possible DKA--off insulin drip but glucoses still high   P: increase AM lantus to 46 and novolog q 4 to 12     ID: suspected septic shock  COVID pneumonia   - empiric meropenem (11/18-11/21) and vancomycin (11/17-11/20)  Afebrile x 5 days   Decadron day 8  Procalcitonin decreased to 12   Got only 2 d remdesivir seconday to AZALIA   - follow up cultures--staph epi and  candida only in sputum   - P: off antibiotics x 2 days    Hematology:  -mild anemia and thrombocytopenia--mild     MSK: history of gout  - holding allopurinol seconday to AZALIA     Lines/ tubes/ drains:  Del Valle Catheter: PRESENT, indication: Strict 1-2 Hour I&O  Central Lines: PRESENT  PICC Triple Lumen 11/21/21 Left Basilic-Site Assessment: WDL  [REMOVED] CVC TRIPLE LUMEN Right Internal jugular-Site Assessment: WDL    Prophylaxis:  - DVT Prophylaxis: Heparin SQ  - PUD Prophylaxis: protonix    Code Status: Full Code      Disposition:  - ICU  Critical care time exclusive of procedures: 30    Pastor Warren MD  [facility]  Securely message with the Vocera Web Console (learn more here)  Text page via [employer] Paging/Directory      Clinically Significant Risk Factors Present on Admission           ______________________________________________________________________    Interval History   HD completed  Proned overnight   NSR       Physical Exam   Vital Signs: Temp: (!) 96.1  F (35.6  C) Temp src: Temporal BP: (!) 153/67 Pulse: 63   Resp: 24 SpO2: 100 % O2 Device: Mechanical Ventilator Oxygen Delivery: 50 LPM  Weight: 215 lbs 6.23 oz    Proned   No spontaneous movement or response to voice  ETT ok  Facial edema   Right IJ  HD catheter   Lungs clear  H- Rr w/o m  Abdomen can't examine   No scrotal edema  Hands 2+ edema, 1+ in thighs   Feet and hands are warm     Data   I reviewed all medications, new labs and imaging results over the last 24 hours.  Arterial Blood Gases   Recent Labs   Lab 11/23/21  0534 11/22/21  1646 11/22/21  0447 11/21/21  1402   PH 7.54* 7.53* 7.46* 7.35   PCO2 29* 33* 30* 26*   PO2 218* 75* 82 103   HCO3 25 28 21 14*       Complete Blood Count   Recent Labs   Lab 11/23/21  0533 11/22/21  0448 11/21/21  0555 11/20/21  0407   WBC 14.8* 17.8* 13.8* 15.0*   HGB 10.1* 10.6* 9.9* 10.0*   * 162 127* 153       Basic Metabolic Panel  Recent Labs   Lab 11/23/21  0754  11/23/21 0533 11/23/21 0412 11/23/21  0021 11/22/21  0827 11/22/21 0448 11/21/21  1525 11/21/21  1404 11/21/21  0812 11/21/21  0555   NA  --  146*  --   --   --  149*  --  146*  --  145*   POTASSIUM  --  3.2*  --   --   --  3.7  --  4.2  --  4.5   CHLORIDE  --  104  --   --   --  105  --  106  --  105   CO2  --  23  --   --   --  21  --  17*  --  14*   BUN  --  99*  --   --   --  136*  --  125*  --  115*   CR  --  3.80*  --   --   --  4.89*  --  4.80*  --  4.71*   * 244* 203* 191*   < > 289*   < > 324*   < > 303*    < > = values in this interval not displayed.       Liver Function Tests  Recent Labs   Lab 11/23/21 0533 11/22/21 0448 11/21/21 0555 11/20/21 0407 11/18/21 0522 11/18/21 0422   AST  --   --   --   --   --  45   ALT  --   --   --   --   --  16   ALKPHOS  --   --   --   --   --  73   BILITOTAL  --   --   --   --   --  0.5   ALBUMIN  --  1.3* 1.4*  --   --  1.9*   INR 0.85 0.90 0.99 1.01   < >  --     < > = values in this interval not displayed.       Coagulation Profile  Recent Labs   Lab 11/23/21 0533 11/22/21 0448 11/21/21 0555 11/20/21 0407   INR 0.85 0.90 0.99 1.01       IMAGING:  No results found for this or any previous visit (from the past 24 hour(s)).

## 2021-11-23 NOTE — PROGRESS NOTES
Potassium   Date Value Ref Range Status   11/23/2021 3.5 3.4 - 5.3 mmol/L Final     Comment:     Specimen slightly hemolyzed, potassium may be falsely elevated.   07/02/2021 3.4 3.4 - 5.3 mmol/L Final     Hemoglobin   Date Value Ref Range Status   11/23/2021 10.1 (L) 13.3 - 17.7 g/dL Final   07/02/2021 10.8 (L) 13.3 - 17.7 g/dL Final     Creatinine   Date Value Ref Range Status   11/23/2021 3.80 (H) 0.66 - 1.25 mg/dL Final   07/02/2021 0.82 0.66 - 1.25 mg/dL Final     Urea Nitrogen   Date Value Ref Range Status   11/23/2021 99 (H) 7 - 30 mg/dL Final   07/02/2021 10 7 - 30 mg/dL Final     Sodium   Date Value Ref Range Status   11/23/2021 146 (H) 133 - 144 mmol/L Final   07/02/2021 141 133 - 144 mmol/L Final     INR   Date Value Ref Range Status   11/23/2021 0.85 0.85 - 1.15 Final       DIALYSIS PROCEDURE NOTE  Hepatitis status of previous patient on machine log was checked and verified ok to use with this patients hepatitis status.  Patient dialyzed for 2.5 hrs. on a K4 bath with a net fluid removal of  1.5L.  A BFR of 300 ml/min was obtained via a nontunneled .      The treatment plan was discussed with Dr. BART Amin  during the treatment.    Total heparin received during the treatment: 0 units.      Line flushed, clamped and capped with heparin 1:1000 1.6 mL (1600 units) per lumen    Meds  given: None    Complications: None     Person educated: Pt is sedated in intubated      ICEBOAT? Timeout performed pre-treatment  I: Patient was identified using 2 identifiers  C:  Consent Signed Yes  E: Equipment preventative maintenance is current and dialysis delivery system OK to use  B: Hepatitis B Surface Antigen: Neg ; Draw Date: 11.20.21      Hepatitis B Surface Antibody: Susceptible  ; Draw Date: 1.20.21  O: Dialysis orders present and complete prior to treatment  A: Vascular access verified and assessed prior to treatment  T: Treatment was performed at a clinically appropriate time  ?: Patient was allowed to ask questions  and address concerns prior to treatment  See flowsheet in EPIC for further details and post assessment.  Machine water alarm in place and functioning. Transducer pods intact and checked every 15min.   Pt dialyzed at bedside in ICU .  Chlorine/Chloramine water system checked every 4 hours.  Outpatient Dialysis at D

## 2021-11-23 NOTE — PROGRESS NOTES
[facility]  Hospitalist Progress Note  Parminder Hernadez MD 11/23/2021    Reason for Stay (Diagnosis): Shock         Assessment and Plan:      Summary of Stay: Rosanna Romero is a 66-year-old male with medical history including diabetes not on insulin, neuropathy, hypertension, LINDSAY who is vaccinated against COVID-19 presented with cough and shortness of breath and recent positive COVID-19 test.  He was admitted here with suspected secondary bacterial pneumonia and also is being treated for COVID-19 with dexamethasone and remdesivir.  He has had evolving renal failure with worsening respiratory status and metabolic acidosis which prompted stabilization early 11/18 with intubation, central line A-line placement and transfer to the ICU.  He was given bicarbonate containing fluids, insulin drip, vasopressors and remains on the vent.     Acid-base status has improved but he is oliguric still.  Procalcitonin was dramatically elevated and he was covered for bacterial sepsis w/ empiric abx though these were stopped due to negative cultures on 11/22.  CT of his chest abdomen and pelvis did not show obvious source other than pneumonia though there was a question of some enteritis.    He remains intubated with evolving renal failure and shock, hemodynamics have now improved.    Underwent hemodialysis on 11/22, running again 11/23.     1.  Septic shock, suspected pulmonary source, likely pneumonia.  Suspect the driving issue is COVID-19 pneumonia.  Treated w/ empiric abx, now off.  --zosyn 11/16-11/8.  --meropenem and vancomycin on 11/18.  Discontinued vancomycin though that was likely on board until 11/22 when HD started.  --stopped meropenem 11/22.  --weaned off of vasopressors  --Blood cultures no growth to date  --Has a central line and arterial line     2.  Severe metabolic acidosis: Suspect due to combination of ATN from renal failure and ketosis potentially from DKA given severe hyperglycemia.   Acid-base status improved.  --Nephrology consulted  --HD per nephrology.  --Ketones improved, transitioned to subcu insulin.  --Trend basic metabolic panel     3.    Oliguric acute renal failure: Nephrology following.  Likely starting dialysis on Monday 11/22.     4.  Type 2 diabetes, evidence of DKA: Somewhat unusual.  Is a type II diabetic not normally on insulin and presented with significantly elevated ketones and blood sugars in the 300s.    --Transitioned off insulin drip to 46 units Lantus daily, 12 units aspart every 4 hours while on tube feeds plus sliding scale insulin.  --Anticipate needing to titrate further.     5.  Acute hypoxemic respiratory failure due to pneumonia, hemodynamic instability, volume overload: Intubated and sedated.    --Vent management as per intensivist.     6.  FEN:  Increasing to goal feeds via feeding tube.     7.    Prominent constipation seen on CT scan: Added bowel regimen. Appears to be stooling.    8.  Atrial fibrillation with RVR: Transitioned from IV to oral amiodarone.    9.  History of upper GI bleed: Continue Protonix.    10.  COVID-19: Continuing Decadron x 10 days.  Remdesivir stopped due to renal failure.    11.  Hypocalcemia: replacing.    DVT Prophylaxis: Heparin SQ  Code Status: Full Code  Discharge Dispo/Date: Multiple more days in the ICU expected  Lines: A-line, central line, Del Valle, feeding tube, ET tube        Interval History (Subjective):      Planning for HD again today  Insulin titrated  Updated his son, Raza this morning.                    Physical Exam:      Last Vital Signs:  BP (!) 153/67   Pulse 63   Temp 97  F (36.1  C) (Temporal)   Resp 24   Wt 97.7 kg (215 lb 6.2 oz)   SpO2 100%   BMI 29.21 kg/m        Intake/Output Summary (Last 24 hours) at 11/22/2021 0737  Last data filed at 11/22/2021 0700  Gross per 24 hour   Intake 2437.09 ml   Output 510 ml   Net 1927.09 ml       General: Intubated and sedated.  Ill-appearing man with a ET tube in  place.  HEENT: NC/AT, eyes anicteric  Cardiac: RRR, S1, S2.   Pulmonary: Normal chest rise and the vent.  Bilateral crackles.  Abdomen: soft, non-distended.  Bowel Sounds Present.    Extremities: Warm, well perfused.  Skin: no rashes or lesions noted.  Warm and Dry.  Neuro: Clonus or tremor.         Medications:      All current medications were reviewed with changes reflected in problem list.         Data:      All new lab and imaging data was reviewed.   Labs:  Recent Labs   Lab 11/23/21  0757 11/23/21  0533   NA  --  146*   POTASSIUM  --  3.2*   CHLORIDE  --  104   CO2  --  23   ANIONGAP  --  19*   * 244*   BUN  --  99*   CR  --  3.80*   GFRESTIMATED  --  16*   GRAZYNA  --  6.7*     Recent Labs   Lab 11/23/21  0533   WBC 14.8*   HGB 10.1*   HCT 28.6*   MCV 86   *      Imaging:   Recent Results (from the past 48 hour(s))   CT Chest Abdomen Pelvis w/o Contrast    Narrative    CT CHEST, ABDOMEN AND PELVIS WITHOUT CONTRAST  11/19/2021 12:57 PM    CLINICAL HISTORY: Sepsis.    TECHNIQUE: CT scan of the chest, abdomen, and pelvis was performed  without IV contrast. Multiplanar reformats were obtained. Dose  reduction techniques were used.   CONTRAST: None.    COMPARISON: CT chest 11/17/2021, CT abdomen and pelvis 6/30/2021.    FINDINGS:   LUNGS AND PLEURA: Extensive bilateral pulmonary consolidation  involving all lobes of both lungs showing interval areas of increased  consolidation at the lower lungs. Small right pleural fluid newly  identified.    MEDIASTINUM/AXILLAE: No acute mediastinal abnormality. Trace  pericardial effusion appears stable. ET tube identified ending above  the robin. Nasogastric tube identified.    CORONARY ARTERY CALCIFICATION: Mild.    HEPATOBILIARY: No acute liver abnormality. Increased density material  within the gallbladder lumen may be excretion of contrast.    PANCREAS: Normal.    SPLEEN: Normal.    ADRENAL GLANDS: No acute abnormality. Minimal stable thickening of  doubtful  significance.    KIDNEYS/BLADDER: No significant mass, stones, or hydronephrosis. There  are simple or benign cysts. No follow up is needed. Catheter  decompresses the urinary bladder. Bladder wall thickening may just  relate to incomplete distention.    BOWEL: Prominent stool distends the sigmoid colon and rectum. No small  bowel obstruction. Normal appendix. Rectal catheter identified. A few  small bowel loops demonstrate mild wall thickening. No free air.    LYMPH NODES: Normal.    VASCULATURE: Unremarkable.    PELVIC ORGANS: Normal.    OTHER: Small ascites newly identified. No abscess. No free air.  Anasarca is increased.    MUSCULOSKELETAL: No new significant abnormality. Diffuse spine  degenerative changes.      Impression    IMPRESSION:  1.  Extensive bilateral pulmonary consolidation with a degree of  progression at the lower lungs consistent with progressing pneumonia.  New but very small right pleural fluid.  2.  New anasarca. New but very small abdominal ascites.  3.  Prominent stool distends the sigmoid colon and rectum.  4.  Mild degree of wall prominence of small bowel loops could relate  to an enteritis.    KRUPA GRIJALVA MD         SYSTEM ID:  [id number]   XR Abdomen Port 1 View    Narrative    XR ABDOMEN PORT 1 VIEWS   11/19/2021 4:32 PM     HISTORY: Keofeed placement  confirmation    COMPARISON: None.      Impression    IMPRESSION: Feeding tube tip at the ligament of Treitz.    CRYSTAL DUMONT MD         SYSTEM ID:  [id number]         Parminder Hernadez MD

## 2021-11-23 NOTE — PROGRESS NOTES
Respiratory Therapy Note    Critical access hospital ICU VENTILATOR RESPIRATORY NOTE  Date of Admission: 11/14/21  Date of Intubation (most recent): 11/18/21  Reason for Mechanical Ventilation: Respiratory Failure  Number of Days on Mechanical Ventilation: 6  Met Criteria for Pressure Support Trial: No  Reason for No Pressure Support Trial: PEEP > 8 cmH20  Significant Events Today: Patient supined this AM around 0835 and MD holding off on proning for now  ABG Results: 7.43/39/70/26    Plan:  Continue to monitor patient's respiratory status.    Ventilation Mode: CMV/AC  (Continuous Mandatory Ventilation/ Assist Control)  FiO2 (%): 35 %  Rate Set (breaths/minute): 20 breaths/min  Tidal Volume Set (mL): 420 mL  PEEP (cm H2O): 10 cmH2O  Oxygen Concentration (%): 35 %  Resp: 9    RT to follow.    Laura Ramos, RT  November 23, 2021

## 2021-11-23 NOTE — PROGRESS NOTES
----------------------------------------------------------  [id number] STUDY VISIT TREATMENT NOTES   ----------------------------------------------------------  [id number] Study (splenic ultrasound treatment for lowering inflammation associated with COVID-19)    ----------------------------------------------------------  Rosanna Romero   Date of Admission: 11/14/2021   Room Number: 0373/0373-01     Participant ID: [id number]  Date: 11/23/21   Study Day 7    Researcher making note/applying tx: Celestina Organ    ULTRASOUND TREATMENT? Yes    IF YES:  MINI Device Serial #: [id number]  Time at the beginning of study visit (opening progress note): 10:55 AM   Time at the end of study visit: 11:20      Pt body position: Sitting up at an angle  Pt arm position: Arm out creating 45 degree angle     NOTES:   First 9-minute stimulation:   START 10:55   STOP 11:04   Was stimulation inturrupted: No   Reason: N/A     Second 9-minute stimulation:   START 11:04   STOP 11:13   Was stimulation inturrupted: No   Reason: N/A     Repeat stimulation if necessary: No   START    STOP        Pt feedback on stimulation: Patient was receiving mechanical ventilation during this stimulation.      Other overall notes:  Patient had minimal movement during stimulations, patient was mechanically ventilated during this stimulation. MINI device #24 used for stimulation.    AE events?  Adverse event(s) reported, See progress notes describing unrelated AE from Dr. Paz 11-22-21.    Note: if AE, use smartphrase 'UNTSAE'    ----------------------------------------------------------  PATIENT STATUS  ----------------------------------------------------------    Instructions for 'on-call' study doctor:  Monitor this patient's status, advise researchers if there are any concerns with patients continued enrollment in this study    ----------------------------------------------------------  O2 Device: Mechanical Ventilator  FiO2 (%): 40 %  Oxygen Delivery: 50  [facility]   Vitals:    11/23/21 0915 11/23/21 0930 11/23/21 0945 11/23/21 1000   BP:       BP Location:       Pulse: 60 64 64 64   Resp: (!) 0 8 8 8   Temp:       TempSrc:       SpO2: 98% 99% 99% 98%   Weight:         ----------------------------------------------------------  No results displayed because visit has over 200 results.         ----------------------------------------------------------    Researcher sent to 'on call' study doctor for review. 11/23/21 10:55 AM     ----------------------------------------------------------  IN RESPONSE TO : [id number] STUDY VISIT TREATMENT NOTES   ----------------------------------------------------------  *copy/paste the above as the 'summary' for progress note      Rosanna Romero   This patient's status has been monitored by me. 11/23/21 1:18 PM     Based on my review:     I suggest we CONTINUE this patients enrollment in the [id number] Study.      Signed:  EJTEJINDER  11/23/21 1:18 PM

## 2021-11-23 NOTE — PROGRESS NOTES
Renal Medicine Inpatient Dialysis Note                                Rosanna Romero MRN# [id number]   Age: 66 year old YOB: 1955   Date of Admission: 11/14/2021 Hospital LOS: 8          Assessment/Plan:     Rosanna Romero is a 66 year old male who was admitted on 11/14/2021.      1) Baseline Normal Kidney Function.     2) COVID-19 Pneumonia/ARDS - oxygenation is improved.      3) Shock - likely vasoplegic/sepsis     4) Metabolic Acidosis      5) AZALIA - oliguric/anuric.  Likely ATN due to sepsis.  Had contrast 11/17/21 but renal function was falling before then. Still oliguric.  Acidosis worse.       6) Hypocalcemia:  Calcium corrects to 9.2 accounting for hypoalbuminemia.       7) Severe hypoalbuminemia - Protein malnutrition.         Initial dialysis 11/22/21  Next 11/23/21    Interval History:     Dialysis run parameters reviewed with dialysis RN at patient bedside.    2.5 hour run  4K  300 ml/min BFR  UF 1.5  liter     IJ access    Stable initial portion of run     ROS     Intubated and sedated: ROS unable    Dialysis Parameters:     Vitals were reviewed  Patient Vitals for the past 8 hrs:   Temp Temp src Pulse Resp SpO2 Weight   11/23/21 1345 -- -- 78 9 92 % --   11/23/21 1330 -- -- 75 12 91 % --   11/23/21 1315 -- -- 76 9 91 % --   11/23/21 1300 -- -- 75 10 91 % --   11/23/21 1245 -- -- 71 10 92 % --   11/23/21 1230 -- -- 66 13 95 % --   11/23/21 1222 -- -- 64 17 95 % --   11/23/21 1215 97  F (36.1  C) Temporal 64 (!) 6 96 % 97.7 kg (215 lb 6.2 oz)   11/23/21 1200 -- -- 65 13 96 % --   11/23/21 1145 -- -- 66 13 96 % --   11/23/21 1130 -- -- 67 (!) 6 97 % --   11/23/21 1115 -- -- 64 12 98 % --   11/23/21 1100 -- -- 64 8 98 % --   11/23/21 1000 -- -- 64 8 98 % --   11/23/21 0945 -- -- 64 8 99 % --   11/23/21 0930 -- -- 64 8 99 % --   11/23/21 0915 -- -- 60 (!) 0 98 % --   11/23/21 0900 -- -- 59 (!) 0 99 % --   11/23/21 0845 -- -- -- -- 99 % --   11/23/21 0835 -- -- -- -- 100 % --   11/23/21 0830 -- -- 63  24 100 % --   11/23/21 0815 -- -- 62 24 100 % --   11/23/21 0800 97  F (36.1  C) Temporal 61 24 100 % --   11/23/21 0745 -- -- 62 23 100 % --   11/23/21 0730 -- -- 63 23 100 % --   11/23/21 0715 -- -- 60 23 100 % --   11/23/21 0700 -- -- 58 23 100 % --   11/23/21 0610 -- -- -- -- -- 97.7 kg (215 lb 6.2 oz)     I/O last 3 completed shifts:  In: 2477.62 [I.V.:902.62; NG/GT:735]  Out: 1820 [Urine:520; Other:1000; Stool:300]    Vitals:    11/20/21 0400 11/21/21 0630 11/22/21 0600 11/23/21 0610   Weight: 93.3 kg (205 lb 11 oz) 95.9 kg (211 lb 6.7 oz) 93.1 kg (205 lb 4 oz) 97.7 kg (215 lb 6.2 oz)    11/23/21 1215   Weight: 97.7 kg (215 lb 6.2 oz)       Current Weight: 97.7 ?  Dry Weight: 83 ?range  Dialysis Temp: 36.5  C  Access Device: IJ  Access Site: right  Dialyzer: Revaclear  Dialysis Bath: 3  Sodium Profile: n  UF Goal: 1.5  Blood Flow Rate (mL/min): 300  Total Treatment Time (hrs): 2.5  Heparin: Low dose as required      EPO dose: n  Zemplar: n  IV Fe: n      Medications and Allergies:     Reviewed      Physical Exam:     Seen and examined during course of dialysis run    BP (!) 153/67   Pulse 78   Temp 97  F (36.1  C) (Temporal)   Resp 9   Wt 97.7 kg (215 lb 6.2 oz)   SpO2 92%   BMI 29.21 kg/m      GENERAL: intubated  HEENT: ETT  SKIN: catheter site clean without drainage    Data:       Recent Labs   Lab 11/23/21  1232 11/23/21  1125 11/23/21  0757 11/23/21  0533   NA  --   --   --  146*   POTASSIUM 3.5  --   --  3.2*   CHLORIDE  --   --   --  104   CO2  --   --   --  23   ANIONGAP  --   --   --  19*   GLC  --  226*   < > 244*   BUN  --   --   --  99*   CR  --   --   --  3.80*   GFRESTIMATED  --   --   --  16*   GRAZYNA  --   --   --  6.7*    < > = values in this interval not displayed.     Recent Labs   Lab Test 11/23/21  0533 11/22/21  0448 11/21/21  1404 11/21/21  0555 11/20/21  0407 11/19/21  1422 11/19/21  0515 11/18/21  1813 11/18/21  1126 11/18/21  0422   CR 3.80* 4.89* 4.80* 4.71* 3.83* 3.13* 2.84* 2.61*  2.45* 2.42*     Recent Labs   Lab 11/22/21  0448 11/21/21  0555 11/18/21  0422   ALBUMIN 1.3* 1.4* 1.9*     Recent Labs   Lab 11/23/21  0533 11/22/21  0448 11/21/21  0555 11/20/21  0407   PHOS 4.4 5.7*  5.8* 7.1* 5.0*   HGB 10.1* 10.6* 9.9* 10.0*         G Arun Kumar MD    [facility] - Nephrology  [phone]

## 2021-11-23 NOTE — PLAN OF CARE
ICU End of Shift Summary.  For vital signs and complete assessments, please see documentation flowsheets.      Pertinent assessments:   Pt sedated RASS -4. ETT @ 24 cm teeth. LS coarse/diminished. Vent FiO2 35%, RR 20, Tidal 420 & PEEP 10. Tele SR/SB w/ invert T waves, occ PACs. BS active, Rectal tube in place - liquid stool. Del Valle in place - minimal output - 25-50 Q 2 hrs. +2/+3 edema throughout body, Scrotum +4 & periorbital edema. Blanchable redness to scrotum and buttocks. Wound on RLE from I/O mepilex in place. Wound R lower lip. L PICC line infusing Fent, Prop, Amio & NS. R radial art line in place and Hemodialysis catheter where R internal jugular was.   Major Shift Events:     Replaced K+ - recheck 3.5.    Supine 0840    TF rate adjusted to 20 ml/hr @ 1100.    Dialysis 1200 1.5 L removed, restarted on Levo during and stopped after.  Plan (Upcoming Events): Continue to wean sedation and vent settings as tolerated. Recheck blood gas in AM. Plan to have dialysis tomorrow.    Discharge/Transfer Needs: TBD     Bedside Shift Report Completed : Y  Bedside Safety Check Completed: Y

## 2021-11-23 NOTE — PLAN OF CARE
ICU End of Shift Summary.  For vital signs and complete assessments, please see documentation flowsheets.     Pertinent assessments: Liquid stools- rectal tube placed.  Oxygenation improving, proned all night.  Does have a small skin tear next to his anus that was cleaned and barrier ointment applied.  He also has two small skin tears on his posterior scrotum that appear to be secondary to scrotal edema.  Urine output still decreased at 15-30 mL/hour.  Major Shift Events:   Plan (Upcoming Events): Plan for dialysis (run #2 today).  Continue to wean FiO2  Discharge/Transfer Needs: TBD    Bedside Shift Report Completed :   Bedside Safety Check Completed:

## 2021-11-23 NOTE — PROGRESS NOTES
[facility] ICU VENTILATOR RESPIRATORY NOTE  Date of Admission: 11/14/21  Date of Intubation (most recent): 11/18/21  Reason for Mechanical Ventilation: Respiratory Failure  Number of Days on Mechanical Ventilation: 6  Met Criteria for Pressure Support Trial: No  Reason for No Pressure Support Trial: PEEP>8  Significant Events Today: Prone  ETT appearance on chest x-ray: Endotracheal tube in satisfactory position terminating 5.8 cm above the robin.  Plan:  Continue to monitor patient's respiratory status.  Ventilation Mode: CMV/AC  (Continuous Mandatory Ventilation/ Assist Control)  FiO2 (%): 55 %  Rate Set (breaths/minute): 24 breaths/min  Tidal Volume Set (mL): 500 mL  PEEP (cm H2O): 10 cmH2O  Oxygen Concentration (%): 55 %  Resp: 23    Geraldine Salguero, RT

## 2021-11-24 NOTE — PROGRESS NOTES
Respiratory Therapy Note     [facility] ICU VENTILATOR RESPIRATORY NOTE  Date of Admission: 11/14/21  Date of Intubation (most recent): 11/18/21  Reason for Mechanical Ventilation: Respiratory Failure  Number of Days on Mechanical Ventilation: 7  Met Criteria for Pressure Support Trial: No  Reason for No Pressure Support Trial: Patient's PEEP +10 cmH20   Significant Events Today: Pt supine  ETT appearance on chest x-ray: Endotracheal tube in satisfactory position terminating 5.8 cm above the robin.     Plan:  Continue to monitor patient's respiratory status.        Yenny Tse, RT

## 2021-11-24 NOTE — PROGRESS NOTES
Renal Medicine Inpatient Dialysis Note                                Rosanna Romero MRN# [id number]   Age: 66 year old YOB: 1955   Date of Admission: 11/14/2021 Hospital LOS: 9          Assessment/Plan:     Rosanna Romero is a 66 year old male who was admitted on 11/14/2021.      1) Baseline Normal Kidney Function.     2) COVID-19 Pneumonia/ARDS - oxygenation is improved.      3) Shock - likely vasoplegic/sepsis     4) Metabolic Acidosis      5) AZALIA - oliguric/anuric.  Likely ATN due to sepsis.  Had contrast 11/17/21 but renal function was falling before then. Still oliguric.  Acidosis worse.       6) Hypocalcemia:  Calcium corrects to 9.2 accounting for hypoalbuminemia.       7) Severe hypoalbuminemia - Protein malnutrition.         Initial dialysis 11/22/21  Next 11/26/21    Interval History:     Dialysis run parameters reviewed with dialysis RN at patient bedside.    3 hour run  4K  300 ml/min BFR  UF 1.5  liter     IJ access    Has dialyzed previous 3 days  NE   FiO2 35%      ROS     Intubated and sedated: ROS unable    Dialysis Parameters:     Vitals were reviewed  Patient Vitals for the past 8 hrs:   Temp Temp src Pulse Resp SpO2   11/24/21 1200 97.8  F (36.6  C) Axillary 74 20 96 %   11/24/21 1145 -- -- 75 19 96 %   11/24/21 1130 -- -- 78 21 97 %   11/24/21 1115 -- -- 82 12 95 %   11/24/21 1100 97.5  F (36.4  C) Axillary 85 16 94 %   11/24/21 1050 -- -- 90 21 92 %   11/24/21 1045 -- -- 84 20 93 %   11/24/21 1030 -- -- 81 19 93 %   11/24/21 1015 -- -- 81 22 93 %   11/24/21 1000 97.1  F (36.2  C) Axillary 83 18 94 %   11/24/21 0945 -- -- 82 23 93 %   11/24/21 0930 -- -- 80 18 93 %   11/24/21 0915 -- -- 78 14 93 %   11/24/21 0900 -- -- 76 20 94 %   11/24/21 0845 -- -- 75 23 94 %   11/24/21 0830 96.8  F (36  C) Axillary 72 16 94 %   11/24/21 0820 -- -- 71 20 91 %   11/24/21 0815 -- -- 68 19 94 %   11/24/21 0800 -- -- 68 16 95 %   11/24/21 0745 -- -- 68 18 95 %   11/24/21 0730 -- -- 70 20 95 %   11/24/21  0715 -- -- 71 21 95 %   11/24/21 0700 -- -- 70 21 95 %   11/24/21 0600 -- -- 66 -- 95 %   11/24/21 0500 -- -- 66 -- 95 %     I/O last 3 completed shifts:  In: 3534.25 [I.V.:2084.25; NG/GT:910]  Out: 2090 [Urine:390; Other:1500; Stool:200]    Vitals:    11/20/21 0400 11/21/21 0630 11/22/21 0600 11/23/21 0610   Weight: 93.3 kg (205 lb 11 oz) 95.9 kg (211 lb 6.7 oz) 93.1 kg (205 lb 4 oz) 97.7 kg (215 lb 6.2 oz)    11/23/21 1215   Weight: 97.7 kg (215 lb 6.2 oz)       Current Weight: 97.7 ?  Dry Weight: 83 ?range  Dialysis Temp: 36.5  C  Access Device: IJ  Access Site: right  Dialyzer: Revaclear  Dialysis Bath: 3  Sodium Profile: n  UF Goal: 1.5  Blood Flow Rate (mL/min): 300  Total Treatment Time (hrs): 2.5  Heparin: Low dose as required      EPO dose: n  Zemplar: n  IV Fe: n      Medications and Allergies:     Reviewed      Physical Exam:     Seen and examined during course of dialysis run    BP (!) 153/67   Pulse 74   Temp 97.8  F (36.6  C) (Axillary)   Resp 20   Wt 97.7 kg (215 lb 6.2 oz)   SpO2 96%   BMI 29.21 kg/m      GENERAL: intubated  HEENT: ETT  SKIN: catheter site clean without drainage    Data:       Recent Labs   Lab 11/24/21  0825 11/24/21  0540   NA  --  141   POTASSIUM  --  3.5   CHLORIDE  --  104   CO2  --  23   ANIONGAP  --  14   * 139*   BUN  --  79*   CR  --  3.05*   GFRESTIMATED  --  20*   GRAZYNA  --  6.9*     Recent Labs   Lab Test 11/24/21  0540 11/23/21  0533 11/22/21  0448 11/21/21  1404 11/21/21  0555 11/20/21  0407 11/19/21  1422 11/19/21  0515 11/18/21  1813 11/18/21  1126   CR 3.05* 3.80* 4.89* 4.80* 4.71* 3.83* 3.13* 2.84* 2.61* 2.45*     Recent Labs   Lab 11/22/21  0448 11/21/21  0555 11/18/21  0422   ALBUMIN 1.3* 1.4* 1.9*     Recent Labs   Lab 11/24/21  0540 11/23/21  0533 11/22/21  0448 11/21/21  0555   PHOS 5.3* 4.4 5.7*  5.8* 7.1*   HGB 10.4* 10.1* 10.6* 9.9*         G Arun Kumar MD    [facility] - Nephrology  [phone]

## 2021-11-24 NOTE — PROGRESS NOTES
[facility]    ICU Progress Note       Date of Admission:  11/14/2021    Assessment: Critical Care   Rosanna Romero is a 66 year old male admitted on 11/14/2021 for COVID-19 infection. He has a history of DM, HTN, gout, HLD, and LINDSAY. Intubated 11/18 due to worsening hypoxemia and afib RVR,  AZALIA.  --needing HD starting 11/22        Summary plans for today  Decrease PEEP to 7  HD today  Decrease insulin coverage  Stop amiodarone     Plan: Critical Care   Neuro/ pain/ sedation:  - Fentanyl and propofol for sedation and vent synchrony  - limit propofol due to increased triglycerides  - stopped precedex seconday to bradycardia   - Goal RASS now -1 to 0     Pulmonary: COVID ARDS   Ventilation Mode: CMV/AC  (Continuous Mandatory Ventilation/ Assist Control)  FiO2 (%): 35 %  Rate Set (breaths/minute): 20 breaths/min  Tidal Volume Set (mL): 420 mL  PEEP (cm H2O): 10 cmH2O  Oxygen Concentration (%): 35 %  Resp: 14  -Ventilator day 7  Improved Oxygenation after second HD run.    P: continue  and RR 20   Decrease PEEP 7   PS trials in AM     Cardiovascular:   - off and on vasopressors with HD   - Amio bolus and drip started for afib RVR and ventricular tachycardia --now in NSR x 3 days   P: stop amiodarone    GI/Nutrition:  - TFs at  20     Renal/ Fluid Balance: Acute kidney injury and metabolic acidosis  - Metabolic acidosis, recurrent seconday to AZALIA --started HD Tuesday.  1.5 L off yesterday with 1 L off.  500 urinary output on lasix   HD today again.  --1. 5 L goal  Stop lasix     Endocrine: history of DM  - Hyperglycemia, possible DKA--off insulin drip but glucoses now low   P: decrease AM lantus to 36  and novolog q 4 to 6     ID: suspected septic shock  COVID pneumonia   - empiric meropenem (11/18-11/21) and vancomycin (11/17-11/20)  Afebrile x 6 days   Decadron day 9  Procalcitonin decreased to 12   Got only 2 d remdesivir seconday to AZALIA   - follow up cultures--staph epi and candida only in  sputum   - P: off antibiotics x 3 days    Hematology:  -mild anemia and thrombocytopenia--mild     MSK: history of gout  - holding allopurinol seconday to AZALIA     Lines/ tubes/ drains:  Del Valle Catheter: PRESENT, indication: Strict 1-2 Hour I&O  Central Lines: PRESENT  PICC Triple Lumen 11/21/21 Left Basilic-Site Assessment: WDL    Prophylaxis:  - DVT Prophylaxis: Heparin SQ  - PUD Prophylaxis: protonix    Code Status: Full Code      Disposition:  - ICU  Critical care time exclusive of procedures: 30    Pastor Warren MD  [facility]      Clinically Significant Risk Factors Present on Admission           ______________________________________________________________________    Interval History   Second HDrun  completed  Kept supine   NSR       Physical Exam   Vital Signs: Temp: 96.8  F (36  C) Temp src: Axillary  Pulse: 78   Resp: 14 SpO2: 93 % O2 Device: Mechanical Ventilator Oxygen Delivery: 35 LPM  Weight: 215 lbs 6.23 oz    Supine   No spontaneous movement or response to voice  ETT ok  Mild Facial edema   Right IJ  HD catheter   Lungs clear  H- Rr w/o m  Abdomen non tender, mild scrotal edema    Hands 1+ edema, 1+ in thighs   Feet and hands are warm     Data   I reviewed all medications, new labs and imaging results over the last 24 hours.  Arterial Blood Gases   Recent Labs   Lab 11/24/21  0546 11/23/21  1518 11/23/21  0534 11/22/21  1646   PH 7.39 7.43 7.54* 7.53*   PCO2 41 39 29* 33*   PO2 72* 70* 218* 75*   HCO3 25 26 25 28       Complete Blood Count   Recent Labs   Lab 11/24/21  0540 11/23/21  0533 11/22/21  0448 11/21/21  0555   WBC 19.4* 14.8* 17.8* 13.8*   HGB 10.4* 10.1* 10.6* 9.9*   * 135* 162 127*       Basic Metabolic Panel  Recent Labs   Lab 11/24/21  0825 11/24/21  0540 11/24/21  0410 11/24/21  0303 11/23/21  1551 11/23/21  1232 11/23/21  0757 11/23/21  0533 11/22/21  0827 11/22/21  0448 11/21/21  1525 11/21/21  1404   NA  --  141  --   --   --   --   --  146*  --   149*  --  146*   POTASSIUM  --  3.5  --   --   --  3.5  --  3.2*  --  3.7  --  4.2   CHLORIDE  --  104  --   --   --   --   --  104  --  105  --  106   CO2  --  23  --   --   --   --   --  23  --  21  --  17*   BUN  --  79*  --   --   --   --   --  99*  --  136*  --  125*   CR  --  3.05*  --   --   --   --   --  3.80*  --  4.89*  --  4.80*   * 139* 126* 143*   < >  --    < > 244*   < > 289*   < > 324*    < > = values in this interval not displayed.       Liver Function Tests  Recent Labs   Lab 11/24/21 0540 11/23/21 0533 11/22/21 0448 11/21/21 0555 11/18/21 0522 11/18/21 0422   AST  --   --   --   --   --  45   ALT  --   --   --   --   --  16   ALKPHOS  --   --   --   --   --  73   BILITOTAL  --   --   --   --   --  0.5   ALBUMIN  --   --  1.3* 1.4*  --  1.9*   INR 0.87 0.85 0.90 0.99   < >  --     < > = values in this interval not displayed.       Coagulation Profile  Recent Labs   Lab 11/24/21 0540 11/23/21 0533 11/22/21 0448 11/21/21 0555   INR 0.87 0.85 0.90 0.99       IMAGING:  No results found for this or any previous visit (from the past 24 hour(s)).

## 2021-11-24 NOTE — PLAN OF CARE
ICU End of Shift Summary.  For vital signs and complete assessments, please see documentation flowsheets.      Pertinent assessments:   Pt sedated RASS -1. ETT @ 24 cm teeth. LS coarse/diminished. Vent FiO2 35%, RR 20, Tidal 420 & PEEP 7. Tele SR/SB w/ occ PACs & ST depression. BS active, Rectal tube in place, leakin occasionally - liquid stool. Del Valle in place - minimal output - 25-50 Q 2 hrs. +2/+3 edema throughout body, Scrotum/perineum +4, penis +3 & periorbital edema. Blanchable redness to scrotum and buttocks. Wound on RLE from I/O RUPERTO. Wound R lower lip - RUPERTO. L PICC line infusing Fent, Prop, Levo & NS. R radial art line in place and Hemodialysis catheter where R internal jugular was. Osei feed running @ goal.  Major Shift Events:     Hemodialysis @ 0829 - 1.5 L removed.    Amio stopped @ 0940    Per MD not change RASS goal to 0/-1    Stopped Levo 1343    TF goal changed to 30 ml/hr.      Plan (Upcoming Events): Continue to wean sedation and vent settings as tolerated. Potential trial sedation tomorrow. Hemodialysis 11/25.    Discharge/Transfer Needs: TBD     Bedside Shift Report Completed : Y  Bedside Safety Check Completed: Y

## 2021-11-24 NOTE — PROGRESS NOTES
----------------------------------------------------------  [id number] STUDY VISIT TREATMENT NOTES   ----------------------------------------------------------  [id number] Study (splenic ultrasound treatment for lowering inflammation associated with COVID-19)    ----------------------------------------------------------  Rosanna Romero   Date of Admission: 11/14/2021   Room Number: 0373/0373-01     Participant ID: [id number]  Date: 11/24/21   Study Day 8    Researcher making note/applying tx: Celestina Organ    ULTRASOUND TREATMENT? No, final day of enrollment.    Other overall notes:  No ultrasound stimulation delivered. Patient was receiving mechanical ventilation.    AE events?  Adverse event(s) reported. See progress notes describing unrelated AE from Dr. Paz on 11-22-21. Please provide any notes about the status/progression of the AE.    Note: if AE, use smartphrase 'UNTSAE'    ----------------------------------------------------------  PATIENT STATUS  ----------------------------------------------------------    Instructions for 'on-call' study doctor:  Monitor this patient's status, advise researchers if there are any concerns with patients continued enrollment in this study. Please provide any update on the AE status/progression.    ----------------------------------------------------------  O2 Device: Mechanical Ventilator  FiO2 (%): 35 %  Oxygen Delivery: 35 LPM   Vitals:    11/24/21 1115 11/24/21 1130 11/24/21 1145 11/24/21 1200   BP:       BP Location:       Pulse: 82 78 75 74   Resp: 12 21 19 20   Temp:    97.8  F (36.6  C)   TempSrc:    Axillary   SpO2: 95% 97% 96% 96%   Weight:         ----------------------------------------------------------  No results displayed because visit has over 200 results.         ----------------------------------------------------------    Researcher sent to 'on call' study doctor for review. 11/24/21 12:18 PM      ----------------------------------------------------------  IN RESPONSE TO : [id number] STUDY VISIT TREATMENT NOTES   ----------------------------------------------------------  *copy/paste the above as the 'summary' for progress note      Rosanna Romero   This patient's status has been monitored by me. 11/24/21 3:22 PM     Based on my review:     I suggest we DISCONTINUE this patients enrollment in the [id number] Study. Patient completed 8 day study protocol.    Adverse event of septic shock with kidney failure is improving; patient remains acidotic and oliguric, but pulmonary and cardiovascular metrics were improved.This observer does not believe AE was causally related to study participation.      Signed:  SHEILA  11/24/21 3:22 PM

## 2021-11-24 NOTE — PROGRESS NOTES
CLINICAL NUTRITION SERVICES - REASSESSMENT NOTE      Recommendations Ordered by Registered Dietitian (RD):   Increase TF Novasource Renal to 30 mL/hr = 720 mL, 1440 kcal, 6 gm protein, 132 gm CHO, 0 gm fiber and 516 mL free water  + continue Prosource 2 pkts TID = 66 gm protein, 240 kcal  + Propofol at 16.8 mL/hr = 443 kcal  TOTAL = 1883 kcal (22 kcal/kg) and 132 gm protein (1.5 gm/kg)   Malnutrition:  % Weight Loss:  Weight loss does not meet criteria for malnutrition   % Intake: unable to determine PTA  Subcutaneous Fat Loss: unable to determine  Muscle Loss: unable to determine   Fluid Retention: none noted     Malnutrition Diagnosis: Unable to determine due to lack of nutrition focused physical exam per direction of new department policy in the setting of COVID19       EVALUATION OF PROGRESS TOWARD GOALS   Diet:  NPO    Nutrition Support:  TF started on 11/19 and was changed to a renal formula on 11/21 and started to advance to goal rate. Rate was decreased yesterday 11/23 given higher Propofol rate and continues as outlined below     Nutrition Support Enteral:  Type of Feeding Tube: NJT (11/19)   Enteral Frequency:  Continuous  Enteral Regimen: Novasource Renal at 20 mL/hr   Total Enteral Provisions: 480 mL, 960 kcal, 43 g protein, 88 g CHO, 0 g fiber, 344 mL free water   Free Water Flush: 60 mL q 4 hrs   Nephronex daily     - Prosource 6 pkts/day = 240 kcal and 66 gm protein  - Propofol currently at 16.8 mL/hr = 443 kcal/day  TOTAL = 1643 kcal (19 kcal/kg & 93% estimated needs) and 109 gm protein (1.3 gm/kg)       Intake/Tolerance:    Labs reviewed: Na 141, K 3.5, Mg 2.1, Phos 5.3 (H), Trig 424 (H), BUN 79 (H), Cr 3.05 (H)  Recent Labs   Lab 11/24/21  1148 11/24/21  0825 11/24/21  0540 11/24/21  0410 11/24/21  0303 11/23/21  2339   * 106* 139* 126* 143* 156*     Medications reviewed: lasix, amino stopped today; Norepi, propofol continue   Stooling:  - 11/22: BM x2  - 11/23: 500 mL via RT  Wt: 215 lbs  6.23 oz  Vitals:    11/20/21 0400 11/21/21 0630 11/22/21 0600 11/23/21 0610   Weight: 93.3 kg (205 lb 11 oz) 95.9 kg (211 lb 6.7 oz) 93.1 kg (205 lb 4 oz) 97.7 kg (215 lb 6.2 oz)    11/23/21 1215   Weight: 97.7 kg (215 lb 6.2 oz)         ASSESSED NUTRITION NEEDS:  Dosing Weight 84.7 kg   Estimated Energy Needs: 1760+ kcals ((PSU) 2003b) vs 7409-7894 kcal (20-25 kcal/kg)  Justification: maintenance and vented; >/=1 week in ICU  Estimated Protein Needs: 102-169 grams protein (1.2-2 g pro/Kg)  Justification: hypercatabolism with critical illness  Estimated Fluid Needs: per MD       NEW FINDINGS:   Chart reviewed and discussed in rounds  HD run this AM   PST tomorrow     11/19 AXR = Feeding tube tip at the ligament of Treitz.    Previous Goals:   EN to meet >80% of nutritional needs over first 1 week of intubation   Evaluation: Met    Previous Nutrition Diagnosis:   Inadequate protein-energy intake related to intubation and pressor requirement as evidenced by pt currently meeting 470 kcal through propofol, planned EN start.   Evaluation: Improving      CURRENT NUTRITION DIAGNOSIS  Inadequate enteral nutrition infusion related to lower TF rate w/ previously higher Propofol provisions as evidenced by TF at 20 mL/hr meeting ~90% estimated energy needs in combination w/ Propofol and Prosource     INTERVENTIONS  Recommendations / Nutrition Prescription  Increase TF rate to better meet nutrient needs   Increase TF Novasource Renal to 30 mL/hr = 720 mL, 1440 kcal, 6 gm protein, 132 gm CHO, 0 gm fiber and 516 mL free water  + continue Prosource 2 pkts TID = 66 gm protein, 240 kcal  + Propofol at 16.8 mL/hr = 443 kcal  TOTAL = 1883 kcal (22 kcal/kg) and 132 gm protein (1.5 gm/kg)    Implementation  EN Composition, EN Schedule: as above   Collaboration and Referral of Nutrition care: patient was discussed during ICU rounds     Goals  EN + Propofol to meet % estimated needs       MONITORING AND EVALUATION:  Progress towards  goals will be monitored and evaluated per protocol and Practice Guidelines      Rona Murillo RD, LD  Clinical Dietitian

## 2021-11-24 NOTE — PROGRESS NOTES
DATA:    VENT DAY#  7    CURRENT SETTINGS:  VENT SETTINGS   Ventilation Mode: CMV/AC  (Continuous Mandatory Ventilation/ Assist Control)  FiO2 (%): 35 %  Rate Set (breaths/minute): 20 breaths/min  Tidal Volume Set (mL): 420 mL  PEEP (cm H2O): (S) 7 cmH2O (changed by MD)  Oxygen Concentration (%): 35 %  Resp: 20            FIO2:   35    PATIENT PARAMETERS:    PIP 23  Pmean:  13  SBT: n/a  SECRETIONS:  Scant, white, thick  02 SATS:  95-98%    ETT SIZE 8.0  Secured at  25 cm at teeth  ABG: Results for FANTASMA ELLIOTT ANTWON (MRN [id number]) as of 11/24/2021 18:09   Ref. Range 11/24/2021 05:46   pH Arterial Latest Ref Range: 7.35 - 7.45  7.39   pCO2 Arterial Latest Ref Range: 35 - 45 mm Hg 41   PO2 Arterial Latest Ref Range: 80 - 105 mm Hg 72 (L)   Bicarbonate Arterial Latest Ref Range: 21 - 28 mmol/L 25   Base Excess Art Latest Ref Range: -9.0 - 1.8 mmol/L -0.2   FIO2 Unknown 35       NOTE / PLAN:   Patient remained intubated and respiratory status stable throughout shift. Please see all documented settings and patient parameters in flow sheets. RT will continue to monitor and provide support as needed.     Yasemin Cuevas, LRT, BSRT-RRT

## 2021-11-24 NOTE — PROGRESS NOTES
Potassium   Date Value Ref Range Status   11/24/2021 3.5 3.4 - 5.3 mmol/L Final     Comment:     Specimen slightly hemolyzed, potassium may be falsely elevated.   07/02/2021 3.4 3.4 - 5.3 mmol/L Final     Hemoglobin   Date Value Ref Range Status   11/24/2021 10.4 (L) 13.3 - 17.7 g/dL Final   07/02/2021 10.8 (L) 13.3 - 17.7 g/dL Final     Creatinine   Date Value Ref Range Status   11/24/2021 3.05 (H) 0.66 - 1.25 mg/dL Final   07/02/2021 0.82 0.66 - 1.25 mg/dL Final     Urea Nitrogen   Date Value Ref Range Status   11/24/2021 79 (H) 7 - 30 mg/dL Final   07/02/2021 10 7 - 30 mg/dL Final     Sodium   Date Value Ref Range Status   11/24/2021 141 133 - 144 mmol/L Final   07/02/2021 141 133 - 144 mmol/L Final     INR   Date Value Ref Range Status   11/24/2021 0.87 0.85 - 1.15 Final       DIALYSIS PROCEDURE NOTE  Hepatitis status of previous patient on machine log was checked and verified ok to use with this patients hepatitis status.  Patient dialyzed for 2.5 hrs. on a K4 bath Na 138 Bicarb 32 with a net fluid removal of  1.5L.  A BFR of 300 ml/min was obtained via a RIJ temp catheter.     The treatment plan was discussed with Dr. Whalen during the treatment.    Total heparin received during the treatment: 0 units.    Line flushed, clamped and capped with heparin 1:1000 1.5 mL (1500 units) per lumen    Meds  given: None. Pt is on Propofol  And Levophed today.   Complications: NONE      Person educated: pt. Knowledge base NONE. Barriers to learning: sedated will evaluate when alert.    ICEBOAT? Timeout performed pre-treatment  I: Patient was identified using 2 identifiers  C:  Consent Signed Yes  E: Equipment preventative maintenance is current and dialysis delivery system OK to use  B: Hepatitis B Surface Antigen: neg; Draw Date: 11/20/2021      Hepatitis B Surface Antibody: succept; Draw Date: 11/20/2021  O: Dialysis orders present and complete prior to treatment  A: Vascular access verified and assessed prior to  treatment  T: Treatment was performed at a clinically appropriate time  ?: Patient was allowed to ask questions and address concerns prior to treatment  See flowsheet in EPIC for further details and post assessment.  Machine water alarm in place and functioning. Transducer pods intact and checked every 15min.   Pt dialyzed in the ICU.  Chlorine/Chloramine water system checked every 4 hours.  Will run on Friday.

## 2021-11-24 NOTE — PROGRESS NOTES
[facility]  Hospitalist Progress Note  Parminder Hernadez MD 11/24/2021    Reason for Stay (Diagnosis): Shock         Assessment and Plan:      Summary of Stay: Rosanna Romero is a 66-year-old male with medical history including diabetes not on insulin, neuropathy, hypertension, LINDSAY who is vaccinated against COVID-19 presented with cough and shortness of breath and recent positive COVID-19 test.  He was admitted here with suspected secondary bacterial pneumonia and also is being treated for COVID-19 with dexamethasone and remdesivir.  He has had evolving renal failure with worsening respiratory status and metabolic acidosis which prompted stabilization early 11/18 with intubation, central line A-line placement and transfer to the ICU.  He was given bicarbonate containing fluids, insulin drip, vasopressors and remains on the vent.     Acid-base status has improved but he is oliguric still.  Procalcitonin was dramatically elevated and he was covered for bacterial sepsis w/ empiric abx though these were stopped due to negative cultures on 11/22.  CT of his chest abdomen and pelvis did not show obvious source other than pneumonia though there was a question of some enteritis.    He remains intubated with evolving renal failure and shock, hemodynamics have now improved.    Underwent hemodialysis on 11/22, running again 11/23.  Vent requirement is fairly minimal, after further volume removal with hemodialysis hopefully can start pressure support trials on 11/25.     1.  Septic shock, suspected pulmonary source, likely pneumonia.  Suspect the driving issue is COVID-19 pneumonia.  Treated w/ empiric abx, now off.  --zosyn 11/16-11/8.  --meropenem and vancomycin on 11/18.  Discontinued vancomycin though that was likely on board until 11/22 when HD started.  --stopped meropenem 11/22.  --weaned off of vasopressors  --Blood cultures no growth to date  --Has a central line and arterial line     2.  Severe  metabolic acidosis: Suspect due to combination of ATN from renal failure and ketosis potentially from DKA given severe hyperglycemia.  Acid-base status improved.  --Nephrology consulted  --HD per nephrology.  --Ketones improved, transitioned to subcu insulin.  --Trend basic metabolic panel     3.    Oliguric acute renal failure: Nephrology following.  Likely starting dialysis on Monday 11/22.     4.  Type 2 diabetes, evidence of DKA: Somewhat unusual.  Is a type II diabetic not normally on insulin and presented with significantly elevated ketones and blood sugars in the 300s.    --Transitioned off insulin drip to 36 units Lantus daily, 6 units aspart every 4 hours while on tube feeds plus sliding scale insulin.  --Anticipate needing to titrate further, especially as steroids are stopped after 9/25 or if tube feeds are held.     5.  Acute hypoxemic respiratory failure due to pneumonia, hemodynamic instability, volume overload: Intubated and sedated.    --Vent management as per intensivist.     6.  FEN:  Increasing to goal feeds via feeding tube.     7.    Prominent constipation seen on CT scan: Added bowel regimen. Appears to be stooling.    8.  Atrial fibrillation with RVR: Transitioned from IV to oral amiodarone.    9.  History of upper GI bleed: Continue Protonix.    10.  COVID-19: Continuing Decadron x 10 days.  Last dose 11/25.  Remdesivir stopped due to renal failure.      DVT Prophylaxis: Heparin SQ  Code Status: Full Code  Discharge Dispo/Date: Multiple more days in the ICU expected  Lines: A-line, central line, [place], feeding tube, ET tube        Interval History (Subjective):      Planning for HD again today  Plan to start to wean from vent tomorrow  Day 9/10 decadron  Called his son Raza, but no answer.  titrating insulin                    Physical Exam:      Last Vital Signs:  BP (!) 153/67   Pulse 81   Temp 97.1  F (36.2  C) (Axillary)   Resp 22   Wt 97.7 kg (215 lb 6.2 oz)   SpO2 93%   BMI 29.21  kg/m        Intake/Output Summary (Last 24 hours) at 11/22/2021 0737  Last data filed at 11/22/2021 0700  Gross per 24 hour   Intake 2437.09 ml   Output 510 ml   Net 1927.09 ml       General: Intubated and sedated.  Somewhat chronically ill appearing man with a ET tube in place.  HEENT: NC/AT, eyes anicteric  Cardiac: RRR, S1, S2.   Pulmonary: Normal chest rise and the vent.  Bilateral crackles.  Abdomen: soft, non-distended.    Extremities: 2+ pitting edema up to his thigh.  Warm, well perfused.  Skin: no rashes or lesions noted.  Warm and Dry.  Neuro: Clonus or tremor.         Medications:      All current medications were reviewed with changes reflected in problem list.         Data:      All new lab and imaging data was reviewed.   Labs:  Recent Labs   Lab 11/24/21  0825 11/24/21  0540   NA  --  141   POTASSIUM  --  3.5   CHLORIDE  --  104   CO2  --  23   ANIONGAP  --  14   * 139*   BUN  --  79*   CR  --  3.05*   GFRESTIMATED  --  20*   GRAZYNA  --  6.9*     Recent Labs   Lab 11/24/21  0540   WBC 19.4*   HGB 10.4*   HCT 31.6*   MCV 89   *      Imaging:   Recent Results (from the past 48 hour(s))   CT Chest Abdomen Pelvis w/o Contrast    Narrative    CT CHEST, ABDOMEN AND PELVIS WITHOUT CONTRAST  11/19/2021 12:57 PM    CLINICAL HISTORY: Sepsis.    TECHNIQUE: CT scan of the chest, abdomen, and pelvis was performed  without IV contrast. Multiplanar reformats were obtained. Dose  reduction techniques were used.   CONTRAST: None.    COMPARISON: CT chest 11/17/2021, CT abdomen and pelvis 6/30/2021.    FINDINGS:   LUNGS AND PLEURA: Extensive bilateral pulmonary consolidation  involving all lobes of both lungs showing interval areas of increased  consolidation at the lower lungs. Small right pleural fluid newly  identified.    MEDIASTINUM/AXILLAE: No acute mediastinal abnormality. Trace  pericardial effusion appears stable. ET tube identified ending above  the robin. Nasogastric tube identified.    CORONARY  ARTERY CALCIFICATION: Mild.    HEPATOBILIARY: No acute liver abnormality. Increased density material  within the gallbladder lumen may be excretion of contrast.    PANCREAS: Normal.    SPLEEN: Normal.    ADRENAL GLANDS: No acute abnormality. Minimal stable thickening of  doubtful significance.    KIDNEYS/BLADDER: No significant mass, stones, or hydronephrosis. There  are simple or benign cysts. No follow up is needed. Catheter  decompresses the urinary bladder. Bladder wall thickening may just  relate to incomplete distention.    BOWEL: Prominent stool distends the sigmoid colon and rectum. No small  bowel obstruction. Normal appendix. Rectal catheter identified. A few  small bowel loops demonstrate mild wall thickening. No free air.    LYMPH NODES: Normal.    VASCULATURE: Unremarkable.    PELVIC ORGANS: Normal.    OTHER: Small ascites newly identified. No abscess. No free air.  Anasarca is increased.    MUSCULOSKELETAL: No new significant abnormality. Diffuse spine  degenerative changes.      Impression    IMPRESSION:  1.  Extensive bilateral pulmonary consolidation with a degree of  progression at the lower lungs consistent with progressing pneumonia.  New but very small right pleural fluid.  2.  New anasarca. New but very small abdominal ascites.  3.  Prominent stool distends the sigmoid colon and rectum.  4.  Mild degree of wall prominence of small bowel loops could relate  to an enteritis.    KRUPA GRIJALVA MD         SYSTEM ID:  [id number]   XR Abdomen Port 1 View    Narrative    XR ABDOMEN PORT 1 VIEWS   11/19/2021 4:32 PM     HISTORY: Keofeed placement  confirmation    COMPARISON: None.      Impression    IMPRESSION: Feeding tube tip at the ligament of Treitz.    CRYSTAL DUMONT MD         SYSTEM ID:  [id number]         Parminder Hernadez MD

## 2021-11-24 NOTE — PLAN OF CARE
ICU End of Shift Summary.  For vital signs and complete assessments, please see documentation flowsheets.     Pertinent assessments: Continues to have liquid stools.  Went back on levophed early in the morning- is at 0.01. HR normal sinus all night.  Was supine overnight.  Minimal secretions.  Major Shift Events:   Plan (Upcoming Events): Dialysis today  Discharge/Transfer Needs: TBD    Bedside Shift Report Completed :   Bedside Safety Check Completed:

## 2021-11-25 NOTE — PROGRESS NOTES
[facility] ICU VENTILATOR RESPIRATORY NOTE  Date of Admission: 11/18/21  Date of Intubation (most recent): 11/18/21  Reason for Mechanical Ventilation: Respiratory Failure  Number of Days on Mechanical Ventilation: 8  Met Criteria for Pressure Support Trial: Yes  Reason for No Pressure Support Trial:   ETT appearance on chest x-ray: In appropriate place    Respiratory Therapy  Patient remains intubated on mechanical ventilator with the following settings:    Ventilation Mode: CMV/AC  (Continuous Mandatory Ventilation/ Assist Control)  FiO2 (%): 55 %  Rate Set (breaths/minute): 20 breaths/min  Tidal Volume Set (mL): 420 mL  PEEP (cm H2O): 7 cmH2O  Oxygen Concentration (%): 50 %  Resp: 27    Temp: 97  F (36.1  C) Temp src: Temporal  Pulse: 78   Resp: 27 SpO2: 95 % O2 Device: Mechanical Ventilator      BS were coarse. Suctioned small to large amounts of creamy thick secretions through the ETT.  Will continue to follow and assess the patient's respiratory needs.    Renae Mcarthur,   11/25/2021 1:27 PM

## 2021-11-25 NOTE — PLAN OF CARE
ICU End of Shift Summary.  For vital signs and complete assessments, please see documentation flowsheets.     Pertinent assessments:Remains on full vent support,sedates with propofol at 30mcgs, fentanyl at 100. Pt opens eyes but does not track or follow commands. Minimal urine output  Major Shift Events:   Plan (Upcoming Events): Wean from vent as able. Dialysis planned for tomorrow  Discharge/Transfer Needs: TBD    Bedside Shift Report Completed : yes  Bedside Safety Check Completed:yes

## 2021-11-25 NOTE — PLAN OF CARE
ICU End of Shift Summary.  For vital signs and complete assessments, please see documentation flowsheets.     Pertinent assessments: RASS goal -3 to -4, opens eyes but doesn't follow commands, cardiac-SR, Resp on full vent support see RT note for settings and changes, GI/ RT with diarrhea, TF per orders, alex negligible UOP dialysis plan for tomorrow, skin issues unchanged.  Major Shift Events: hypertension, CCB started  Plan (Upcoming Events): continue ICU cares  Discharge/Transfer Needs: TBD    Bedside Shift Report Completed : Y  Bedside Safety Check Completed: Y

## 2021-11-25 NOTE — PROGRESS NOTES
Respiratory Therapy Note     [facility] ICU VENTILATOR RESPIRATORY NOTE  Date of Admission: 11/14/21  Date of Intubation (most recent): 11/18/21  Reason for Mechanical Ventilation: Respiratory Failure  Number of Days on Mechanical Ventilation: 8  Met Criteria for Pressure Support Trial: No  Reason for No Pressure Support Trial:  Significant Events Today: Pt supine  ETT appearance on chest x-ray: Endotracheal tube in satisfactory position terminating 5.8 cm above the robin.     Plan:  Continue to monitor patient's respiratory status.        Yenny Tse, RT

## 2021-11-25 NOTE — PROGRESS NOTES
[facility]  Hospitalist Progress Note  Celestina Hamilton MD 11/25/21    Reason for Stay (Diagnosis): COVID         Assessment and Plan:      Summary of Stay: Rosanna Romero is a 66-year-old male with medical history including diabetes not on insulin, neuropathy, hypertension, LINDSAY who is vaccinated against COVID-19 who was admitted on 11/14/2021 with cough and shortness of breath and recent positive COVID-19 test and was found to have acute hypoxic respiratory due to COVID-19 and suspected secondary bacterial pneumonia.  He has had evolving renal failure with worsening respiratory status and metabolic acidosis which prompted stabilization early 11/18 with intubation, central line A-line placement and transfer to the ICU.  He was given bicarbonate containing fluids, insulin drip, vasopressors and remains on the vent.     Acid-base status has improved but he is oliguric still.  Procalcitonin was dramatically elevated and he was covered for bacterial sepsis w/ empiric abx though these were stopped due to negative cultures on 11/22.  CT of his chest abdomen and pelvis did not show obvious source other than pneumonia though there was a question of some enteritis.     He remains intubated with evolving renal failure and shock, hemodynamics have now improved.  He began HD on 11/22.    Problem List/Assessment and Plan:     Septic shock, suspected pulmonary source, likely pneumonia.  Suspect the driving issue is COVID-19 pneumonia.  Treated w/ empiric abx, now off.  --zosyn 11/16-11/8.  --meropenem and vancomycin on 11/18.  Discontinued vancomycin though that was likely on board until 11/22 when HD started.  --stopped meropenem 11/22.  --weaned off of vasopressors  --Blood cultures no growth to date  --Has a central line and arterial line     Severe metabolic acidosis: Suspect due to combination of ATN from renal failure and ketosis potentially from DKA given severe hyperglycemia.  Acid-base status  improved.  --Nephrology consulted  --HD per nephrology.  --Ketones improved, transitioned to subcu insulin.  --Trend basic metabolic panel     Oliguric acute renal failure: Nephrology following. Had progressive renal failure and acidosis.  Initiated on HD on 11/22.  Next HD on 11/26.  --Nephrology following, appreciate recommendations     Type 2 diabetes, evidence of DKA: Somewhat unusual.  Is a type II diabetic not normally on insulin (PTA on Glipizide and Metformin) and presented with significantly elevated ketones and blood sugars in the 300s.  Had been on insulin drip, now SQ.  --Decrease Lantus to 26 units QAM (from 36 units QAM, steroids completed today)  --Aspart 6 units Q4H in addition to NPO sliding scale insulin     Acute hypoxemic respiratory failure due to pneumonia, hemodynamic instability, volume overload: Intubated and sedated.    --Vent management as per intensivist.     FEN: Continue TF.  Nutrition following.     Prominent constipation seen on CT scan: Added bowel regimen. Appears to be stooling.     Atrial fibrillation with RVR: Developed AF with RVR in addition to ventricular tachycardia.  He did receive Amiodarone bolus and drip but subsequently converted to NSR and Amiodarone discontinued.     History of upper GI bleed: Continue Protonix.    Gout: Hold Allopurinol given AZALIA.     COVID-19: Continuing Decadron x 10 days, to complete today.  Remdesivir stopped due to renal failure (received 2 doses).    HTN: PTA on Amlodipine, Lisinopril, Clonidine and Metoprolol on which were all initially held as he was requiring pressors.  Now off pressures.  --Resume Amlodipine    Diet: Adult Formula Drip Feeding: Continuous Novasource Renal; Nasojejunal; Goal Rate: 30; mL/hr; Medication - Feeding Tube Flush Frequency: At least 15-30 mL water before and after medication administration and with tube clogging; increase to new goal ...    DVT Prophylaxis: Heparin SQ  Del Valle Catheter: PRESENT, indication: Strict 1-2  Hour I&O  Code Status: Full Code      Disposition Plan   Expected discharge: TBD once respiratory failure resolved.  Remains on the ventilator.  Entered: Celestina Hamilton MD 11/25/2021, 7:58 AM       The patient's care was discussed with the Bedside Nurse, Patient's Family and Intensivist.    Hospitalist Service  [facility]          Interval History (Subjective):      Care assumed.  Events from overnight resumed. Patient remains intubated and sedated.  Briefly did open his eyes as I was examining him.  I updated patient's son, Raza, by phone.  All questions answered.                  Physical Exam:      Last Vital Signs:  BP (!) 153/67   Pulse 76   Temp 97  F (36.1  C) (Temporal)   Resp 23   Wt 97.3 kg (214 lb 8.1 oz)   SpO2 93%   BMI 29.09 kg/m      General: Intubated and sedated.  HEENT: Normocephalic and atraumatic, eyes anicteric and without scleral injection, ETT in place.  Cardiac: RRR, normal S1, S2. No m/g/r, 2+ edema of hands and feet.  Pulmonary: Normal chest rise, normal work of breathing.  Course breath sounds diffusely, no crackles or wheezing.  Abdomen: soft, non-tender, non-distended.  Normoactive bowel sounds, no guarding or rebound tenderness.  Extremities: no deformities.  Warm, well perfused.  Skin: no rashes or lesions.  Warm and Dry.  Neuro: Intubated and sedated, briefly opened eyes during my exam.  Psych: Intubated and sedated.         Medications:      All current medications were reviewed with changes reflected in problem list.         Data:      All new lab and imaging data was reviewed.   Labs:  Recent Labs   Lab 11/24/21  0540 11/23/21  0533 11/22/21  0448   WBC 19.4* 14.8* 17.8*   HGB 10.4* 10.1* 10.6*   HCT 31.6* 28.6* 31.4*   MCV 89 86 87   * 135* 162     Recent Labs   Lab 11/25/21  0818 11/25/21  0443 11/25/21  0345 11/24/21  0825 11/24/21  0540 11/23/21  1551 11/23/21  1232 11/23/21  0757 11/23/21  0533 11/22/21  0827 11/22/21  0448 11/21/21  0812  11/21/21  0555   NA  --  138  --   --  141  --   --   --  146*  --  149*   < > 145*   POTASSIUM  --  3.6  --   --  3.5  --  3.5  --  3.2*  --  3.7   < > 4.5   CHLORIDE  --  102  --   --  104  --   --   --  104  --  105   < > 105   CO2  --  23  --   --  23  --   --   --  23  --  21   < > 14*   ANIONGAP  --  13  --   --  14  --   --   --  19*  --  23*   < > 26*   GLC 85 113* 96   < > 139*   < >  --    < > 244*   < > 289*   < > 303*   BUN  --  70*  --   --  79*  --   --   --  99*  --  136*   < > 115*   CR  --  2.65*  --   --  3.05*  --   --   --  3.80*  --  4.89*   < > 4.71*   GFRESTIMATED  --  24*  --   --  20*  --   --   --  16*  --  11*   < > 12*   GRAZYNA  --  7.0*  --   --  6.9*  --   --   --  6.7*  --  6.5*   < > 7.1*   MAG  --  2.0  --   --  2.1  --   --   --  2.3  --  2.7*  --  2.8*   PHOS  --  6.0*  --   --  5.3*  --   --   --  4.4  --  5.7*  5.8*  --  7.1*   ALBUMIN  --   --   --   --   --   --   --   --   --   --  1.3*  --  1.4*    < > = values in this interval not displayed.     Recent Labs   Lab 11/24/21 0540   DD 3.67*     Recent Labs   Lab 11/24/21 0540 11/23/21 0533 11/22/21  0448   CRP 22.9* 34.4* 56.5*      Imaging:   No results found for this or any previous visit (from the past 24 hour(s)).    Celestina Hamilton MD

## 2021-11-25 NOTE — PROGRESS NOTES
Renal Medicine Progress Note                                Rosanna Romero MRN# [id number]   Age: 66 year old YOB: 1955   Date of Admission: 11/14/2021 Hospital LOS: 10                  Assessment/Plan:     Rosanna Romero is a 66 year old male who was admitted on 11/14/2021.      1) Baseline Normal Kidney Function.     2) COVID-19 Pneumonia/ARDS      3) Shock      4) Metabolic Acidosis      5) AZALIA - oliguric/anuric.  Likely ATN due to sepsis.  Had contrast 11/17/21 but renal function was falling before then. Still oliguric.     6) Hypocalcemia:  Calcium corrects to 9.2 accounting for hypoalbuminemia.       7) Severe hypoalbuminemia - Protein malnutrition.         Dialyzed previous 3 days  Dialysis 11/26/21      Interval History:     UF yesterday 1.5 liter  UO noted   Lytes are OK    Remains intubated     ROS:     Intubated and sedated: ROS unable    Medications and Allergies:     Reviewed    Physical Exam:     Vitals were reviewed  Temp: 97  F (36.1  C) Temp src: Temporal  Pulse: 76   Resp: 29 SpO2: 93 % O2 Device: Mechanical Ventilator Oxygen Delivery: 35 LPM  I/O last 3 completed shifts:  In: 2267.62 [I.V.:892.62; NG/GT:725]  Out: 3125 [Urine:425; Other:1500; Stool:1200]    Vitals:    11/21/21 0630 11/22/21 0600 11/23/21 0610 11/23/21 1215   Weight: 95.9 kg (211 lb 6.7 oz) 93.1 kg (205 lb 4 oz) 97.7 kg (215 lb 6.2 oz) 97.7 kg (215 lb 6.2 oz)    11/25/21 0415   Weight: 97.3 kg (214 lb 8.1 oz)         GENERAL: intubated    Appears comfortable in bed    Full exam not performed given Covid status     Data:     Recent Labs   Lab 11/25/21  0818 11/25/21  0443 11/25/21  0345 11/25/21  0012 11/24/21  0825 11/24/21  0540 11/23/21  1551 11/23/21  1232 11/23/21  0757 11/23/21  0533 11/22/21  0827 11/22/21  0448   NA  --  138  --   --   --  141  --   --   --  146*  --  149*   POTASSIUM  --  3.6  --   --   --  3.5  --  3.5  --  3.2*  --  3.7   CHLORIDE  --  102  --   --   --  104  --   --   --  104  --  105   CO2  --   23  --   --   --  23  --   --   --  23  --  21   ANIONGAP  --  13  --   --   --  14  --   --   --  19*  --  23*   GLC 85 113* 96 101*   < > 139*   < >  --    < > 244*   < > 289*   BUN  --  70*  --   --   --  79*  --   --   --  99*  --  136*   CR  --  2.65*  --   --   --  3.05*  --   --   --  3.80*  --  4.89*   GFRESTIMATED  --  24*  --   --   --  20*  --   --   --  16*  --  11*   GRAZYNA  --  7.0*  --   --   --  6.9*  --   --   --  6.7*  --  6.5*    < > = values in this interval not displayed.         Recent Labs   Lab 11/25/21 0443 11/24/21  0540 11/23/21  0533 11/22/21  0448 11/21/21  1404 11/21/21  0555 11/20/21  0407 11/19/21  1422 11/19/21  0515 11/18/21  1813   CR 2.65* 3.05* 3.80* 4.89* 4.80* 4.71* 3.83* 3.13* 2.84* 2.61*     Recent Labs   Lab 11/22/21 0448 11/21/21  0555   ALBUMIN 1.3* 1.4*     Recent Labs   Lab 11/25/21  0443 11/24/21  0540 11/23/21  0533 11/22/21  0448 11/21/21  0555   PHOS 6.0* 5.3* 4.4 5.7*  5.8* 7.1*   HGB  --  10.4* 10.1* 10.6* 9.9*         G Arun Kumar MD    [facility] - Nephrology  [phone]

## 2021-11-25 NOTE — PHARMACY-CONSULT NOTE
Pharmacy Tube Feeding Consult    Medication reviewed for administration by feeding tube and for potential food/drug interactions.    Recommendation: No changes are needed at this time.     Pharmacy will continue to follow as new medications are ordered.  Iram Delgado, RamanD

## 2021-11-25 NOTE — PROGRESS NOTES
[facility]    ICU Progress Note       Date of Admission:  11/14/2021    Assessment: Critical Care   Rosanna Romero is a 66 year old male admitted on 11/14/2021 for COVID-19 infection. He has a history of DM, HTN, and LINDSAY. Intubated 11/18 due to worsening hypoxemia and afib RVR,  AZALIA.  --needing HD starting 11/22        Summary plans for today  No HD today  Start  Amlodipine  Decrease lantus 26     Plan: Critical Care   Neuro/ pain/ sedation:  - Fentanyl and propofol for sedation and vent synchrony  - limit propofol due to increased triglycerides  - stopped precedex seconday to bradycardia   - Goal RASS now -1 to 0     Pulmonary: COVID ARDS   Ventilation Mode: CMV/AC  (Continuous Mandatory Ventilation/ Assist Control)  FiO2 (%): 50 %  Rate Set (breaths/minute): 20 breaths/min  Tidal Volume Set (mL): 420 mL  PEEP (cm H2O): 7 cmH2O  Oxygen Concentration (%): 50 %  Resp: 29  -Ventilator day 8  Improved Oxygenation after second HD run.    P: continue  and RR 20   PEEP 7   PS trials in AM     Cardiovascular:   - off and on vasopressors with HD , now more hypertensive   - Amio bolus and drip started for afib RVR and ventricular tachycardia --now in NSR x 3 days , remains off amiodarone   P: start amlodipine     GI/Nutrition:  - TFs at  30     Renal/ Fluid Balance: Acute kidney injury and metabolic acidosis  - Metabolic acidosis, recurrent seconday to AZALIA --started HD Tuesday.  1.5 L off yesterday.  200 urinary output since MN off lasix   P: HD on Friday     Endocrine: history of DM  - Hyperglycemia, possible DKA on admission. -  P: decrease AM lantus to 26  and novolog q 4 to 6     ID:  COVID pneumonia   - empiric meropenem (11/18-11/21) and vancomycin (11/17-11/20)  Afebrile x 7 days   Decadron day 10--last day   Procalcitonin decreased to 12   Got only 2 d remdesivir seconday to AZALIA   - follow up cultures--staph epi and candida only in sputum   - P: off antibiotics x 4 days    Hematology:  -mild  anemia and thrombocytopenia--mild     MSK: history of gout  - holding allopurinol seconday to AZALIA     Lines/ tubes/ drains:  Del Valle Catheter: PRESENT, indication: Strict 1-2 Hour I&O  Central Lines: PRESENT  PICC Triple Lumen 11/21/21 Left Basilic-Site Assessment: WDL  CVC Double Lumen Right External jugular Non - tunneled-Site Assessment: WDL    Prophylaxis:  - DVT Prophylaxis: Heparin SQ  - PUD Prophylaxis: protonix    Code Status: Full Code      Disposition:  - ICU  Critical care time exclusive of procedures: 30    Pastor Warren MD  [facility]      Clinically Significant Risk Factors Present on Admission           ______________________________________________________________________    Interval History   Third HD run  completed  Kept supine   NSR off amiodarone       Physical Exam   Vital Signs: Temp: 97  F (36.1  C) Temp src: Temporal  Pulse: 76   Resp: 29 SpO2: 93 % O2 Device: Mechanical Ventilator Oxygen Delivery: 35 LPM  Weight: 214 lbs 8.12 oz    Supine   Opens eyes to  But does not follow commands   ETT ok  Mild Facial edema   Right IJ  HD catheter   Lungs clear  H- Rr w/o m  Abdomen non tender, moderate scrotal edema    Hands 2+ edema, 1+ in thighs   Feet and hands are warm     Data   I reviewed all medications, new labs and imaging results over the last 24 hours.  Arterial Blood Gases   Recent Labs   Lab 11/25/21  0444 11/24/21  0546 11/23/21  1518 11/23/21  0534   PH 7.40 7.39 7.43 7.54*   PCO2 39 41 39 29*   PO2 74* 72* 70* 218*   HCO3 24 25 26 25       Complete Blood Count   Recent Labs   Lab 11/24/21  0540 11/23/21  0533 11/22/21  0448 11/21/21  0555   WBC 19.4* 14.8* 17.8* 13.8*   HGB 10.4* 10.1* 10.6* 9.9*   * 135* 162 127*       Basic Metabolic Panel  Recent Labs   Lab 11/25/21  0818 11/25/21  0443 11/25/21  0345 11/25/21  0012 11/24/21  0825 11/24/21  0540 11/23/21  1551 11/23/21  1232 11/23/21  0757 11/23/21  0533 11/22/21  0827 11/22/21  0448   NA  --  138   --   --   --  141  --   --   --  146*  --  149*   POTASSIUM  --  3.6  --   --   --  3.5  --  3.5  --  3.2*  --  3.7   CHLORIDE  --  102  --   --   --  104  --   --   --  104  --  105   CO2  --  23  --   --   --  23  --   --   --  23  --  21   BUN  --  70*  --   --   --  79*  --   --   --  99*  --  136*   CR  --  2.65*  --   --   --  3.05*  --   --   --  3.80*  --  4.89*   GLC 85 113* 96 101*   < > 139*   < >  --    < > 244*   < > 289*    < > = values in this interval not displayed.       Liver Function Tests  Recent Labs   Lab 11/24/21  0540 11/23/21  0533 11/22/21  0448 11/21/21  0555   ALBUMIN  --   --  1.3* 1.4*   INR 0.87 0.85 0.90 0.99       Coagulation Profile  Recent Labs   Lab 11/24/21  0540 11/23/21  0533 11/22/21  0448 11/21/21  0555   INR 0.87 0.85 0.90 0.99       IMAGING:  No results found for this or any previous visit (from the past 24 hour(s)).

## 2021-11-26 NOTE — PROGRESS NOTES
[facility]    ICU Progress Note       Date of Admission:  11/14/2021    Assessment: Critical Care   Rosanna Romero is a 66 year old male admitted on 11/14/2021 for COVID-19 infection. He has a history of DM, HTN, and LINDSAY. Intubated 11/18 due to worsening hypoxemia and afib RVR,  AZALIA.  --needing HD starting 11/22        Summary plans for today  HD today--try for 3 L   Stop Amlodipine  Decrease lantus 12    Increase PEEP  To 10     Plan: Critical Care   Neuro/ pain/ sedation:  - Fentanyl and propofol for sedation and vent synchrony  - limit propofol due to increased triglycerides  - stopped precedex seconday to bradycardia   - Goal RASS now -1 to 0     Pulmonary: COVID ARDS   Ventilation Mode: CMV/AC  (Continuous Mandatory Ventilation/ Assist Control)  FiO2 (%): 70 %  Rate Set (breaths/minute): 20 breaths/min  Tidal Volume Set (mL): 420 mL  PEEP (cm H2O): 16 cmH2O  Oxygen Concentration (%): 70 %  Resp: 28  -Ventilator day 9  Worsening oxygenation gain so PEEP increase to 16 overnight   P: continue  and RR 20   PEEP 10       Cardiovascular:   - off and on vasopressors  - Amio bolus and drip started for afib RVR and ventricular tachycardia --now in NSR x 3 days , remains off amiodarone   P: stop amlodipine   Pressors prn for HD volume removal     GI/Nutrition:  - TFs at  30     Renal/ Fluid Balance: Acute kidney injury and metabolic acidosis  - Metabolic acidosis, recurrent seconday to AZALIA --started HD Tuesday.   No HD .  815  urinary output  P: HD today     Endocrine: history of DM  - Hyperglycemia, possible DKA on admission. - glucoses lower   P: decrease AM lantus to 12 and novolog q 4 to 6     ID:  COVID pneumonia   - empiric meropenem (11/18-11/21) and vancomycin (11/17-11/20)  Afebrile x 7 days   Decadron day 10--completed   Got only 2 d remdesivir seconday to AZALIA   - follow up cultures--staph epi and candida only in sputum   - P: off antibiotics x 5 days    Hematology:  -mild anemia and  thrombocytopenia--mild --no change     MSK: history of gout  - holding allopurinol seconday to AZALIA     Lines/ tubes/ drains:  Del Valle Catheter: PRESENT, indication: Strict 1-2 Hour I&O  Central Lines: PRESENT  PICC Triple Lumen 11/21/21 Left Basilic-Site Assessment: WDL  CVC Double Lumen Right External jugular Non - tunneled-Site Assessment: WDL    Prophylaxis:  - DVT Prophylaxis: Heparin SQ  - PUD Prophylaxis: protonix    Code Status: Full Code      Disposition:  - ICU  Critical care time exclusive of procedures: 30    Pastor Warren MD  [facility]      Clinically Significant Risk Factors Present on Admission           ______________________________________________________________________    Interval History   Worsening O2  Increased PEEP   Kept supine       Physical Exam   Vital Signs: Temp: 97.7  F (36.5  C) Temp src: Temporal BP: (!) 149/64 Pulse: 85   Resp: 28 SpO2: 99 % O2 Device: Mechanical Ventilator    Weight: 216 lbs 14.92 oz    Supine   Opens eyes to  But does not follow commands   ETT ok  Mild Facial edema   Right IJ  HD catheter   Lungs clear  H- Rr w/o m  Abdomen non tender, moderate scrotal edema    Hands 2+ edema, 1+ in thighs   Feet and hands are warm     Data   I reviewed all medications, new labs and imaging results over the last 24 hours.  Arterial Blood Gases   Recent Labs   Lab 11/26/21  0626 11/25/21  0444 11/24/21  0546 11/23/21  1518   PH 7.37 7.40 7.39 7.43   PCO2 37 39 41 39   PO2 127* 74* 72* 70*   HCO3 22 24 25 26       Complete Blood Count   Recent Labs   Lab 11/26/21  0626 11/24/21  0540 11/23/21  0533 11/22/21  0448   WBC 23.1* 19.4* 14.8* 17.8*   HGB 11.2* 10.4* 10.1* 10.6*   * 128* 135* 162       Basic Metabolic Panel  Recent Labs   Lab 11/26/21  0825 11/26/21  0626 11/26/21  0357 11/26/21  0032 11/25/21  2150 11/25/21  0818 11/25/21  0443 11/24/21  0825 11/24/21  0540   NA  --  138  --   --  139  --  138  --  141   POTASSIUM  --  3.3*  --   --   3.2*  --  3.6  --  3.5   CHLORIDE  --  102  --   --  103  --  102  --  104   CO2  --  22  --   --  22  --  23  --  23   BUN  --  86*  --   --  85*  --  70*  --  79*   CR  --  3.02*  --   --  2.89*  --  2.65*  --  3.05*   GLC 85 105* 92 105* 132*   < > 113*   < > 139*    < > = values in this interval not displayed.       Liver Function Tests  Recent Labs   Lab 11/24/21  0540 11/23/21  0533 11/22/21  0448 11/21/21  0555   ALBUMIN  --   --  1.3* 1.4*   INR 0.87 0.85 0.90 0.99       Coagulation Profile  Recent Labs   Lab 11/24/21  0540 11/23/21  0533 11/22/21  0448 11/21/21  0555   INR 0.87 0.85 0.90 0.99       IMAGING:  Recent Results (from the past 24 hour(s))   XR Chest Port 1 View    Narrative    CHEST PORTABLE ONE VIEW   11/25/2021 9:48 AM     HISTORY: Followup COVID ARDS.    COMPARISON: CT chest, abdomen, pelvis on 11/19/2021.      Impression    IMPRESSION: Single AP view of the chest was obtained. Endotracheal  tube tip projects over mid thoracic trachea approximately 4.5 cm from  the robin. Enteric tube crosses the diaphragm with the distal tip  outside the field of view. Right IJ central catheter tip projects over  low SVC. Left upper extremity PICC tip projects over high right  atrium. Stable mild enlargement of cardiac silhouette. Bilateral  basilar predominant patchy airspace pulmonary opacities slightly  worsened as compared to 11/18/2021 exam. No significant pleural  effusion or pneumothorax.    TANK VERMA MD         SYSTEM ID:  [id number]

## 2021-11-26 NOTE — PROGRESS NOTES
Respiratory Therapy Note     [facility] ICU VENTILATOR RESPIRATORY NOTE  Date of Admission: 11/14/21  Date of Intubation (most recent): 11/18/21  Reason for Mechanical Ventilation: Respiratory Failure  Number of Days on Mechanical Ventilation: 9  Met Criteria for Pressure Support Trial: No, Pt on 90% FiO2  Reason for No Pressure Support Trial:  Significant Events Today: Pt supine  ETT appearance on chest x-ray: Endotracheal tube in satisfactory position terminating 5.8 cm above the robin.     Plan:  Continue to monitor patient's respiratory status.        Yenny Tse, RT

## 2021-11-26 NOTE — PLAN OF CARE
ICU End of Shift Summary.  For vital signs and complete assessments, please see documentation flowsheets.     Neuro: RASS -3/-4. Spontaneously opens eyes at times, but does not follow commands or track with eyes.  Cardiac: Hypertensive/hypotensive, had to restart levo this morning. Multiple 20-70 beat runs with HR in 160-170, then would go back down to the 80s.  Resp: Vent supported. Large amounts of creamy tan secretions, some chunks came out with lavaging. Coarse.   GI: Rectal tube w watery dark brown output.  : alex in place, yellow output  Skin: blanchable redness noted to R ear, mepilex lite placed. Other skin issues see flowsheets.  Lines: A line, picc, alex, rectal tube  Drips: levo, prop, fent    Major Shift Events:   Restarted levo  Multiple instances of long runs in the 170s.  Plan (Upcoming Events): TBD, dialysis today, continue supportive cares  Discharge/Transfer Needs: TBD

## 2021-11-26 NOTE — PROGRESS NOTES
Renal Medicine Progress Note                                Rosanna Romero MRN# [id number]   Age: 66 year old YOB: 1955   Date of Admission: 11/14/2021 Hospital LOS: 11                  Assessment/Plan:     Rosanna Romero is a 66 year old male who was admitted on 11/14/2021.      1) Baseline Normal Kidney Function.     2) COVID-19 Pneumonia/ARDS      3) Shock      4) Metabolic Acidosis      5) AZALIA - oliguric/anuric.  Likely ATN due to sepsis.  Had contrast 11/17/21 but renal function was falling before then. Still oliguric.     6) Hypocalcemia:  Calcium corrects to 9.2 accounting for hypoalbuminemia.       7) Severe hypoalbuminemia - Protein malnutrition.         Dialysis today  UF to 3 liter as tolerated       Interval History:     No run yesterday  Some respiratory issues overnight  Required increased PEEP     Will attempt increase goal today      ROS:     Intubated and sedated: ROS unable    Medications and Allergies:     Reviewed    Physical Exam:     Vitals were reviewed  Temp: 98  F (36.7  C) Temp src: Temporal BP: (!) 149/64 Pulse: 89   Resp: 13 SpO2: 98 % O2 Device: Mechanical Ventilator    I/O last 3 completed shifts:  In: 1510.89 [I.V.:475.89; NG/GT:315]  Out: 1885 [Urine:885; Stool:1000]    Vitals:    11/22/21 0600 11/23/21 0610 11/23/21 1215 11/25/21 0415   Weight: 93.1 kg (205 lb 4 oz) 97.7 kg (215 lb 6.2 oz) 97.7 kg (215 lb 6.2 oz) 97.3 kg (214 lb 8.1 oz)    11/26/21 0600   Weight: 98.4 kg (216 lb 14.9 oz)         GENERAL: intubated    Appears comfortable in bed    Full exam not performed given Covid status     Data:     Recent Labs   Lab 11/26/21  0825 11/26/21  0626 11/26/21  0357 11/26/21  0032 11/25/21  2150 11/25/21  0818 11/25/21  0443 11/24/21  0825 11/24/21  0540   NA  --  138  --   --  139  --  138  --  141   POTASSIUM  --  3.3*  --   --  3.2*  --  3.6  --  3.5   CHLORIDE  --  102  --   --  103  --  102  --  104   CO2  --  22  --   --  22  --  23  --  23   ANIONGAP  --  14  --   --  14   --  13  --  14   GLC 85 105* 92 105* 132*   < > 113*   < > 139*   BUN  --  86*  --   --  85*  --  70*  --  79*   CR  --  3.02*  --   --  2.89*  --  2.65*  --  3.05*   GFRESTIMATED  --  21*  --   --  22*  --  24*  --  20*   GRAZYNA  --  6.7*  --   --  6.6*  --  7.0*  --  6.9*    < > = values in this interval not displayed.         Recent Labs   Lab 11/26/21  0626 11/25/21  2150 11/25/21  0443 11/24/21  0540 11/23/21  0533 11/22/21  0448 11/21/21  1404 11/21/21  0555 11/20/21  0407 11/19/21  1422   CR 3.02* 2.89* 2.65* 3.05* 3.80* 4.89* 4.80* 4.71* 3.83* 3.13*     Recent Labs   Lab 11/22/21  0448 11/21/21  0555   ALBUMIN 1.3* 1.4*     Recent Labs   Lab 11/26/21  0626 11/25/21  0443 11/24/21  0540 11/23/21  0533 11/22/21  0448   PHOS 7.7* 6.0* 5.3* 4.4 5.7*  5.8*   HGB 11.2*  --  10.4* 10.1* 10.6*         G Arun Kumar MD    [facility] - Nephrology  [phone]

## 2021-11-26 NOTE — PROGRESS NOTES
[facility]  Respiratory Care Note    [facility] ICU VENTILATOR RESPIRATORY NOTE  Date of Admission: 11/14/2021  Date of Intubation (most recent): 11/18/2021  Reason for Mechanical Ventilation: Respiratory Failure  Number of Days on Mechanical Ventilation: 9  Met Criteria for Pressure Support Trial: No  Length of Pressure Support Trial:   Reason for Stopping Pressure Support Trial:   Reason for No Pressure Support Trial: Unstable Airway  Significant Events Today:  ABG Results: 7.37/37/127/22 @0626 11/26/2021  ETT appearance on chest x-ray: 5.8 cm above Manisha     Plan:  Will continue to monitor and assess the pt's current respiratory status and needs, in hopes of liberating him from the ventilator.    Ventilation Mode: CMV/AC  (Continuous Mandatory Ventilation/ Assist Control)  FiO2 (%): 70 %  Rate Set (breaths/minute): 20 breaths/min  Tidal Volume Set (mL): 420 mL  PEEP (cm H2O): 10 cmH2O  Oxygen Concentration (%): 70 %  Resp: 20    Vital signs:  Temp: 98.3  F (36.8  C) Temp src: Axillary BP: (!) 158/66 Pulse: 105   Resp: 20 SpO2: 93 % O2 Device: Mechanical Ventilator Oxygen Delivery: 35 LPM   Weight: 98.4 kg (216 lb 14.9 oz)  Estimated body mass index is 29.42 kg/m  as calculated from the following:    Height as of 6/30/21: 1.829 m (6').    Weight as of this encounter: 98.4 kg (216 lb 14.9 oz).      Recent Labs   Lab 11/26/21  0626 11/25/21  0444 11/24/21  0546 11/23/21  1518   PH 7.37 7.40 7.39 7.43   PCO2 37 39 41 39   PO2 127* 74* 72* 70*   HCO3 22 24 25 26   O2PER 70 40 35 35     No past medical history on file.    Past Surgical History:   Procedure Laterality Date     C UNLISTED PROCEDURE, ABDOMEN/PERITONEUM/OMENTUM      Description: Hernia Repair;  Proc Date: 03/09/2009;  Comments: ventral with bovine graft and re-do because of incarceration May 2009     C UNLISTED PROCEDURE, ABDOMEN/PERITONEUM/OMENTUM      Description: Hernia Repair;  Recorded: 02/03/2010;  Comments: redo ventral  hernia failed bovine graft with incarceration     EYE SURGERY  2016    cataract     HC KNEE SCOPE, DIAGNOSTIC      Description: Arthroscopy Knee Right;  Recorded: 02/03/2009;     HC REPAIR UMBILICAL KATJA,<6Y/O,REDUC      Description: Umbilical Hernia Repair;  Recorded: 02/03/2009;       Family History   Problem Relation Age of Onset     Heart Disease Father      Diabetes Sister      Cancer Sister         colon     Heart Disease Sister      Diabetes Brother      Cancer Brother         stomach? abd, testicle     Cancer Sister         ovarian     Heart Disease Sister        Social History     Tobacco Use     Smoking status: Never Smoker     Smokeless tobacco: Not on file   Substance Use Topics     Alcohol use: Yes     Comment: Alcoholic Drinks/day: rare; remote regular/daily     Godfrey FRY  [facility]  11/26/2021

## 2021-11-26 NOTE — PROGRESS NOTES
[facility] Nurse Inpatient Wound Assessment   Reason for consultation: Evaluate and treat  Lip, penis, right shin wounds    Assessment  Right upper lip wound due to bite injury  Status: improving  White mucosal tissue in shape of right front tooth    Penis wound due to Friction  Status: improving  Small area now pink mucosal tissue    Right medial shin wound due to Skin Tear  Status: stable  Covered with a thin layer of dried drainage    Treatment Plan  Right upper lip wound: Daily    1. Leave open to air  2. Ensure ET tube is off area     Penis wound: BID  1. Cleanse with water and dry  2. Apply Vaseline to wound  3. Continue stabilizer for alex catheter    Right medial shin wound: Leave open to air     Orders Reviewed  Recommended provider order: None, at this time  WO Nurse follow-up plan:weekly  Nursing to notify the Provider(s) and re-consult the WO Nurse if wound(s) deteriorates or new skin concern.    Patient History  According to provider note(s):  Rosanna Romero is a 66 year old male admitted on 11/14/2021 for COVID-19 infection. He has a history of DM, HTN, gout, HLD, and LINDSAY. Intubated 11/18 due to worsening hypoxemia and afib RVR, requiring intubation. AZALIA.      Objective Data  Containment of urine/stool: Indwelling catheter    Active Diet Order  None      Output:   I/O last 3 completed shifts:  In: 1510.89 [I.V.:475.89; NG/GT:315]  Out: 1885 [Urine:885; Stool:1000]    Risk Assessment:   Sensory Perception: 2-->very limited  Moisture: 3-->occasionally moist  Activity: 1-->bedfast  Mobility: 1-->completely immobile  Nutrition: 3-->adequate  Friction and Shear: 2-->potential problem  Taras Score: 12                          Labs:   Recent Labs   Lab 11/26/21  0626 11/24/21  0540 11/23/21  0533 11/22/21  8918   ALBUMIN  --   --   --  1.3*   HGB 11.2* 10.4*   < > 10.6*   INR  --  0.87   < > 0.90   WBC 23.1* 19.4*   < > 17.8*   CRP  --  22.9*   < > 56.5*    < > = values in this interval not displayed.        Physical Exam  Areas of skin assessed: focused lip, penis, right shin    Wound Location:  Right upper lip  Date of last photo NA  Wound History: Bite injury on right upper lip in shape of right front tooth  Wound Base: 100 % white mucosal tissue     Palpation of the wound bed: normal      Drainage: none     Description of drainage: none     Measurements (length x width x depth, in cm) 0.4  x 0.7 cm      Tunneling N/A     Undermining N/A  Periwound skin: intact      Color: normal and consistent with surrounding tissue      Temperature: normal   Odor: none   Pain: unable to assess due to  sedation , none    Wound Location:  Penis  Date of last photo NA  Wound History: Friction injury from catheter movement  Wound Base: 100 % pink mucosal tissue     Palpation of the wound bed: normal      Drainage: none     Description of drainage: none     Measurements (length x width x depth, in cm) 0.3 x 0.3 cm      Tunneling N/A     Undermining N/A  Periwound skin: intact      Color: normal and consistent with surrounding tissue      Temperature: normal   Odor: none     Wound Location:  Right medial shin  Date of last photo NA  Wound History: Skin tear with adhesive removal  Wound Base: 100 % dry drainage-thin layer     Palpation of the wound bed: normal      Drainage: none     Description of drainage: none     Measurements (length x width x depth, in cm) 2 x 1.5 cm      Tunneling N/A     Undermining N/A  Periwound skin: intact      Color: normal and consistent with surrounding tissue      Temperature: normal   Odor: none     Interventions  Visual inspection and assessment completed   Wound Care Rationale Promote moist wound healing without tissue dehydration  and Provide protection   Wound Care: completed by RN  Supplies: discussed with RN  Current off-loading measures: Pillows under calves and Pillows  Current support surface: Standard  ICU  Education provided to: plan of care and Off-loading pressure  Discussed plan of  care with Patient and Nurse    Israel Mayes RN CWOCN

## 2021-11-26 NOTE — PROGRESS NOTES
Potassium   Date Value Ref Range Status   11/26/2021 3.3 (L) 3.4 - 5.3 mmol/L Final     Comment:     Specimen slightly hemolyzed, potassium may be falsely elevated.   07/02/2021 3.4 3.4 - 5.3 mmol/L Final     Hemoglobin   Date Value Ref Range Status   11/26/2021 11.2 (L) 13.3 - 17.7 g/dL Final   07/02/2021 10.8 (L) 13.3 - 17.7 g/dL Final     Creatinine   Date Value Ref Range Status   11/26/2021 3.02 (H) 0.66 - 1.25 mg/dL Final   07/02/2021 0.82 0.66 - 1.25 mg/dL Final     Urea Nitrogen   Date Value Ref Range Status   11/26/2021 86 (H) 7 - 30 mg/dL Final   07/02/2021 10 7 - 30 mg/dL Final     Sodium   Date Value Ref Range Status   11/26/2021 138 133 - 144 mmol/L Final   07/02/2021 141 133 - 144 mmol/L Final     INR   Date Value Ref Range Status   11/24/2021 0.87 0.85 - 1.15 Final       DIALYSIS PROCEDURE NOTE  Hepatitis status of previous patient on machine log was checked and verified ok to use with this patients hepatitis status.  Patient dialyzed for 3 hrs. on a K4 bath with a net fluid removal of  3 L.  A BFR of 350 ml/min was obtained via a RIJ nontunneled catheter.  The treatment plan was discussed with Dr. Kumar during the treatment.    Total heparin received during the treatment: 0 units.   Line flushed, clamped and capped with heparin 1:1000 1.5 mL (1500 units) per lumen    Meds given: None ordered   Complications: Pt was labile throughout the run with SBP ranging from 90-150s. Pt was intermittently tachycardic throughout the run.      Pt became hypotensive with 80 minutes left of run. Lowest BP SBP 70s. Levophed restarted by ICU RN. UF paused and goal decreased, NS bolus given while waiting for levophed. MD notified and aware. Per MD, order remains for 3 L UF, use levophed.    Due to arterial spasming, collapsing, BFR decreased and lines reversed x 4. No success with repositioning. MD notified and aware.      Person educated: pt. Knowledge base NONE. Barriers to learning: sedated will evaluate when  alert.    ICEBOAT? Timeout performed pre-treatment  I: Patient was identified using 2 identifiers  C:  Consent Signed Yes  E: Equipment preventative maintenance is current and dialysis delivery system OK to use  B: Hepatitis B Surface Antigen: neg; Draw Date: 11/20/2021      Hepatitis B Surface Antibody: susceptible; Draw Date: 11/20/2021  O: Dialysis orders present and complete prior to treatment  A: Vascular access verified and assessed prior to treatment  T: Treatment was performed at a clinically appropriate time  ?: Patient was allowed to ask questions and address concerns prior to treatment  See flowsheet in EPIC for further details and post assessment.  Machine water alarm in place and functioning. Transducer pods intact and checked every 15min.   Pt dialyzed in the ICU.  Chlorine/Chloramine water system checked every 4 hours.  Outpatient Dialysis at [facility], Dialysis initiated in the hospital. Next dialysis scheduled for Monday per Dr. Kumar.

## 2021-11-26 NOTE — PROGRESS NOTES
[facility]  Hospitalist Progress Note  Celestina Hamilton MD 11/26/21     Reason for Stay (Diagnosis): COVID         Assessment and Plan:      Summary of Stay: Rosanna Romero is a 66-year-old male with medical history including diabetes not on insulin, neuropathy, hypertension, LINDSAY who is vaccinated against COVID-19 who was admitted on 11/14/2021 with cough and shortness of breath and recent positive COVID-19 test and was found to have acute hypoxic respiratory due to COVID-19 and suspected secondary bacterial pneumonia.  He has had evolving renal failure with worsening respiratory status and metabolic acidosis which prompted stabilization early 11/18 with intubation, central line A-line placement and transfer to the ICU.  He was given bicarbonate containing fluids, insulin drip, vasopressors and remains on the vent.     Acid-base status has improved but he is oliguric still.  Procalcitonin was dramatically elevated and he was covered for bacterial sepsis w/ empiric abx though these were stopped due to negative cultures on 11/22.  CT of his chest abdomen and pelvis did not show obvious source other than pneumonia though there was a question of some enteritis.     He remains intubated with evolving renal failure and shock, hemodynamics have now improved.  He began HD on 11/22.    Problem List/Assessment and Plan:     Septic shock, suspected pulmonary source, likely pneumonia.  Suspect the driving issue is COVID-19 pneumonia.  Treated w/ empiric abx, now off.  --zosyn 11/16-11/8.  --meropenem and vancomycin on 11/18.  Discontinued vancomycin though that was likely on board until 11/22 when HD started.  --stopped meropenem 11/22.  --weaned off of vasopressors  --Blood cultures no growth to date  --Has a central line and arterial line     Severe metabolic acidosis: Suspect due to combination of ATN from renal failure and ketosis potentially from DKA given severe hyperglycemia.  Acid-base status  improved.  --Nephrology consulted  --HD per nephrology.  --Ketones improved, transitioned to subcu insulin.  --Trend basic metabolic panel     Oliguric acute renal failure: Nephrology following. Had progressive renal failure and acidosis.  Initiated on HD on 11/22.  Next HD on 11/26.  --Nephrology following, appreciate recommendations     Type 2 diabetes, evidence of DKA: Somewhat unusual.  Is a type II diabetic not normally on insulin (PTA on Glipizide and Metformin) and presented with significantly elevated ketones and blood sugars in the 300s.  Had been on insulin drip, now SQ.  --Decrease Lantus to 8 units QAM   --Aspart 4 units Q4H in addition to NPO sliding scale insulin     Acute hypoxemic respiratory failure due to pneumonia, hemodynamic instability, volume overload: Intubated and sedated.    --Vent management as per intensivist.     FEN: Continue TF.  Nutrition following.     Prominent constipation seen on CT scan: Added bowel regimen. Appears to be stooling.     Atrial fibrillation with RVR: Developed AF with RVR in addition to ventricular tachycardia.  He did receive Amiodarone bolus and drip but subsequently converted to NSR and Amiodarone discontinued.     History of upper GI bleed: Continue Protonix.    Gout: Hold Allopurinol given AZALIA.     COVID-19: Continuing Decadron x 10 days, to complete today.  Remdesivir stopped due to renal failure (received 2 doses).    HTN: PTA on Amlodipine, Lisinopril, Clonidine and Metoprolol on which were all initially held as he was requiring pressors.  Now off pressures.  --Amlodipine had been resumed, now stop    Diet: Adult Formula Drip Feeding: Continuous Novasource Renal; Nasojejunal; Goal Rate: 30; mL/hr; Medication - Feeding Tube Flush Frequency: At least 15-30 mL water before and after medication administration and with tube clogging; increase to new goal ...    DVT Prophylaxis: Heparin SQ  [place] Catheter: PRESENT, indication: Strict 1-2 Hour I&O  Code Status:  Full Code      Disposition Plan   Expected discharge: TBD once respiratory failure resolved.  Remains on the ventilator.  Entered: Celestina Hamilton MD 11/26/2021, 7:58 AM       The patient's care was discussed with the Bedside Nurse, Patient's Family and Intensivist.    Hospitalist Service  [facility]          Interval History (Subjective):      Events from overnight resumed. Patient remains intubated and sedated.      I updated patient's son, Raza, by phone.  All questions answered.                   Physical Exam:      Last Vital Signs:  BP (!) 153/67   Pulse 80   Temp 97.7  F (36.5  C)   Resp 26   Wt 98.4 kg (216 lb 14.9 oz)   SpO2 99%   BMI 29.42 kg/m      General: Intubated and sedated.  HEENT: Normocephalic and atraumatic, eyes anicteric and without scleral injection, ETT in place.  Cardiac: RRR, normal S1, S2. No m/g/r, 2+ edema of hands and feet.  Pulmonary: Normal chest rise, normal work of breathing.  Course breath sounds diffusely, no crackles or wheezing.  Abdomen: soft, non-tender, non-distended.  Normoactive bowel sounds, no guarding or rebound tenderness.  Extremities: no deformities.  Warm, well perfused.  Skin: no rashes or lesions.  Warm and Dry.  Neuro: Intubated and sedated.  Psych: Intubated and sedated.         Medications:      All current medications were reviewed with changes reflected in problem list.         Data:      All new lab and imaging data was reviewed.   Labs:  Recent Labs   Lab 11/26/21  0626 11/24/21  0540 11/23/21  0533   WBC 23.1* 19.4* 14.8*   HGB 11.2* 10.4* 10.1*   HCT 33.2* 31.6* 28.6*   MCV 89 89 86   * 128* 135*     Recent Labs   Lab 11/26/21  0626 11/26/21  0357 11/26/21  0032 11/25/21  2150 11/25/21  0818 11/25/21  0443 11/24/21  0825 11/24/21  0540 11/23/21  0757 11/23/21  0533 11/22/21  0827 11/22/21  0448 11/21/21  0812 11/21/21  0555     --   --  139  --  138  --  141  --  146*  --  149*   < > 145*   POTASSIUM 3.3*  --   --   3.2*  --  3.6  --  3.5   < > 3.2*  --  3.7   < > 4.5   CHLORIDE 102  --   --  103  --  102  --  104  --  104  --  105   < > 105   CO2 22  --   --  22  --  23  --  23  --  23  --  21   < > 14*   ANIONGAP 14  --   --  14  --  13  --  14  --  19*  --  23*   < > 26*   * 92 105* 132*   < > 113*   < > 139*   < > 244*   < > 289*   < > 303*   BUN 86*  --   --  85*  --  70*  --  79*  --  99*  --  136*   < > 115*   CR 3.02*  --   --  2.89*  --  2.65*  --  3.05*  --  3.80*  --  4.89*   < > 4.71*   GFRESTIMATED 21*  --   --  22*  --  24*  --  20*  --  16*  --  11*   < > 12*   GRAZYNA 6.7*  --   --  6.6*  --  7.0*  --  6.9*  --  6.7*  --  6.5*   < > 7.1*   MAG  --   --   --   --   --  2.0  --  2.1  --  2.3  --  2.7*  --  2.8*   PHOS 7.7*  --   --   --   --  6.0*  --  5.3*  --  4.4  --  5.7*  5.8*  --  7.1*   ALBUMIN  --   --   --   --   --   --   --   --   --   --   --  1.3*  --  1.4*    < > = values in this interval not displayed.     Recent Labs   Lab 11/24/21 0540   DD 3.67*     Recent Labs   Lab 11/24/21 0540 11/23/21  0533 11/22/21  0448   CRP 22.9* 34.4* 56.5*      Imaging:   Recent Results (from the past 24 hour(s))   XR Chest Port 1 View    Narrative    CHEST PORTABLE ONE VIEW   11/25/2021 9:48 AM     HISTORY: Followup COVID ARDS.    COMPARISON: CT chest, abdomen, pelvis on 11/19/2021.      Impression    IMPRESSION: Single AP view of the chest was obtained. Endotracheal  tube tip projects over mid thoracic trachea approximately 4.5 cm from  the robin. Enteric tube crosses the diaphragm with the distal tip  outside the field of view. Right IJ central catheter tip projects over  low SVC. Left upper extremity PICC tip projects over high right  atrium. Stable mild enlargement of cardiac silhouette. Bilateral  basilar predominant patchy airspace pulmonary opacities slightly  worsened as compared to 11/18/2021 exam. No significant pleural  effusion or pneumothorax.    TANK VERMA MD         SYSTEM ID:  [id number]        Celestina Hamilton MD

## 2021-11-27 NOTE — PROGRESS NOTES
[facility]  Respiratory Care Note     [facility] ICU VENTILATOR RESPIRATORY NOTE  Date of Admission: 11/14/2021  Date of Intubation (most recent): 11/18/2021  Reason for Mechanical Ventilation: Respiratory Failure  Number of Days on Mechanical Ventilation: 10  Met Criteria for Pressure Support Trial: Yes  Length of Pressure Support Trial: 2 hrs  Reason for Stopping Pressure Support Trial: Hypotensive, > RR   Reason for No Pressure Support Trial:   Significant Events Today:  ABG Results: 7.35/39/62/21 @1505 11/27/2021  ETT appearance on chest x-ray: 5.8 cm above Manisha      Plan:  Will continue to monitor and assess the pt's current respiratory status and needs, in hopes of liberating him from the ventilator.    Ventilation Mode: CMV/AC  (Continuous Mandatory Ventilation/ Assist Control)  FiO2 (%): 60 %  Rate Set (breaths/minute): 20 breaths/min  Tidal Volume Set (mL): 420 mL  PEEP (cm H2O): 8 cmH2O  Oxygen Concentration (%): 60 %  Resp: 23    Vital signs:  Temp: 97.8  F (36.6  C) Temp src: Temporal BP: (!) 142/53 Pulse: 97   Resp: 23 SpO2: 90 % O2 Device: Mechanical Ventilator Oxygen Delivery: 35 LPM   Weight: 97.5 kg (214 lb 15.2 oz)  Estimated body mass index is 29.15 kg/m  as calculated from the following:    Height as of 6/30/21: 1.829 m (6').    Weight as of this encounter: 97.5 kg (214 lb 15.2 oz).    Recent Labs   Lab 11/27/21  1505 11/27/21  0512 11/26/21  0626 11/25/21  0444   PH 7.35 7.37 7.37 7.40   PCO2 39 38 37 39   PO2 62* 89 127* 74*   HCO3 21 22 22 24   O2PER 60 70 70 40     No past medical history on file.    Past Surgical History:   Procedure Laterality Date     C UNLISTED PROCEDURE, ABDOMEN/PERITONEUM/OMENTUM      Description: Hernia Repair;  Proc Date: 03/09/2009;  Comments: ventral with bovine graft and re-do because of incarceration May 2009     C UNLISTED PROCEDURE, ABDOMEN/PERITONEUM/OMENTUM      Description: Hernia Repair;  Recorded: 02/03/2010;  Comments: redo  ventral hernia failed bovine graft with incarceration     EYE SURGERY  2016    cataract     HC KNEE SCOPE, DIAGNOSTIC      Description: Arthroscopy Knee Right;  Recorded: 02/03/2009;     HC REPAIR UMBILICAL KATJA,<6Y/O,REDUC      Description: Umbilical Hernia Repair;  Recorded: 02/03/2009;       Family History   Problem Relation Age of Onset     Heart Disease Father      Diabetes Sister      Cancer Sister         colon     Heart Disease Sister      Diabetes Brother      Cancer Brother         stomach? abd, testicle     Cancer Sister         ovarian     Heart Disease Sister        Social History     Tobacco Use     Smoking status: Never Smoker     Smokeless tobacco: Not on file   Substance Use Topics     Alcohol use: Yes     Comment: Alcoholic Drinks/day: rare; remote regular/daily     Godfrey FRY  [facility]  11/27/2021

## 2021-11-27 NOTE — PLAN OF CARE
ICU End of Shift Summary.  For vital signs and complete assessments, please see documentation flowsheets.     Neuro: RASS ranging from +1 to -1. Eyes open spontaneously, does not follow commands, can move head left to right.  Cardiac: SR-ST, labile BPs. With any movement becomes very hypertensive. Tried bumps of fent to premedicate and tried increasing prop per tele hub suggestion- did not seem to make much of a difference, then pt became hypotensive and required levo for a short period.  Resp: Vent supported. Coarse, large amounts of thick creamy /yellow secretions at times.   GI: Tf at goal, rectal tube in place. Rectal tube consistently leaking- replaced this AM.   : alex in place, urine yellow.   Skin: See flowsheets  Lines: A line, picc, alex, rectal  Drips: fent, prop    Major Shift Events:   Labile BPs  Plan (Upcoming Events): TBD, continue supportive cares/wean O2 as able  Discharge/Transfer Needs: TBD

## 2021-11-27 NOTE — PROGRESS NOTES
RT note    [facility] ICU VENTILATOR RESPIRATORY NOTE  Date of Admission: 11/14/21  Date of Intubation (most recent): 11/18/21  Reason for Mechanical Ventilation: Resp Failure  Number of Days on Mechanical Ventilation: 10  Met Criteria for Pressure Support Trial: No  Reason for No Pressure Support Trial: High PEEP/Fio2  Significant Events Today: none  Plan:  Will continue to follow and monitor    RT Estelita on 11/27/2021 at 6:49 AM

## 2021-11-27 NOTE — PROGRESS NOTES
Renal Medicine Progress Note                                Rosanna Romero MRN# [id number]   Age: 66 year old YOB: 1955   Date of Admission: 11/14/2021 Hospital LOS: 12                  Assessment/Plan:     Rosanna Romero is a 66 year old male who was admitted on 11/14/2021.      1) Baseline Normal Kidney Function.     2) COVID-19 Pneumonia/ARDS      3) Shock      4) Metabolic Acidosis      5) AZALIA - oliguric/anuric.  Likely ATN due to sepsis.  Had contrast 11/17/21 but renal function was falling before then. Still oliguric.     6) Hypocalcemia:  Calcium corrects to 9.2 accounting for hypoalbuminemia.       7) Severe hypoalbuminemia - Protein malnutrition.         Next dialysis 11/28/21      Interval History:     UF yesterday 3 liter  Required NE to meet goal  Currently off    UO noted   Lytes are OK    Remains intubated     ROS:     Intubated and sedated: ROS unable    Medications and Allergies:     Reviewed    Physical Exam:     Vitals were reviewed  Temp: 98.6  F (37  C) Temp src: Temporal BP: (!) 142/58 Pulse: 84   Resp: 26 SpO2: 95 % O2 Device: Mechanical Ventilator    I/O last 3 completed shifts:  In: 1540.58 [I.V.:460.58; NG/GT:360]  Out: 3920 [Urine:820; Other:3000; Stool:100]    Vitals:    11/23/21 0610 11/23/21 1215 11/25/21 0415 11/26/21 0600   Weight: 97.7 kg (215 lb 6.2 oz) 97.7 kg (215 lb 6.2 oz) 97.3 kg (214 lb 8.1 oz) 98.4 kg (216 lb 14.9 oz)    11/27/21 0639   Weight: 97.5 kg (214 lb 15.2 oz)       GENERAL: intubated    Appears comfortable in bed    Full exam not performed given Covid status     Data:     Recent Labs   Lab 11/27/21  0815 11/27/21  0511 11/27/21  0456 11/27/21  0046 11/26/21  0825 11/26/21  0626 11/26/21  0032 11/25/21  2150 11/25/21  0818 11/25/21  0443   NA  --  137  --   --   --  138  --  139  --  138   POTASSIUM  --  3.4  --   --   --  3.3*  --  3.2*  --  3.6   CHLORIDE  --  103  --   --   --  102  --  103  --  102   CO2  --  22  --   --   --  22  --  22  --  23   ANIONGAP   --  12  --   --   --  14  --  14  --  13   * 191* 139* 152*   < > 105*   < > 132*   < > 113*   BUN  --  64*  --   --   --  86*  --  85*  --  70*   CR  --  2.44*  --   --   --  3.02*  --  2.89*  --  2.65*   GFRESTIMATED  --  27*  --   --   --  21*  --  22*  --  24*   GRAZYNA  --  6.9*  --   --   --  6.7*  --  6.6*  --  7.0*    < > = values in this interval not displayed.         Recent Labs   Lab 11/27/21  0511 11/26/21  0626 11/25/21  2150 11/25/21  0443 11/24/21  0540 11/23/21  0533 11/22/21  0448 11/21/21  1404 11/21/21  0555   CR 2.44* 3.02* 2.89* 2.65* 3.05* 3.80* 4.89* 4.80* 4.71*     Recent Labs   Lab 11/22/21  0448 11/21/21  0555   ALBUMIN 1.3* 1.4*     Recent Labs   Lab 11/27/21  0511 11/26/21  0626 11/25/21  0443 11/24/21  0540 11/23/21  0533   PHOS 6.5* 7.7* 6.0* 5.3* 4.4   HGB 9.4* 11.2*  --  10.4* 10.1*         G Arun Kumar MD    [facility] - Nephrology  [phone]

## 2021-11-27 NOTE — PLAN OF CARE
ICU End of Shift Summary.  For vital signs and complete assessments, please see documentation flowsheets.      Pertinent assessments:   Vented and sedated, open his eyes not following commands. On Propofol and Fentanyl for sedation. RASS -1. On full vent support. LS: dim/ Coarse. +BS. Watery stools continues though rectal tube. Mepliex to buttocks, Del Valle in place , low UP, MD aware. HD access, PICC and ART line intact. TF at [facility].    Major Shift Events:   Insulin modified  Peep changed to 10  Had HD 3 l out  Levo started for short period during HD to keep Map above 65  Sedation reduced per MD Warren notes  Plan (Upcoming Events): continue vent management, wean as able  Discharge/Transfer Needs: TBD     Bedside Shift Report Completed : yes  Bedside Safety Check Completed: yes

## 2021-11-27 NOTE — PROGRESS NOTES
[facility]    ICU Progress Note       Date of Admission:  11/14/2021    Assessment: Critical Care   Rosanna Romero is a 66 year old male admitted on 11/14/2021 for COVID-19 infection. He has a history of DM, HTN, and LINDSAY. Intubated 11/18 due to worsening hypoxemia and afib RVR,  AZALIA.  --needing HD starting 11/22        Summary plans for today  No HD today  Decrease PEEP  To 8     Plan: Critical Care   Neuro/ pain/ sedation:  - Fentanyl and propofol for sedation and vent synchrony  - stopped precedex seconday to bradycardia   - Goal RASS now -1 to 0     Pulmonary: COVID ARDS   Ventilation Mode: CMV/AC  (Continuous Mandatory Ventilation/ Assist Control)  FiO2 (%): 60 %  Rate Set (breaths/minute): 20 breaths/min  Tidal Volume Set (mL): 420 mL  PEEP (cm H2O): 10 cmH2O  Oxygen Concentration (%): 60 %  Resp: 26  -Ventilator day 10  Improved oxygenation after 3 L negative with HD yesterday   P: continue  and RR 20  Decrease  PEEP to 8       Cardiovascular:   - off and on vasopressors  - Amio bolus and drip started last week for afib RVR and ventricular tachycardia --now in NSR x 5 days , remains off amiodarone   P:  Pressors prn for HD volume removal     GI/Nutrition:  - TFs at  30     Renal/ Fluid Balance: Acute kidney injury and metabolic acidosis  - Metabolic acidosis, recurrent seconday to AZALIA --started HD Tuesday.    HD yesterday with -3 L   P: HD tomorrow     Endocrine: history of DM  - Hyperglycemia  on admission. - glucoses improved   P: continue AM lantus  8 and novolog 4 q 4      ID:  COVID pneumonia   - empiric meropenem (11/18-11/21) and vancomycin (11/17-11/20)  Afebrile x 1 week    Decadron day 10--completed   Got only 2 d remdesivir seconday to AZALIA   - follow up cultures--staph epi and candida only in sputum   - P: off antibiotics x 6 days    Hematology:  -mild anemia and thrombocytopenia--mild --slightly lower platelets.  Does not fit an HIT pattern      MSK: history of gout  -  holding allopurinol seconday to AZALIA     Lines/ tubes/ drains:  Del Valle Catheter: PRESENT, indication: Strict 1-2 Hour I&O  Central Lines: PRESENT  PICC Triple Lumen 11/21/21 Left Basilic-Site Assessment: WDL except  CVC Double Lumen Right External jugular Non - tunneled-Site Assessment: WDL    Prophylaxis:  - DVT Prophylaxis: Heparin SQ  - PUD Prophylaxis: protonix    Code Status: Full Code      Disposition:  - ICU  Critical care time exclusive of procedures: 30    Pastor Warren MD  [facility]      Clinically Significant Risk Factors Present on Admission           ______________________________________________________________________    Interval History   HD   -3 L   Less O2 needs        Physical Exam   Vital Signs: Temp: 98.6  F (37  C) Temp src: Temporal BP: (!) 142/58 Pulse: 97   Resp: 26 SpO2: 92 % O2 Device: Mechanical Ventilator    Weight: 214 lbs 15.18 oz    Supine   Opens eyes to  But does not follow commands , does not track   ETT ok  No Facial edema   Right IJ  HD catheter   Lungs clear  H- Rr w/o m  Abdomen non tender, moderate scrotal edema    Hands 1+ edema, 1+ in thighs   Feet and hands are warm     Data   I reviewed all medications, new labs and imaging results over the last 24 hours.  Arterial Blood Gases   Recent Labs   Lab 11/27/21  0512 11/26/21  0626 11/25/21  0444 11/24/21  0546   PH 7.37 7.37 7.40 7.39   PCO2 38 37 39 41   PO2 89 127* 74* 72*   HCO3 22 22 24 25       Complete Blood Count   Recent Labs   Lab 11/27/21  0511 11/26/21  0626 11/24/21  0540 11/23/21  0533   WBC 15.5* 23.1* 19.4* 14.8*   HGB 9.4* 11.2* 10.4* 10.1*   PLT 79* 129* 128* 135*       Basic Metabolic Panel  Recent Labs   Lab 11/27/21  0815 11/27/21  0511 11/27/21  0456 11/27/21  0046 11/26/21  0825 11/26/21  0626 11/26/21  0032 11/25/21  2150 11/25/21  0818 11/25/21  0443   NA  --  137  --   --   --  138  --  139  --  138   POTASSIUM  --  3.4  --   --   --  3.3*  --  3.2*  --  3.6   CHLORIDE  --   103  --   --   --  102  --  103  --  102   CO2  --  22  --   --   --  22  --  22  --  23   BUN  --  64*  --   --   --  86*  --  85*  --  70*   CR  --  2.44*  --   --   --  3.02*  --  2.89*  --  2.65*   * 191* 139* 152*   < > 105*   < > 132*   < > 113*    < > = values in this interval not displayed.       Liver Function Tests  Recent Labs   Lab 11/24/21  0540 11/23/21  0533 11/22/21  0448 11/21/21  0555   ALBUMIN  --   --  1.3* 1.4*   INR 0.87 0.85 0.90 0.99       Coagulation Profile  Recent Labs   Lab 11/24/21 0540 11/23/21  0533 11/22/21 0448 11/21/21  0555   INR 0.87 0.85 0.90 0.99       IMAGING:  No results found for this or any previous visit (from the past 24 hour(s)).

## 2021-11-27 NOTE — PROGRESS NOTES
[facility]  Hospitalist Progress Note  Celestina Hamilton MD 11/27/21     Reason for Stay (Diagnosis): COVID         Assessment and Plan:      Summary of Stay: Rosanna Romero is a 66-year-old male with medical history including diabetes not on insulin, neuropathy, hypertension, LINDSAY who is vaccinated against COVID-19 who was admitted on 11/14/2021 with cough and shortness of breath and recent positive COVID-19 test and was found to have acute hypoxic respiratory due to COVID-19 and suspected secondary bacterial pneumonia.  He has had evolving renal failure with worsening respiratory status and metabolic acidosis which prompted stabilization early 11/18 with intubation, central line A-line placement and transfer to the ICU.  He was given bicarbonate containing fluids, insulin drip, vasopressors and remains on the vent.     Acid-base status has improved but he is oliguric still.  Procalcitonin was dramatically elevated and he was covered for bacterial sepsis w/ empiric abx though these were stopped due to negative cultures on 11/22.  CT of his chest abdomen and pelvis did not show obvious source other than pneumonia though there was a question of some enteritis.     He remains intubated with evolving renal failure and shock, hemodynamics have now improved.  He began HD on 11/22.    Problem List/Assessment and Plan:     Septic shock, suspected pulmonary source, likely pneumonia.  Suspect the driving issue is COVID-19 pneumonia.  Treated w/ empiric abx, now off.  --zosyn 11/16-11/8.  --meropenem and vancomycin on 11/18.  Discontinued vancomycin though that was likely on board until 11/22 when HD started.  --stopped meropenem 11/22.  --weaned off of vasopressors  --Blood cultures no growth to date  --Has a central line and arterial line     Severe metabolic acidosis: Suspect due to combination of ATN from renal failure and ketosis potentially from DKA given severe hyperglycemia.  Acid-base status  improved.  --Nephrology consulted  --HD per nephrology.  --Ketones improved, transitioned to subcu insulin.  --Trend basic metabolic panel     Oliguric acute renal failure: Nephrology following. Had progressive renal failure and acidosis.  Initiated on HD on 11/22.  HD on 11/26 with 3 L UF.  --Nephrology following, appreciate recommendations  --Next MD on Monday  --Did need Norepinephrine while on HD     Type 2 diabetes, evidence of DKA: Somewhat unusual.  Is a type II diabetic not normally on insulin (PTA on Glipizide and Metformin) and presented with significantly elevated ketones and blood sugars in the 300s.  Had been on insulin drip, now SQ.  --Lantus to 8 units QAM   --Aspart 4 units Q4H in addition to NPO sliding scale insulin     Acute hypoxemic respiratory failure due to pneumonia, hemodynamic instability, volume overload: Intubated and sedated.    --Vent management as per intensivist.     FEN: Continue TF.  Nutrition following.     Prominent constipation seen on CT scan: Added bowel regimen. Appears to be stooling.     Atrial fibrillation with RVR: Developed AF with RVR in addition to ventricular tachycardia.  He did receive Amiodarone bolus and drip but subsequently converted to NSR and Amiodarone discontinued.     History of upper GI bleed: Continue Protonix.    Gout: Hold Allopurinol given AZALIA.     COVID-19: Decadron x 10 days completed.  Remdesivir stopped due to renal failure (received 2 doses).    HTN: PTA on Amlodipine, Lisinopril, Clonidine and Metoprolol on which were all initially held as he was requiring pressors. Off antihypertensives.      Diet: Adult Formula Drip Feeding: Continuous Novasource Renal; Nasojejunal; Goal Rate: 30; mL/hr; Medication - Feeding Tube Flush Frequency: At least 15-30 mL water before and after medication administration and with tube clogging; increase to new goal ...    DVT Prophylaxis: Heparin SQ  Del Valle Catheter: PRESENT, indication: Strict 1-2 Hour I&O  Code Status:  Full Code      Disposition Plan   Expected discharge: TBD once respiratory failure resolved.  Remains on the ventilator.  Entered: Celestina Hamilton MD 11/27/2021, 8:28 AM       The patient's care was discussed with the Bedside Nurse, Patient's Family and Intensivist.    Hospitalist Service  [facility]          Interval History (Subjective):      Events from overnight resumed. Patient remains intubated and sedated.      I updated patient's son, Raza, by phone.  All questions answered. He was wondering when he will come off the ventilator and when we will know if his kidneys will get better.                  Physical Exam:      Last Vital Signs:  BP (!) 142/58   Pulse 84   Temp 97.4  F (36.3  C) (Temporal)   Resp 26   Wt 97.5 kg (214 lb 15.2 oz)   SpO2 96%   BMI 29.15 kg/m      General: Intubated and sedated, eyes open but not responsive  HEENT: Normocephalic and atraumatic, eyes anicteric and without scleral injection, ETT in place.  Cardiac: RRR, normal S1, S2. No m/g/r, 2+ edema of hands and feet.  Pulmonary: Normal chest rise, normal work of breathing.  Course breath sounds diffusely, no crackles or wheezing.  Abdomen: soft, non-tender, non-distended.  Normoactive bowel sounds, no guarding or rebound tenderness.  Extremities: no deformities.  Warm, well perfused.  Skin: no rashes or lesions.  Warm and Dry.  Neuro: Intubated and sedated.  Psych: Intubated and sedated.         Medications:      All current medications were reviewed with changes reflected in problem list.         Data:      All new lab and imaging data was reviewed.   Labs:  Recent Labs   Lab 11/27/21  0511 11/26/21  0626 11/24/21  0540   WBC 15.5* 23.1* 19.4*   HGB 9.4* 11.2* 10.4*   HCT 29.0* 33.2* 31.6*   MCV 90 89 89   PLT 79* 129* 128*     Recent Labs   Lab 11/27/21  0815 11/27/21  0511 11/27/21  0456 11/26/21  0825 11/26/21  0626 11/26/21  0032 11/25/21  2150 11/25/21  0818 11/25/21  0443 11/24/21  0825  11/24/21  0540 11/23/21  0757 11/23/21  0533 11/22/21  0827 11/22/21  0448 11/21/21  0812 11/21/21  0555   NA  --  137  --   --  138  --  139  --  138  --  141  --  146*  --  149*   < > 145*   POTASSIUM  --  3.4  --   --  3.3*  --  3.2*  --  3.6  --  3.5   < > 3.2*  --  3.7   < > 4.5   CHLORIDE  --  103  --   --  102  --  103  --  102  --  104  --  104  --  105   < > 105   CO2  --  22  --   --  22  --  22  --  23  --  23  --  23  --  21   < > 14*   ANIONGAP  --  12  --   --  14  --  14  --  13  --  14  --  19*  --  23*   < > 26*   * 191* 139*   < > 105*   < > 132*   < > 113*   < > 139*   < > 244*   < > 289*   < > 303*   BUN  --  64*  --   --  86*  --  85*  --  70*  --  79*  --  99*  --  136*   < > 115*   CR  --  2.44*  --   --  3.02*  --  2.89*  --  2.65*  --  3.05*  --  3.80*  --  4.89*   < > 4.71*   GFRESTIMATED  --  27*  --   --  21*  --  22*  --  24*  --  20*  --  16*  --  11*   < > 12*   GRAZYNA  --  6.9*  --   --  6.7*  --  6.6*  --  7.0*  --  6.9*  --  6.7*  --  6.5*   < > 7.1*   MAG  --   --   --   --   --   --   --   --  2.0  --  2.1  --  2.3  --  2.7*  --  2.8*   PHOS  --  6.5*  --   --  7.7*  --   --   --  6.0*  --  5.3*  --  4.4  --  5.7*  5.8*  --  7.1*   ALBUMIN  --   --   --   --   --   --   --   --   --   --   --   --   --   --  1.3*  --  1.4*    < > = values in this interval not displayed.     Recent Labs   Lab 11/24/21  0540   DD 3.67*     Recent Labs   Lab 11/24/21  0540 11/23/21  0533 11/22/21  0448   CRP 22.9* 34.4* 56.5*      Imaging:   No results found for this or any previous visit (from the past 24 hour(s)).    Celestina Hamilton MD

## 2021-11-28 NOTE — PROGRESS NOTES
[facility]  Respiratory Care Note     [facility] ICU VENTILATOR RESPIRATORY NOTE  Date of Admission: 11/14/2021  Date of Intubation (most recent): 11/18/2021  Reason for Mechanical Ventilation: Respiratory Failure  Number of Days on Mechanical Ventilation: 11  Met Criteria for Pressure Support Trial: No  Length of Pressure Support Trial:   Reason for Stopping Pressure Support Trial:   Reason for No Pressure Support Trial: PEEP +8, FIO2 60%  Significant Events Today: Pt needed to be taped and proned this afternoon.   ABG Results: 7.41/37/56/24 @1501 11/28/2021  ETT appearance on chest x-ray: 5.8 cm above Manisha      Plan:  Will continue to monitor and assess the pt's current respiratory status and needs, in hopes of liberating him from the ventilator.    Ventilation Mode: CMV/AC  (Continuous Mandatory Ventilation/ Assist Control)  FiO2 (%): 60 %  Rate Set (breaths/minute): 24 breaths/min  Tidal Volume Set (mL): 420 mL  PEEP (cm H2O): 8 cmH2O  Oxygen Concentration (%): 60 %  Resp: 26    Vital signs:  Temp: 97.8  F (36.6  C) Temp src: Axillary BP: (!) 179/89 Pulse: 86   Resp: 26 SpO2: 99 % O2 Device: Mechanical Ventilator Oxygen Delivery: 35 LPM   Weight: 93 kg (205 lb 0.4 oz)  Estimated body mass index is 27.81 kg/m  as calculated from the following:    Height as of 6/30/21: 1.829 m (6').    Weight as of this encounter: 93 kg (205 lb 0.4 oz).    Recent Labs   Lab 11/28/21  1501 11/28/21  0515 11/27/21  1505 11/27/21  0512   PH 7.41 7.25* 7.35 7.37   PCO2 37 44 39 38   PO2 56* 69* 62* 89   HCO3 24 19* 21 22   O2PER 60 60 60 70     No past medical history on file.    Past Surgical History:   Procedure Laterality Date     C UNLISTED PROCEDURE, ABDOMEN/PERITONEUM/OMENTUM      Description: Hernia Repair;  Proc Date: 03/09/2009;  Comments: ventral with bovine graft and re-do because of incarceration May 2009     C UNLISTED PROCEDURE, ABDOMEN/PERITONEUM/OMENTUM      Description: Hernia Repair;   Recorded: 02/03/2010;  Comments: redo ventral hernia failed bovine graft with incarceration     EYE SURGERY  2016    cataract     HC KNEE SCOPE, DIAGNOSTIC      Description: Arthroscopy Knee Right;  Recorded: 02/03/2009;     HC REPAIR UMBILICAL KATJA,<4Y/O,REDUC      Description: Umbilical Hernia Repair;  Recorded: 02/03/2009;       Family History   Problem Relation Age of Onset     Heart Disease Father      Diabetes Sister      Cancer Sister         colon     Heart Disease Sister      Diabetes Brother      Cancer Brother         stomach? abd, testicle     Cancer Sister         ovarian     Heart Disease Sister        Social History     Tobacco Use     Smoking status: Never Smoker     Smokeless tobacco: Not on file   Substance Use Topics     Alcohol use: Yes     Comment: Alcoholic Drinks/day: rare; remote regular/daily     Godfrey FRY  [facility]  11/28/2021

## 2021-11-28 NOTE — PROGRESS NOTES
Potassium   Date Value Ref Range Status   11/28/2021 3.1 (L) 3.4 - 5.3 mmol/L Final   07/02/2021 3.4 3.4 - 5.3 mmol/L Final     Hemoglobin   Date Value Ref Range Status   11/28/2021 7.9 (L) 13.3 - 17.7 g/dL Final   07/02/2021 10.8 (L) 13.3 - 17.7 g/dL Final     Creatinine   Date Value Ref Range Status   11/28/2021 2.96 (H) 0.66 - 1.25 mg/dL Final   07/02/2021 0.82 0.66 - 1.25 mg/dL Final     Urea Nitrogen   Date Value Ref Range Status   11/28/2021 84 (H) 7 - 30 mg/dL Final   07/02/2021 10 7 - 30 mg/dL Final     Sodium   Date Value Ref Range Status   11/28/2021 136 133 - 144 mmol/L Final   07/02/2021 141 133 - 144 mmol/L Final     INR   Date Value Ref Range Status   11/24/2021 0.87 0.85 - 1.15 Final       DIALYSIS PROCEDURE NOTE  Hepatitis status of previous patient on machine log was checked and verified ok to use with this patients hepatitis status.  Patient dialyzed for 3 hrs. on a K4 Na 138 Bicarb 32 bath with a net fluid removal of  3L.  A BFR of 350 ml/min was obtained via a RIJ temp. Catheter.      The treatment plan was discussed with Dr. Whalen during the treatment.    Total heparin received during the treatment: 0 units.   Line flushed, clamped and capped with heparin 1:1000 1.5 mL (1500 units) per lumen    Meds  given: EPO 3,000units IV pt is on Propofol IV for sedation   Complications: NONE      Person educated: pt. Knowledge base none. Barriers to learning: Vented and sedated will assess later.    ICEBOAT? Timeout performed pre-treatment  I: Patient was identified using 2 identifiers  C:  Consent Signed Yes  E: Equipment preventative maintenance is current and dialysis delivery system OK to use  B: Hepatitis B Surface Antigen: neg; Draw Date: 11/20/21      Hepatitis B Surface Antibody: succept; Draw Date: 11/20/21  O: Dialysis orders present and complete prior to treatment  A: Vascular access verified and assessed prior to treatment  T: Treatment was performed at a clinically appropriate time  ?: Patient  was allowed to ask questions and address concerns prior to treatment  See flowsheet in EPIC for further details and post assessment.  Machine water alarm in place and functioning. Transducer pods intact and checked every 15min.   Pt in the ICU  Chlorine/Chloramine water system checked every 4 hours.  Next run Tuesday?

## 2021-11-28 NOTE — PROGRESS NOTES
Renal Medicine Inpatient Dialysis Note                                Rosanna Romero MRN# [id number]   Age: 66 year old YOB: 1955   Date of Admission: 11/14/2021 Hospital LOS: 13          Assessment/Plan:     Rosanna Romero is a 66 year old male who was admitted on 11/14/2021.      1) Baseline Normal Kidney Function.     2) COVID-19 Pneumonia/ARDS - oxygenation is improved.      3) Shock - likely vasoplegic/sepsis     4) Metabolic Acidosis      5) AZALIA - oliguric/anuric.  Likely ATN due to sepsis.  Had contrast 11/17/21 but renal function was falling before then. Still oliguric.  Acidosis worse.       6) Hypocalcemia:  Calcium corrects to 9.2 accounting for hypoalbuminemia.       7) Severe hypoalbuminemia - Protein malnutrition.         Initial dialysis 11/22/21  Next 11/30/21  Currently on every other day schedule     Interval History:     Dialysis run parameters reviewed with dialysis RN at patient bedside.    3 hour run  4K  300 ml/min BFR  UF 3 liter     IJ access    Stable run      ROS     Intubated and sedated: ROS unable    Dialysis Parameters:     Vitals were reviewed  Patient Vitals for the past 8 hrs:   Temp Temp src Pulse Resp SpO2 Weight   11/28/21 0915 -- -- 75 12 96 % --   11/28/21 0905 -- -- 77 10 96 % --   11/28/21 0900 97.4  F (36.3  C) Axillary 78 15 96 % --   11/28/21 0800 98.5  F (36.9  C) Temporal 79 26 96 % --   11/28/21 0600 -- -- -- -- 94 % --   11/28/21 0530 -- -- -- -- 90 % --   11/28/21 0500 -- -- -- -- 90 % --   11/28/21 0430 -- -- -- -- 90 % --   11/28/21 0400 98.8  F (37.1  C) Temporal -- -- 90 % --   11/28/21 0330 -- -- -- -- 91 % --   11/28/21 0300 -- -- -- -- 91 % --   11/28/21 0230 -- -- -- -- 90 % 93 kg (205 lb 0.4 oz)   11/28/21 0200 -- -- -- -- 91 % --   11/28/21 0149 -- -- -- -- 91 % --     I/O last 3 completed shifts:  In: 1572.01 [I.V.:492.01; NG/GT:360]  Out: 1250 [Urine:850; Stool:400]    Vitals:    11/23/21 1215 11/25/21 0415 11/26/21 0600 11/27/21 0639   Weight:  97.7 kg (215 lb 6.2 oz) 97.3 kg (214 lb 8.1 oz) 98.4 kg (216 lb 14.9 oz) 97.5 kg (214 lb 15.2 oz)    11/28/21 0230   Weight: 93 kg (205 lb 0.4 oz)       Current Weight: 97.7 ?  Dry Weight: 83 ?range  Dialysis Temp: 36.5  C  Access Device: IJ  Access Site: right  Dialyzer: Revaclear  Dialysis Bath: 3  Sodium Profile: n  UF Goal: 1.5  Blood Flow Rate (mL/min): 300  Total Treatment Time (hrs): 2.5  Heparin: Low dose as required      EPO dose: n  Zemplar: n  IV Fe: n      Medications and Allergies:     Reviewed      Physical Exam:     Seen and examined during course of dialysis run    BP (!) 142/53   Pulse 75   Temp 97.4  F (36.3  C) (Axillary)   Resp 12   Wt 93 kg (205 lb 0.4 oz)   SpO2 96%   BMI 27.81 kg/m      GENERAL: intubated  HEENT: ETT  SKIN: catheter site clean without drainage    Data:       Recent Labs   Lab 11/28/21  0814 11/28/21  0513   NA  --  136   POTASSIUM  --  3.1*   CHLORIDE  --  102   CO2  --  19*   ANIONGAP  --  15*   * 160*   BUN  --  84*   CR  --  2.96*   GFRESTIMATED  --  21*   GRAZYNA  --  6.7*     Recent Labs   Lab Test 11/28/21  0513 11/27/21  0511 11/26/21  0626 11/25/21  2150 11/25/21  0443 11/24/21  0540 11/23/21  0533 11/22/21  0448 11/21/21  1404 11/21/21  0555   CR 2.96* 2.44* 3.02* 2.89* 2.65* 3.05* 3.80* 4.89* 4.80* 4.71*     Recent Labs   Lab 11/22/21  0448   ALBUMIN 1.3*     Recent Labs   Lab 11/28/21  0706 11/28/21  0513 11/27/21  0511 11/26/21  0626 11/25/21  0443   PHOS  --  8.1* 6.5* 7.7* 6.0*   HGB 7.9* 7.9* 9.4* 11.2*  --          CRIS Kumar MD    [facility] - Nephrology  [phone]

## 2021-11-28 NOTE — PLAN OF CARE
ICU End of Shift Summary.  For vital signs and complete assessments, please see documentation flowsheets.     Pertinent assessments: Patient remains intubated and sedated. Tolerating tube feeds. Adequate urine output-see I&O charted today.   Major Shift Events: peep decreased  Plan (Upcoming Events): continue to monitor.  Discharge/Transfer Needs: to be determined    Bedside Shift Report Completed : yes  Bedside Safety Check Completed:yes    Saloni Peters RN

## 2021-11-28 NOTE — PROGRESS NOTES
RT note    [facility] ICU VENTILATOR RESPIRATORY NOTE  Date of Admission: 11/14/21  Date of Intubation (most recent): 11/18/21  Reason for Mechanical Ventilation: Resp Failure  Number of Days on Mechanical Ventilation: 11  Met Criteria for Pressure Support Trial: No  Reason for No Pressure Support Trial: FiO2 60%  Significant Events Today: none  ABG Results: 7.25/44/69/19 on 60% (PF ratio 115) @0515 11/28/21  Current vent settings CMV/AC 20/420/8+/60%    Plan:  Will continue to  Monitor    RT Estelita on 11/28/2021 at 5:39 AM

## 2021-11-28 NOTE — PROGRESS NOTES
[facility]  Hospitalist Progress Note  Celestina Hamilton MD 11/28/21      Reason for Stay (Diagnosis): COVID         Assessment and Plan:      Summary of Stay: Rosanna Romero is a 66-year-old male with medical history including diabetes not on insulin, neuropathy, hypertension, LINDSAY who is vaccinated against COVID-19 who was admitted on 11/14/2021 with cough and shortness of breath and recent positive COVID-19 test and was found to have acute hypoxic respiratory due to COVID-19 and suspected secondary bacterial pneumonia.  He has had evolving renal failure with worsening respiratory status and metabolic acidosis which prompted stabilization early 11/18 with intubation, central line A-line placement and transfer to the ICU.  He was given bicarbonate containing fluids, insulin drip, vasopressors and remains on the vent.     Acid-base status has improved but he is oliguric still.  Procalcitonin was dramatically elevated and he was covered for bacterial sepsis w/ empiric abx though these were stopped due to negative cultures on 11/22.  CT of his chest abdomen and pelvis did not show obvious source other than pneumonia though there was a question of some enteritis.     He remains intubated with evolving renal failure and shock, hemodynamics have now improved.  He began HD on 11/22.    Problem List/Assessment and Plan:     Septic shock, suspected pulmonary source, likely pneumonia.  Suspect the driving issue is COVID-19 pneumonia.  Treated w/ empiric abx, now off.  --zosyn 11/16-11/8.  --meropenem and vancomycin on 11/18.  Discontinued vancomycin though that was likely on board until 11/22 when HD started.  --stopped meropenem 11/22.  --weaned off of vasopressors  --Blood cultures no growth to date  --Has a central line and arterial line     Severe metabolic acidosis: Suspect due to combination of ATN from renal failure and ketosis potentially from DKA given severe hyperglycemia.  Acid-base status  improved.  --Nephrology consulted  --HD per nephrology.  --Ketones improved, transitioned to subcu insulin.  --Trend basic metabolic panel     Oliguric acute renal failure: Nephrology following. Had progressive renal failure and acidosis.  Initiated on HD on 11/22.  HD on 11/26 with 3 L UF.  --Nephrology following, appreciate recommendations  --HD today  --Will use Norepinephrine on HD if needed     Type 2 diabetes, evidence of DKA: Somewhat unusual.  Is a type II diabetic not normally on insulin (PTA on Glipizide and Metformin) and presented with significantly elevated ketones and blood sugars in the 300s.  Had been on insulin drip, now SQ.  --Lantus 8 units QAM   --Aspart 4 units Q4H in addition to NPO sliding scale insulin     Acute hypoxemic respiratory failure due to pneumonia, hemodynamic instability, volume overload: Intubated and sedated.    --Vent management as per intensivist.     FEN: Continue TF.  Nutrition following.     Prominent constipation seen on CT scan: Added bowel regimen. Appears to be stooling.     Atrial fibrillation with RVR: Developed AF with RVR in addition to ventricular tachycardia.  He did receive Amiodarone bolus and drip but subsequently converted to NSR and Amiodarone discontinued.     History of upper GI bleed: Continue Protonix.    Gout: Hold Allopurinol given AZALIA.     COVID-19: Decadron x 10 days completed.  Remdesivir stopped due to renal failure (received 2 doses).    HTN: PTA on Amlodipine, Lisinopril, Clonidine and Metoprolol on which were all initially held as he was requiring pressors. Off antihypertensives.      Anemia and Thrombocytopenia: Likely due to acute illness, not HIT pattern.  Receiving EPO with HD.  No indication for transfusions.  Repeat tomorrow.    Hypokalemia: HD today, no replacement.    Diet: Adult Formula Drip Feeding: Continuous Novasource Renal; Nasojejunal; Goal Rate: 30; mL/hr; Medication - Feeding Tube Flush Frequency: At least 15-30 mL water before  and after medication administration and with tube clogging; increase to new goal ...    DVT Prophylaxis: Heparin SQ  Del Valle Catheter: PRESENT, indication: Strict 1-2 Hour I&O  Code Status: Full Code      Disposition Plan   Expected discharge: TBD once respiratory failure resolved.  Remains on the ventilator.  Entered: Celestina Hamilton MD 11/28/2021, 8:29 AM       The patient's care was discussed with the Bedside Nurse, Patient's Family and Intensivist.    Hospitalist Service  [employer]          Interval History (Subjective):      Events from overnight resumed. Patient remains intubated and sedated.  Will have HD today.    I updated patient's son, Raza, by phone.  All questions answered.                   Physical Exam:      Last Vital Signs:  BP (!) 142/53   Pulse 79   Temp 98.5  F (36.9  C) (Temporal)   Resp 26   Wt 93 kg (205 lb 0.4 oz)   SpO2 96%   BMI 27.81 kg/m      General: Intubated and sedated, eyes open but not responsive  HEENT: Normocephalic and atraumatic, eyes anicteric and without scleral injection, ETT in place.  Cardiac: RRR, normal S1, S2. No m/g/r, 2+ edema of hands and feet.  Pulmonary: Normal chest rise, normal work of breathing.  Course breath sounds diffusely, no crackles or wheezing.  Abdomen: soft, non-tender, non-distended.  Normoactive bowel sounds, no guarding or rebound tenderness.  Extremities: no deformities.  Warm, well perfused.  Skin: no rashes or lesions.  Warm and Dry.  Neuro: Intubated and sedated.  Psych: Intubated and sedated.         Medications:      All current medications were reviewed with changes reflected in problem list.         Data:      All new lab and imaging data was reviewed.   Labs:  Recent Labs   Lab 11/28/21  0706 11/28/21  0513 11/27/21  0511 11/26/21  0626   WBC  --  12.5* 15.5* 23.1*   HGB 7.9* 7.9* 9.4* 11.2*   HCT  --  25.8* 29.0* 33.2*   MCV  --  94 90 89   PLT  --  87* 79* 129*     Recent Labs   Lab 11/28/21  0814  11/28/21  0513 11/28/21  0419 11/27/21  0815 11/27/21  0511 11/26/21  0825 11/26/21  0626 11/25/21  0818 11/25/21  0443 11/24/21  0825 11/24/21  0540 11/23/21  0757 11/23/21  0533 11/22/21  0827 11/22/21  0448   NA  --  136  --   --  137  --  138   < > 138  --  141  --  146*  --  149*   POTASSIUM  --  3.1*  --   --  3.4  --  3.3*   < > 3.6  --  3.5   < > 3.2*  --  3.7   CHLORIDE  --  102  --   --  103  --  102   < > 102  --  104  --  104  --  105   CO2  --  19*  --   --  22  --  22   < > 23  --  23  --  23  --  21   ANIONGAP  --  15*  --   --  12  --  14   < > 13  --  14  --  19*  --  23*   * 160* 123*   < > 191*   < > 105*   < > 113*   < > 139*   < > 244*   < > 289*   BUN  --  84*  --   --  64*  --  86*   < > 70*  --  79*  --  99*  --  136*   CR  --  2.96*  --   --  2.44*  --  3.02*   < > 2.65*  --  3.05*  --  3.80*  --  4.89*   GFRESTIMATED  --  21*  --   --  27*  --  21*   < > 24*  --  20*  --  16*  --  11*   GRAZYNA  --  6.7*  --   --  6.9*  --  6.7*   < > 7.0*  --  6.9*  --  6.7*  --  6.5*   MAG  --   --   --   --   --   --   --   --  2.0  --  2.1  --  2.3  --  2.7*   PHOS  --  8.1*  --   --  6.5*  --  7.7*  --  6.0*  --  5.3*  --  4.4  --  5.7*  5.8*   ALBUMIN  --   --   --   --   --   --   --   --   --   --   --   --   --   --  1.3*    < > = values in this interval not displayed.     Recent Labs   Lab 11/24/21  0540   DD 3.67*     Recent Labs   Lab 11/24/21  0540 11/23/21  0533 11/22/21  0448   CRP 22.9* 34.4* 56.5*      Imaging:   No results found for this or any previous visit (from the past 24 hour(s)).    Celestina Hamilton MD

## 2021-11-28 NOTE — PLAN OF CARE
ICU End of Shift Summary.  For vital signs and complete assessments, please see documentation flowsheets.     Pertinent assessments: Remains on full vent support.sedated with propofol at 25  mcgs and fentanyl at 75. Pt opens eyes but does not track,does not follow commands,Low urine output,urine cloudy. TF at goal, Continues to have liquid stools rectal tube in place.  Major Shift Events:   Plan (Upcoming Events): Wean from vent and sedation as able  Discharge/Transfer Needs: TBD    Bedside Shift Report Completed : yes  Bedside Safety Check Completed:yes

## 2021-11-28 NOTE — PROGRESS NOTES
[facility]    ICU Progress Note       Date of Admission:  11/14/2021    Assessment: Critical Care   Rosanna Romero is a 66 year old male admitted on 11/14/2021 for COVID-19 infection. Intubated 11/18 due to worsening hypoxemia and afib RVR,  AZALIA.  --needing HD starting 11/22        Summary plans for today  HD today, try for 3 L   Decrease fentanyl to 50     Plan: Critical Care   Neuro/ pain/ sedation:  - Fentanyl and propofol for sedation and vent synchrony  - stopped precedex seconday to bradycardia   - Goal RASS now -1 to 0     Pulmonary: COVID ARDS   Ventilation Mode: CMV/AC  (Continuous Mandatory Ventilation/ Assist Control)  FiO2 (%): 60 %  Rate Set (breaths/minute): 20 breaths/min  Tidal Volume Set (mL): 420 mL  PEEP (cm H2O): 8 cmH2O  Oxygen Concentration (%): 60 %  Resp: 15  -Ventilator day 11  Improved oxygenation over last few days   P: continue  and RR 20  PEEP to 8       Cardiovascular:   - off and on vasopressors with HD   - Amio bolus and drip started last week for afib RVR and ventricular tachycardia --now in NSR x 6 days , remains off amiodarone   P:  Pressors prn for HD volume removal     GI/Nutrition:  - TFs at  Goal     Renal/ Fluid Balance: Acute kidney injury and metabolic acidosis  - Metabolic acidosis, recurrent seconday to AZALIA --started HD Tuesday.    HD Friday with -3 L   P: HD today   Got EPO today     Endocrine: history of DM  - Hyperglycemia  on admission. - glucoses improved   P: continue AM lantus  8 and novolog 4 q 4      ID:  COVID pneumonia   - empiric meropenem (11/18-11/21) and vancomycin (11/17-11/20)   Decadron day 10--completed   Got only 2 d remdesivir seconday to AZALIA   - follow up cultures--staph epi and candida only in sputum   - P: off antibiotics x 1 week     Hematology:  -mild anemia and thrombocytopenia--mild --slightly higher platelets.  Does not fit an HIT pattern    EPO today     MSK: history of gout  - holding allopurinol seconday to AZALIA      Lines/ tubes/ drains:  Del Valle Catheter: PRESENT, indication: Strict 1-2 Hour I&O  Central Lines: PRESENT  PICC Triple Lumen 11/21/21 Left Basilic-Site Assessment: WDL except (dried blood)  CVC Double Lumen Right External jugular Non - tunneled-Site Assessment: WDL    Prophylaxis:  - DVT Prophylaxis: Heparin SQ  - PUD Prophylaxis: protonix    Code Status: Full Code      Disposition:  - ICU  Critical care time exclusive of procedures: 30    Pastor Warren MD  [facility]      Clinically Significant Risk Factors Present on Admission           ______________________________________________________________________    Interval History   Remains on ventilator supine      Physical Exam   Vital Signs: Temp: 97.4  F (36.3  C) Temp src: Axillary BP: (!) 142/53 Pulse: 78   Resp: 15 SpO2: 93 % O2 Device: Mechanical Ventilator Oxygen Delivery: 35 LPM  Weight: 205 lbs .44 oz    Supine   Opens eyes to loud voice but does not follow commands , does not track   ETT ok  No Facial edema   Right IJ  HD catheter   Lungs clear  H- Rr w/o m  Abdomen non tender, moderate scrotal edema    Hands 1+ edema, 1+ in thighs   Feet and hands are warm     Data   I reviewed all medications, new labs and imaging results over the last 24 hours.  Arterial Blood Gases   Recent Labs   Lab 11/28/21  0515 11/27/21  1505 11/27/21  0512 11/26/21  0626   PH 7.25* 7.35 7.37 7.37   PCO2 44 39 38 37   PO2 69* 62* 89 127*   HCO3 19* 21 22 22       Complete Blood Count   Recent Labs   Lab 11/28/21  0706 11/28/21  0513 11/27/21  0511 11/26/21  0626 11/24/21  0540   WBC  --  12.5* 15.5* 23.1* 19.4*   HGB 7.9* 7.9* 9.4* 11.2* 10.4*   PLT  --  87* 79* 129* 128*       Basic Metabolic Panel  Recent Labs   Lab 11/28/21  0814 11/28/21  0513 11/28/21  0419 11/28/21  0031 11/27/21  0815 11/27/21  0511 11/26/21  0825 11/26/21  0626 11/26/21 0032 11/25/21  2150   NA  --  136  --   --   --  137  --  138  --  139   POTASSIUM  --  3.1*  --   --    --  3.4  --  3.3*  --  3.2*   CHLORIDE  --  102  --   --   --  103  --  102  --  103   CO2  --  19*  --   --   --  22  --  22  --  22   BUN  --  84*  --   --   --  64*  --  86*  --  85*   CR  --  2.96*  --   --   --  2.44*  --  3.02*  --  2.89*   * 160* 123* 140*   < > 191*   < > 105*   < > 132*    < > = values in this interval not displayed.       Liver Function Tests  Recent Labs   Lab 11/24/21  0540 11/23/21  0533 11/22/21  0448   ALBUMIN  --   --  1.3*   INR 0.87 0.85 0.90       Coagulation Profile  Recent Labs   Lab 11/24/21  0540 11/23/21  0533 11/22/21  0448   INR 0.87 0.85 0.90       IMAGING:  No results found for this or any previous visit (from the past 24 hour(s)).

## 2021-11-29 NOTE — PLAN OF CARE
ICU End of Shift Summary.  For vital signs and complete assessments, please see documentation flowsheets.     Pertinent assessments: Patient remains intubated and sedated. Tolerating tube feeds. Lowe urine output.  Major Shift Events: dialysis done today. PF ratio poor, prone this evening. Hypertensive see emar for meds given. Potassium replaced during dialysis per Dialysis RN.  Plan (Upcoming Events): continue to monitor  Discharge/Transfer Needs: to be determined    Bedside Shift Report Completed : yes  Bedside Safety Check Completed:yes    Saloni Peters RN

## 2021-11-29 NOTE — PLAN OF CARE
ICU End of Shift Summary.  For vital signs and complete assessments, please see documentation flowsheets.     Pertinent assessments: RASS goal -1 to 0, cardiac-SR, Resp on full vent see RT note for settings, GI/ TF see new orders, isai dickerson UOP.  Major Shift Events: sedation weaning, supine at 9 am  Plan (Upcoming Events): wean sedation, continue ICU cares  Discharge/Transfer Needs: TBD    Bedside Shift Report Completed : Y  Bedside Safety Check Completed: Y

## 2021-11-29 NOTE — PROGRESS NOTES
[facility]  Hospitalist Progress Note  Celestina Hamilton MD 11/29/21     Reason for Stay (Diagnosis): COVID         Assessment and Plan:      Summary of Stay: Rosanna Romero is a 66-year-old male with medical history including diabetes not on insulin, neuropathy, hypertension, LINDSAY who is vaccinated against COVID-19 who was admitted on 11/14/2021 with cough and shortness of breath and recent positive COVID-19 test and was found to have acute hypoxic respiratory due to COVID-19 and suspected secondary bacterial pneumonia.  He has had evolving renal failure with worsening respiratory status and metabolic acidosis which prompted stabilization early 11/18 with intubation, central line A-line placement and transfer to the ICU.  He was given bicarbonate containing fluids, insulin drip, vasopressors and remains on the vent.     Acid-base status has improved but he is oliguric still.  Procalcitonin was dramatically elevated and he was covered for bacterial sepsis w/ empiric abx though these were stopped due to negative cultures on 11/22.  CT of his chest abdomen and pelvis did not show obvious source other than pneumonia though there was a question of some enteritis.     He remains intubated with evolving renal failure and shock, hemodynamics have now improved.  He began HD on 11/22.    Problem List/Assessment and Plan:     Septic shock, suspected pulmonary source, likely pneumonia.  Suspect the driving issue is COVID-19 pneumonia.  Treated w/ empiric abx, now off.  --zosyn 11/16-11/8.  --meropenem and vancomycin on 11/18.  Discontinued vancomycin though that was likely on board until 11/22 when HD started.  --stopped meropenem 11/22.  --Has required intermittent pressors with HD recently  --Blood cultures no growth to date  --Has a central line and arterial line  --Was proned overnight, still with significant oxygen requirements     Severe metabolic acidosis: Suspect due to combination of ATN from renal failure  and ketosis potentially from DKA given severe hyperglycemia.  Acid-base status improved.  --Nephrology consulted  --HD per nephrology.  --Ketones improved, transitioned to subcu insulin.  --Trend basic metabolic panel    Hypokalemia: Defer replacement to Nephrology so we are both not replacing as he is undergoing HD.     Oliguric acute renal failure: Nephrology following. Had progressive renal failure and acidosis.  Initiated on HD on 11/22.  Last HD on 11/28 with 3 L UF.  --Nephrology following, appreciate recommendations  --HD today  --Will use Norepinephrine on HD if needed     Type 2 diabetes, evidence of DKA: Somewhat unusual.  Is a type II diabetic not normally on insulin (PTA on Glipizide and Metformin) and presented with significantly elevated ketones and blood sugars in the 300s.  Had been on insulin drip, now SQ.  --Lantus 8 units QAM   --Aspart 4 units Q4H in addition to NPO sliding scale insulin     Acute hypoxemic respiratory failure due to pneumonia, hemodynamic instability, volume overload: Intubated and sedated.    --Vent management as per intensivist.     Severe Malnutrition: Due to acute on chronic illness.  Continue TF.  Nutrition following.     Prominent constipation seen on CT scan: Added bowel regimen. Appears to be stooling.     Atrial fibrillation with RVR: Developed AF with RVR in addition to ventricular tachycardia.  He did receive Amiodarone bolus and drip but subsequently converted to NSR and Amiodarone discontinued.     History of upper GI bleed: Continue Protonix.    Gout: Hold Allopurinol given AZALIA.     COVID-19: Decadron x 10 days completed.  Remdesivir stopped due to renal failure (received 2 doses).    HTN: PTA on Amlodipine, Lisinopril, Clonidine and Metoprolol on which were all initially held as he was requiring pressors. Off antihypertensives.      Anemia and Thrombocytopenia: Likely due to acute illness, not HIT pattern.  Receiving EPO with HD.  No indication for transfusions.   Repeat tomorrow. Platelets are improving.    Diet: Adult Formula Drip Feeding: Continuous Novasource Renal; Nasojejunal; Goal Rate: 30; mL/hr; Medication - Feeding Tube Flush Frequency: At least 15-30 mL water before and after medication administration and with tube clogging; increase to new goal ...    DVT Prophylaxis: Heparin SQ  Del Valle Catheter: PRESENT, indication: Deep Sedation/Paralysis  Code Status: Full Code      Disposition Plan   Expected discharge: TBD once respiratory failure resolved.  Remains on the ventilator.  Entered: Celestina Hamilton MD 11/29/2021, 8:09 AM       The patient's care was discussed with the Bedside Nurse, Patient's Family and Intensivist.    Hospitalist Service  [facility]          Interval History (Subjective):      Events from overnight resumed. Patient was proned yesterday evening, remains proned this morning.  Had HD yesterday with UF 3L.    I updated patient's son, Raza, by phone.  All questions answered. I informed him that he required to be proned due to worsening oxygen status.  Explained that he is slightly worse today compared to yesterday.  Also discussed that he continues to require HD.  Discussed getting palliative care involved to help with continuity, symptom management as well as goals of care should his dad not continue to improve.  He is fine with them getting involved but notes that mornings are better to talk as he just got to work.    I discussed the case with Lucy Carroll with palliative care, she will call Raza tomorrow.                  Physical Exam:      Last Vital Signs:  BP (!) 179/89   Pulse 84   Temp 98.5  F (36.9  C) (Temporal)   Resp 23   Wt 93 kg (205 lb 0.4 oz)   SpO2 100%   BMI 27.81 kg/m      General: Intubated and sedated, currently proned  HEENT: Normocephalic and atraumatic, eyes anicteric and without scleral injection, ETT in place.  Cardiac: RRR, 2+ edema of hands and feet.  Pulmonary: Normal chest rise, normal work of  breathing. CTAB without crackles or wheezing  Abdomen: Unable to examine as he is currently proned.  Extremities: no deformities.  Warm, well perfused.  Skin: no rashes or lesions.  Warm and Dry.  Neuro: Intubated and sedated.  Psych: Intubated and sedated.         Medications:      All current medications were reviewed with changes reflected in problem list.         Data:      All new lab and imaging data was reviewed.   Labs:  Recent Labs   Lab 11/29/21  0626 11/28/21  0706 11/28/21  0513 11/27/21  0511   WBC 10.8  --  12.5* 15.5*   HGB 8.1* 7.9* 7.9* 9.4*   HCT 26.1*  --  25.8* 29.0*   MCV 92  --  94 90   *  --  87* 79*     Recent Labs   Lab 11/29/21  0626 11/29/21  0415 11/28/21  2325 11/28/21  0814 11/28/21  0513 11/27/21  0815 11/27/21  0511 11/25/21  0818 11/25/21  0443 11/24/21  0825 11/24/21  0540 11/23/21  0757 11/23/21  0533     --   --   --  136  --  137   < > 138  --  141  --  146*   POTASSIUM 3.3*  --   --   --  3.1*  --  3.4   < > 3.6  --  3.5   < > 3.2*   CHLORIDE 102  --   --   --  102  --  103   < > 102  --  104  --  104   CO2 19*  --   --   --  19*  --  22   < > 23  --  23  --  23   ANIONGAP 15*  --   --   --  15*  --  12   < > 13  --  14  --  19*   * 151* 153*   < > 160*   < > 191*   < > 113*   < > 139*   < > 244*   BUN 69*  --   --   --  84*  --  64*   < > 70*  --  79*  --  99*   CR 2.41*  --   --   --  2.96*  --  2.44*   < > 2.65*  --  3.05*  --  3.80*   GFRESTIMATED 27*  --   --   --  21*  --  27*   < > 24*  --  20*  --  16*   GRAZYNA 7.6*  --   --   --  6.7*  --  6.9*   < > 7.0*  --  6.9*  --  6.7*   MAG  --   --   --   --   --   --   --   --  2.0  --  2.1  --  2.3   PHOS 6.6*  --   --   --  8.1*  --  6.5*   < > 6.0*  --  5.3*  --  4.4    < > = values in this interval not displayed.     Recent Labs   Lab 11/24/21  0540   DD 3.67*     Recent Labs   Lab 11/24/21  0540 11/23/21  0533   CRP 22.9* 34.4*      Imaging:   No results found for this or any previous visit (from the past  24 hour(s)).    Celestina Hamilton MD

## 2021-11-29 NOTE — PROGRESS NOTES
RT note     [facility] ICU VENTILATOR RESPIRATORY NOTE  Date of Admission: 11/14/21  Date of Intubation (most recent): 11/18/21  Reason for Mechanical Ventilation: Resp Failure  Number of Days on Mechanical Ventilation: 12  Met Criteria for Pressure Support Trial: No  Reason for No Pressure Support Trial: FiO2 40%  Significant Events Today: none  ABG Results: 7.41/37/56/24 on 60% (PF ratio 93 @1501  11/28/21  Current vent settings CMV/AC 24/420/8+/40%     Plan:  Will continue to  Monitor

## 2021-11-29 NOTE — PROGRESS NOTES
CLINICAL NUTRITION SERVICES - REASSESSMENT NOTE    Recommendations Ordered by Registered Dietitian (RD):     Nepro at 40 mL/hr - contains fiber    Free water flush 60 mL q4 hours    Prosource 2 packets TID   Future/Additional Recommendations:     Total goal rate pending tolerance, kcal from propofol   Malnutrition:  % Weight Loss: unable to determine, edema masking dry weight  % Intake:  </= 75% for >/= 1 month   Subcutaneous Fat Loss: mild, as outlined below  Muscle Loss: mild to moderate, as outlined below  Fluid Retention: mild to severe edema    Malnutrition Diagnosis: Severe malnutrition  In Context of:  Acute illness or injury with underlying Chronic illness or disease     EVALUATION OF PROGRESS TOWARD GOALS/NEW FINDINGS   Progress towards goals will be monitored and evaluated per protocol and Practice Guidelines    Patient remains intubated, TF at goal rate:  Type of Feeding Tube: NJ (11/19)  Enteral Frequency:  Continuous  Enteral Regimen: Novasource Renal to 30 mL/hr  Total Enteral Provisions: 720 mL daily provides 1440 kcal, 6 gm protein, 132 gm CHO, 0 gm fiber and 516 mL free water  Prosource 2 packets TID provides an additional 66 gm protein, 240 kcal  TOTAL = 1883 kcal (22 kcal/kg) and 132 gm protein (1.5 gm/kg)  Free Water Flush: 60 mL q4 hours  Total EN volume received, 5 day average: 696 mL + propofol contributing kcal     Updated nutrition focused physical exam:  Muscle loss: moderate depletion in clavicle, scapular, acromion and temporal regions. Edema masking acute on chronic depletion in calves, patellar and upper thigh region, hands (sarcopenia component)  Fat loss: mild to moderate in upper and lower arms, orbital region; mild in thoracic region. Again, edema masking full extent of changes.  Hair, nails: no obvious signs of micronutrient deficiencies  Proned. Unable to assess dentition. Oral mucosa moist, skin around nasal bridle intact. Skin pale.       Last BM: 500-700 mL/24 hours rectal tube  output    Wounds/WOCN : mucosal tissue (R lip), penis due to friction, R medial shin skin tear    HD     Fluid: +2-4 generalized, dependent, BUE, BLE edema    Weight: 92 kg -up 8 kg fromadmit    Palliative: consulted for goals of care    Taras nutrition score: 3; total score: 12    I/O last 3 completed shifts:    In: 1563.29 [I.V.:483.29; NG/GT:360]    Out: 4085 [Urine:285; Other:3000; Stool:800]     Temp (24hrs), Av.8  F (36.6  C), Min:96.3  F (35.7  C), Max:98.8  F (37.1  C)     Ventilation Mode: CMV/AC  (Continuous Mandatory Ventilation/ Assist Control)  FiO2 (%): 40 %  Rate Set (breaths/minute): 24 breaths/min  Tidal Volume Set (mL): 420 mL  PEEP (cm H2O): 8 cmH2O  Oxygen Concentration (%): 40 %  Resp: 29    Labs:     Electrolytes  Potassium (mmol/L)   Date Value   2021 3.3 (L)   2021 3.1 (L)   2021 3.4   2021 3.4   2021 3.8   2021 3.9     Phosphorus (mg/dL)   Date Value   2021 6.6 (H)   2021 8.1 (H)   2021 6.5 (H)   2021 7.7 (H)   2021 6.0 (H)    Blood Glucose  Glucose (mg/dL)   Date Value   2021 168 (H)   2021 160 (H)   2021 191 (H)   2021 105 (H)   2021 132 (H)     GLUCOSE BY METER POCT (mg/dL)   Date Value   2021 151 (H)   2021 153 (H)   2021 107 (H)   2021 137 (H)   2021 140 (H)     Hemoglobin A1C (%)   Date Value   2021 8.4 (H)   2021 8.1 (H)   2019 9.1 (H)    Inflammatory Markers  CRP Inflammation (mg/L)   Date Value   2021 22.9 (H)   2021 34.4 (H)   2021 56.5 (H)     WBC Count (10e3/uL)   Date Value   2021 10.8   2021 12.5 (H)   2021 15.5 (H)     Albumin (g/dL)   Date Value   2021 1.3 (L)   2021 1.4 (L)   2021 1.9 (L)   2021 3.5   2010 3.3   2010 3.6      Magnesium (mg/dL)   Date Value   2021 2.0   2021 2.1   2021 2.3     Sodium (mmol/L)   Date Value    11/29/2021 136   11/28/2021 136   11/27/2021 137   07/02/2021 141   07/01/2021 141   06/30/2021 137    Renal  Urea Nitrogen (mg/dL)   Date Value   11/29/2021 69 (H)   11/28/2021 84 (H)   11/27/2021 64 (H)   07/02/2021 10   07/01/2021 18   06/30/2021 22     Creatinine (mg/dL)   Date Value   11/29/2021 2.41 (H)   11/28/2021 2.96 (H)   11/27/2021 2.44 (H)   07/02/2021 0.82   07/01/2021 1.08   06/30/2021 1.29 (H)     Additional  Triglycerides (mg/dL)   Date Value   11/23/2021 424 (H)   11/21/2021 479 (H)   11/20/2021 446 (H)     Ketones Urine (mg/dL)   Date Value   11/18/2021 10  (A)   06/30/2021 Negative      Meds:       - MEDICATION INSTRUCTIONS for Dialysis Patients -   Does not apply See Admin Instructions     acetaminophen  975 mg Per Feeding Tube Q8H     B and C vitamin Complex with folic acid  5 mL Oral or Feeding Tube Daily     chlorhexidine  15 mL Mouth/Throat BID     heparin ANTICOAGULANT  5,000 Units Subcutaneous Q8H     insulin aspart  4 Units Subcutaneous Q4H     insulin aspart  1-12 Units Subcutaneous Q4H     insulin glargine  8 Units Subcutaneous QAM AC     pantoprazole  40 mg Per Feeding Tube QAM AC     protein modular  2 packet Per Feeding Tube TID        dextrose       fentaNYL 50 mcg/hr (11/28/21 2217)     norepinephrine Stopped (11/27/21 0637)     propofol (DIPRIVAN) infusion 25 mcg/kg/min (11/29/21 1395)    Propofol gtt at 14 mL/hr to provide 370 kcal daily from lipids    ASSESSED NUTRITION NEEDS (PER APPROVED PRACTICE GUIDELINES; DW: 84 kg suspected dry weight):  Estimated Energy Needs: 1953+ kcals (PSU 2003b)   Justification: maintenance and vented; 1+ week in ICU  Estimated Protein Needs: 102-169 grams protein (1.2-2 g pro/Kg)  Justification: hypercatabolism with critical illness, acute HD  Estimated Fluid Needs: per MD     Difficult to accurately assess estimated needs with prolonged critical clinical course and hypermetabolic nature of COVID19 infection without access to metabolic cart  data    Previous Goals:   EN + Propofol to meet % estimated needs   Evaluation: Met     Previous Nutrition Diagnosis:   Inadequate enteral nutrition infusion related to lower TF rate w/ previously higher Propofol provisions as evidenced by TF at 20 mL/hr meeting ~90% estimated energy needs in combination w/ Propofol and Prosource   Evaluation: in progress, updated below     CURRENT NUTRITION DIAGNOSIS  Malnutrition related to hypercatabolism associated with COVID19 infection as evidenced by edema masking dry weight, fat and muscle wasting    INTERVENTIONS  Recommendations / Nutrition Prescription  Updated TF regimen to better match estimated needs:    Type of Feeding Tube: NJ (11/19)    Enteral Frequency:  Continuous    Enteral Regimen: Nepro at 40 mL/hr    Total Enteral Provisions: 960 mL daily to provide 1728 kcals, 78 g protein, 701 ml free H2O, 155 g CHO and 12 g Fiber daily. Meets > 100% of DRI's at goal rate.    Continue 2 prosource TID for an additional 66 grams protein    Free Water Flush: 60 mL q4 hours    Total goal rate pending kcal from propofol    Implementation  EN Composition, EN Schedule, Multivitamin/Minerals and Feeding Tube Flush: Entered orders to reflect regimen outlined above  Collaboration and Referral of care: Discussed patient during interdisciplinary care rounds this morning    Goals  TF + kcal from propofol to meet % of estimated needs      MONITORING AND EVALUATION:  Progress towards goals will be monitored and evaluated per protocol and Practice Guidelines      Yvonne Camargo MS, RDN-AP, LD, CNSC  Pager - 3rd floor/ICU: [phone]  Pager - All other floors: [phone]  Pager - Weekend/holiday: [phone]  Office: [phone]

## 2021-11-29 NOTE — PROGRESS NOTES
ICU Attending Note    I have seen and examined Rosanna Romero, reviewed the patient's history, pertinent labs, vital signs, medications, physical exam, and radiographs.  The patient is critically ill by my examination and requires continued ICU monitoring and cares    Rosanna Romero is admitted on 11/14/2021 for COVID-19 infection. Intubated 11/18 due to worsening hypoxemia and afib RVR,  AZALIA.  --needing HD starting 11/22.    Recent Events:  Not waking on minimal sedation.  FiOs is low.      Exam:  Temp:  [97.8  F (36.6  C)-98.8  F (37.1  C)] 98.5  F (36.9  C)  Pulse:  [68-98] 68  Resp:  [8-31] 30  BP: (179)/(89) 179/89  MAP:  [61 mmHg-115 mmHg] 64 mmHg  Arterial Line BP: ()/(43-80) 114/45  FiO2 (%):  [40 %-60 %] 40 %  SpO2:  [93 %-100 %] 98 %  @I/O last 3 completed shifts:  In: 1563.29 [I.V.:483.29; NG/GT:360]  Out: 4085 [Urine:285; Other:3000; Stool:800]  Ventilation Mode: CMV/AC  (Continuous Mandatory Ventilation/ Assist Control)  FiO2 (%): 40 %  Rate Set (breaths/minute): 24 breaths/min  Tidal Volume Set (mL): 420 mL  PEEP (cm H2O): 8 cmH2O  Oxygen Concentration (%): 40 %  Resp: 30    Lungs coarse  Heart rrr  abd soft, nontender  Ext warm, edematous    Results:  ABG   Recent Labs   Lab 11/29/21  0627 11/28/21  1501 11/28/21  0515 11/27/21  1505   PH 7.38 7.41 7.25* 7.35   PCO2 33* 37 44 39   PO2 104 56* 69* 62*   HCO3 19* 24 19* 21     CBC  Recent Labs   Lab 11/29/21  0626 11/28/21  0706 11/28/21  0513 11/27/21  0511 11/26/21  0626   WBC 10.8  --  12.5* 15.5* 23.1*   HGB 8.1* 7.9* 7.9* 9.4* 11.2*   HCT 26.1*  --  25.8* 29.0* 33.2*   *  --  87* 79* 129*     BMP  Recent Labs   Lab 11/29/21  1233 11/29/21  0626 11/29/21  0415 11/28/21  2325 11/28/21  0814 11/28/21  0513 11/27/21  0815 11/27/21  0511 11/26/21  0825 11/26/21 0626   NA  --  136  --   --   --  136  --  137  --  138   POTASSIUM  --  3.3*  --   --   --  3.1*  --  3.4  --  3.3*   CHLORIDE  --  102  --   --   --  102  --  103  --  102   CO2  --   19*  --   --   --  19*  --  22  --  22   BUN  --  69*  --   --   --  84*  --  64*  --  86*   CR  --  2.41*  --   --   --  2.96*  --  2.44*  --  3.02*   * 168* 151* 153*   < > 160*   < > 191*   < > 105*    < > = values in this interval not displayed.     LFT  Recent Labs   Lab 11/24/21  0540 11/23/21  0533   INR 0.87 0.85     PancreasNo lab results found in last 7 days.  INR  Lab Results   Component Value Date    INR 0.87 11/24/2021    INR 0.85 11/23/2021    INR 0.90 11/22/2021    INR 0.99 11/21/2021       Current Issues:  Neuro/ pain/ sedation:  - Reduce Fentanyl and propofol  - stopped precedex seconday to bradycardia   - Goal RASS now -1 to 0     Pulmonary: COVID ARDS   Ventilation Mode: CMV/AC  (Continuous Mandatory Ventilation/ Assist Control)  FiO2 (%): 40 %  Rate Set (breaths/minute): 24 breaths/min  Tidal Volume Set (mL): 420 mL  PEEP (cm H2O): 8 cmH2O  Oxygen Concentration (%): 40 %  Resp: 30  -Ventilator day 11  Improved oxygenation over last few days   P: PS trials when mental status improved.      Cardiovascular:   - off and on vasopressors with HD   - Amio bolus and drip started last week for afib RVR and ventricular tachycardia --now in NSR x 6 days , remains off amiodarone   P:  Pressors prn for HD volume removal     GI/Nutrition:  - TFs at  Goal     Renal/ Fluid Balance: Acute kidney injury and metabolic acidosis  - Acute kidney failure: dialysis as needed per nephrology    Endocrine: history of DM  - Hyperglycemia  on admission. - glucoses improved   P: continue AM lantus  8 and novolog 4 q 4      ID:  COVID pneumonia   - empiric meropenem (11/18-11/21) and vancomycin (11/17-11/20)   Decadron day 10--completed   Got only 2 d remdesivir seconday to AZALIA   - follow up cultures--staph epi and candida only in sputum   - P: off antibiotics x 1 week     Hematology:  -mild anemia and thrombocytopenia--mild --slightly higher platelets.  Does not fit an HIT pattern    EPO today     MSK: history of  gout  - holding allopurinol seconday to AZALIA     Lines/ tubes/ drains:  Del Valle Catheter: PRESENT, indication: Deep Sedation/Paralysis  Central Lines: PRESENT  PICC Triple Lumen 11/21/21 Left Basilic-Site Assessment: WDL (Okay per RN)  CVC Double Lumen Right External jugular Non - tunneled-Site Assessment: WDL    Prophylaxis:  - DVT Prophylaxis: Heparin SQ  - PUD Prophylaxis: protonix    Code Status: Full Code      Disposition:  - ICU    Evaluation and management time exclusive of procedures was 30 minutes critical care time including:  examination with the ICU team, discussion of the patient's condition with other physicians and members of the care team, reviewing all data related to the patient, and time utilizing the EMR for documentation of this patient's care.      Nader Menjivar MD  Acute Care Surgery/Critical Care

## 2021-11-29 NOTE — PLAN OF CARE
ICU End of Shift Summary.  For vital signs and complete assessments, please see documentation flowsheets.     Pertinent assessments: Remains on full vent support,sedated with propofol at 25mcgs and fentanyl at 50,opens eyes but does not track or follow commands Minimal urine output.TF at goal. Continues to have liquid stools rectal tube  in place                                                                                                                                                    Major Shift Events: O2  weaned to 40%  Plan (Upcoming Events): Wean from vent and sedation as able  Discharge/Transfer Needs: TBD    Bedside Shift Report Completed : yes  Bedside Safety Check Completed:yes

## 2021-11-30 NOTE — PROGRESS NOTES
ICU Attending Note    I have seen and examined Rosanna Romero, reviewed the patient's history, pertinent labs, vital signs, medications, physical exam, and radiographs.  The patient is critically ill by my examination and requires continued ICU monitoring and cares    Rosanna Romero is admitted on 11/14/2021 for COVID-19 infection. Intubated 11/18 due to worsening hypoxemia and afib RVR,  AZALIA.  --needing HD starting 11/22.    Recent Events:  Still not interactive on minimal sedation.  Moves face to stimuli.    Exam:  Temp:  [96.7  F (35.9  C)-97.6  F (36.4  C)] 97.5  F (36.4  C)  Pulse:  [] 103  Resp:  [11-29] 24  MAP:  [66 mmHg-134 mmHg] 94 mmHg  Arterial Line BP: (108-208)/(45-93) 164/61  FiO2 (%):  [40 %-70 %] 60 %  SpO2:  [90 %-98 %] 93 %  @I/O last 3 completed shifts:  In: 1536.77 [I.V.:381.77; NG/GT:405]  Out: 1275 [Urine:675; Stool:600]  Ventilation Mode: CMV/AC  (Continuous Mandatory Ventilation/ Assist Control)  FiO2 (%): 60 %  Rate Set (breaths/minute): 24 breaths/min  Tidal Volume Set (mL): 420 mL  PEEP (cm H2O): 8 cmH2O  Oxygen Concentration (%): 40 %  Resp: 24    Lungs coarse  Heart rrr  abd soft, nontender  Ext warm, edematous    Results:  ABG   Recent Labs   Lab 11/30/21  0439 11/29/21  1512 11/29/21  0627 11/28/21  1501   PH 7.31* 7.37 7.38 7.41   PCO2 33* 32* 33* 37   PO2 99 91 104 56*   HCO3 16* 18* 19* 24     CBC  Recent Labs   Lab 11/30/21  0438 11/29/21  0626 11/28/21  0706 11/28/21  0513 11/27/21  0511   WBC 9.6 10.8  --  12.5* 15.5*   HGB 7.9* 8.1* 7.9* 7.9* 9.4*   HCT 24.6* 26.1*  --  25.8* 29.0*   * 141*  --  87* 79*     BMP  Recent Labs   Lab 11/30/21  0438 11/30/21  0406 11/30/21  0005 11/29/21 2003 11/29/21  0812 11/29/21  0626 11/28/21  0814 11/28/21  0513 11/27/21  0815 11/27/21  0511     --   --   --   --  136  --  136  --  137   POTASSIUM 3.2*  --   --   --   --  3.3*  --  3.1*  --  3.4   CHLORIDE 102  --   --   --   --  102  --  102  --  103   CO2 16*  --   --   --   --   19*  --  19*  --  22   BUN 85*  --   --   --   --  69*  --  84*  --  64*   CR 2.89*  --   --   --   --  2.41*  --  2.96*  --  2.44*   * 136* 142* 121*   < > 168*   < > 160*   < > 191*    < > = values in this interval not displayed.     LFT  Recent Labs   Lab 11/30/21  0438 11/24/21  0540   ALBUMIN 1.2*  --    INR  --  0.87     PancreasNo lab results found in last 7 days.  INR  Lab Results   Component Value Date    INR 0.87 11/24/2021    INR 0.85 11/23/2021    INR 0.90 11/22/2021    INR 0.99 11/21/2021       Current Issues:  Neuro/ pain/ sedation:  - Reduce Fentanyl and propofol  - stopped precedex seconday to bradycardia   - Goal RASS now -1 to 0     Pulmonary: COVID ARDS   O2 needs up from yesterday.  PS as able.   P: PS trials when mental status improved.      Cardiovascular:   - off and on vasopressors with HD   - Amio bolus and drip started last week for afib RVR and ventricular tachycardia --now in NSR x 6 days , remains off amiodarone   P:  Pressors prn for HD volume removal     GI/Nutrition:  - Protein calorie malnutrition due to critical illness and stress response  Tube feeds    Renal/ Fluid Balance: Acute kidney injury and metabolic acidosis  - Acute kidney failure: dialysis as needed per nephrology    Endocrine: history of DM  - Hyperglycemia  on admission. - glucoses improved   P: continue AM lantus  8 and novolog 4 q 4      ID:  COVID pneumonia   - empiric meropenem (11/18-11/21) and vancomycin (11/17-11/20)   Decadron day 10--completed   Got only 2 d remdesivir seconday to AZALIA   - follow up cultures--staph epi and candida only in sputum   - P: off antibiotics x 1 week     Hematology:  -mild anemia and thrombocytopenia--mild --slightly higher platelets.  Does not fit an HIT pattern    EPO today     MSK: history of gout  - holding allopurinol seconday to AZALIA     Lines/ tubes/ drains:  Del Valle Catheter: PRESENT, indication: sedation, need to track outs  Central Lines: PRESENT  PICC Triple Lumen  11/21/21 Left Basilic-Site Assessment: WDL (Paty per RN)  CVC Double Lumen Right External jugular Non - tunneled-Site Assessment: WDL    Prophylaxis:  - DVT Prophylaxis: Heparin SQ  - PUD Prophylaxis: protonix    Code Status: Full Code      Disposition:  - ICU    Evaluation and management time exclusive of procedures was 30 minutes critical care time including:  examination with the ICU team, discussion of the patient's condition with other physicians and members of the care team, reviewing all data related to the patient, and time utilizing the EMR for documentation of this patient's care.      Nader Menjivar MD  Acute Care Surgery/Critical Care

## 2021-11-30 NOTE — PROGRESS NOTES
[facility]  Hospitalist Progress Note  Celestina Hamilton MD 11/30/21     Reason for Stay (Diagnosis): COVID         Assessment and Plan:      Summary of Stay: Rosanna Romero is a 66-year-old male with medical history including diabetes not on insulin, neuropathy, hypertension, LINDSAY who is vaccinated against COVID-19 who was admitted on 11/14/2021 with cough and shortness of breath and recent positive COVID-19 test and was found to have acute hypoxic respiratory due to COVID-19 and suspected secondary bacterial pneumonia.  He has had evolving renal failure with worsening respiratory status and metabolic acidosis which prompted stabilization early 11/18 with intubation, central line A-line placement and transfer to the ICU.  He was given bicarbonate containing fluids, insulin drip, vasopressors and remains on the vent.     Acid-base status has improved but he is oliguric still.  Procalcitonin was dramatically elevated and he was covered for bacterial sepsis w/ empiric abx though these were stopped due to negative cultures on 11/22.  CT of his chest abdomen and pelvis did not show obvious source other than pneumonia though there was a question of some enteritis.     He remains intubated with evolving renal failure and shock, hemodynamics have now improved.  He began HD on 11/22.  Mental status continues to be an issue with weaning from vent.    Problem List/Assessment and Plan:     Septic shock, suspected pulmonary source, likely pneumonia.  Suspect the driving issue is COVID-19 pneumonia.  Treated w/ empiric abx, now off.  --zosyn 11/16-11/8.  --meropenem and vancomycin on 11/18.  Discontinued vancomycin though that was likely on board until 11/22 when HD started.  --stopped meropenem 11/22.  --Has required intermittent pressors, now Hypertensive  --Blood cultures no growth to date  --Has a central line and arterial line     Severe metabolic acidosis: Suspect due to combination of ATN from renal failure and  ketosis potentially from DKA given severe hyperglycemia.  Acid-base status improved.  --Nephrology consulted  --HD per nephrology.  --Ketones improved, transitioned to subcu insulin.  --Trend basic metabolic panel    Hypokalemia: Defer replacement to Nephrology so we are both not replacing as he is undergoing HD.     Oliguric acute renal failure: Nephrology following. Had progressive renal failure and acidosis.  Initiated on HD on 11/22.  HD today.  --Nephrology following, appreciate recommendations  --HD today  --Bumex 2 mg IV BID started 11/30     Type 2 diabetes, evidence of DKA: Somewhat unusual.  Is a type II diabetic not normally on insulin (PTA on Glipizide and Metformin) and presented with significantly elevated ketones and blood sugars in the 300s.  Had been on insulin drip, now SQ.  --Lantus 8 units QAM   --Aspart 4 units Q4H in addition to NPO sliding scale insulin     Acute hypoxemic respiratory failure due to pneumonia, hemodynamic instability, volume overload: Intubated and sedated.    --Vent management as per intensivist.     Severe Malnutrition: Due to acute on chronic illness.  Continue TF.  Nutrition following.     Prominent constipation seen on CT scan: Added bowel regimen. Appears to be stooling.     Atrial fibrillation with RVR: Developed AF with RVR in addition to ventricular tachycardia.  He did receive Amiodarone bolus and drip but subsequently converted to NSR and Amiodarone discontinued.     History of upper GI bleed: Continue Protonix.    Gout: Hold Allopurinol given AZALIA.     COVID-19: Decadron x 10 days completed.  Remdesivir stopped due to renal failure (received 2 doses).    HTN: PTA on Amlodipine, Lisinopril, Clonidine and Metoprolol on which were all initially held as he was requiring pressors. No longer requiring pressors.  Using PRN IV Labetalol as has had spikes of HTN.    Anemia and Thrombocytopenia: Likely due to acute illness, not HIT pattern.  Receiving EPO with HD.  No  indication for transfusions.  Repeat tomorrow. Platelets are improving.    Acute Toxic/Metabolic Encephalopathy: Patient will open his eyes but not follow commands when sedation is lightheaded.  He was on Benzodiazepines for sedation but has been off for a few days.  Could take several more days for benzodiazepines to clear.  No focal findings on exam.    Goals of Care: Patient is currently full code.  Remains critically ill.  Palliative care consulted for ongoing goals of care discussions.    Diet: Adult Formula Drip Feeding: Continuous Nepro with Carbsteady; Nasojejunal; Goal Rate: 40; mL/hr; Medication - Feeding Tube Flush Frequency: At least 15-30 mL water before and after medication administration and with tube clogging; At bag change, n...  NPO for Medical/Clinical Reasons Except for: NPO but receiving Tube Feeding, Meds    DVT Prophylaxis: Heparin SQ  Del Avlle Catheter: PRESENT, indication: Strict 1-2 Hour I&O  Code Status: Full Code      Disposition Plan   Expected discharge: TBD once respiratory failure resolved.  Remains on the ventilator.  Entered: Celestina Hamilton MD 11/30/2021, 8:50 AM       The patient's care was discussed with the Bedside Nurse, Patient's Family, Palliative Consultant and Intensivist.    Hospitalist Service  [facility]          Interval History (Subjective):      Events from overnight resumed. Has been more hypertensive, received IV Labetalol overnight.  Due for HD today.    Spoke with patient's son, Raza, by phone today.  Updated on status.  All questions answered.                   Physical Exam:      Last Vital Signs:  BP (!) 179/89   Pulse 94   Temp 97.4  F (36.3  C) (Temporal)   Resp 17   Wt 94.7 kg (208 lb 12.4 oz)   SpO2 93%   BMI 28.32 kg/m      General: Intubated and sedated, eyes open but does not respond or follow commands  HEENT: Normocephalic and atraumatic, eyes anicteric and without scleral injection, ETT in place.  Cardiac: RRR, no m/g/r, 2+  edema of hands and feet.  Pulmonary: Normal chest rise, rhonchi diffusely, no wheezing or crackles  Abdomen: Soft, non tender, normoactive BS  Extremities: no deformities.  Warm, well perfused.  Skin: no rashes or lesions.  Warm and Dry.  Neuro: Intubated and sedated. Opens eyes but will not track or follow any commands  Psych: Intubated and sedated.         Medications:      All current medications were reviewed with changes reflected in problem list.         Data:      All new lab and imaging data was reviewed.   Labs:  Recent Labs   Lab 11/30/21  0438 11/29/21  0626 11/28/21  0706 11/28/21  0513   WBC 9.6 10.8  --  12.5*   HGB 7.9* 8.1* 7.9* 7.9*   HCT 24.6* 26.1*  --  25.8*   MCV 91 92  --  94   * 141*  --  87*     Recent Labs   Lab 11/30/21  0438 11/30/21  0406 11/30/21  0005 11/29/21  0812 11/29/21  0626 11/28/21  0814 11/28/21  0513 11/25/21  0818 11/25/21  0443 11/24/21  0825 11/24/21  0540     --   --   --  136  --  136   < > 138  --  141   POTASSIUM 3.2*  --   --   --  3.3*  --  3.1*   < > 3.6  --  3.5   CHLORIDE 102  --   --   --  102  --  102   < > 102  --  104   CO2 16*  --   --   --  19*  --  19*   < > 23  --  23   ANIONGAP 17*  --   --   --  15*  --  15*   < > 13  --  14   * 136* 142*   < > 168*   < > 160*   < > 113*   < > 139*   BUN 85*  --   --   --  69*  --  84*   < > 70*  --  79*   CR 2.89*  --   --   --  2.41*  --  2.96*   < > 2.65*  --  3.05*   GFRESTIMATED 22*  --   --   --  27*  --  21*   < > 24*  --  20*   GRAZYNA 7.2*  --   --   --  7.6*  --  6.7*   < > 7.0*  --  6.9*   MAG  --   --   --   --   --   --   --   --  2.0  --  2.1   PHOS 6.8*  --   --   --  6.6*  --  8.1*   < > 6.0*  --  5.3*   ALBUMIN 1.2*  --   --   --   --   --   --   --   --   --   --     < > = values in this interval not displayed.     Recent Labs   Lab 11/24/21 0540   DD 3.67*     Recent Labs   Lab 11/24/21 0540   CRP 22.9*      Imaging:   Recent Results (from the past 24 hour(s))   XR Chest Port 1 View     Narrative    CHEST ONE VIEW PORTABLE   11/29/2021 9:53 AM     HISTORY:  Acute respiratory distress syndrome (ARDS).    COMPARISON: 11/25/2021.      Impression    IMPRESSION: Endotracheal tube has been retracted slightly, with tip  now 6.2 cm above the robin. Right IJ sheath, left PICC line, and  enteric tube are not appreciably changed. Ill-defined opacities  throughout the right lung have increased since the previous exam. Hazy  opacities in the left mid and lower lung are unchanged. No  pneumothorax. Heart size appears stable.    CLOTILDE RODRIGUEZ MD         SYSTEM ID:  [id number]       Celestina Hamilton MD

## 2021-11-30 NOTE — PROGRESS NOTES
Potassium   Date Value Ref Range Status   11/30/2021 3.2 (L) 3.4 - 5.3 mmol/L Final   07/02/2021 3.4 3.4 - 5.3 mmol/L Final     Hemoglobin   Date Value Ref Range Status   11/30/2021 7.9 (L) 13.3 - 17.7 g/dL Final   07/02/2021 10.8 (L) 13.3 - 17.7 g/dL Final     Creatinine   Date Value Ref Range Status   11/30/2021 2.89 (H) 0.66 - 1.25 mg/dL Final   07/02/2021 0.82 0.66 - 1.25 mg/dL Final     Urea Nitrogen   Date Value Ref Range Status   11/30/2021 85 (H) 7 - 30 mg/dL Final   07/02/2021 10 7 - 30 mg/dL Final     Sodium   Date Value Ref Range Status   11/30/2021 135 133 - 144 mmol/L Final   07/02/2021 141 133 - 144 mmol/L Final     INR   Date Value Ref Range Status   11/24/2021 0.87 0.85 - 1.15 Final       DIALYSIS PROCEDURE NOTE  Hepatitis status of previous patient on machine log was checked and verified ok to use with this patients hepatitis status.  Patient dialyzed for 3.5 hrs. on a K4 bath with a net fluid removal of  3L.  A BFR of 350-400 ml/min was obtained via a non-tunneled RIJ.      The treatment plan was discussed with Dr. Da Silva during the treatment.    Total heparin received during the treatment: 1700 units.   Line flushed, clamped and capped with heparin 1:1000 1.4 mL (1400 units) per lumen    Meds  given: epogen 5000u   Complications: none      Person educated: unable - pt sedated.    ICEBOAT? Timeout performed pre-treatment  I: Patient was identified using 2 identifiers  C:  Consent Signed Yes  E: Equipment preventative maintenance is current and dialysis delivery system OK to use  B: Hepatitis B Surface Antigen: Negative; Draw Date: 11/20/2021      Hepatitis B Surface Antibody: Susceptible; Draw Date: 11/20/2021  O: Dialysis orders present and complete prior to treatment  A: Vascular access verified and assessed prior to treatment  T: Treatment was performed at a clinically appropriate time  ?: Patient was allowed to ask questions and address concerns prior to treatment  See flowsheet in EPIC for  further details and post assessment.  Machine water alarm in place and functioning. Transducer pods intact and checked every 15min.   Pt ran bedside .  Chlorine/Chloramine water system checked every 4 hours.  Outpatient Dialysis at [facility]     No pertinent past medical history <<----- Click to add NO pertinent Past Medical History

## 2021-11-30 NOTE — PROGRESS NOTES
RT note     H ICU VENTILATOR RESPIRATORY NOTE  Date of Admission: 11/14/21  Date of Intubation (most recent): 11/18/21  Reason for Mechanical Ventilation: Resp Failure  Number of Days on Mechanical Ventilation: 13  Met Criteria for Pressure Support Trial: yes  Reason for No Pressure Support Trial: Will wean when able  Significant Events Today: none  ABG Results:  7.37/32/91/18 on 40% @1512 11/29/21 (PF ratio 228, supine)  Current vent settings CMV/AC 24/420/8+/40%     Plan:  Will continue to  Monitor    RT Estelita on 11/30/2021 at 3:45 AM

## 2021-11-30 NOTE — PLAN OF CARE
ICU End of Shift Summary.  For vital signs and complete assessments, please see documentation flowsheets.     Pertinent assessments: patient remains intubated and sedated to RASS goal 0, -1. Opens eye spontaneously but not following commands. LS coarse on 40% FiO2. SR/ST overnight. Occasional high BP when in pain and restless. Resolved after bolus of fentanyl. Labetolol given prn. Keofeed changed and increased to 40/hr. Rectal tube in place. Leakage. Del Valle in place. Adequate UO overnight.   Major Shift Events: increased propofol to achieve RASS goal. Labetolol given this am.  Plan (Upcoming Events): HD today.  Discharge/Transfer Needs: continue ICU cares    Bedside Shift Report Completed : y   Bedside Safety Check Completed:y

## 2021-11-30 NOTE — PROGRESS NOTES
Inpatient Dialysis Progress Note            Assessment and Plan:     Rosanna Romero is a 66 year old male who was admitted on 11/14/2021.      1) Baseline Normal Kidney Function.     2) COVID-19 Pneumonia/ARDS - oxygenation is improved.      3) Shock -now off pressors.       4) Metabolic Acidosis      5) AZALIA - oliguric/anuric.  Likely ATN due to sepsis.  Had contrast 11/17/21 but renal function was falling before then. Still oliguric.  Acidosis persists.       6) Hypocalcemia:  Calcium corrects to 9.4 accounting for hypoalbuminemia.       7) Severe hypoalbuminemia - Protein malnutrition.       8) 10 kg up    8) Anemia:  Multifactorial.  On EPO.          Initial dialysis 11/22/21  Next 12/2/21  Will continue to work on ultrafiltration.  Trial of diuretic             Interval History:     Off pressors.  BP up to 197/82.    Weight is ~10 kg up from admission.  On FIO2 40%  Pulling 3 kg.  Some trouble with SVT overnight.  On K4 dialysate.          Dialysis Parameters:     Wt Readings from Last 4 Encounters:   11/30/21 94.7 kg (208 lb 12.4 oz)   06/30/21 84.5 kg (186 lb 4.8 oz)   01/24/19 90.7 kg (199 lb 14.4 oz)   10/10/17 95.4 kg (210 lb 6.4 oz)     I/O last 3 completed shifts:  In: 1536.77 [I.V.:381.77; NG/GT:405]  Out: 1275 [Urine:675; Stool:600]  BP Readings from Last 3 Encounters:   11/28/21 (!) 179/89   07/02/21 138/70       Routine, ONE TIME, Starting today For 1 Occurrences  Weight Loss (kg): 3  Dialysis Temp: 36.5  C  Access Device: CVC  Access Site: R IJ  Dialyzer: Revaclear  Dialysis Bath: K 4  Blood Flow Rate (mL/min): 400  Total Treatment Time (hrs): 3.5  Heparin: low dose         Medications and Allergies:   Reviewed in EPIC      - MEDICATION INSTRUCTIONS for Dialysis Patients -   Does not apply See Admin Instructions     acetaminophen  975 mg Per Feeding Tube Q8H     B and C vitamin Complex with folic acid  5 mL Oral or Feeding Tube Daily     chlorhexidine  15 mL Mouth/Throat BID     heparin ANTICOAGULANT   5,000 Units Subcutaneous Q8H     insulin aspart  4 Units Subcutaneous Q4H     insulin aspart  1-12 Units Subcutaneous Q4H     insulin glargine  8 Units Subcutaneous QAM AC     pantoprazole  40 mg Per Feeding Tube QAM AC     protein modular  2 packet Per Feeding Tube BID     sodium chloride (PF)  9 mL Intracatheter During Dialysis/CRRT (from stock)     sodium chloride (PF)  9 mL Intracatheter During Dialysis/CRRT (from stock)     sodium chloride (PF) 0.9%  1.3-2.6 mL Intracatheter Once in dialysis/CRRT     sodium chloride (PF) 0.9%  1.3-2.6 mL Intracatheter Once in dialysis/CRRT     sodium chloride 0.9%, acetaminophen, acetaminophen, alteplase, alteplase, bisacodyl, dextrose, glucose **OR** dextrose **OR** glucagon, docusate, fentaNYL, labetalol, naloxone **OR** naloxone **OR** naloxone **OR** naloxone, ondansetron **OR** ondansetron, sennosides, sodium chloride (PF), sodium chloride (PF)   No Known Allergies           Labs:     [facility]  Recent Labs   Lab 11/30/21  0438 11/30/21  0406 11/30/21  0005 11/29/21  2003 11/29/21  0812 11/29/21  0626 11/28/21  0814 11/28/21  0513 11/27/21  0815 11/27/21  0511     --   --   --   --  136  --  136  --  137   POTASSIUM 3.2*  --   --   --   --  3.3*  --  3.1*  --  3.4   CHLORIDE 102  --   --   --   --  102  --  102  --  103   GRAZYNA 7.2*  --   --   --   --  7.6*  --  6.7*  --  6.9*   CO2 16*  --   --   --   --  19*  --  19*  --  22   BUN 85*  --   --   --   --  69*  --  84*  --  64*   CR 2.89*  --   --   --   --  2.41*  --  2.96*  --  2.44*   * 136* 142* 121*   < > 168*   < > 160*   < > 191*    < > = values in this interval not displayed.     CBC  Recent Labs   Lab 11/30/21  0438 11/29/21  0626 11/28/21  0706 11/28/21  0513 11/27/21  0511   WBC 9.6 10.8  --  12.5* 15.5*   HGB 7.9* 8.1* 7.9* 7.9* 9.4*   HCT 24.6* 26.1*  --  25.8* 29.0*   MCV 91 92  --  94 90   * 141*  --  87* 79*     Lab Results   Component Value Date    AST 45 11/18/2021    ALT 16 11/18/2021     ALKPHOS 73 11/18/2021    BILITOTAL 0.5 11/18/2021            Physical Exam:   Vitals were reviewed in [facility]    Wt Readings from Last 3 Encounters:   11/30/21 94.7 kg (208 lb 12.4 oz)   06/30/21 84.5 kg (186 lb 4.8 oz)   01/24/19 90.7 kg (199 lb 14.4 oz)       Intake/Output Summary (Last 24 hours) at 11/30/2021 1127  Last data filed at 11/30/2021 0900  Gross per 24 hour   Intake 1618.67 ml   Output 830 ml   Net 788.67 ml       GENERAL APPEARANCE: intubated, sedated  HEENT:  Eyes/ears/nose grossly normal, neck supple  RESP: lungs clear to auscultation with good efforts, no crackles, rhonchi or wheezes  CV: regular rate and rhythm, normal S1 S2, no murmur, click or rub   ABDOMEN: soft, nontender, bowel sounds normal  EXTREMITIES/SKIN: 2+ generalized edema, no rashes or lesions     Pt seen on dialysis.  Stable run.  Good BFR.      Attestation:  I have reviewed today's vital signs, notes, medications, labs and imaging.     Owen Da Silva MD  [facility] - Nephrology  [phone]

## 2021-11-30 NOTE — CONSULTS
[facility]  Palliative Care Consultation   Text Page    Assessment & Plan   Rosanna Romero is a 66 year old male who was admitted on 11/14/2021.   Consulted by Dr. Warren to assist with, goals of care, and development of plan of care.    Recommendations:  1. Goals of Care- Full Code-restorative cares  Hospitalization goals discussed Discussed condition as well as overall healthcare goals. Discussed and reviewed code status. Daughter Lisa reports that she is hopeful he will improve but also understands that there is a chance he might not. She stated he has always wanted to live but at baseline does not care for himself the best. She reports that he would likely be ok with prolonged dialysis. She reports that at this time they want everything done, should he worsen would want the opportunity to see him.  Decisional Capacity- Unreliable. Patient does not have an advance directive. Per  informed consent policy next of kin should be involved if patient becomes unable.  -KARLEEK are his children Raza and Lisa HOLMAN should goals change would complete prior to discharge    2. Pain   -On fentanyl drip for sedation, wean as able per medical/icu team    3. Dyspnea  -Continue with full vent support and proning per medical team    4. Spiritual Care  Oriented to Spiritual Health as part of Palliative Care team. Spiritual Health Services declined at this time.  Spiritual Background: not Ep[employer]opalian per son    5. Care Planning  Appreciate Care of [facility] for discharge planning as able.    Medical Decision Making and Goals of Care:  Discussed on November 30, 2021 with Lucy TORRES, CNP: Phone call placed to Raza and Lisa to discuss and offer support and updates on patient condition. Lisa is very teary but appropriately so. She reports that she understands that sometimes people get better and sometimes they dont. She asked what would happen if his condition worsens and we talked about  comfort cares. The family is not there, but are asking questions appropriately. Raza was more flat and asked little questions. See above. Will continue to follow for support and on going goals of care conversations.     Thank you for involving us in the patient's care.     Lucy TORRES, CNP  Pain Management and Palliative Care  [facility]  Pgr: [phone]    Time Spent on this Encounter   Total unit/floor time 98 minutes, time consisted of the following, examination of the patient, reviewing the record and completing documentation. >50% of time spent in counseling and coordination of care, Bedside Nurse Nicole and Hospitalist Dr Hamilton.  Time spend counseling with family consisted of the following topics, goals of care, education about diagnosis, education about prognosis, care planning for discharge and symptom management.    Understanding of disease process:   This has been discussed with patients son Raza and daughter Lisa.    History of Present Illness   Unable to obtain a history from the patient due to intubation and sedation  Electronic medical record    Rosanna Romero is a 66 year old male with a past medical history of DM2, neuropathy, HTN, LINDSAY, who presents with cough shortness of breath and found to be covid positive. He has been intubated and in the ICU since admission and has been getting HD which started on 11/22      Past Medical History   I have reviewed this patient's medical history and updated it with pertinent information if needed.   No past medical history on file.    Past Surgical History   I have reviewed this patient's surgical history and updated it with pertinent information if needed.  Past Surgical History:   Procedure Laterality Date     C UNLISTED PROCEDURE, ABDOMEN/PERITONEUM/OMENTUM      Description: Hernia Repair;  Proc Date: 03/09/2009;  Comments: ventral with bovine graft and re-do because of incarceration May 2009     C UNLISTED PROCEDURE,  ABDOMEN/PERITONEUM/OMENTUM      Description: Hernia Repair;  Recorded: 02/03/2010;  Comments: redo ventral hernia failed bovine graft with incarceration     EYE SURGERY  2016    cataract     HC KNEE SCOPE, DIAGNOSTIC      Description: Arthroscopy Knee Right;  Recorded: 02/03/2009;     HC REPAIR UMBILICAL KATJA,<6Y/O,REDUC      Description: Umbilical Hernia Repair;  Recorded: 02/03/2009;       Prior to Admission Medications   Prior to Admission Medications   Prescriptions Last Dose Informant Patient Reported? Taking?   allopurinol (ZYLOPRIM) 300 MG tablet 11/14/2021 at 1130  No Yes   Sig: [ALLOPURINOL (ZYLOPRIM) 300 MG TABLET] TAKE ONE TABLET BY MOUTH DAILY.   amLODIPine (NORVASC) 10 MG tablet 11/14/2021 at 1130  No Yes   Sig: [AMLODIPINE (NORVASC) 10 MG TABLET] TAKE 1 TABLET(10 MG) BY MOUTH DAILY   atorvastatin (LIPITOR) 10 MG tablet  at Unknown time  No No   Sig: Take 1 tablet (10 mg) by mouth every evening   cloNIDine HCl (CATAPRES) 0.2 MG tablet 11/14/2021 at 1130  No Yes   Sig: [CLONIDINE HCL (CATAPRES) 0.2 MG TABLET] TAKE 1 TABLET BY MOUTH ONCE DAILY   gabapentin (NEURONTIN) 100 MG capsule  at Unknown time Son Yes No   Sig: Take 100-200 mg by mouth nightly as needed   glipiZIDE (GLUCOTROL XL) 10 MG 24 hr tablet   Yes No   Sig: Take 10 mg by mouth daily (with breakfast)   hydrochlorothiazide (HYDRODIURIL) 25 MG tablet Past Week Son Yes Yes   Sig: Take 25 mg by mouth daily   lisinopril (ZESTRIL) 40 MG tablet 11/14/2021 at 1130 Son Yes Yes   Sig: Take 40 mg by mouth daily   metFORMIN (GLUCOPHAGE-XR) 500 MG 24 hr tablet 11/14/2021 at 1130  Yes Yes   Sig: Take 2,000 mg by mouth daily (with breakfast)   metoprolol succinate (TOPROL-XL) 100 MG 24 hr tablet 11/14/2021 at 1130  No Yes   Sig: [METOPROLOL SUCCINATE (TOPROL-XL) 100 MG 24 HR TABLET] TAKE 1 TABLET(100 MG) BY MOUTH DAILY      Facility-Administered Medications: None     Allergies   No Known Allergies    Social History   I have updated and reviewed the following  Social History Narrative:   Social History     Social History Narrative    ; ex wife left him with 2 young children; Retires Aug 2017; two HS age kids (Raza age 17; Lisa 14)-2017; non smoker. Rare ETOH;        Family History   I have reviewed this patient's family history and updated it with pertinent information if needed.   Family History   Problem Relation Age of Onset     Heart Disease Father      Diabetes Sister      Cancer Sister         colon     Heart Disease Sister      Diabetes Brother      Cancer Brother         stomach? abd, testicle     Cancer Sister         ovarian     Heart Disease Sister        Review of Systems   Review of systems not obtained due to patient factors - intubation and sedation    Physical Exam   Temp:  [96.7  F (35.9  C)-98.5  F (36.9  C)] 97.4  F (36.3  C)  Pulse:  [] 94  Resp:  [8-31] 17  MAP:  [61 mmHg-111 mmHg] 109 mmHg  Arterial Line BP: (105-190)/(43-70) 183/69  FiO2 (%):  [40 %] 40 %  SpO2:  [91 %-100 %] 93 %  208 lbs 12.41 oz  Exam: assessment is limited due to covid isolation, see hospitalist note for expanded assessment  GENERAL APPEARANCE:  intubated and sedated  RESP:  full vent support, supine  CV:  Palpation and auscultation of heart done , regular rate and rhythm, no murmur, rub, or gallop  ABDOMEN:  normal bowel sounds, soft, nontender, no hepatosplenomegaly or other masses  PSYCH:  intubated and sedated, no s/s of pain      Data  I have personally reviewed all labs prior to medication changes  Results for orders placed or performed during the hospital encounter of 11/14/21 (from the past 24 hour(s))   XR Chest Port 1 View    Narrative    CHEST ONE VIEW PORTABLE   11/29/2021 9:53 AM     HISTORY:  Acute respiratory distress syndrome (ARDS).    COMPARISON: 11/25/2021.      Impression    IMPRESSION: Endotracheal tube has been retracted slightly, with tip  now 6.2 cm above the robin. Right IJ sheath, left PICC line, and  enteric tube are not appreciably  changed. Ill-defined opacities  throughout the right lung have increased since the previous exam. Hazy  opacities in the left mid and lower lung are unchanged. No  pneumothorax. Heart size appears stable.    CLOTILDE RODRIGUEZ MD         SYSTEM ID:  [id number]   Glucose by meter   Result Value Ref Range    GLUCOSE BY METER POCT 147 (H) 70 - 99 mg/dL   Blood gas arterial   Result Value Ref Range    pH Arterial 7.37 7.35 - 7.45    pCO2 Arterial 32 (L) 35 - 45 mm Hg    pO2 Arterial 91 80 - 105 mm Hg    FIO2 40     Bicarbonate Arterial 18 (L) 21 - 28 mmol/L    Base Excess/Deficit (+/-) -6.7 -9.0 - 1.8 mmol/L   Glucose by meter   Result Value Ref Range    GLUCOSE BY METER POCT 144 (H) 70 - 99 mg/dL   Glucose by meter   Result Value Ref Range    GLUCOSE BY METER POCT 121 (H) 70 - 99 mg/dL   Glucose by meter   Result Value Ref Range    GLUCOSE BY METER POCT 142 (H) 70 - 99 mg/dL   Glucose by meter   Result Value Ref Range    GLUCOSE BY METER POCT 136 (H) 70 - 99 mg/dL   CBC with platelets   Result Value Ref Range    WBC Count 9.6 4.0 - 11.0 10e3/uL    RBC Count 2.71 (L) 4.40 - 5.90 10e6/uL    Hemoglobin 7.9 (L) 13.3 - 17.7 g/dL    Hematocrit 24.6 (L) 40.0 - 53.0 %    MCV 91 78 - 100 fL    MCH 29.2 26.5 - 33.0 pg    MCHC 32.1 31.5 - 36.5 g/dL    RDW 15.4 (H) 10.0 - 15.0 %    Platelet Count 115 (L) 150 - 450 10e3/uL   Renal panel   Result Value Ref Range    Sodium 135 133 - 144 mmol/L    Potassium 3.2 (L) 3.4 - 5.3 mmol/L    Chloride 102 94 - 109 mmol/L    Carbon Dioxide (CO2) 16 (L) 20 - 32 mmol/L    Anion Gap 17 (H) 3 - 14 mmol/L    Urea Nitrogen 85 (H) 7 - 30 mg/dL    Creatinine 2.89 (H) 0.66 - 1.25 mg/dL    Calcium 7.2 (L) 8.5 - 10.1 mg/dL    Glucose 186 (H) 70 - 99 mg/dL    Albumin 1.2 (L) 3.4 - 5.0 g/dL    Phosphorus 6.8 (H) 2.5 - 4.5 mg/dL    GFR Estimate 22 (L) >60 mL/min/1.73m2   Blood gas arterial   Result Value Ref Range    pH Arterial 7.31 (L) 7.35 - 7.45    pCO2 Arterial 33 (L) 35 - 45 mm Hg    pO2 Arterial 99 80 -  105 mm Hg    FIO2 40     Bicarbonate Arterial 16 (L) 21 - 28 mmol/L    Base Excess/Deficit (+/-) -9.0 -9.0 - 1.8 mmol/L

## 2021-12-01 NOTE — PLAN OF CARE
ICU End of Shift Summary.  For vital signs and complete assessments, please see documentation flowsheets.     Pertinent assessments: RASS -1 to +1, moves head during cares but no movement of extremities, SR-ST with runs of SVT when agitated (turns, oral cares), afebrile, LS very coarse but with minimal secretions upon suctioning, VC 60%/24/8/420, good pulses, edematous throughout, low UO per alex (is on HD), large BM leaked around rectal tube, TF at goal  Major Shift Events: HD this AM with 3L off, SVT when less sedated and agitated by cares, HTN requiring pushes of labetalol  Plan (Upcoming Events): wean O2 if able, PS trials when more alert  Discharge/Transfer Needs: TBD    Bedside Shift Report Completed : y  Bedside Safety Check Completed: y

## 2021-12-01 NOTE — PROGRESS NOTES
Renal Medicine Progress Note            Assessment/Plan:       Rosanna Romero is a 66 year old male who was admitted on 11/14/2021.      1) Baseline Normal Kidney Function.     2) COVID-19 Pneumonia/ARDS - oxygenation is improved.      3) Shock -now off pressors.       4) Metabolic Acidosis      5) AZALIA - oliguric/anuric.  Likely ATN due to sepsis.  Had contrast 11/17/21 but renal function was falling before then. Still oliguric.  Acidosis persists.       6) Hypocalcemia:  Calcium corrects to 9.4 accounting for hypoalbuminemia.       7) Severe hypoalbuminemia - Protein malnutrition.        8) 9 kg up     8) Anemia:  Multifactorial.  On EPO.          Plan:    Initial dialysis 11/22/21  Next 12/2/21  Will continue to work on ultrafiltration.  Continue bumet        Interval History:   Unable.  Pt is intubated/sedated.  Still off pressors.  FIO2 70%.   Urine ouput 815 mL yesterday.  Bumet 2 mg IV Q12H started.         Medications and Allergies:       - MEDICATION INSTRUCTIONS for Dialysis Patients -   Does not apply See Admin Instructions     acetaminophen  975 mg Per Feeding Tube Q8H     amLODIPine  5 mg Per Feeding Tube Daily     B and C vitamin Complex with folic acid  5 mL Oral or Feeding Tube Daily     bumetanide  2 mg Intravenous Q12H     chlorhexidine  15 mL Mouth/Throat BID     heparin ANTICOAGULANT  5,000 Units Subcutaneous Q8H     insulin aspart  4 Units Subcutaneous Q4H     insulin aspart  1-12 Units Subcutaneous Q4H     insulin glargine  8 Units Subcutaneous QAM AC     pantoprazole  40 mg Per Feeding Tube QAM AC     protein modular  2 packet Per Feeding Tube BID      No Known Allergies         Physical Exam:   Vitals were reviewed  BP (!) 184/68   Pulse 84   Temp 98.3  F (36.8  C) (Temporal)   Resp 24   Wt 91.2 kg (201 lb 1 oz)   SpO2 90%   BMI 27.27 kg/m      Wt Readings from Last 3 Encounters:   12/01/21 91.2 kg (201 lb 1 oz)   06/30/21 84.5 kg (186 lb 4.8 oz)   01/24/19 90.7 kg (199 lb 14.4 oz)        Intake/Output Summary (Last 24 hours) at 12/1/2021 1040  Last data filed at 12/1/2021 1000  Gross per 24 hour   Intake 2292.27 ml   Output 3715 ml   Net -1422.73 ml       GENERAL APPEARANCE: intubated, sedated           Data:     BMP  Recent Labs   Lab 12/01/21 0814 12/01/21 0446 12/01/21 0442 12/01/21  0359 11/30/21  0813 11/30/21  0438 11/29/21  0812 11/29/21  0626 11/28/21  0814 11/28/21  0513   NA  --   --  136  --   --  135  --  136  --  136   POTASSIUM  --   --  3.5  --   --  3.2*  --  3.3*  --  3.1*   CHLORIDE  --   --  102  --   --  102  --  102  --  102   GRAZYNA  --   --  8.1*  --   --  7.2*  --  7.6*  --  6.7*   CO2  --   --  21  --   --  16*  --  19*  --  19*   BUN  --   --  51*  --   --  85*  --  69*  --  84*   CR  --   --  1.96*  --   --  2.89*  --  2.41*  --  2.96*   * 133* 149* 128*   < > 186*   < > 168*   < > 160*    < > = values in this interval not displayed.     CBC  Recent Labs   Lab 12/01/21 0442 11/30/21 0438 11/29/21  0626 11/28/21  0706 11/28/21  0513   WBC 11.2* 9.6 10.8  --  12.5*   HGB 7.7* 7.9* 8.1* 7.9* 7.9*   HCT 24.8* 24.6* 26.1*  --  25.8*   MCV 93 91 92  --  94   * 115* 141*  --  87*     Lab Results   Component Value Date    AST 45 11/18/2021    ALT 16 11/18/2021    ALKPHOS 73 11/18/2021    BILITOTAL 0.5 11/18/2021     Lab Results   Component Value Date    INR 0.87 11/24/2021       Attestation:  I have reviewed today's vital signs, notes, medications, labs and imaging.    Owen Da Silva MD  [facility] - Nephrology  [phone]

## 2021-12-01 NOTE — PROGRESS NOTES
DATA:    VENT DAY#  14    CURRENT SETTINGS:  VENT SETTINGS   Ventilation Mode: CMV/AC  (Continuous Mandatory Ventilation/ Assist Control)  FiO2 (%): 70 %  Rate Set (breaths/minute): 24 breaths/min  Tidal Volume Set (mL): 420 mL  PEEP (cm H2O): 8 cmH2O  Oxygen Concentration (%): 70 %  Resp: 22            FIO2:   70    SBT: n/a  SECRETIONS:  Scant, cloudy, thick  02 SATS:  91-93    ETT SIZE 8.0  Secured at  2 cm at teeth    Respiratory Medications: n/a     ABG: Results for LEXI FANTASMA E (MRN [id number]) as of 12/1/2021 17:19   Ref. Range 12/1/2021 04:39   pH Arterial Latest Ref Range: 7.35 - 7.45  7.37   pCO2 Arterial Latest Ref Range: 35 - 45 mm Hg 40   PO2 Arterial Latest Ref Range: 80 - 105 mm Hg 71 (L)   Bicarbonate Arterial Latest Ref Range: 21 - 28 mmol/L 23   Base Excess Art Latest Ref Range: -9.0 - 1.8 mmol/L -1.7   FIO2 Unknown 70       NOTE / PLAN:   Patient remained intubated and respiratory status stable throughout shift. Please see all documented settings and patient parameters in flow sheets. RT will continue to monitor and provide support as needed.       Yasemin Cuevas, LRT, BSRT-RRT

## 2021-12-01 NOTE — PROGRESS NOTES
ICU Attending Note    I have seen and examined Rosanna Romero, reviewed the patient's history, pertinent labs, vital signs, medications, physical exam, and radiographs.  The patient is critically ill by my examination and requires continued ICU monitoring and cares    Rosanna Romero is admitted on 11/14/2021 for COVID-19 infection. Intubated 11/18 due to worsening hypoxemia and afib RVR,  AZALIA.  --needing HD starting 11/22.    Recent Events  Remains unresponsive  Increasing O2 needs    Exam:  Temp:  [97.8  F (36.6  C)-100.3  F (37.9  C)] 97.8  F (36.6  C)  Pulse:  [] 85  Resp:  [14-30] 22  BP: (151-184)/(54-68) 151/54  MAP:  [64 mmHg-108 mmHg] 95 mmHg  Arterial Line BP: (112-188)/(39-71) 169/61  FiO2 (%):  [60 %-75 %] 70 %  SpO2:  [90 %-97 %] 95 %  @I/O last 3 completed shifts:  In: 2292.41 [I.V.:642.41; NG/GT:690]  Out: 680 [Urine:680]  Ventilation Mode: CMV/AC  (Continuous Mandatory Ventilation/ Assist Control)  FiO2 (%): 70 %  Rate Set (breaths/minute): 24 breaths/min  Tidal Volume Set (mL): 420 mL  PEEP (cm H2O): 8 cmH2O  Oxygen Concentration (%): 60 %  Resp: 22    Lungs coarse  Heart rrr  abd soft, nontender  Ext warm, edematous    Results:  ABG   Recent Labs   Lab 12/01/21  0439 11/30/21  0439 11/29/21  1512 11/29/21  0627   PH 7.37 7.31* 7.37 7.38   PCO2 40 33* 32* 33*   PO2 71* 99 91 104   HCO3 23 16* 18* 19*     CBC  Recent Labs   Lab 12/01/21  0442 11/30/21  0438 11/29/21  0626 11/28/21  0706 11/28/21  0513   WBC 11.2* 9.6 10.8  --  12.5*   HGB 7.7* 7.9* 8.1* 7.9* 7.9*   HCT 24.8* 24.6* 26.1*  --  25.8*   * 115* 141*  --  87*     BMP  Recent Labs   Lab 12/01/21  1201 12/01/21  0814 12/01/21  0446 12/01/21  0442 11/30/21  0813 11/30/21  0438 11/29/21  0812 11/29/21  0626 11/28/21  0814 11/28/21  0513   NA  --   --   --  136  --  135  --  136  --  136   POTASSIUM  --   --   --  3.5  --  3.2*  --  3.3*  --  3.1*   CHLORIDE  --   --   --  102  --  102  --  102  --  102   CO2  --   --   --  21  --  16*   --  19*  --  19*   BUN  --   --   --  51*  --  85*  --  69*  --  84*   CR  --   --   --  1.96*  --  2.89*  --  2.41*  --  2.96*   * 169* 133* 149*   < > 186*   < > 168*   < > 160*    < > = values in this interval not displayed.     LFT  Recent Labs   Lab 12/01/21  0442 11/30/21  0438   ALBUMIN 1.2* 1.2*     PancreasNo lab results found in last 7 days.  INR  Lab Results   Component Value Date    INR 0.87 11/24/2021    INR 0.85 11/23/2021    INR 0.90 11/22/2021    INR 0.99 11/21/2021       Current Issues:  Neuro/ pain/ sedation:  - Minimize sedation  - Goal RASS now -1 to 0     Pulmonary: COVID ARDS   O2 needs up from yesterday.  PS as able.   P: PS trials when mental status improved.    Cardiovascular:   - off and on vasopressors with HD   - Amio bolus and drip started last week for afib RVR and ventricular tachycardia --remains in NSR , remains off amiodarone   P:  Pressors prn for HD volume removal     GI/Nutrition:  - Protein calorie malnutrition due to critical illness and stress response  Tube feeds    Renal/ Fluid Balance: Acute kidney injury and metabolic acidosis  - Acute kidney failure: dialysis as needed per nephrology    Endocrine: history of DM  - Hyperglycemia  on admission. - glucoses improved   P: continue AM lantus  8 and novolog 4 q 4      ID:  COVID pneumonia   - empiric meropenem (11/18-11/21) and vancomycin (11/17-11/20)   Decadron day 10--completed   Got only 2 d remdesivir seconday to AZALIA   - follow up cultures--staph epi and candida only in sputum   - P: off antibiotics x 1 week     Hematology:  -mild anemia and thrombocytopenia--mild --slightly higher platelets.  Does not fit an HIT pattern    EPO today     MSK: history of gout  - holding allopurinol seconday to AZALIA     Lines/ tubes/ drains:  [place] Catheter: PRESENT, indication: sedation, need to track outs  Central Lines: PRESENT  PICC Triple Lumen 11/21/21 Left Basilic-Site Assessment: WDL (Okay per RN)  CVC Double Lumen Right  External jugular Non - tunneled-Site Assessment: WDL    Prophylaxis:  - DVT Prophylaxis: Heparin SQ  - PUD Prophylaxis: protonix    Code Status: Full Code      Disposition:  - ICU    Evaluation and management time exclusive of procedures was 30 minutes critical care time including:  examination with the ICU team, discussion of the patient's condition with other physicians and members of the care team, reviewing all data related to the patient, and time utilizing the EMR for documentation of this patient's care.      Nader Menjivar MD  Acute Care Surgery/Critical Care

## 2021-12-01 NOTE — PLAN OF CARE
ICU End of Shift Summary.  For vital signs and complete assessments, please see documentation flowsheets.     Pertinent assessments:   Neuro: RASS -1, PERRLA  CV: SR/ST, hypertensive, pulses palpable  Pulm: coarse LS, vented  GI: bowel sounds audible, stooling  : oliguric, on HD  Skin: see flowsheet     Major Shift Events: none  Plan (Upcoming Events): tbd  Discharge/Transfer Needs: tbd  t  Bedside Shift Report Completed : y  Bedside Safety Check Completed:y

## 2021-12-01 NOTE — PROGRESS NOTES
Patient continue on day 14 on vent.     ETT 8.0 and secured 24 cm at teeth.    Current vent settings:  Ventilation Mode: CMV/AC  (Continuous Mandatory Ventilation/ Assist Control)  FiO2 (%): 70 %  Rate Set (breaths/minute): 24 breaths/min  Tidal Volume Set (mL): 420 mL  PEEP (cm H2O): 8 cmH2O  Oxygen Concentration (%): 60 %  Resp: 23      BP (!) 184/68   Pulse 101   Temp 98.8  F (37.1  C) (Temporal)   Resp 26   Wt 91.2 kg (201 lb 1 oz)   SpO2 92%   BMI 27.27 kg/m

## 2021-12-01 NOTE — PROGRESS NOTES
[facility]    Medicine Progress Note - Hospitalist Service       Date of Admission:  11/14/2021    Assessment & Plan        Summary of Stay: Rosanna Romero is a 66-year-old male with medical history including diabetes not on insulin, neuropathy, hypertension, LINDSAY who is vaccinated against COVID-19 who was admitted on 11/14/2021 with cough and shortness of breath and recent positive COVID-19 test and was found to have acute hypoxic respiratory due to COVID-19 and suspected secondary bacterial pneumonia.  He has had evolving renal failure with worsening respiratory status and metabolic acidosis which prompted stabilization early 11/18 with intubation, central line A-line placement and transfer to the ICU.  He was given bicarbonate containing fluids, insulin drip, vasopressors and remains on the vent.     Acid-base status has improved but he is oliguric still.  Procalcitonin was dramatically elevated and he was covered for bacterial sepsis w/ empiric abx though these were stopped due to negative cultures on 11/22.  CT of his chest abdomen and pelvis did not show obvious source other than pneumonia though there was a question of some enteritis.     He remains intubated with evolving renal failure and shock, hemodynamics have now improved.  He began HD on 11/22.  Mental status continues to be an issue with weaning from vent.     Problem List/Assessment and Plan:      Septic shock, suspected pulmonary source, likely pneumonia.  Suspect the driving issue is COVID-19 pneumonia.  Treated w/ empiric abx, now off.  --zosyn 11/16-11/8.  --meropenem and vancomycin on 11/18.  Discontinued vancomycin though that was likely on board until 11/22 when HD started.  --stopped meropenem 11/22.  --Has required intermittent pressors, now Hypertensive  --Blood cultures no growth to date  --Has a central line and arterial line     Severe metabolic acidosis: Suspect due to combination of ATN from renal failure and ketosis  potentially from DKA given severe hyperglycemia.  Acid-base status improved.  --Nephrology consulted  --HD per nephrology.  --Ketones improved, transitioned to subcu insulin.  --Trend basic metabolic panel     Hypokalemia: Defer replacement to Nephrology so we are both not replacing as he is undergoing HD.     Oliguric acute renal failure: Nephrology following. Had progressive renal failure and acidosis.  Initiated on HD on 11/22.    --Nephrology following, appreciate recommendations  --HD 12/2.  --Bumex 2 mg IV BID started 11/30     Type 2 diabetes, evidence of DKA: Somewhat unusual.  Is a type II diabetic not normally on insulin (PTA on Glipizide and Metformin) and presented with significantly elevated ketones and blood sugars in the 300s.  Had been on insulin drip, now SQ.  --Lantus 8 units QAM   --Aspart 4 units Q4H in addition to NPO sliding scale insulin     Acute hypoxemic respiratory failure due to pneumonia, hemodynamic instability, volume overload: Intubated and sedated.    --Vent management as per intensivist.     Severe Malnutrition: Due to acute on chronic illness.  Continue TF.  Nutrition following.     Prominent constipation seen on CT scan: Added bowel regimen. Appears to be stooling.     Atrial fibrillation with RVR: Developed AF with RVR in addition to ventricular tachycardia.  He did receive Amiodarone bolus and drip but subsequently converted to NSR and Amiodarone discontinued.     History of upper GI bleed: Continue Protonix.     Gout: Hold Allopurinol given AZALIA.     COVID-19: Decadron x 10 days completed.  Remdesivir stopped due to renal failure (received 2 doses).     HTN: PTA on Amlodipine, Lisinopril, Clonidine and Metoprolol on which were all initially held as he was requiring pressors. No longer requiring pressors.  Using PRN IV Labetalol as has had spikes of HTN.  - restart norvasc.     Anemia and Thrombocytopenia: Likely due to acute illness, not HIT pattern.  Receiving EPO with HD.  No  indication for transfusions.  Repeat tomorrow. Platelets are improving.     Acute Toxic/Metabolic Encephalopathy: Patient will open his eyes but not follow commands when sedation is lightheaded.  He was on Benzodiazepines for sedation but has been off for a few days.  Could take several more days for benzodiazepines to clear.  No focal findings on exam.       Diet: Adult Formula Drip Feeding: Continuous Nepro with Carbsteady; Nasojejunal; Goal Rate: 40; mL/hr; Medication - Feeding Tube Flush Frequency: At least 15-30 mL water before and after medication administration and with tube clogging; At bag change, n...  NPO for Medical/Clinical Reasons Except for: NPO but receiving Tube Feeding, Meds    DVT Prophylaxis: Heparin SQ  Del Valle Catheter: PRESENT, indication: Strict 1-2 Hour I&O  Central Lines: PRESENT  PICC Triple Lumen 11/21/21 Left Basilic-Site Assessment: WDL  CVC Double Lumen Right External jugular Non - tunneled-Site Assessment: WDL  Code Status: Full Code      Disposition Plan   Expected discharge: 12/03/2021   recommended to prior living arrangement once O2 use less than 3 liters/minute and SIRS/Sepsis treated.     The patient's care was discussed with the Bedside Nurse.    Oscar Rush MD  Hospitalist Service  [facility]  Securely message with the Vocera Web Console (learn more here)  Text page via [employer] Paging/Directory        Clinically Significant Risk Factors Present on Admission                ______________________________________________________________________    Interval History     Intubated and sedated.    Data reviewed today: I reviewed all medications, new labs and imaging results over the last 24 hours. I personally reviewed no images or EKG's today.    Physical Exam   Vital Signs: Temp: 98.3  F (36.8  C) Temp src: Temporal BP: (!) 184/68 Pulse: 89   Resp: 21 SpO2: 97 % O2 Device: Mechanical Ventilator    Weight: 201 lbs .95 oz    Gen - Intubated and sedated.  Lungs  - coarse BS.  Heart - RR,S1+S2 nml, no m/g/r.  Abd - soft, NT, ND, + BS.  Ext - no edema.    Data   Recent Labs   Lab 12/01/21 0814 12/01/21 0446 12/01/21 0442 11/30/21 0813 11/30/21  0438 11/29/21  0812 11/29/21  0626   WBC  --   --  11.2*  --  9.6  --  10.8   HGB  --   --  7.7*  --  7.9*  --  8.1*   MCV  --   --  93  --  91  --  92   PLT  --   --  128*  --  115*  --  141*   NA  --   --  136  --  135  --  136   POTASSIUM  --   --  3.5  --  3.2*  --  3.3*   CHLORIDE  --   --  102  --  102  --  102   CO2  --   --  21  --  16*  --  19*   BUN  --   --  51*  --  85*  --  69*   CR  --   --  1.96*  --  2.89*  --  2.41*   ANIONGAP  --   --  13  --  17*  --  15*   GRAZYNA  --   --  8.1*  --  7.2*  --  7.6*   * 133* 149*   < > 186*   < > 168*   ALBUMIN  --   --  1.2*  --  1.2*  --   --     < > = values in this interval not displayed.     No results found for this or any previous visit (from the past 24 hour(s)).  Medications     dextrose       fentaNYL 25 mcg/hr (12/01/21 0800)     propofol (DIPRIVAN) infusion 40 mcg/kg/min (12/01/21 0859)       - MEDICATION INSTRUCTIONS for Dialysis Patients -   Does not apply See Admin Instructions     acetaminophen  975 mg Per Feeding Tube Q8H     B and C vitamin Complex with folic acid  5 mL Oral or Feeding Tube Daily     bumetanide  2 mg Intravenous Q12H     chlorhexidine  15 mL Mouth/Throat BID     heparin ANTICOAGULANT  5,000 Units Subcutaneous Q8H     insulin aspart  4 Units Subcutaneous Q4H     insulin aspart  1-12 Units Subcutaneous Q4H     insulin glargine  8 Units Subcutaneous QAM AC     pantoprazole  40 mg Per Feeding Tube QAM AC     protein modular  2 packet Per Feeding Tube BID

## 2021-12-02 NOTE — PROGRESS NOTES
Potassium   Date Value Ref Range Status   12/02/2021 3.6 3.4 - 5.3 mmol/L Final   07/02/2021 3.4 3.4 - 5.3 mmol/L Final     Hemoglobin   Date Value Ref Range Status   12/02/2021 7.3 (L) 13.3 - 17.7 g/dL Final   07/02/2021 10.8 (L) 13.3 - 17.7 g/dL Final     Creatinine   Date Value Ref Range Status   12/02/2021 2.56 (H) 0.66 - 1.25 mg/dL Final   07/02/2021 0.82 0.66 - 1.25 mg/dL Final     Urea Nitrogen   Date Value Ref Range Status   12/02/2021 74 (H) 7 - 30 mg/dL Final   07/02/2021 10 7 - 30 mg/dL Final     Sodium   Date Value Ref Range Status   12/02/2021 135 133 - 144 mmol/L Final   07/02/2021 141 133 - 144 mmol/L Final     INR   Date Value Ref Range Status   11/24/2021 0.87 0.85 - 1.15 Final       DIALYSIS PROCEDURE NOTE  Hepatitis status of previous patient on machine log was checked and verified ok to use with this patients hepatitis status.  Patient dialyzed for 4 hrs. on a K3 bath with a net fluid removal of  4L.  A BFR of 400 ml/min was obtained via a R CVC    The treatment plan was discussed with Dr. Da Silva during the treatment.    Total heparin received during the treatment: 2100 units.   Line flushed, clamped and capped with heparin 1:1000 2 mL   Meds  given: Epo   Complications: none  .     ICEBOAT? Timeout performed pre-treatment  I: Patient was identified using 2 identifiers  C:  Consent Signed Yes  E: Equipment preventative maintenance is current and dialysis delivery system OK to use  B: Hepatitis B Surface Antigen: neg; Draw Date: 11/20/21      Hepatitis B Surface Antibody: johann; Draw Date: 11/20/21  O: Dialysis orders present and complete prior to treatment  A: Vascular access verified and assessed prior to treatment  T: Treatment was performed at a clinically appropriate time  ?: Patient was allowed to ask questions and address concerns prior to treatment  See flowsheet in EPIC for further details and post assessment.  Machine water alarm in place and functioning. Transducer pods intact and checked  every 15min. .  Chlorine/Chloramine water system checked every 4 hours

## 2021-12-02 NOTE — PROGRESS NOTES
ICU Attending Note    I have seen and examined Rosanna Romero, reviewed the patient's history, pertinent labs, vital signs, medications, physical exam, and radiographs.  The patient is critically ill by my examination and requires continued ICU monitoring and cares    Rosanna Romero is admitted on 11/14/2021 for COVID-19 infection. Intubated 11/18 due to worsening hypoxemia and afib RVR,  AZALIA.  --needing HD starting 11/22.    Recent Events  Remains unresponsive  Increasing O2 needs    Exam:  Temp:  [97.2  F (36.2  C)-98.8  F (37.1  C)] 98.2  F (36.8  C)  Pulse:  [] 100  Resp:  [19-31] 29  MAP:  [61 mmHg-107 mmHg] 84 mmHg  Arterial Line BP: (113-190)/(42-72) 142/58  FiO2 (%):  [70 %-85 %] 85 %  SpO2:  [90 %-98 %] 92 %  @I/O last 3 completed shifts:  In: 1860.78 [I.V.:235.78; NG/GT:825]  Out: 845 [Urine:845]  Ventilation Mode: CMV/AC  (Continuous Mandatory Ventilation/ Assist Control)  FiO2 (%): 85 %  Rate Set (breaths/minute): 24 breaths/min  Tidal Volume Set (mL): 420 mL  PEEP (cm H2O): 8 cmH2O  Oxygen Concentration (%): 85 %  Resp: 29    Lungs coarse  Heart rrr  abd soft, nontender  Ext warm, edematous    Results:  ABG   Recent Labs   Lab 12/02/21 0444 12/01/21 0439 11/30/21  0439 11/29/21  1512   PH 7.28* 7.37 7.31* 7.37   PCO2 41 40 33* 32*   PO2 73* 71* 99 91   HCO3 19* 23 16* 18*     CBC  Recent Labs   Lab 12/02/21 0445 12/01/21 0442 11/30/21  0438 11/29/21  0626   WBC 14.2* 11.2* 9.6 10.8   HGB 7.3* 7.7* 7.9* 8.1*   HCT 23.5* 24.8* 24.6* 26.1*   * 128* 115* 141*     BMP  Recent Labs   Lab 12/02/21  1140 12/02/21  0841 12/02/21  0445 12/02/21  0402 12/01/21  0446 12/01/21 0442 11/30/21 0813 11/30/21  0438 11/29/21  0812 11/29/21  0626   NA  --   --  135  --   --  136  --  135  --  136   POTASSIUM  --   --  3.6  --   --  3.5  --  3.2*  --  3.3*   CHLORIDE  --   --  100  --   --  102  --  102  --  102   CO2  --   --  18*  --   --  21  --  16*  --  19*   BUN  --   --  74*  --   --  51*  --  85*  --   69*   CR  --   --  2.56*  --   --  1.96*  --  2.89*  --  2.41*   * 139* 187* 190*   < > 149*   < > 186*   < > 168*    < > = values in this interval not displayed.     LFT  Recent Labs   Lab 12/02/21  0445 12/01/21  0442 11/30/21  0438   ALBUMIN 1.1* 1.2* 1.2*     PancreasNo lab results found in last 7 days.  INR  Lab Results   Component Value Date    INR 0.87 11/24/2021    INR 0.85 11/23/2021    INR 0.90 11/22/2021    INR 0.99 11/21/2021       Current Issues:  Neuro/ pain/ sedation:  - Not waking   Minimize sedation  - Goal RASS now -1 to 0     Pulmonary: COVID ARDS   O2 needs continue to increase. Continue..   P: PS trials when mental status improved.    Cardiovascular:   - off and on vasopressors with HD   - Amio bolus and drip started last week for afib RVR and ventricular tachycardia --remains in NSR , remains off amiodarone   P:  Pressors prn for HD volume removal     GI/Nutrition:  - Protein calorie malnutrition due to critical illness and stress response  Tube feeds    Renal/ Fluid Balance: Acute kidney injury and metabolic acidosis  - Acute kidney failure: dialysis as needed per nephrology    Endocrine: history of DM  - Hyperglycemia on admission. - glucoses improved   P: continue AM lantus  8 and novolog 4 q 4      ID:  COVID pneumonia   - empiric meropenem (11/18-11/21) and vancomycin (11/17-11/20)   Decadron day 10--completed   Got only 2 d remdesivir seconday to AZALIA   - follow up cultures--staph epi and candida only in sputum   - P: off antibiotics x 1 week     Hematology:  -mild anemia and thrombocytopenia--mild --slightly higher platelets.  Does not fit an HIT pattern    EPO today     MSK: history of gout  - holding allopurinol seconday to AZALIA     Lines/ tubes/ drains:  Del Valle Catheter: PRESENT, indication: sedation, need to track outs  Central Lines: PRESENT  PICC Triple Lumen 11/21/21 Left Basilic-Site Assessment: WDL (Okay per RN)  CVC Double Lumen Right External jugular Non - tunneled-Site  Assessment: WDL    Prophylaxis:  - DVT Prophylaxis: Heparin SQ  - PUD Prophylaxis: protonix    Code Status: Full Code      Disposition:  - ICU.  Overall his progress is in the wrong direction.  Palliative care help is warranted and appreciated.    Evaluation and management time exclusive of procedures was 30 minutes critical care time including:  examination with the ICU team, discussion of the patient's condition with other physicians and members of the care team, reviewing all data related to the patient, and time utilizing the EMR for documentation of this patient's care.      Nader Menjivar MD  Acute Care Surgery/Critical Care

## 2021-12-02 NOTE — PROGRESS NOTES
[facility]    Medicine Progress Note - Hospitalist Service       Date of Admission:  11/14/2021    Assessment & Plan               Summary of Stay: Rosanna Romero is a 66-year-old male with medical history including diabetes not on insulin, neuropathy, hypertension, LINDSAY who is vaccinated against COVID-19 who was admitted on 11/14/2021 with cough and shortness of breath and recent positive COVID-19 test and was found to have acute hypoxic respiratory due to COVID-19 and suspected secondary bacterial pneumonia.  He has had evolving renal failure with worsening respiratory status and metabolic acidosis which prompted stabilization early 11/18 with intubation, central line A-line placement and transfer to the ICU.  He was given bicarbonate containing fluids, insulin drip, vasopressors and remains on the vent.     Acid-base status has improved but he is oliguric still.  Procalcitonin was dramatically elevated and he was covered for bacterial sepsis w/ empiric abx though these were stopped due to negative cultures on 11/22.  CT of his chest abdomen and pelvis did not show obvious source other than pneumonia though there was a question of some enteritis.     He remains intubated with evolving renal failure and shock, hemodynamics have now improved.  He began HD on 11/22.  Mental status continues to be an issue with weaning from vent.     Problem List/Assessment and Plan:      Septic shock, suspected pulmonary source, likely pneumonia.  Suspect the driving issue is COVID-19 pneumonia.  Treated w/ empiric abx, now off.  --zosyn 11/16-11/8.  --meropenem and vancomycin on 11/18.  Discontinued vancomycin though that was likely on board until 11/22 when HD started.  --stopped meropenem 11/22.  --Has required intermittent pressors, now Hypertensive  --Blood cultures no growth to date  --Has a central line and arterial line     Severe metabolic acidosis: Suspect due to combination of ATN from renal failure and  ketosis potentially from DKA given severe hyperglycemia.  Acid-base status improved.  --Nephrology consulted  --HD per nephrology.  --Ketones improved, transitioned to subcu insulin.  --Trend basic metabolic panel     Hypokalemia: Defer replacement to Nephrology so we are both not replacing as he is undergoing HD.     Oliguric acute renal failure: Nephrology following. Had progressive renal failure and acidosis.  Initiated on HD on 11/22.    --Nephrology following, appreciate recommendations  --HD 12/2.  --Bumex 2 mg IV BID started 11/30     Type 2 diabetes, evidence of DKA: Somewhat unusual.  Is a type II diabetic not normally on insulin (PTA on Glipizide and Metformin) and presented with significantly elevated ketones and blood sugars in the 300s.  Had been on insulin drip, now SQ.  --Lantus 8 units QAM   --Aspart 4 units Q4H in addition to NPO sliding scale insulin     Acute hypoxemic respiratory failure due to pneumonia, hemodynamic instability, volume overload: Intubated and sedated.    --Vent management as per intensivist.     Severe Malnutrition: Due to acute on chronic illness.  Continue TF.  Nutrition following.     Prominent constipation seen on CT scan: Added bowel regimen. Appears to be stooling.     Atrial fibrillation with RVR: Developed AF with RVR in addition to ventricular tachycardia.  He did receive Amiodarone bolus and drip but subsequently converted to NSR and Amiodarone discontinued.     History of upper GI bleed: Continue Protonix.     Gout: Hold Allopurinol given AZALIA.     COVID-19: Decadron x 10 days completed.  Remdesivir stopped due to renal failure (received 2 doses).     HTN: PTA on Amlodipine, Lisinopril, Clonidine and Metoprolol on which were all initially held as he was requiring pressors. No longer requiring pressors.  Using PRN IV Labetalol as has had spikes of HTN.  - restart norvasc.     Anemia and Thrombocytopenia: Likely due to acute illness, not HIT pattern.  Receiving EPO with  HD.  No indication for transfusions.  Repeat tomorrow. Platelets are improving.     Acute Toxic/Metabolic Encephalopathy: Patient will open his eyes but not follow commands when sedation is lightheaded.  He was on Benzodiazepines for sedation but has been off for a few days.  Could take several more days for benzodiazepines to clear.  No focal findings on exam.         Diet: Adult Formula Drip Feeding: Continuous Nepro with Carbsteady; Nasojejunal; Goal Rate: 40; mL/hr; Medication - Feeding Tube Flush Frequency: At least 15-30 mL water before and after medication administration and with tube clogging; At bag change, n...  NPO for Medical/Clinical Reasons Except for: NPO but receiving Tube Feeding, Meds    DVT Prophylaxis: Heparin SQ  Del Valle Catheter: PRESENT, indication: Strict 1-2 Hour I&O  Central Lines: PRESENT  PICC Triple Lumen 11/21/21 Left Basilic-Site Assessment: WDL  CVC Double Lumen Right External jugular Non - tunneled-Site Assessment: WDL  Code Status: Full Code      Disposition Plan   Expected discharge: 12/09/2021   recommended to prior living arrangement once O2 use less than 3 liters/minute.     The patient's care was discussed with the Patient's Family.    Osacr Rush MD  Hospitalist Service  [facility]  Securely message with the Vocera Web Console (learn more here)  Text page via [employer] Paging/Directory        Clinically Significant Risk Factors Present on Admission                ______________________________________________________________________    Interval History     Intubated and sedated. On HD.    Data reviewed today: I reviewed all medications, new labs and imaging results over the last 24 hours. I personally reviewed no images or EKG's today.    Physical Exam   Vital Signs: Temp: 98.2  F (36.8  C) Temp src: Axillary BP: (!) 151/54 Pulse: 90   Resp: 29 SpO2: 93 % O2 Device: Mechanical Ventilator    Weight: 205 lbs 14.55 oz    Gen - Intubated and sedated.  Lungs -  coarse BS.  Heart - RR,S1+S2 nml, no m/g/r.  Abd - soft, NT, ND, + BS.  Ext - no edema.    Data   Recent Labs   Lab 12/02/21  0841 12/02/21 0445 12/02/21 0402 12/01/21 0446 12/01/21 0442 11/30/21  0813 11/30/21  0438   WBC  --  14.2*  --   --  11.2*  --  9.6   HGB  --  7.3*  --   --  7.7*  --  7.9*   MCV  --  93  --   --  93  --  91   PLT  --  129*  --   --  128*  --  115*   NA  --  135  --   --  136  --  135   POTASSIUM  --  3.6  --   --  3.5  --  3.2*   CHLORIDE  --  100  --   --  102  --  102   CO2  --  18*  --   --  21  --  16*   BUN  --  74*  --   --  51*  --  85*   CR  --  2.56*  --   --  1.96*  --  2.89*   ANIONGAP  --  17*  --   --  13  --  17*   GRAZYNA  --  8.0*  --   --  8.1*  --  7.2*   * 187* 190*   < > 149*   < > 186*   ALBUMIN  --  1.1*  --   --  1.2*  --  1.2*    < > = values in this interval not displayed.     No results found for this or any previous visit (from the past 24 hour(s)).  Medications     dextrose       fentaNYL 25 mcg/hr (12/02/21 0900)     heparin (porcine) 500 Units/hr (12/02/21 0837)     propofol (DIPRIVAN) infusion 25 mcg/kg/min (12/02/21 0900)       - MEDICATION INSTRUCTIONS for Dialysis Patients -   Does not apply See Admin Instructions     acetaminophen  975 mg Per Feeding Tube Q8H     amLODIPine  5 mg Per Feeding Tube Daily     B and C vitamin Complex with folic acid  5 mL Oral or Feeding Tube Daily     bumetanide  2 mg Intravenous Q12H     chlorhexidine  15 mL Mouth/Throat BID     heparin ANTICOAGULANT  5,000 Units Subcutaneous Q8H     insulin aspart  4 Units Subcutaneous Q4H     insulin aspart  1-12 Units Subcutaneous Q4H     insulin glargine  8 Units Subcutaneous QAM AC     pantoprazole  40 mg Per Feeding Tube QAM AC     protein modular  2 packet Per Feeding Tube TID     sodium chloride (PF)  9 mL Intracatheter During Dialysis/CRRT (from stock)     sodium chloride (PF)  9 mL Intracatheter During Dialysis/CRRT (from stock)

## 2021-12-02 NOTE — PROGRESS NOTES
RT note     [facility] ICU VENTILATOR RESPIRATORY NOTE  Date of Admission: 11/14/21  Date of Intubation (most recent): 11/18/21  Reason for Mechanical Ventilation: Resp Failure  Number of Days on Mechanical Ventilation: 15  Met Criteria for Pressure Support Trial: NO  Reason for No Pressure Support Trial: 70% FiO2  Significant Events Today: none  ABG Results:  7.37/40/71/23 on 70%  (PF ratio 101, supine)  Current vent settings CMV/AC 24/420/8+/70%     Plan:  Will continue to  Monitor    RT Estelita on 12/2/2021 at 4:57 AM

## 2021-12-02 NOTE — PLAN OF CARE
ICU End of Shift Summary.  For vital signs and complete assessments, please see documentation flowsheets.     Pertinent assessments: RASS - 3, opens eyes to rigorous/repeated stimulation. On propofol 25 mcg/mg/min, Fentanyl 25 mcg/hr. Fully vented on CMV - AC (RR 24 VT set 420 FiO2 75% PEEP 8). Moderate amount of secretions via ETT. BP stable, sinus rhythm with inverted T waves, no ectopy noted. Urine output  mL/2hr - due for dialysis today. BM x2.   Major Shift Events:   -Labetolol given x1 for SBP > 170  -FiO2 titrated between 70 and 80 to maintain SpO2 > 92%  Plan (Upcoming Events): wean sedation, wean vent settings   Discharge/Transfer Needs: TBD    Bedside Shift Report Completed : Y  Bedside Safety Check Completed: Y

## 2021-12-02 NOTE — PROGRESS NOTES
Inpatient Dialysis Progress Note            Assessment and Plan:       Rosanna Romero is a 66 year old male who was admitted on 11/14/2021.      1) Baseline Normal Kidney Function.     2) COVID-19 Pneumonia/ARDS - oxygenation is improved.      3) Shock -now off pressors.       4) Metabolic Acidosis      5) AZALIA - oliguric/anuric.  Likely ATN due to sepsis.  Had contrast 11/17/21 but renal function was falling before then. Still oliguric.  Acidosis persists.       6) Hypercalcemia:  Calcium corrects to 10.3 accounting for hypoalbuminemia.  This may reflect his immobilized state.      7) Severe hypoalbuminemia - Protein malnutrition.        8) 10 kg up     8) Anemia:  Multifactorial.  On EPO.       Plan:     Initial dialysis 11/22/21  Next 12/4/21  Will continue to work on ultrafiltration.  Continue bumet        Interval History:   Still off pressors.  FIO2 75%  Weight still 10 kg above angela weight of 11/17/21.    Urine output 745 mL yesterday.      Dialysis Parameters:     Wt Readings from Last 4 Encounters:   12/02/21 93.4 kg (205 lb 14.6 oz)   06/30/21 84.5 kg (186 lb 4.8 oz)   01/24/19 90.7 kg (199 lb 14.4 oz)   10/10/17 95.4 kg (210 lb 6.4 oz)     I/O last 3 completed shifts:  In: 1860.78 [I.V.:235.78; NG/GT:825]  Out: 845 [Urine:845]  BP Readings from Last 3 Encounters:   12/01/21 (!) 151/54   07/02/21 138/70       Routine, ONE TIME, Starting today For 1 Occurrences  Weight Loss (kg): 3-4  Dialysis Temp: 36.5  C  Access Device: CVC  Access Site: R IJ  Dialyzer: Revaclear  Dialysis Bath: K 3  Blood Flow Rate (mL/min): 400  Total Treatment Time (hrs): 4  Heparin: low dose         Medications and Allergies:   Reviewed in EPIC      - MEDICATION INSTRUCTIONS for Dialysis Patients -   Does not apply See Admin Instructions     acetaminophen  975 mg Per Feeding Tube Q8H     amLODIPine  5 mg Per Feeding Tube Daily     B and C vitamin Complex with folic acid  5 mL Oral or Feeding Tube Daily     bumetanide  2 mg Intravenous  Q12H     chlorhexidine  15 mL Mouth/Throat BID     heparin ANTICOAGULANT  5,000 Units Subcutaneous Q8H     insulin aspart  4 Units Subcutaneous Q4H     insulin aspart  1-12 Units Subcutaneous Q4H     insulin glargine  8 Units Subcutaneous QAM AC     pantoprazole  40 mg Per Feeding Tube QAM AC     protein modular  2 packet Per Feeding Tube TID     sodium chloride (PF)  9 mL Intracatheter During Dialysis/CRRT (from stock)     sodium chloride (PF)  9 mL Intracatheter During Dialysis/CRRT (from stock)     sodium chloride 0.9%, acetaminophen, acetaminophen, alteplase, alteplase, bisacodyl, dextrose, glucose **OR** dextrose **OR** glucagon, docusate, fentaNYL, labetalol, naloxone **OR** naloxone **OR** naloxone **OR** naloxone, ondansetron **OR** ondansetron, sennosides, sodium chloride (PF), sodium chloride (PF)   No Known Allergies           Labs:     BMP  Recent Labs   Lab 12/02/21  0841 12/02/21 0445 12/02/21 0402 12/01/21 2358 12/01/21 0446 12/01/21 0442 11/30/21 0813 11/30/21 0438 11/29/21  0812 11/29/21  0626   NA  --  135  --   --   --  136  --  135  --  136   POTASSIUM  --  3.6  --   --   --  3.5  --  3.2*  --  3.3*   CHLORIDE  --  100  --   --   --  102  --  102  --  102   GRAZYNA  --  8.0*  --   --   --  8.1*  --  7.2*  --  7.6*   CO2  --  18*  --   --   --  21  --  16*  --  19*   BUN  --  74*  --   --   --  51*  --  85*  --  69*   CR  --  2.56*  --   --   --  1.96*  --  2.89*  --  2.41*   * 187* 190* 174*   < > 149*   < > 186*   < > 168*    < > = values in this interval not displayed.     CBC  Recent Labs   Lab 12/02/21 0445 12/01/21 0442 11/30/21 0438 11/29/21  0626   WBC 14.2* 11.2* 9.6 10.8   HGB 7.3* 7.7* 7.9* 8.1*   HCT 23.5* 24.8* 24.6* 26.1*   MCV 93 93 91 92   * 128* 115* 141*     Lab Results   Component Value Date    AST 45 11/18/2021    ALT 16 11/18/2021    ALKPHOS 73 11/18/2021    BILITOTAL 0.5 11/18/2021            Physical Exam:   Vitals were reviewed in [facility]    Wt Readings  from Last 3 Encounters:   12/02/21 93.4 kg (205 lb 14.6 oz)   06/30/21 84.5 kg (186 lb 4.8 oz)   01/24/19 90.7 kg (199 lb 14.4 oz)       Intake/Output Summary (Last 24 hours) at 12/2/2021 1028  Last data filed at 12/2/2021 0900  Gross per 24 hour   Intake 1562.48 ml   Output 895 ml   Net 667.48 ml     Lying in bed in ICU.  Vented, sedated.     Pt seen on dialysis.  Stable run.  Good BFR.      Attestation:  I have reviewed today's vital signs, notes, medications, labs and imaging.     Owen Da Silva MD  [facility] - Nephrology  [phone]

## 2021-12-03 NOTE — PROGRESS NOTES
Nutrition Brief Note     Chart check on nutrition support patient. Chart reviewed and pt discussed during IDT rounds. Propofol increasing, currently providing 444 kcal.     Recommendations:    Type of Feeding Tube: NJ (11/19)    Enteral Frequency:  Continuous    Enteral Regimen: Nepro at 35 mL/hr    Total Enteral Provisions: 840 ml/day to provide 1512 kcals, 68 g protein, 613 ml free H2O, 135 g CHO and 11 g Fiber daily.     Continue 2 prosource TID for an additional 66 grams protein    Prop = 16.8 = 444 kcal     TOTAL = 134 g protein + 2196 kcal       Free Water Flush: 60 mL q4 hours    Will continue to follow per protocol.     Bridget Joy, PRIYA, LD  Clinical Dietitian

## 2021-12-03 NOTE — PROVIDER NOTIFICATION
Tele Hub made aware of increased RR since start of shift 33-43, intolerance to suction/care and SpO2 currently 90% on 85% FiO2. Feels is a sedated issue, ordered to increase sedation until synchronous.

## 2021-12-03 NOTE — PLAN OF CARE
ICU End of Shift Summary.  For vital signs and complete assessments, please see documentation flowsheets.     Pertinent assessments: RASS -3 to -4. On Propofol 50 mcg/kg/min, Fentanyl 75 mcg/hr. Fully vented on CMV-AC with an FiO2 of 85% - SpO2 92-94% RR 28-30. Afebrile, SR on monitor. BP stable. Doppler required for right pedal pulse. Oliguric urine output, dialysis patient. Bmx2.  Major Shift Events:   -? ST elevation in lead 2 on cardiac monitor, PVCs noted. 12 lead done showing sinus tach with PVCs and T wave abnormality.  -RR up to 42, SpO2 down to 90% on 85% FiO2, intolerance to care/suctioning - propofol/fentanyl infusions increased to vent synchrony.   -Temp 100.8 despite schedule tylenol - Telehub made aware but no intervention  -Art line dressing changed     Plan (Upcoming Events): continue current management   Discharge/Transfer Needs: TBD    Bedside Shift Report Completed :  Y  Bedside Safety Check Completed: Y

## 2021-12-03 NOTE — PROGRESS NOTES
[facility]    Nephrology Progress Note     Assessment & Plan     Rosanna Romero is a 66 year old male who was admitted on 11/14/2021.      1) Baseline Normal Kidney Function.     2) COVID-19 Pneumonia/ARDS - up to 85% FIO2 despite aggressive UF.       3) Hemodynamics - Off pressors.  On amlodipine for HTN.       4) AZALIA - oliguric/anuric.  Likely ATN due to sepsis.  Had contrast 11/17/21 but renal function was falling before then. Still oliguric.  Bumet added to help with fluid overload.       6) Hypercalcemia:  Calcium corrects to 10.3 accounting for hypoalbuminemia.  This may reflect his immobilized state.      7) Severe hypoalbuminemia - Protein malnutrition.        8) 8 kg up     8) Anemia:  Multifactorial.  On EPO.      9) Hypokalemia -      Plan:     Replace K  Initial dialysis 11/22/21  Next 12/4/21  Will continue to work on ultrafiltration.  Continue bumet      Owen Da Silva MD  [facility] - Nephrology  [phone]    Interval History     4 kg removed in HD yesterday.  Wt down to 88.4 kg.    ~6 kg above angela weight.  K and hgb down this AM.  Getting RBCs  Had KCl 20 mEq    Physical Exam   Temp: 98.3  F (36.8  C) Temp src: Temporal  Pulse: 88   Resp: 29 SpO2: 95 % O2 Device: Mechanical Ventilator    Vitals:    12/01/21 0400 12/02/21 0527 12/03/21 0554   Weight: 91.2 kg (201 lb 1 oz) 93.4 kg (205 lb 14.6 oz) 88.4 kg (194 lb 14.2 oz)     Vital Signs with Ranges  Temp:  [97  F (36.1  C)-100.8  F (38.2  C)] 98.3  F (36.8  C)  Pulse:  [] 88  Resp:  [8-41] 29  MAP:  [63 mmHg-110 mmHg] 83 mmHg  Arterial Line BP: (117-186)/(45-74) 164/58  FiO2 (%):  [80 %-100 %] 85 %  SpO2:  [88 %-100 %] 95 %  I/O last 3 completed shifts:  In: 1820 [I.V.:50; NG/GT:890]  Out: 4560 [Urine:560; Other:4000]    GENERAL APPEARANCE: intubated, sedated  HEENT:  Oral ET tube  RESP: lungs coarse breath sounds  CV: RRR, nl S1/S2, no m/r/g   ABDOMEN: o/s/nt/nd, bs present  EXTREMITIES/SKIN: no  rashes/lesions; 1+ BLE edema    Medications     dextrose       fentaNYL 75 mcg/hr (12/03/21 0650)     propofol (DIPRIVAN) infusion 30 mcg/kg/min (12/03/21 0943)       - MEDICATION INSTRUCTIONS for Dialysis Patients -   Does not apply See Admin Instructions     acetaminophen  975 mg Per Feeding Tube Q8H     amLODIPine  5 mg Per Feeding Tube Daily     B and C vitamin Complex with folic acid  5 mL Oral or Feeding Tube Daily     bumetanide  2 mg Intravenous Q12H     chlorhexidine  15 mL Mouth/Throat BID     heparin ANTICOAGULANT  5,000 Units Subcutaneous Q8H     insulin aspart  4 Units Subcutaneous Q4H     insulin aspart  1-12 Units Subcutaneous Q4H     insulin glargine  15 Units Subcutaneous QAM AC     pantoprazole  40 mg Per Feeding Tube QAM AC     potassium chloride  40 mEq Per Feeding Tube Once     protein modular  2 packet Per Feeding Tube TID       Data   BMP  Recent Labs   Lab 12/03/21 0829 12/03/21 0449 12/03/21 0422 12/02/21 2355 12/02/21 0841 12/02/21 0445 12/01/21 0446 12/01/21 0442 11/30/21 0813 11/30/21  0438   NA  --  134  --   --   --  135  --  136  --  135   POTASSIUM  --  2.9*  --   --   --  3.6  --  3.5  --  3.2*   CHLORIDE  --  99  --   --   --  100  --  102  --  102   GRAZYNA  --  8.1*  --   --   --  8.0*  --  8.1*  --  7.2*   CO2  --  23  --   --   --  18*  --  21  --  16*   BUN  --  54*  --   --   --  74*  --  51*  --  85*   CR  --  1.83*  --   --   --  2.56*  --  1.96*  --  2.89*   * 237* 196* 184*   < > 187*   < > 149*   < > 186*    < > = values in this interval not displayed.     [email or web address](phos:4)  CBC)  Recent Labs   Lab 12/03/21 0449 12/02/21 0445 12/01/21 0442 11/30/21  0438   WBC 15.4* 14.2* 11.2* 9.6   HGB 6.6* 7.3* 7.7* 7.9*   HCT 20.8* 23.5* 24.8* 24.6*   MCV 91 93 93 91   * 129* 128* 115*       Attestation:   I have reviewed today's relevant vital signs, notes, medications, labs and imaging.

## 2021-12-03 NOTE — PROGRESS NOTES
SPIRITUAL HEALTH SERVICES Progress Note  [facility] ICU    Spiritual Health Service per Palliative Care Team consultation.  Family declines SHS at this time.    Plan: Spiritual Health Services remains available for additional emotional/spiritual support.    Lionel Hernandez MA  Staff   Pager: [phone]  Phone: [phone]  *off on Mondays

## 2021-12-03 NOTE — PROGRESS NOTES
Cross Cover    Patient has Hgb of 6.6 and does not have consent for blood. I called his son, Raza, by phone.  He gives consent for blood transfusion. It appears that this has already been ordered.    Celestina Hamilton MD

## 2021-12-03 NOTE — PROGRESS NOTES
[facility]    Medicine Progress Note - Hospitalist Service       Date of Admission:  11/14/2021    Assessment & Plan         Summary of Stay: Rosanna Romero is a 66-year-old male with medical history including diabetes not on insulin, neuropathy, hypertension, LINDSAY who is vaccinated against COVID-19 who was admitted on 11/14/2021 with cough and shortness of breath and recent positive COVID-19 test and was found to have acute hypoxic respiratory due to COVID-19 and suspected secondary bacterial pneumonia.  He has had evolving renal failure with worsening respiratory status and metabolic acidosis which prompted stabilization early 11/18 with intubation, central line A-line placement and transfer to the ICU.  He was given bicarbonate containing fluids, insulin drip, vasopressors and remains on the vent.     Acid-base status has improved but he is oliguric still.  Procalcitonin was dramatically elevated and he was covered for bacterial sepsis w/ empiric abx though these were stopped due to negative cultures on 11/22.  CT of his chest abdomen and pelvis did not show obvious source other than pneumonia though there was a question of some enteritis.     He remains intubated with evolving renal failure and shock, hemodynamics have now improved.  He began HD on 11/22.  Mental status continues to be an issue with weaning from vent.     Problem List/Assessment and Plan:      Septic shock, suspected pulmonary source, likely pneumonia.  Suspect the driving issue is COVID-19 pneumonia.  Treated w/ empiric abx, now off.  --zosyn 11/16-11/8.  --meropenem and vancomycin on 11/18.  Discontinued vancomycin though that was likely on board until 11/22 when HD started.  --stopped meropenem 11/22.  --Has required intermittent pressors, now Hypertensive  --Blood cultures no growth to date  --Has a central line and arterial line     Severe metabolic acidosis: Suspect due to combination of ATN from renal failure and  ketosis potentially from DKA given severe hyperglycemia.  Acid-base status improved.  --Nephrology consulted  --HD per nephrology.  --Ketones improved, transitioned to subcu insulin.  --Trend basic metabolic panel     Hypokalemia: Defer replacement to Nephrology so we are both not replacing as he is undergoing HD.     Oliguric acute renal failure: Nephrology following. Had progressive renal failure and acidosis.  Initiated on HD on 11/22.    --Nephrology following, appreciate recommendations  --HD 12/2.  --Bumex 2 mg IV BID started 11/30     Type 2 diabetes, evidence of DKA: Somewhat unusual.  Is a type II diabetic not normally on insulin (PTA on Glipizide and Metformin) and presented with significantly elevated ketones and blood sugars in the 300s.  Had been on insulin drip, now SQ.  --Lantus 8 units QAM   --Aspart 4 units Q4H in addition to NPO sliding scale insulin     Acute hypoxemic respiratory failure due to pneumonia, hemodynamic instability, volume overload: Intubated and sedated.    --Vent management as per intensivist.     Severe Malnutrition: Due to acute on chronic illness.  Continue TF.  Nutrition following.     Prominent constipation seen on CT scan: Added bowel regimen. Appears to be stooling.     Atrial fibrillation with RVR: Developed AF with RVR in addition to ventricular tachycardia.  He did receive Amiodarone bolus and drip but subsequently converted to NSR and Amiodarone discontinued.     History of upper GI bleed: Continue Protonix.     Gout: Hold Allopurinol given AZALIA.     COVID-19: Decadron x 10 days completed.  Remdesivir stopped due to renal failure (received 2 doses).     HTN: PTA on Amlodipine, Lisinopril, Clonidine and Metoprolol on which were all initially held as he was requiring pressors. No longer requiring pressors.  Using PRN IV Labetalol as has had spikes of HTN.  - restart norvasc.     Anemia and Thrombocytopenia: Likely due to acute illness, not HIT pattern.  Receiving EPO with  HD.    - Anemia likely due to blood draws and critical illness.  - will transfuse 1 unit PRBC 12/3.     Acute Toxic/Metabolic Encephalopathy: Patient will open his eyes but not follow commands when sedation is lightheaded.  He was on Benzodiazepines for sedation but has been off for a few days.  Could take several more days for benzodiazepines to clear.  No focal findings on exam.    Addendum.  - later in the day, patient went into a fib with RVR. Hence, will restart amio.       Diet: Adult Formula Drip Feeding: Continuous Nepro with Carbsteady; Nasojejunal; Goal Rate: 40; mL/hr; Medication - Feeding Tube Flush Frequency: At least 15-30 mL water before and after medication administration and with tube clogging; At bag change, n...  NPO for Medical/Clinical Reasons Except for: NPO but receiving Tube Feeding, Meds    DVT Prophylaxis: Heparin SQ  [place] Catheter: PRESENT, indication: Strict 1-2 Hour I&O  Central Lines: PRESENT  PICC Triple Lumen 11/21/21 Left Basilic-Site Assessment: WDL  CVC Double Lumen Right External jugular Non - tunneled-Site Assessment: WDL  Code Status: Full Code      Disposition Plan   Expected discharge: 12/09/2021   recommended to transitional care unit once O2 use less than 2 liters/minute.     The patient's care was discussed with the Bedside Nurse.    Oscar Rush MD  Hospitalist Service  [facility]  Securely message with the Vocera Web Console (learn more here)  Text page via [employer] Paging/Directory        Clinically Significant Risk Factors Present on Admission                ______________________________________________________________________    Interval History     + fever. Intubated and sedated.    Data reviewed today: I reviewed all medications, new labs and imaging results over the last 24 hours. I personally reviewed no images or EKG's today.    Physical Exam   Vital Signs: Temp: 97.2  F (36.2  C) Temp src: Temporal  Pulse: 84   Resp: 30 SpO2: 94 % O2  Device: Mechanical Ventilator    Weight: 194 lbs 14.19 oz    Gen - Intubated and sedated.  Lungs - coarse BS.  Heart - RR,S1+S2 nml, no m/g/r.  Abd - soft, NT, ND, + BS.  Ext - no edema.    Data   Recent Labs   Lab 12/03/21  0829 12/03/21 0449 12/03/21  0422 12/02/21  0841 12/02/21 0445 12/01/21 0446 12/01/21 0442   WBC  --  15.4*  --   --  14.2*  --  11.2*   HGB  --  6.6*  --   --  7.3*  --  7.7*   MCV  --  91  --   --  93  --  93   PLT  --  144*  --   --  129*  --  128*   NA  --  134  --   --  135  --  136   POTASSIUM  --  2.9*  --   --  3.6  --  3.5   CHLORIDE  --  99  --   --  100  --  102   CO2  --  23  --   --  18*  --  21   BUN  --  54*  --   --  74*  --  51*   CR  --  1.83*  --   --  2.56*  --  1.96*   ANIONGAP  --  12  --   --  17*  --  13   GRAZYNA  --  8.1*  --   --  8.0*  --  8.1*   * 237* 196*   < > 187*   < > 149*   ALBUMIN  --   --   --   --  1.1*  --  1.2*    < > = values in this interval not displayed.     No results found for this or any previous visit (from the past 24 hour(s)).  Medications     dextrose       fentaNYL 75 mcg/hr (12/03/21 0650)     propofol (DIPRIVAN) infusion 50 mcg/kg/min (12/03/21 0650)       - MEDICATION INSTRUCTIONS for Dialysis Patients -   Does not apply See Admin Instructions     acetaminophen  975 mg Per Feeding Tube Q8H     amLODIPine  5 mg Per Feeding Tube Daily     B and C vitamin Complex with folic acid  5 mL Oral or Feeding Tube Daily     bumetanide  2 mg Intravenous Q12H     chlorhexidine  15 mL Mouth/Throat BID     heparin ANTICOAGULANT  5,000 Units Subcutaneous Q8H     insulin aspart  4 Units Subcutaneous Q4H     insulin aspart  1-12 Units Subcutaneous Q4H     insulin glargine  15 Units Subcutaneous QAM AC     pantoprazole  40 mg Per Feeding Tube QAM AC     protein modular  2 packet Per Feeding Tube TID

## 2021-12-03 NOTE — PROVIDER NOTIFICATION
Tele hub made aware of patient temp 100.8 despite being on tylenol scheduled q 8h. RR improved 31-33, SpO2 91-92 on 85% FiO2. Inquired re: CXR and pan culture. Stated will discuss with MD.

## 2021-12-03 NOTE — PROGRESS NOTES
[facility]  Palliative Care Progress Note  Text Page     Assessment & Plan   Rosanna Romero is a 66 year old male who was admitted on 11/14/2021.   Consulted by Dr. Warren to assist with, goals of care, and development of plan of care.     Recommendations:  1. Goals of Care- Full Code-restorative cares  Hospitalization goals discussed Discussed and reviewed condition. Discussed code status. They remain hopeful and at this time would like to continue with CPR. Discussed that if this changes they can let the team know.  Decisional Capacity- Unreliable. Patient does not have an advance directive. Per  informed consent policy next of kin should be involved if patient becomes unable.  -LAMIN are his children Raza and Lisa HOLMAN should goals change would complete prior to discharge     2. Pain   -On fentanyl drip for sedation, wean as able per medical/icu team     3. Dyspnea  -Continue with full vent support and proning per medical team  -worse hypoxia     4. Spiritual Care  Oriented to Spiritual Health as part of Palliative Care team. Spiritual Health Services declined at this time.  Spiritual Background: not [employer] per son     5. Care Planning  Appreciate Care of Cony KHALIL for discharge planning as able.    Medical Decision Making and Goals of Care:  Discussed on December 3, 2021 with Lucy TORRES CNP: Phone call placed to son Raza. Reviewed condition and to get updates and answer questions. Discussed CPR in the setting of worsening hypoxia. Discussed that the family should keep talking about if this is what the patient would want in the setting of multiple organ failure. He stated they will continue to talk about it but for now they want all things done. Questions asked and answered Discussed that we will allow the family in when he is covid recovered. Likely soon    Lucy TORRES CNP  Pain Management and Palliative Care  [facility]  Pgr:  [phone]    Time Spent on this Encounter   Total unit/floor time 25 minutes, time consisted of the following, examination of the patient, reviewing the record and completing documentation. >50% of time spent in counseling and coordination of care, Bedside Nurse Yasemin and Hospitalist Dr Rush, Dr Menjivar.  Time spend counseling with family consisted of the following topics, goals of care.      Review of Systems   Unable to assess due to intubated and sedated    Physical Exam   Temp:  [97  F (36.1  C)-100.8  F (38.2  C)] 100.1  F (37.8  C)  Pulse:  [] 79  Resp:  [8-41] 21  MAP:  [63 mmHg-110 mmHg] 73 mmHg  Arterial Line BP: (117-186)/(45-74) 141/53  FiO2 (%):  [80 %-100 %] 85 %  SpO2:  [88 %-100 %] 94 %  194 lbs 14.19 oz  Exam:  GENERAL APPEARANCE:  intubated and sedated  RESP:  full vent support  CV:  Palpation and auscultation of heart done , regular rate and rhythm, no murmur, rub, or gallop  ABDOMEN:  normal bowel sounds, soft, nontender, no hepatosplenomegaly or other masses  PSYCH:  intubated and sedated    Medications     dextrose       fentaNYL 75 mcg/hr (12/03/21 0650)     propofol (DIPRIVAN) infusion 50 mcg/kg/min (12/03/21 0650)       - MEDICATION INSTRUCTIONS for Dialysis Patients -   Does not apply See Admin Instructions     acetaminophen  975 mg Per Feeding Tube Q8H     amLODIPine  5 mg Per Feeding Tube Daily     B and C vitamin Complex with folic acid  5 mL Oral or Feeding Tube Daily     bumetanide  2 mg Intravenous Q12H     chlorhexidine  15 mL Mouth/Throat BID     heparin ANTICOAGULANT  5,000 Units Subcutaneous Q8H     insulin aspart  4 Units Subcutaneous Q4H     insulin aspart  1-12 Units Subcutaneous Q4H     insulin glargine  8 Units Subcutaneous QAM AC     pantoprazole  40 mg Per Feeding Tube QAM AC     protein modular  2 packet Per Feeding Tube TID       Data   Results for orders placed or performed during the hospital encounter of 11/14/21 (from the past 24 hour(s))   Glucose by meter    Result Value Ref Range    GLUCOSE BY METER POCT 139 (H) 70 - 99 mg/dL   Glucose by meter   Result Value Ref Range    GLUCOSE BY METER POCT 122 (H) 70 - 99 mg/dL   Glucose by meter   Result Value Ref Range    GLUCOSE BY METER POCT 212 (H) 70 - 99 mg/dL   Glucose by meter   Result Value Ref Range    GLUCOSE BY METER POCT 178 (H) 70 - 99 mg/dL   Glucose by meter   Result Value Ref Range    GLUCOSE BY METER POCT 184 (H) 70 - 99 mg/dL   Glucose by meter   Result Value Ref Range    GLUCOSE BY METER POCT 196 (H) 70 - 99 mg/dL   CBC with Platelets & Differential    Narrative    The following orders were created for panel order CBC with Platelets & Differential.  Procedure                               Abnormality         Status                     ---------                               -----------         ------                     CBC with platelets and d...[[id number]]  Abnormal            Final result                 Please view results for these tests on the individual orders.   Phosphorus   Result Value Ref Range    Phosphorus 4.5 2.5 - 4.5 mg/dL   Basic metabolic panel   Result Value Ref Range    Sodium 134 133 - 144 mmol/L    Potassium 2.9 (L) 3.4 - 5.3 mmol/L    Chloride 99 94 - 109 mmol/L    Carbon Dioxide (CO2) 23 20 - 32 mmol/L    Anion Gap 12 3 - 14 mmol/L    Urea Nitrogen 54 (H) 7 - 30 mg/dL    Creatinine 1.83 (H) 0.66 - 1.25 mg/dL    Calcium 8.1 (L) 8.5 - 10.1 mg/dL    Glucose 237 (H) 70 - 99 mg/dL    GFR Estimate 38 (L) >60 mL/min/1.73m2   CBC with platelets and differential   Result Value Ref Range    WBC Count 15.4 (H) 4.0 - 11.0 10e3/uL    RBC Count 2.29 (L) 4.40 - 5.90 10e6/uL    Hemoglobin 6.6 (LL) 13.3 - 17.7 g/dL    Hematocrit 20.8 (L) 40.0 - 53.0 %    MCV 91 78 - 100 fL    MCH 28.8 26.5 - 33.0 pg    MCHC 31.7 31.5 - 36.5 g/dL    RDW 15.2 (H) 10.0 - 15.0 %    Platelet Count 144 (L) 150 - 450 10e3/uL    % Neutrophils 92 %    % Lymphocytes 3 %    % Monocytes 4 %    % Eosinophils 0 %    % Basophils 0 %     % Immature Granulocytes 1 %    NRBCs per 100 WBC 0 <1 /100    Absolute Neutrophils 14.2 (H) 1.6 - 8.3 10e3/uL    Absolute Lymphocytes 0.4 (L) 0.8 - 5.3 10e3/uL    Absolute Monocytes 0.6 0.0 - 1.3 10e3/uL    Absolute Eosinophils 0.0 0.0 - 0.7 10e3/uL    Absolute Basophils 0.0 0.0 - 0.2 10e3/uL    Absolute Immature Granulocytes 0.1 <=0.4 10e3/uL    Absolute NRBCs 0.0 10e3/uL   Blood gas arterial with oxyhemoglobin   Result Value Ref Range    pH Arterial 7.34 (L) 7.35 - 7.45    pCO2 Arterial 43 35 - 45 mm Hg    pO2 Arterial 76 (L) 80 - 105 mm Hg    Bicarbonate Arterial 23 21 - 28 mmol/L    Oxyhemoglobin Arterial 94 92 - 100 %    Base Excess/Deficit (+/-) -2.6 -9.0 - 1.8 mmol/L    FIO2 85    Prepare red blood cells (unit)   Result Value Ref Range    CROSSMATCH Compatible     UNIT ABO/RH O Pos     Unit Number [id number]     Unit Status Ready     Blood Component Type Red Blood Cells     Product Code [id number]     CODING SYSTEM [id number]     UNIT TYPE ISBT 5100    ABO/Rh type and screen    Narrative    The following orders were created for panel order ABO/Rh type and screen.  Procedure                               Abnormality         Status                     ---------                               -----------         ------                     Adult Type and Screen[[id number]]                            Edited Result - FINAL        Please view results for these tests on the individual orders.   Adult Type and Screen   Result Value Ref Range    ABO/RH(D) O POS     Antibody Screen Negative Negative    SPECIMEN EXPIRATION DATE [id number]    ABO and Rh   Result Value Ref Range    ABO/RH(D) O POS     SPECIMEN EXPIRATION DATE [id number]    ABO/Rh type and screen *Canceled*    Narrative    The following orders were created for panel order ABO/Rh type and screen.  Procedure                               Abnormality         Status                     ---------                               -----------         ------                      Adult Type and Screen[[id number]]                                                         Please view results for these tests on the individual orders.

## 2021-12-03 NOTE — PLAN OF CARE
ICU End of Shift Summary.  For vital signs and complete assessments, please see documentation flowsheets.     Pertinent assessments: RASS goal -1 to -2, LS coarse, BS audible - BM this shift. Del Valle in place. Tele - SR  Major Shift Events:   - HD run today, 4L removed   - FiO2 requirements increased to 85%   - Attempted to wean sedation. RR increased to 30s.   - Labetolol given once   Plan (Upcoming Events): Continue ICU cares  Discharge/Transfer Needs: TBD    Bedside Shift Report Completed : Y  Bedside Safety Check Completed: Y

## 2021-12-03 NOTE — PROGRESS NOTES
[facility] Nurse Inpatient Wound Assessment   Reason for consultation: Evaluate and treat  Lip, penis, right shin wounds    Assessment  Right upper lip wound due to bite injury  Status: healed  Pink mucosal tissue no wound present     Penis wound due to Friction  Status: Healed   No wound present     Right medial shin wound due to Skin Tear  Status: stable  Covered with a thin layer of dried drainage    Treatment Plan  Right upper lip wound: Daily    1. Leave open to air  2. Ensure ET tube is off area     Right medial shin wound: Leave open to air     Orders Reviewed  Recommended provider order: None, at this time  [facility] Nurse follow-up plan:weekly  Nursing to notify the Provider(s) and re-consult the WO Nurse if wound(s) deteriorates or new skin concern.    Patient History  According to provider note(s):  Rosanna Romero is a 66 year old male admitted on 11/14/2021 for COVID-19 infection. He has a history of DM, HTN, gout, HLD, and LINDSAY. Intubated 11/18 due to worsening hypoxemia and afib RVR, requiring intubation. AZALIA.      Objective Data  Containment of urine/stool: Indwelling catheter    Active Diet Order  Orders Placed This Encounter      NPO for Medical/Clinical Reasons Except for: NPO but receiving Tube Feeding, Meds      Output:   I/O last 3 completed shifts:  In: 2518.07 [I.V.:800.07; NG/GT:883]  Out: 375 [Urine:375]    Risk Assessment:   Sensory Perception: 1-->completely limited  Moisture: 3-->occasionally moist  Activity: 1-->bedfast  Mobility: 1-->completely immobile  Nutrition: 3-->adequate  Friction and Shear: 1-->problem  Taras Score: 10                          Labs:   Recent Labs   Lab 12/03/21  0449 12/02/21  0445   ALBUMIN  --  1.1*   HGB 6.6* 7.3*   WBC 15.4* 14.2*       Physical Exam  Areas of skin assessed: focused lip, penis, right shin    Wound Location:  Right upper lip  Date of last photo NA  Healed 12/03/21    Wound Location:  Penis  Date of last photo NA  Healed 12/03/21    Wound Location:  Right  medial shin  Date of last photo NA  Wound History: Skin tear with adhesive removal  Wound Base: 100 % dry drainage-thin layer     Palpation of the wound bed: normal      Drainage: none     Description of drainage: none     Measurements (length x width x depth, in cm)2.5x 2.0 cm      Tunneling N/A     Undermining N/A  Periwound skin: intact      Color: normal and consistent with surrounding tissue      Temperature: normal   Odor: none     Interventions  Visual inspection and assessment completed   Wound Care Rationale Promote moist wound healing without tissue dehydration  and Provide protection   Wound Care: done per plan of care  Supplies: discussed with RN  Current off-loading measures: Pillows under calves and Pillows  Current support surface: Standard  ICU  Education provided to: plan of care and Off-loading pressure  Discussed plan of care with Patient and Nurse    Gretchen Ness RN [facility] Nursing Student    Cosigned Israel Mayes RN CWOCN

## 2021-12-03 NOTE — PROGRESS NOTES
RT note    [facility] ICU VENTILATOR RESPIRATORY NOTE    Date of Admission: 11/14/21  Date of Intubation (most recent): 11/18/21  Reason for Mechanical Ventilation: Resp Failure  Number of Days on Mechanical Ventilation: 16  Met Criteria for Pressure Support Trial: NO  Reason for No Pressure Support Trial: 85% FiO2  Significant Events Today: none  ABG Results: 7.28/41/73/19 on 70% (PF ratio 104, supine) @0444 12/2/21  Current vent settings CMV/AC 24/420/+8/85%    Plan:  Will continue to monitor    RT Estelita on 12/3/2021 at 4:25 AM         “Patient's name, , procedure and correct site were confirmed during the [place] Timeout.”

## 2021-12-03 NOTE — PROGRESS NOTES
ICU Attending Note    I have seen and examined Rosanna Romero, reviewed the patient's history, pertinent labs, vital signs, medications, physical exam, and radiographs.  The patient is critically ill by my examination and requires continued ICU monitoring and cares    Rosanna Romero is admitted on 11/14/2021 for COVID-19 infection. Intubated 11/18 due to worsening hypoxemia and afib RVR,  AZALIA.  --needing HD starting 11/22.    Recent Events  No significant changes.  Dialysis yesterday.  O2 needs continue to slowly increase    Exam:  Temp:  [97  F (36.1  C)-100.8  F (38.2  C)] 98.3  F (36.8  C)  Pulse:  [] 82  Resp:  [8-41] 22  MAP:  [63 mmHg-110 mmHg] 74 mmHg  Arterial Line BP: (117-186)/(45-74) 148/53  FiO2 (%):  [80 %-100 %] 85 %  SpO2:  [88 %-100 %] 94 %  @I/O last 3 completed shifts:  In: 1820 [I.V.:50; NG/GT:890]  Out: 4560 [Urine:560; Other:4000]  Ventilation Mode: CMV/AC  (Continuous Mandatory Ventilation/ Assist Control)  FiO2 (%): 85 %  Rate Set (breaths/minute): 24 breaths/min  Tidal Volume Set (mL): 420 mL  PEEP (cm H2O): 8 cmH2O  Oxygen Concentration (%): 85 %  Resp: 22    Lungs coarse  Heart rrr  abd soft, nontender  Ext warm, edematous    Results:  ABG   Recent Labs   Lab 12/03/21 0450 12/02/21 0444 12/01/21 0439 11/30/21 0439   PH 7.34* 7.28* 7.37 7.31*   PCO2 43 41 40 33*   PO2 76* 73* 71* 99   HCO3 23 19* 23 16*     CBC  Recent Labs   Lab 12/03/21 0449 12/02/21 0445 12/01/21 0442 11/30/21  0438   WBC 15.4* 14.2* 11.2* 9.6   HGB 6.6* 7.3* 7.7* 7.9*   HCT 20.8* 23.5* 24.8* 24.6*   * 129* 128* 115*     BMP  Recent Labs   Lab 12/03/21  1213 12/03/21  0829 12/03/21  0449 12/03/21  0422 12/02/21  0841 12/02/21  0445 12/01/21  0446 12/01/21  0442 11/30/21  0813 11/30/21  0438   NA  --   --  134  --   --  135  --  136  --  135   POTASSIUM  --   --  2.9*  --   --  3.6  --  3.5  --  3.2*   CHLORIDE  --   --  99  --   --  100  --  102  --  102   CO2  --   --  23  --   --  18*  --  21  --  16*    BUN  --   --  54*  --   --  74*  --  51*  --  85*   CR  --   --  1.83*  --   --  2.56*  --  1.96*  --  2.89*   * 172* 237* 196*   < > 187*   < > 149*   < > 186*    < > = values in this interval not displayed.     LFT  Recent Labs   Lab 12/02/21  0445 12/01/21  0442 11/30/21  0438   ALBUMIN 1.1* 1.2* 1.2*     PancreasNo lab results found in last 7 days.  INR  Lab Results   Component Value Date    INR 0.87 11/24/2021    INR 0.85 11/23/2021    INR 0.90 11/22/2021    INR 0.99 11/21/2021       Current Issues:  Neuro/ pain/ sedation:  - Not waking   Minimize sedation  - Goal RASS now -1 to 0     Pulmonary: COVID ARDS   O2 needs continue to increase. Continue..   P: PS trials when mental status improved.    Cardiovascular:   - off and on vasopressors with HD   - Amio bolus and drip started last week for afib RVR and ventricular tachycardia --remains in NSR , remains off amiodarone   P:  Pressors prn for HD volume removal     GI/Nutrition:  - Protein calorie malnutrition due to critical illness and stress response  Tube feeds    Renal/ Fluid Balance: Acute kidney injury and metabolic acidosis  - Acute kidney failure: dialysis as needed per nephrology    Endocrine: history of DM  - Hyperglycemia on admission. - glucoses improved   P: continue AM lantus  8 and novolog 4 q 4      ID:  COVID pneumonia   - empiric meropenem (11/18-11/21) and vancomycin (11/17-11/20)   Decadron day 10--completed   Got only 2 d remdesivir seconday to AZALIA   - follow up cultures--staph epi and candida only in sputum   - P: off antibiotics x 1 week     Hematology:  -mild anemia and thrombocytopenia--mild --slightly higher platelets.  Does not fit an HIT pattern    EPO today     MSK: history of gout  - holding allopurinol seconday to AZALIA     Lines/ tubes/ drains:  [place] Valle Catheter: PRESENT, indication: sedation, need to track outs  Central Lines: PRESENT  PICC Triple Lumen 11/21/21 Left Basilic-Site Assessment: WDL (Okay per RN)  CVC Double  Lumen Right External jugular Non - tunneled-Site Assessment: WDL    Prophylaxis:  - DVT Prophylaxis: Heparin SQ  - PUD Prophylaxis: protonix    Code Status: Full Code      Disposition:  -Prognosis is poor without any improvement.    Evaluation and management time exclusive of procedures was 30 minutes critical care time including:  examination with the ICU team, discussion of the patient's condition with other physicians and members of the care team, reviewing all data related to the patient, and time utilizing the EMR for documentation of this patient's care.      Nader Menjivar MD  Acute Care Surgery/Critical Care

## 2021-12-03 NOTE — PROGRESS NOTES
Respiratory Therapy Note    [facility] ICU VENTILATOR RESPIRATORY NOTE  Date of Admission: 11/14/21  Date of Intubation (most recent): 11/18/21  Reason for Mechanical Ventilation: Respiratory Failure  Number of Days on Mechanical Ventilation: 15  Met Criteria for Pressure Support Trial: No  Reason for No Pressure Support Trial: Patient's FiO2 at 85%  ABG Results: 7.28/41/73/19    Plan:  Continue to monitor patient's respiratory status.    Ventilation Mode: CMV/AC  (Continuous Mandatory Ventilation/ Assist Control)  FiO2 (%): 85 %  Rate Set (breaths/minute): 24 breaths/min  Tidal Volume Set (mL): 420 mL  PEEP (cm H2O): 8 cmH2O  Oxygen Concentration (%): 85 %  Resp: 14    RT to follow.     Laura Ramos RT  6:58 PM December 2, 2021

## 2021-12-04 NOTE — PROGRESS NOTES
[facility]    Medicine Progress Note - Hospitalist Service       Date of Admission:  11/14/2021    Assessment & Plan                Summary of Stay: Rosanna Romero is a 66-year-old male with medical history including diabetes not on insulin, neuropathy, hypertension, LINDSAY who is vaccinated against COVID-19 who was admitted on 11/14/2021 with cough and shortness of breath and recent positive COVID-19 test and was found to have acute hypoxic respiratory due to COVID-19 and suspected secondary bacterial pneumonia.  He has had evolving renal failure with worsening respiratory status and metabolic acidosis which prompted stabilization early 11/18 with intubation, central line A-line placement and transfer to the ICU.  He was given bicarbonate containing fluids, insulin drip, vasopressors and remains on the vent.     Acid-base status has improved but he is oliguric still.  Procalcitonin was dramatically elevated and he was covered for bacterial sepsis w/ empiric abx though these were stopped due to negative cultures on 11/22.  CT of his chest abdomen and pelvis did not show obvious source other than pneumonia though there was a question of some enteritis.     He remains intubated with evolving renal failure and shock, hemodynamics have now improved.  He began HD on 11/22.  Mental status continues to be an issue with weaning from vent.     Problem List/Assessment and Plan:      Septic shock, suspected pulmonary source, likely pneumonia.  Suspect the driving issue is COVID-19 pneumonia.  Treated w/ empiric abx.  --was on zosyn.  --meropenem and vancomycin on 11/18.  Discontinued vancomycin though that was likely on board until 11/22 when HD started.  --stopped meropenem 11/22.  --Has required intermittent pressors, now Hypertensive  --Blood cultures no growth to date  --Has a central line and arterial line  --will restart IV zosyn 12/4, and obtain sputum Cx given worsening hypoxia and infiltrates on  CXR.     Severe metabolic acidosis: Suspect due to combination of ATN from renal failure and ketosis potentially from DKA given severe hyperglycemia.  Acid-base status improved.  --Nephrology consulted  --HD per nephrology.  --Ketones improved, transitioned to subcu insulin.  --Trend basic metabolic panel     Hypokalemia: Defer replacement to Nephrology so we are both not replacing as he is undergoing HD.     Oliguric acute renal failure: Nephrology following. Had progressive renal failure and acidosis.  Initiated on HD on 11/22.    --Nephrology following, appreciate recommendations  --HD 12/2, 12/4.  --Bumex 2 mg IV BID started 11/30     Type 2 diabetes, evidence of DKA: Somewhat unusual.  Is a type II diabetic not normally on insulin (PTA on Glipizide and Metformin) and presented with significantly elevated ketones and blood sugars in the 300s.  Had been on insulin drip, now SQ.  --Lantus 8 units QAM   --Aspart 4 units Q4H in addition to NPO sliding scale insulin     Acute hypoxemic respiratory failure due to pneumonia, hemodynamic instability, volume overload: Intubated and sedated.    --Vent management as per intensivist.     Severe Malnutrition: Due to acute on chronic illness.  Continue TF.  Nutrition following.     Prominent constipation seen on CT scan: Added bowel regimen. Appears to be stooling.     Atrial fibrillation with RVR: Developed AF with RVR in addition to ventricular tachycardia.  He did receive Amiodarone bolus and drip but subsequently converted to NSR and Amiodarone discontinued.  - went back to a [facility] with RVR 12/3, amio restarted, but, still with RVR.  - will consult cardiology.     History of upper GI bleed: Continue Protonix.     Gout: Hold Allopurinol given AZALIA.     COVID-19: Decadron x 10 days completed.  Remdesivir stopped due to renal failure (received 2 doses).     HTN: PTA on Amlodipine, Lisinopril, Clonidine and Metoprolol on which were all initially held as he was requiring  pressors. No longer requiring pressors.  Using PRN IV Labetalol as has had spikes of HTN.  - restart norvasc.     Anemia and Thrombocytopenia: Likely due to acute illness, not HIT pattern.  Receiving EPO with HD.    - Anemia likely due to blood draws and critical illness.  - s/p 1 unit PRBC 12/3 transfusion.     Acute Toxic/Metabolic Encephalopathy: Patient will open his eyes but not follow commands when sedation is lightheaded.  He was on Benzodiazepines for sedation but has been off for a few days.  Could take several more days for benzodiazepines to clear.  No focal findings on exam.           Diet: NPO for Medical/Clinical Reasons Except for: NPO but receiving Tube Feeding, Meds  Adult Formula Drip Feeding: Continuous Nepro with Carbsteady; Nasojejunal; Goal Rate: 30; mL/hr; Medication - Feeding Tube Flush Frequency: At least 15-30 mL water before and after medication administration and with tube clogging; decrease to new ...    DVT Prophylaxis: Heparin SQ  Del Valle Catheter: PRESENT, indication: Strict 1-2 Hour I&O  Central Lines: PRESENT  PICC Triple Lumen 11/21/21 Left Basilic-Site Assessment: WDL  CVC Double Lumen Right External jugular Non - tunneled-Site Assessment: WDL  Code Status: Full Code      Disposition Plan   Expected discharge: 12/09/2021   recommended to transitional care unit once O2 use less than 2 liters/minute.     The patient's care was discussed with the Bedside Nurse.    Oscar Rush MD  Hospitalist Service  [facility]  Securely message with the [employer] Web Console (learn more here)  Text page via [employer] Paging/Directory        Clinically Significant Risk Factors Present on Admission                ______________________________________________________________________    Interval History     LGF. A flutter with RVR.    Data reviewed today: I reviewed all medications, new labs and imaging results over the last 24 hours. I personally reviewed no images or EKG's  today.    Physical Exam   Vital Signs: Temp: 100.3  F (37.9  C) Temp src: Axillary  Pulse: (!) 149   Resp: 28 SpO2: 95 % O2 Device: Mechanical Ventilator    Weight: 199 lbs 4.73 oz    Gen - Intubated and sedated.  Lungs - coarse BS.  Heart - tachycardic, S1+S2 nml, no m/g/r.  Abd - soft, NT, ND, + BS.  Ext - trace edema.    Data   Recent Labs   Lab 12/04/21  0758 12/04/21  0531 12/04/21  0453 12/04/21  0348 12/03/21  2033 12/03/21  1734 12/03/21  0829 12/03/21  0449 12/02/21  0841 12/02/21 0445 12/01/21 0446 12/01/21 0442   WBC  --  13.5*  --   --   --  13.7*  --  15.4*  --  14.2*  --  11.2*   HGB  --  7.5*  --   --   --  7.5*  --  6.6*  --  7.3*  --  7.7*   MCV  --  92  --   --   --  92  --  91  --  93  --  93   PLT  --  125*  --   --   --  129*  --  144*  --  129*  --  128*   NA  --   --  133  --   --   --   --  134  --  135  --  136   POTASSIUM  --   --  3.4  --   --  3.5  --  2.9*  --  3.6  --  3.5   CHLORIDE  --   --  99  --   --   --   --  99  --  100  --  102   CO2  --   --  18*  --   --   --   --  23  --  18*  --  21   BUN  --   --  75*  --   --   --   --  54*  --  74*  --  51*   CR  --   --  2.31*  --   --   --   --  1.83*  --  2.56*  --  1.96*   ANIONGAP  --   --  16*  --   --   --   --  12  --  17*  --  13   GRAZYNA  --   --  8.3*  --   --   --   --  8.1*  --  8.0*  --  8.1*   *  --  164* 127*   < >  --    < > 237*   < > 187*   < > 149*   ALBUMIN  --   --   --   --   --   --   --   --   --  1.1*  --  1.2*    < > = values in this interval not displayed.     Recent Results (from the past 24 hour(s))   XR Chest Port 1 View    Narrative    EXAM: XR CHEST PORT 1 VIEW  LOCATION: [facility]  DATE/TIME: 12/3/2021 6:50 PM    INDICATION: SOB  COMPARISON: 11/29/2021      Impression    IMPRESSION: Opacities throughout the right lung and within the left mid and lower lung field have significantly progressed compatible with pneumonia. No effusions. Endotracheal tube tip in satisfactory  position at the level of the clavicles. Enteric tube   extends into the stomach. Right IJ central line tip distal SVC. No pneumothorax. Left PICC line tip distal SVC.     Medications     amiodarone 1 mg/min (12/04/21 0800)     dextrose       fentaNYL 75 mcg/hr (12/04/21 0800)     heparin (porcine) 500 Units/hr (12/04/21 0850)     phenylephrine 1 mcg/kg/min (12/04/21 0800)     propofol (DIPRIVAN) infusion 40 mcg/kg/min (12/04/21 0800)       - MEDICATION INSTRUCTIONS for Dialysis Patients -   Does not apply See Admin Instructions     sodium chloride 0.9%  250 mL Intravenous Once in dialysis/CRRT     acetaminophen  975 mg Per Feeding Tube Q8H     B and C vitamin Complex with folic acid  5 mL Oral or Feeding Tube Daily     bumetanide  2 mg Intravenous Q12H     chlorhexidine  15 mL Mouth/Throat BID     epoetin radhames-epbx (RETACRIT) inj ESRD  10,000 Units Intravenous Once     sodium chloride (PF) 0.9%  1.3-2.6 mL Intracatheter Once in dialysis/CRRT    Followed by     heparin  3 mL Intracatheter Once in dialysis/CRRT     sodium chloride (PF) 0.9%  1.3-2.6 mL Intracatheter Once in dialysis/CRRT    Followed by     heparin  3 mL Intracatheter Once in dialysis/CRRT     heparin ANTICOAGULANT  5,000 Units Subcutaneous Q8H     insulin aspart  4 Units Subcutaneous Q4H     insulin aspart  1-12 Units Subcutaneous Q4H     insulin glargine  15 Units Subcutaneous QAM AC     pantoprazole  40 mg Per Feeding Tube QAM AC     piperacillin-tazobactam  3.375 g Intravenous Q6H     protein modular  2 packet Per Feeding Tube TID     sodium chloride (PF)  9 mL Intracatheter During Dialysis/CRRT (from stock)     sodium chloride (PF)  9 mL Intracatheter During Dialysis/CRRT (from stock)

## 2021-12-04 NOTE — CONSULTS
Cardiology brief note.  Patient with atrial flutter with 2-1 conduction at a heart rate of 150 bpm.  Blood pressure stable.  Sick with Covid pneumonia.  Currently on amiodarone.  Troponins trivially elevated.  LV function normal.    Consider cardioversion with or without ZAYDA if needed for hemodynamic support or if consistent with goals of care.  Seems to be tolerating the atrial flutter adequately at this time.  Would give it 24 more hours on the amiodarone to see if patient can convert on his own.  Otherwise can readdress whether cardioversion may help hemodynamics.

## 2021-12-04 NOTE — PROGRESS NOTES
ICU Attending Note    I have seen and examined Rosanna Romero, reviewed the patient's history, pertinent labs, vital signs, medications, physical exam, and radiographs.  The patient is critically ill by my examination and requires continued ICU monitoring and cares    Rosanna Romero is admitted on 11/14/2021 for COVID-19 infection. Intubated 11/18 due to worsening hypoxemia and afib RVR,  AZALIA.  --needing HD starting 11/22.    Recent Events  No significant progress.    Exam:  Temp:  [97.3  F (36.3  C)-100.3  F (37.9  C)] 98.7  F (37.1  C)  Pulse:  [] 149  Resp:  [12-30] 21  MAP:  [59 mmHg-100 mmHg] 77 mmHg  Arterial Line BP: ()/(42-74) 119/60  FiO2 (%):  [75 %-95 %] 75 %  SpO2:  [86 %-100 %] 98 %  @I/O last 3 completed shifts:  In: 3384.65 [I.V.:1896.65; NG/GT:618]  Out: 415 [Urine:415]  Ventilation Mode: CMV/AC  (Continuous Mandatory Ventilation/ Assist Control)  FiO2 (%): (S) 75 %  Rate Set (breaths/minute): 24 breaths/min  Tidal Volume Set (mL): 420 mL  PEEP (cm H2O): 8 cmH2O  Oxygen Concentration (%): 95 %  Resp: 21    Lungs very coarse  Heart rrr  abd soft, nontender  Ext warm, edematous    Results:  ABG   Recent Labs   Lab 12/04/21  0454 12/03/21  0450 12/02/21  0444 12/01/21  0439   PH 7.28* 7.34* 7.28* 7.37   PCO2 38 43 41 40   PO2 69* 76* 73* 71*   HCO3 18* 23 19* 23     CBC  Recent Labs   Lab 12/04/21  0531 12/03/21  1734 12/03/21  0449 12/02/21  0445   WBC 13.5* 13.7* 15.4* 14.2*   HGB 7.5* 7.5* 6.6* 7.3*   HCT 24.1* 24.1* 20.8* 23.5*   * 129* 144* 129*     BMP  Recent Labs   Lab 12/04/21  1147 12/04/21  0758 12/04/21  0453 12/04/21  0348 12/03/21  2033 12/03/21  1734 12/03/21  0829 12/03/21 0449 12/02/21  0841 12/02/21 0445 12/01/21 0446 12/01/21 0442   NA  --   --  133  --   --   --   --  134  --  135  --  136   POTASSIUM  --   --  3.4  --   --  3.5  --  2.9*  --  3.6  --  3.5   CHLORIDE  --   --  99  --   --   --   --  99  --  100  --  102   CO2  --   --  18*  --   --   --   --  23  --   18*  --  21   BUN  --   --  75*  --   --   --   --  54*  --  74*  --  51*   CR  --   --  2.31*  --   --   --   --  1.83*  --  2.56*  --  1.96*   * 146* 164* 127*   < >  --    < > 237*   < > 187*   < > 149*    < > = values in this interval not displayed.     LFT  Recent Labs   Lab 12/02/21  0445 12/01/21  0442 11/30/21  0438   ALBUMIN 1.1* 1.2* 1.2*     PancreasNo lab results found in last 7 days.  INR  Lab Results   Component Value Date    INR 0.87 11/24/2021    INR 0.85 11/23/2021    INR 0.90 11/22/2021    INR 0.99 11/21/2021       Current Issues:  Neuro/ pain/ sedation:  - Not waking   Minimize sedation  - Goal RASS now -1 to 0     Pulmonary: COVID ARDS   O2 needs continue to increase. Continue..   P: Continue full ventilator support    Cardiovascular:   New tachycardia, seems atrial flutter  - off and on vasopressors with HD   - Amio bolus and drip restarted  P:  Amio, cardiology consult    GI/Nutrition:  - Protein calorie malnutrition due to critical illness and stress response  Tube feeds    Renal/ Fluid Balance: Acute kidney injury and metabolic acidosis  - Acute kidney failure: dialysis as needed per nephrology    Endocrine: history of DM  - Hyperglycemia on admission. - glucoses improved   P: continue AM lantus  8 and novolog 4 q 4      ID:  COVID pneumonia   - empiric meropenem (11/18-11/21) and vancomycin (11/17-11/20)   Decadron day 10--completed   Got only 2 d remdesivir seconday to AZALIA   - follow up cultures--staph epi and candida only in sputum   - P: off antibiotics x 1 week   Labelled recovered.  Has had improved O2 and afebrile x 24 hours    Hematology:  -mild anemia and thrombocytopenia--mild --slightly higher platelets.  Does not fit an HIT pattern      MSK: history of gout  - holding allopurinol seconday to AZALIA     Lines/ tubes/ drains:  Del Valle Catheter: PRESENT, indication: sedation, need to track outs  Central Lines: PRESENT  PICC Triple Lumen 11/21/21 Left Basilic-Site Assessment: WDL  (Paty per RN)  CVC Double Lumen Right External jugular Non - tunneled-Site Assessment: WDL    Prophylaxis:  - DVT Prophylaxis: Heparin SQ  - PUD Prophylaxis: protonix    Code Status: Full Code      Disposition:  -Prognosis is poor without any improvement.    Evaluation and management time exclusive of procedures was 30 minutes critical care time including:  examination with the ICU team, discussion of the patient's condition with other physicians and members of the care team, reviewing all data related to the patient, and time utilizing the EMR for documentation of this patient's care.    Nader Menjivar MD  Acute Care Surgery/Critical Care

## 2021-12-04 NOTE — PROGRESS NOTES
Respiratory Therapy Note    Critical access hospital ICU VENTILATOR RESPIRATORY NOTE  Date of Admission: 11/14/21  Date of Intubation (most recent): 11/18/21  Reason for Mechanical Ventilation: Respiratory Failure  Number of Days on Mechanical Ventilation: 16  Met Criteria for Pressure Support Trial: No  Reason for No Pressure Support Trial: Patient's FiO2 at 95%  ABG Results: 7.34/43/76/23     Plan:  Continue to monitor patient's respiratory status.     Ventilation Mode: CMV/AC  (Continuous Mandatory Ventilation/ Assist Control)  FiO2 (%): 95 %  Rate Set (breaths/minute): 24 breaths/min  Tidal Volume Set (mL): 420 mL  PEEP (cm H2O): 8 cmH2O  Oxygen Concentration (%): 95 %  Resp: 14     RT to follow.     Laura Ramos RT  7:57 PM December 3, 2021

## 2021-12-04 NOTE — PROVIDER NOTIFICATION
Intensivist paged. Sats mid 80s @ 100 FiO2, PEEP increased to 14. No improvement in Sats. Per MD, no further interventions at this time.

## 2021-12-04 NOTE — PROGRESS NOTES
Inpatient Dialysis Progress Note            Assessment and Plan:       Rosanna Romero is a 66 year old male who was admitted on 11/14/2021.      1) Baseline Normal Kidney Function.     2) COVID-19 Pneumonia/ARDS - up to 90% FIO2 despite aggressive UF.       3) Hemodynamics - Back on phenylephrine.     4) AZALIA - oliguric/anuric.  Likely ATN due to sepsis.  Had contrast 11/17/21 but renal function was falling before then. Still oliguric.  Bumet added to help with fluid overload.       6) Hypercalcemia:  Calcium corrects to 10.3 accounting for hypoalbuminemia.  This may reflect his immobilized state.      7) Severe hypoalbuminemia - Protein malnutrition.        8) 8 kg up - but limited ability to remove fluid due to AFIB c RVR.     8) Anemia:  Multifactorial.  On EPO.       9) Hypokalemia - On K4 bath     Plan:     Limit UF to 2 kg today.  3 H run.  No improvement in oxygenation with UF  His prognosis is very poor, it seems  Next HD 12/7/21        Interval History:     AFIB c RVR overnight.  Amio increased.  Still on 90% oxygen despite UF in HD down to weight of 88.4 kg.          Dialysis Parameters:     Wt Readings from Last 4 Encounters:   12/04/21 90.4 kg (199 lb 4.7 oz)   06/30/21 84.5 kg (186 lb 4.8 oz)   01/24/19 90.7 kg (199 lb 14.4 oz)   10/10/17 95.4 kg (210 lb 6.4 oz)     I/O last 3 completed shifts:  In: 3384.65 [I.V.:1896.65; NG/GT:618]  Out: 415 [Urine:415]  BP Readings from Last 3 Encounters:   12/01/21 (!) 151/54   07/02/21 138/70       Routine, ONE TIME, Starting today For 1 Occurrences  Weight Loss (kg): 2  Dialysis Temp: 36.5  C  Access Device: CVC  Access Site: r   Dialyzer: Revaclear  Dialysis Bath: K 4  Blood Flow Rate (mL/min): 400  Total Treatment Time (hrs): 3  Heparin: low dose         Medications and Allergies:   Reviewed in EPIC      - MEDICATION INSTRUCTIONS for Dialysis Patients -   Does not apply See Admin Instructions     acetaminophen  975 mg Per Feeding Tube Q8H     B and C vitamin  Complex with folic acid  5 mL Oral or Feeding Tube Daily     bumetanide  2 mg Intravenous Q12H     chlorhexidine  15 mL Mouth/Throat BID     heparin  3 mL Intracatheter Once in dialysis/CRRT     sodium chloride (PF) 0.9%  1.3-2.6 mL Intracatheter Once in dialysis/CRRT    Followed by     heparin  3 mL Intracatheter Once in dialysis/CRRT     heparin ANTICOAGULANT  5,000 Units Subcutaneous Q8H     insulin aspart  4 Units Subcutaneous Q4H     insulin aspart  1-12 Units Subcutaneous Q4H     insulin glargine  15 Units Subcutaneous QAM AC     pantoprazole  40 mg Per Feeding Tube QAM AC     piperacillin-tazobactam  2.25 g Intravenous Q6H     protein modular  2 packet Per Feeding Tube TID     sodium chloride (PF)  9 mL Intracatheter During Dialysis/CRRT (from stock)     sodium chloride (PF)  9 mL Intracatheter During Dialysis/CRRT (from stock)     sodium chloride 0.9%, acetaminophen, acetaminophen, alteplase, alteplase, bisacodyl, dextrose, glucose **OR** dextrose **OR** glucagon, docusate, fentaNYL, naloxone **OR** naloxone **OR** naloxone **OR** naloxone, ondansetron **OR** ondansetron, sennosides, sodium chloride (PF), sodium chloride (PF)   No Known Allergies           Labs:     [facility]  Recent Labs   Lab 12/04/21  0758 12/04/21  0453 12/04/21  0348 12/04/21  0028 12/03/21  2033 12/03/21  1734 12/03/21  0829 12/03/21  0449 12/02/21  0841 12/02/21  0445 12/01/21  0446 12/01/21 0442   NA  --  133  --   --   --   --   --  134  --  135  --  136   POTASSIUM  --  3.4  --   --   --  3.5  --  2.9*  --  3.6  --  3.5   CHLORIDE  --  99  --   --   --   --   --  99  --  100  --  102   GRAZYNA  --  8.3*  --   --   --   --   --  8.1*  --  8.0*  --  8.1*   CO2  --  18*  --   --   --   --   --  23  --  18*  --  21   BUN  --  75*  --   --   --   --   --  54*  --  74*  --  51*   CR  --  2.31*  --   --   --   --   --  1.83*  --  2.56*  --  1.96*   * 164* 127* 160*   < >  --    < > 237*   < > 187*   < > 149*    < > = values in this interval  not displayed.     CBC  Recent Labs   Lab 12/04/21  0531 12/03/21  1734 12/03/21  0449 12/02/21  0445   WBC 13.5* 13.7* 15.4* 14.2*   HGB 7.5* 7.5* 6.6* 7.3*   HCT 24.1* 24.1* 20.8* 23.5*   MCV 92 92 91 93   * 129* 144* 129*     Lab Results   Component Value Date    AST 45 11/18/2021    ALT 16 11/18/2021    ALKPHOS 73 11/18/2021    BILITOTAL 0.5 11/18/2021            Physical Exam:   Vitals were reviewed in [facility]    Wt Readings from Last 3 Encounters:   12/04/21 90.4 kg (199 lb 4.7 oz)   06/30/21 84.5 kg (186 lb 4.8 oz)   01/24/19 90.7 kg (199 lb 14.4 oz)       Intake/Output Summary (Last 24 hours) at 12/4/2021 1055  Last data filed at 12/4/2021 1000  Gross per 24 hour   Intake 2667.18 ml   Output 460 ml   Net 2207.18 ml       GENERAL APPEARANCE: intubated, sedated  HEENT:  Eyes/ears/nose grossly normal, neck supple  RESP: vent noise  CV: irreg irreg, rapid  ABDOMEN: soft, nontender, bowel sounds normal  EXTREMITIES/SKIN: tr-1+ BLE edema, no rashes or lesions     Pt seen on dialysis.  Stable run.  Good BFR.      Attestation:  I have reviewed today's vital signs, notes, medications, labs and imaging.     Owen Da Silva MD  [facility] - Nephrology  [phone]

## 2021-12-04 NOTE — PLAN OF CARE
ICU End of Shift Summary.  For vital signs and complete assessments, please see documentation flowsheets.     Pertinent assessments: Afib RVR tonight HR 130s-145, multiple liquid stools    Major Shift Events:  Started on neosynephrine after  sustained hypotension.  Attempted  to bring amio from 1 to 0.5 but had to go back to 1- heart rate has remained unchanged.  Plan (Upcoming Events):  PF ratio is not good today- possibly prone???  Stool softeners were held yesterday- may need to consider rectal tube if they continue  Discharge/Transfer Needs: TBD    Bedside Shift Report Completed :   Bedside Safety Check Completed:

## 2021-12-04 NOTE — PROGRESS NOTES
[facility] ICU VENTILATOR RESPIRATORY NOTE  Date of Admission: 11/14/21  Date of Intubation (most recent): 11/18/21  Reason for Mechanical Ventilation: Respiratory Failure  Number of Days on Mechanical Ventilation: 17  Met Criteria for Pressure Support Trial: No  Reason for No Pressure Support Trial: Patient's FiO2 at 90%  ABG Results:     Ventilation Mode: CMV/AC  (Continuous Mandatory Ventilation/ Assist Control)  FiO2 (%): (S) 75 %  Rate Set (breaths/minute): 24 breaths/min  Tidal Volume Set (mL): 420 mL  PEEP (cm H2O): 8 cmH2O  Oxygen Concentration (%): 100 %  Resp: 19    Ventilation Mode: (S) PCV Plus assist  (Pressure Control Ventilation/ Assist Control)  FiO2 (%): (S) 100 %  Rate Set (breaths/minute): 24 breaths/min  Tidal Volume Set (mL): 420 mL  PEEP (cm H2O): 16 cmH2O  Oxygen Concentration (%): 100 %  Resp: 26    Pt getting Vt's of 150ml-200ml on VC. RT placed pt on PC to increased vol exchange.     Charles Stover, RT

## 2021-12-04 NOTE — PROGRESS NOTES
NUTRITION BRIEF NOTE     Chart check for nutrition support patient.     Recommendations:  Due to increasing propofol, will decrease rate of Nepro @ goal 30 ml/hr (720 ml/day) to provide 1296 kcals, 58 g PRO, 526 ml free H2O, 116 g CHO and 18 g Fiber daily.  Continue 6 pkts Prosource daily= 240 kcal/ 66 g protein  Total provisions including propofol (591 kcal/day at current rate)= 2127 kcal (25 kcal/kg, 106% PSU), 124 g protein (~1.5 g/kg)    Will continue to follow per protocol. Please page/consult as needed.     Chanda Escobar RD, LD  Pager - 3rd floor/ICU: [phone]  Pager - All other floors: [phone]  Pager - Weekend/holiday: [phone]  Office: [phone]

## 2021-12-04 NOTE — PROGRESS NOTES
Afib with RVR, complicated with hypotension.  Very high FIO2 and on diuresis.    Plan :Up titrate amiodarone to max dose  Start neosynephrine    Alyssa Duke MD  Pulmonary, Critical Care and Sleep Medicine  [facility]  Pager: [phone]

## 2021-12-04 NOTE — PROGRESS NOTES
Potassium   Date Value Ref Range Status   12/04/2021 3.4 3.4 - 5.3 mmol/L Final   07/02/2021 3.4 3.4 - 5.3 mmol/L Final     Hemoglobin   Date Value Ref Range Status   12/04/2021 7.5 (L) 13.3 - 17.7 g/dL Final   07/02/2021 10.8 (L) 13.3 - 17.7 g/dL Final     Creatinine   Date Value Ref Range Status   12/04/2021 2.31 (H) 0.66 - 1.25 mg/dL Final   07/02/2021 0.82 0.66 - 1.25 mg/dL Final     Urea Nitrogen   Date Value Ref Range Status   12/04/2021 75 (H) 7 - 30 mg/dL Final   07/02/2021 10 7 - 30 mg/dL Final     Sodium   Date Value Ref Range Status   12/04/2021 133 133 - 144 mmol/L Final   07/02/2021 141 133 - 144 mmol/L Final     INR   Date Value Ref Range Status   11/24/2021 0.87 0.85 - 1.15 Final       DIALYSIS PROCEDURE NOTE  Hepatitis status of previous patient on machine log was checked and verified ok to use with this patients hepatitis status.  Patient dialyzed for 3 hrs. on a K4 bath with a net fluid removal of  2L.  A BFR of 400 ml/min was obtained via a R IJ       The treatment plan was discussed with Dr. Da Silva during the treatment.    Total heparin received during the treatment: 1700 units.      Line flushed, clamped and capped with heparin 1:1000 1.6 mL (1600 units) per lumen    Meds  given: Epo   Complications: none        ICEBOAT? Timeout performed pre-treatment  I: Patient was identified using 2 identifiers  C:  Consent Signed Yes  E: Equipment preventative maintenance is current and dialysis delivery system OK to use  B: Hepatitis B Surface Antigen: neg; Draw Date: 11/22/21      Hepatitis B Surface Antibody: johann; Draw Date: 11/22/21  O: Dialysis orders present and complete prior to treatment  A: Vascular access verified and assessed prior to treatment  T: Treatment was performed at a clinically appropriate time  ?: Patient was allowed to ask questions and address concerns prior to treatment  See flowsheet in EPIC for further details and post assessment.  Machine water alarm in place and functioning.  Transducer pods intact and checked every 15min. .  Chlorine/Chloramine water system checked every 4 hours.

## 2021-12-04 NOTE — PLAN OF CARE
ICU End of Shift Summary.  For vital signs and complete assessments, please see documentation flowsheets.     Pertinent assessments: RASS goal -1 to -2, LS coarse, BS audible - BM this shift. Del Valle in place - low UOP. HD completed yesterday. Tele - SR with inverted T waves, around 1630 became Afib RVR - see below.  Major Shift Events:   - Propofol titrated to achieve RASS goal of -1 to -2. Pt becomes tachypnec in 30s to 40s.  - Around 1630 pt HR increased to 140s - 160s and SBP briefly 170. PRN labetolol given without improvement. Pt desatted to high 80s - increased FiO2. Physician notified - received orders to start amiodarone infusion.  - Received one unit of blood and one time dose potassium  Plan (Upcoming Events): Continue ICU cares  Discharge/Transfer Needs: TBD    Bedside Shift Report Completed : Y  Bedside Safety Check Completed: Y

## 2021-12-05 NOTE — PROGRESS NOTES
Mr. Romero has slowly deteriorated.  Dropped saturations after turning this evening without rebound.  Currently on 100% FiO2 with blood pressure dropping.  Discussed with daughter and son that all has been done for him and there is nothing left.  Also discussed the long term unlikelihood of meaningful survival with kidney failure, prolonged respiratory failure, deconditioning, etc.  They don't want him to suffer.  Will make comfort care, DNR and initiate comfort care measures when the rest of the family gathers.  Nader Menjivar MD

## 2021-12-05 NOTE — PROGRESS NOTES
[facility] Progress Note  RH: ICU    Was called in by staff for ventilator removal/end of life for Rosanna.  His children Lisa and Raza were in the room along with Lisa's fiance and Raza's girlfriend.  We spoke about the love, special memories, and hardships this family has faced.  I encouraged everyone to tell Rosanna whatever they needed to say.  We were joined by Rosanna's sister and her .     I provided and introduction to [facility], words of comfort and support, encouragement and acknowledgement, and a calm presence.      Yanet Ordaz     Intern    Pager: [phone]

## 2021-12-05 NOTE — PLAN OF CARE
ICU End of Shift Summary.  For vital signs and complete assessments, please see documentation flowsheets.     Pertinent assessments: RASS -1 to -2. CPOT 0. Afebrile. Tele AFib/SVT, -150s. MAPs>65 with Branden gtt. LS coarse, crackles. Progressive desaturation despite 100% FiO2 and increased PEEP. Anuric, UOP 155mL for shift.   Major Shift Events: HD 2 hrs, 2L off. Rapid deterioration with sats down to 50% and MAPs to 50s. Family called to bedside.   Plan (Upcoming Events): Transition to comfort cares, compassionate extubation  Discharge/Transfer Needs: Donor Referral line @ time of death. Reference # [id number]    Bedside Shift Report Completed : Y  Bedside Safety Check Completed: Y

## 2021-12-05 NOTE — DISCHARGE SUMMARY
[facility] Discharge Summary    Rosanna Romero MRN# [id number]   Age: 66 year old YOB: 1955     Date of Admission:  2021  Date of Discharge::  2021 11:15 PM  Admitting Physician:  Tristan Torres DO  Discharge Physician:  Nader Menjivar MD            Admission Diagnoses:   Generalized muscle weakness [M62.81]  Physical deconditioning [R53.81]  Infection due to 2019 novel coronavirus [U07.1]  COVID-19 [U07.1]            Discharge Diagnosis:   Infection due to 2019 novel coronavirus [U07.1]  Kidney failure          Procedures:   none       Medications Prior to Admission:     No medications prior to admission.               Consultations:   Consultation during this admission received from nephrology          Brief History of Illness:   Pt admitted with COVID-19 and respiratory failure.  Eventually intubated with deteriorating respiratory function.  Eventually made comfort cares and DNR when family recognized there was minimal chance of meaningful recovery.           Hospital Course:   See above          Discharge Instructions and Follow-Up:          Discharge Disposition:   Patient  during this admission          Nader Menjivar MD

## 2021-12-06 LAB
BACTERIA SPT CULT: ABNORMAL
GRAM STAIN RESULT: ABNORMAL
GRAM STAIN RESULT: ABNORMAL

## 2021-12-08 LAB
ATRIAL RATE - MUSE: 300 BPM
DIASTOLIC BLOOD PRESSURE - MUSE: NORMAL MMHG
INTERPRETATION ECG - MUSE: NORMAL
P AXIS - MUSE: 89 DEGREES
PR INTERVAL - MUSE: NORMAL MS
QRS DURATION - MUSE: 90 MS
QT - MUSE: 338 MS
QTC - MUSE: 516 MS
R AXIS - MUSE: 77 DEGREES
SYSTOLIC BLOOD PRESSURE - MUSE: NORMAL MMHG
T AXIS - MUSE: 105 DEGREES
VENTRICULAR RATE- MUSE: 140 BPM

## 2023-01-25 NOTE — PROGRESS NOTES
[facility] ICU VENTILATOR RESPIRATORY NOTE  Date of Admission: 11/14/21  Date of Intubation (most recent): 11/18/21  Reason for Mechanical Ventilation: Respiratory Failure  Number of Days on Mechanical Ventilation: 17  Met Criteria for Pressure Support Trial: No  Reason for No Pressure Support Trial: Patient's FiO2 at 90%  ABG Results:   Recent Labs   Lab 12/03/21  0450 12/02/21  0444 12/01/21  0439 11/30/21  0439   PH 7.34* 7.28* 7.37 7.31*   PCO2 43 41 40 33*   PO2 76* 73* 71* 99   HCO3 23 19* 23 16*   O2PER 85 70 70 40     Ventilation Mode: CMV/AC  (Continuous Mandatory Ventilation/ Assist Control)  FiO2 (%): 90 %  Rate Set (breaths/minute): 24 breaths/min  Tidal Volume Set (mL): 420 mL  PEEP (cm H2O): 8 cmH2O  Oxygen Concentration (%): 90 %  Resp: 30         Plan:  Continue to monitor patient's respiratory status.   Graft Donor Site Bandage (Optional-Leave Blank If You Don't Want In Note): Steri-strips and a pressure bandage were applied to the donor site.

## 2024-04-18 NOTE — PLAN OF CARE
ICU End of Shift Summary.  For vital signs and complete assessments, please see documentation flowsheets.      Pertinent assessments:   Pt is sedated, RASS -4, LS dim, Vent FiO2 65%, Tidal 500, RR 24 and PEEP 10. Tele SR w/ invert T waves. Bs active, BM x 2. Del Valle in place with minimal UOP. +2/+3 edema throughout body. Scrotum red and swollen. Penile wound, Lip wound, Leg wound care done per WOC. PICC running Fent, Prop, Amio and NS. R radial art line. R hemodialysis access where R internal jugular was.    Major Shift Events:     Swapped out R internal jugular for Hemodialysis catheter in R internal jugular.    Hemodialysis today 1 L removed.    Hypotensive during dialysis started LEVO 1341 & stopped 1700.    Prone 1735.  Plan (Upcoming Events): Continue to wean vent and sedation as tolerated. Hemodialysis tomorrow.  Discharge/Transfer Needs: TBD     Bedside Shift Report Completed : Y  Bedside Safety Check Completed: Y          Lab Frequency Next Occurrence   CT CALCIUM SCORING Once 03/14/2024     Letter mailed to patient reminding them they have outstanding orders.